# Patient Record
Sex: MALE | Race: BLACK OR AFRICAN AMERICAN | Employment: OTHER | URBAN - METROPOLITAN AREA
[De-identification: names, ages, dates, MRNs, and addresses within clinical notes are randomized per-mention and may not be internally consistent; named-entity substitution may affect disease eponyms.]

---

## 2017-11-01 ENCOUNTER — HOSPITAL ENCOUNTER (EMERGENCY)
Age: 72
Discharge: HOME | End: 2017-11-01
Attending: EMERGENCY MEDICINE
Payer: MEDICARE

## 2017-11-01 VITALS
HEIGHT: 66 IN | HEART RATE: 70 BPM | BODY MASS INDEX: 23.78 KG/M2 | RESPIRATION RATE: 20 BRPM | OXYGEN SATURATION: 100 % | DIASTOLIC BLOOD PRESSURE: 99 MMHG | TEMPERATURE: 98.5 F | SYSTOLIC BLOOD PRESSURE: 180 MMHG | WEIGHT: 148 LBS

## 2017-11-01 DIAGNOSIS — L02.91 ABSCESS: Primary | ICD-10-CM

## 2017-11-01 PROCEDURE — 99282 EMERGENCY DEPT VISIT SF MDM: CPT | Performed by: PHYSICIAN ASSISTANT

## 2017-11-01 RX ORDER — CLINDAMYCIN HYDROCHLORIDE 150 MG/1
150 CAPSULE ORAL 4 TIMES DAILY
Qty: 40 CAP | Refills: 0 | Status: SHIPPED | OUTPATIENT
Start: 2017-11-01 | End: 2018-09-27

## 2017-11-01 NOTE — ED PROVIDER NOTES
Patient is a 73 y.o. male presenting with wound check. The history is provided by the patient. Wound Check    This is a new problem. The current episode started more than 2 days ago. The problem occurs constantly. The problem has been gradually improving. Pain location: left hip/waist area  The quality of the pain is described as aching. The pain is at a severity of 5/10. The pain is mild. The symptoms are aggravated by activity and palpation. He has tried heat for the symptoms. The treatment provided moderate relief. There has been no history of extremity trauma. Past Medical History:   Diagnosis Date    Chronic abdominal pain     EGD, Colonoscopy, CT Abdomen/pelvis-colon polyps, scattered diverticula    EtOH dependence (HCC)     GERD (gastroesophageal reflux disease)     Hypertension     [facility] 9/74/4243       Past Surgical History:   Procedure Laterality Date    HX COLONOSCOPY  4/2016    polyps    HX ENDOSCOPY  4/2016    unremarkable         Family History:   Problem Relation Age of Onset    Hypertension Mother     Kidney Disease Mother     Cancer Father      bone       Social History     Social History    Marital status: SINGLE     Spouse name: N/A    Number of children: N/A    Years of education: N/A     Occupational History    Retired       Social History Main Topics    Smoking status: Former Smoker     Packs/day: 1.00     Years: 15.00     Types: Cigarettes    Smokeless tobacco: Never Used    Alcohol use 0.0 oz/week     0 Standard drinks or equivalent per week      Comment: 5 cans of beer/day    Drug use: Yes     Special: Marijuana    Sexual activity: Not on file     Other Topics Concern    Not on file     Social History Narrative    Lives with his sister. ALLERGIES: Review of patient's allergies indicates no known allergies. Review of Systems   All other systems reviewed and are negative.       Vitals:    11/01/17 0908   BP: (!) 180/99   Pulse: 70   Resp: 20   Temp: 98.5 °F (36.9 °C)   SpO2: 100%   Weight: 67.1 kg (148 lb)   Height: 5' 6\" (1.676 m)            Physical Exam   Constitutional: He is oriented to person, place, and time. He appears well-developed and well-nourished. No distress. HENT:   Head: Normocephalic and atraumatic. Eyes: Conjunctivae and EOM are normal. Pupils are equal, round, and reactive to light. Neck: Normal range of motion. Neck supple. Cardiovascular: Normal rate and regular rhythm. Pulmonary/Chest: Effort normal and breath sounds normal. No respiratory distress. He has no wheezes. Abdominal: Soft. Bowel sounds are normal.   Musculoskeletal: He exhibits tenderness. He exhibits no edema. Left upper hip area with shallow wound,slight yellow drainage    Neurological: He is alert and oriented to person, place, and time. Skin: Skin is warm. Nursing note and vitals reviewed.        MDM  Number of Diagnoses or Management Options  Diagnosis management comments: Draining abscess, no need for any further I&D  Placed on cleocin, and wound covered        Amount and/or Complexity of Data Reviewed  Review and summarize past medical records: yes    Risk of Complications, Morbidity, and/or Mortality  Presenting problems: low  Diagnostic procedures: low  Management options: low    Patient Progress  Patient progress: improved    ED Course       Procedures

## 2017-11-01 NOTE — DISCHARGE INSTRUCTIONS

## 2018-07-14 NOTE — ED TRIAGE NOTES
Pt arrive c/o abscess to left hip x15 years that \"popped\" last week. Pt states soreness.
Principal Discharge DX:	Pancreatitis  Secondary Diagnosis:	Pancreatic pseudocyst

## 2019-05-30 ENCOUNTER — ANESTHESIA EVENT (OUTPATIENT)
Dept: ENDOSCOPY | Age: 74
End: 2019-05-30
Payer: MEDICARE

## 2019-05-30 RX ORDER — SODIUM CHLORIDE, SODIUM LACTATE, POTASSIUM CHLORIDE, CALCIUM CHLORIDE 600; 310; 30; 20 MG/100ML; MG/100ML; MG/100ML; MG/100ML
100 INJECTION, SOLUTION INTRAVENOUS CONTINUOUS
Status: CANCELLED | OUTPATIENT
Start: 2019-05-30

## 2019-05-31 ENCOUNTER — HOSPITAL ENCOUNTER (OUTPATIENT)
Age: 74
Setting detail: OUTPATIENT SURGERY
Discharge: HOME | End: 2019-05-31
Attending: INTERNAL MEDICINE | Admitting: INTERNAL MEDICINE
Payer: MEDICARE

## 2019-05-31 ENCOUNTER — ANESTHESIA (OUTPATIENT)
Dept: ENDOSCOPY | Age: 74
End: 2019-05-31
Payer: MEDICARE

## 2019-05-31 VITALS
OXYGEN SATURATION: 98 % | SYSTOLIC BLOOD PRESSURE: 169 MMHG | RESPIRATION RATE: 18 BRPM | HEIGHT: 66 IN | BODY MASS INDEX: 24.91 KG/M2 | HEART RATE: 68 BPM | DIASTOLIC BLOOD PRESSURE: 74 MMHG | WEIGHT: 155 LBS | TEMPERATURE: 97.6 F

## 2019-05-31 LAB
ALBUMIN SERPL-MCNC: 3.4 G/DL (ref 3.2–4.6)
ALBUMIN/GLOB SERPL: 1 {RATIO} (ref 1.2–3.5)
ALP SERPL-CCNC: 41 U/L (ref 50–136)
ALT SERPL-CCNC: 38 U/L (ref 12–65)
AST SERPL-CCNC: 29 U/L (ref 15–37)
BILIRUB DIRECT SERPL-MCNC: 0.2 MG/DL
BILIRUB SERPL-MCNC: 0.5 MG/DL (ref 0.2–1.1)
GLOBULIN SER CALC-MCNC: 3.5 G/DL (ref 2.3–3.5)
GLUCOSE BLD STRIP.AUTO-MCNC: 104 MG/DL (ref 65–100)
LIPASE SERPL-CCNC: 137 U/L (ref 73–393)
PROT SERPL-MCNC: 6.9 G/DL (ref 6.3–8.2)

## 2019-05-31 PROCEDURE — 80076 HEPATIC FUNCTION PANEL: CPT

## 2019-05-31 PROCEDURE — 74011250636 HC RX REV CODE- 250/636: Performed by: ANESTHESIOLOGY

## 2019-05-31 PROCEDURE — 77030021593 HC FCPS BIOP ENDOSC BSC -A: Performed by: INTERNAL MEDICINE

## 2019-05-31 PROCEDURE — 88305 TISSUE EXAM BY PATHOLOGIST: CPT

## 2019-05-31 PROCEDURE — 76040000026: Performed by: INTERNAL MEDICINE

## 2019-05-31 PROCEDURE — 77030013991 HC SNR POLYP ENDOSC BSC -A: Performed by: INTERNAL MEDICINE

## 2019-05-31 PROCEDURE — 82962 GLUCOSE BLOOD TEST: CPT

## 2019-05-31 PROCEDURE — 74011250636 HC RX REV CODE- 250/636

## 2019-05-31 PROCEDURE — 83690 ASSAY OF LIPASE: CPT

## 2019-05-31 PROCEDURE — 36415 COLL VENOUS BLD VENIPUNCTURE: CPT

## 2019-05-31 PROCEDURE — 88312 SPECIAL STAINS GROUP 1: CPT

## 2019-05-31 PROCEDURE — 76060000032 HC ANESTHESIA 0.5 TO 1 HR: Performed by: INTERNAL MEDICINE

## 2019-05-31 RX ORDER — PROPOFOL 10 MG/ML
INJECTION, EMULSION INTRAVENOUS AS NEEDED
Status: DISCONTINUED | OUTPATIENT
Start: 2019-05-31 | End: 2019-05-31 | Stop reason: HOSPADM

## 2019-05-31 RX ORDER — SODIUM CHLORIDE, SODIUM LACTATE, POTASSIUM CHLORIDE, CALCIUM CHLORIDE 600; 310; 30; 20 MG/100ML; MG/100ML; MG/100ML; MG/100ML
100 INJECTION, SOLUTION INTRAVENOUS CONTINUOUS
Status: DISCONTINUED | OUTPATIENT
Start: 2019-05-31 | End: 2019-05-31 | Stop reason: HOSPADM

## 2019-05-31 RX ORDER — LIDOCAINE HYDROCHLORIDE 20 MG/ML
INJECTION, SOLUTION EPIDURAL; INFILTRATION; INTRACAUDAL; PERINEURAL AS NEEDED
Status: DISCONTINUED | OUTPATIENT
Start: 2019-05-31 | End: 2019-05-31 | Stop reason: HOSPADM

## 2019-05-31 RX ORDER — PROPOFOL 10 MG/ML
INJECTION, EMULSION INTRAVENOUS
Status: DISCONTINUED | OUTPATIENT
Start: 2019-05-31 | End: 2019-05-31 | Stop reason: HOSPADM

## 2019-05-31 RX ORDER — HYDRALAZINE HYDROCHLORIDE 20 MG/ML
10 INJECTION INTRAMUSCULAR; INTRAVENOUS ONCE
Status: COMPLETED | OUTPATIENT
Start: 2019-05-31 | End: 2019-05-31

## 2019-05-31 RX ADMIN — PROPOFOL 45 MG: 10 INJECTION, EMULSION INTRAVENOUS at 12:15

## 2019-05-31 RX ADMIN — HYDRALAZINE HYDROCHLORIDE 10 MG: 20 INJECTION INTRAMUSCULAR; INTRAVENOUS at 13:29

## 2019-05-31 RX ADMIN — PROPOFOL 140 MCG/KG/MIN: 10 INJECTION, EMULSION INTRAVENOUS at 12:13

## 2019-05-31 RX ADMIN — LIDOCAINE HYDROCHLORIDE 100 MG: 20 INJECTION, SOLUTION EPIDURAL; INFILTRATION; INTRACAUDAL; PERINEURAL at 12:13

## 2019-05-31 RX ADMIN — PROPOFOL 40 MG: 10 INJECTION, EMULSION INTRAVENOUS at 12:13

## 2019-05-31 RX ADMIN — SODIUM CHLORIDE, SODIUM LACTATE, POTASSIUM CHLORIDE, AND CALCIUM CHLORIDE 100 ML/HR: 600; 310; 30; 20 INJECTION, SOLUTION INTRAVENOUS at 12:02

## 2019-05-31 NOTE — H&P
[facility] Consult Note           Referring Physician:     Consult Date: 2019    Reason for Consult: epigastric pain and PMH polyps    History of Present Illness:  Patient is a 72 y.o. male who is seen in consultation for epigastric pain and PMH polyps. Past Medical History:   Diagnosis Date    Chronic abdominal pain     EGD, Colonoscopy, CT Abdomen/pelvis-colon polyps, scattered diverticula    EtOH dependence (HCC)     GERD (gastroesophageal reflux disease)     Hypertension     Type [facility] 3466    does not check blood sugars      Past Surgical History:   Procedure Laterality Date    HX COLONOSCOPY  2016    polyps    HX ENDOSCOPY  2016    unremarkable      Family History   Problem Relation Age of Onset    Hypertension Mother     Kidney Disease Mother     Cancer Father         bone     Social History     Occupational History    Occupation: Retired    Tobacco Use    Smoking status: Former Smoker     Packs/day: 1.00     Years: 15.00     Pack years: 15.00     Types: Cigarettes     Last attempt to quit:      Years since quittin.4    Smokeless tobacco: Never Used   Substance and Sexual Activity    Alcohol use: Yes     Alcohol/week: 19.2 oz     Types: 32 Cans of beer per week     Frequency: 4 or more times a week     Drinks per session: 3 or 4     Binge frequency: Never    Drug use: Yes     Types: Marijuana    Sexual activity: Not on file       Hospital Medications:  No current facility-administered medications for this encounter. Allergies:  No Known Allergies    Review of Systems:  A comprehensive review of systems was negative except for that written in the History of Present Illness. Objective:     Physical Exam:  Vitals:  Visit Vitals  BP (!) 189/94   Pulse 69   Resp 17   Ht 5' 6\" (1.676 m)   Wt 70.3 kg (155 lb)   SpO2 100%   BMI 25.02 kg/m²       General: No acute distress.   Skin:  Extremities and face reveal no rashes. No beck erythema. No telangiectasias on the chest wall. HEENT: Sclerae anicteric. No oral ulcers. No abnormal pigmentation of the lips. The neck is supple. Cardiovascular: Regular rate and rhythm. No murmurs, gallops, or rubs. Respiratory:  Comfortable breathing  With no accessory muscle use. Clear breath sounds with no wheezes, rales, or rhonchi. GI:  Abdomen nondistended, soft, and nontender. Normal active bowel sounds. No enlargement of the liver or spleen. No masses palpable. Musculoskeletal:  No pitting edema of the lower legs. Extremities have good range of motion. Neurological:  Gross memory appears intact. Patient is alert and oriented. Psychiatric:  Mood appears appropriate with judgement intact. Lymphatic:  No cervical or supraclavicular adenopathy. Laboratory:    No results for input(s): WBC, RBC, HGB, HCT, PLT, HGBEXT, HCTEXT, PLTEXT in the last 72 hours. No results for input(s): GLU, NA, K, CL, CO2, BUN, CREA, CA in the last 72 hours. No results for input(s): PTP, INR, APTT in the last 72 hours. No lab exists for component: INREXT  No results for input(s): TBIL, CBIL, SGOT, GPT, AP, ALB, TP, AML, LPSE in the last 72 hours.     Assessment:       A 72 y.o. male with epigastric pain and PMH polyps      Plan:       EGD and colonoscopy    Signed By: Morris Vela MD     May 31, 2019

## 2019-05-31 NOTE — ROUTINE PROCESS
VSS. No complaints noted. Education given and reviewed with friend who voiced understanding. Pt wheeled out via wheelchair by ngozi Castañeda.

## 2019-05-31 NOTE — ANESTHESIA PREPROCEDURE EVALUATION
Relevant Problems   No relevant active problems       Anesthetic History   No history of anesthetic complications            Review of Systems / Medical History  Patient summary reviewed and pertinent labs reviewed    Pulmonary          Smoker (Former)         Neuro/Psych   Within defined limits           Cardiovascular    Hypertension: poorly controlled              Exercise tolerance: >4 METS  Comments: Denies CP, SOB or changes in functional status  Pt states he walks several miles per day without issues   GI/Hepatic/Renal     GERD: well controlled           Endo/Other    Diabetes: type 2         Other Findings   Comments: ETOH abuse           Physical Exam    Airway  Mallampati: II  TM Distance: 4 - 6 cm  Neck ROM: normal range of motion   Mouth opening: Normal     Cardiovascular    Rhythm: regular  Rate: normal         Dental    Dentition: Edentulous     Pulmonary  Breath sounds clear to auscultation               Abdominal  GI exam deferred       Other Findings            Anesthetic Plan    ASA: 3  Anesthesia type: total IV anesthesia          Induction: Intravenous  Anesthetic plan and risks discussed with: Patient

## 2019-05-31 NOTE — PROCEDURES
Colonoscopy Procedure Note    Indications: PMH polyps    Anesthesia/Sedation: MAC IV     Pre-Procedure Physical:  Current Facility-Administered Medications   Medication Dose Route Frequency    lactated Ringers infusion  100 mL/hr IntraVENous CONTINUOUS     Facility-Administered Medications Ordered in Other Encounters   Medication Dose Route Frequency    lidocaine (PF) (XYLOCAINE) 20 mg/mL (2 %) injection   IntraVENous PRN    propofol (DIPRIVAN) 10 mg/mL injection    PRN    propofol (DIPRIVAN) 10 mg/mL injection    CONTINUOUS      Patient has no known allergies. Patient Vitals for the past 8 hrs:   BP Pulse Resp SpO2 Height Weight   05/31/19 1148 (!) 189/94 69 17 100 %     05/31/19 1110     5' 6\" (1.676 m) 70.3 kg (155 lb)       Exam:    Airway: clear   Heart: normal S1and S2    Lungs: clear bilateral  Abdomen: soft, nontender, bowel sounds present and normal in all quads   Mental Status: awake, alert and oriented to person, place and time        Procedure Details      Informed consent was obtained for the procedure, including sedation. Risks of perforation, hemorrhage, adverse drug reaction and aspiration were discussed. The patient was placed in the left lateral decubitus position. Based on the pre-procedure assessment, including review of the patient's medical history, medications, allergies, and review of systems, he had been deemed to be an appropriate candidate for conscious sedation; he was therefore sedated with the medications listed below. The patient was monitored continuously with ECG tracing, pulse oximetry, blood pressure monitoring, and direct observations. A rectal examination was performed. The colonoscope was inserted into the rectum and advanced under direct vision to the cecum. The quality of the colonic preparation was fair.  A careful inspection was made as the colonoscope was withdrawn, including a retroflexed view of the rectum; findings and interventions are described below. Appropriate photodocumentation was obtained. Findings:   Single 6 mm sessile polyp removed by cold snare from sigmoid  Single 1 cm sessile polyp removed by hot snare from rectum     Specimens: Polyps    Estimated Blood Loss: 0 cc           Complications: None; patient tolerated the procedure well. Attending Attestation: I performed the procedure.     Impression:    Single 6 mm sessile polyp removed by cold snare from sigmoid  Single 1 cm sessile polyp removed by hot snare from rectum    Recommendations:   Next colonoscopy in 2 years

## 2019-05-31 NOTE — PROCEDURES
Endoscopic Gastroduodenoscopy Procedure Note    Indications: Epigastric pain    Anesthesia/Sedation: MAC IV     Pre-Procedure Physical:    No current facility-administered medications for this encounter. Patient has no known allergies. Patient Vitals for the past 8 hrs:   BP Pulse Resp SpO2 Height Weight   05/31/19 1148 (!) 189/94 69 17 100 %     05/31/19 1110     5' 6\" (1.676 m) 70.3 kg (155 lb)       Exam      Airway: clear   Heart: normal S1and S2    Lungs: clear bilateral  Abdomen: soft, nontender, bowel sounds present and normal in all quads   Mental Status: awake, alert and oriented to person, place and time          Procedure Details     Informed consent was obtained for the procedure, including conscious sedation. Risks of pancreatitis, infection, perforation, hemorrhage, adverse drug reaction and aspiration were discussed. The patient was placed in the left lateral decubitus position. Based on the pre-procedure assessment, including review of the patient's medical history, medications, allergies, and review of systems, he had been deemed to be an appropriate candidate for conscious sedation; he was therefore sedated with the medications listed below. He was monitored continuously with ECG tracing, pulse oximetry, blood pressure monitoring, and direct observation. The EGD gastroscope was inserted into the mouth and advanced under direct vision to the second portion of the duodenum. A careful inspection was made as the gastroscope was withdrawn, including a retroflexed view of the proximal stomach; findings and interventions are described below. Appropriate photodocumentation was obtained. Findings:   Esophagus- Small non-bleeding Judie Thompson tears at [facility]. Stomach- Normal.  Biopsied. Duodenum- Normal.    Therapies: Biopsy    Specimens: Gastric    Estimated Blood Loss: 0 cc           Complications:   None; patient tolerated the procedure well.            Attending Attestation:  I performed the procedure. Impression:    Small non-bleeding Judie Thompson tears at [facility]. Random gastric biopsies taken. Recommendations:  Await path results. Protonix 40 bid. Check LFT's and lipase.

## 2019-05-31 NOTE — PROGRESS NOTES
Blood pressure 169/74. Pt may be discharged at this time per Dr. Dennis Molina. Pt instructed to get blood pressure medication filled today.

## 2019-05-31 NOTE — PROGRESS NOTES
SBP >200 pt no s/s. Dr. Precious Coles at bedside. Orders received to give 10mg hydralazine IV. Will continue to monitor.

## 2019-05-31 NOTE — PROGRESS NOTES
TRANSFER - OUT REPORT:    Verbal report given to [address] [place] [address] on Zoë Kingston  being transferred to [phone] for routine post - op       Report consisted of patients Situation, Background, Assessment and   Recommendations(SBAR). Information from the following report(s) SBAR and MAR was reviewed with the receiving nurse. Lines:   Peripheral IV 05/31/19 Right Hand (Active)   Site Assessment Clean, dry, & intact 5/31/2019 12:54 PM   Phlebitis Assessment 0 5/31/2019 12:54 PM   Infiltration Assessment 0 5/31/2019 12:54 PM   Dressing Status Clean, dry, & intact 5/31/2019 12:54 PM   Dressing Type Transparent;Tape 5/31/2019 12:54 PM   Hub Color/Line Status Blue; Infusing;Patent 5/31/2019 12:00 PM   Alcohol Cap Used No 5/31/2019 12:54 PM        Opportunity for questions and clarification was provided.       Patient transported with:   [employer]

## 2019-05-31 NOTE — DISCHARGE INSTRUCTIONS
Gastrointestinal Esophagogastroduodenoscopy (EGD) - Upper Exam Discharge Instructions    1. Call Dr. Lesa Hu for any problems or questions. 2. Contact the doctor's office for follow up appointment as directed. 3. Medication may cause drowsiness for several hours, therefore:  · Do not drive or operate machinery for remainder of the day. · No alcohol today. · Do not make any important or legal decisions for 24 hours. · Do not sign any legal documents for 24 hours. 5. Ordinarily, you may resume regular diet and activity after exam unless otherwise specified by your physician. 6. For mild soreness in your throat you may use Cepacol throat lozenges or warm salt-water gargles as needed. Any additional instructions: Take Protonix as prescribed for pain. Gastrointestinal Colonoscopy/Flexible Sigmoidoscopy - Lower Exam Discharge Instructions  1. Call Dr. Lesa Hu for any problems or questions. 2. Contact the doctors office for follow up appointment as directed  3. Medication may cause drowsiness for several hours, therefore:  · Do not drive or operate machinery for reminder of the day. · No alcohol today. · Do not make any important or legal decisions for 24 hours. · Do not sign any legal documents for 24 hours. 4. Ordinarily, you may resume regular diet and activity after exam unless otherwise specified by your physician. 5. Because of air put into your colon during exam, you may experience some abdominal distension, relieved by the passage of gas, for several hours. 6. Contact your physician if you have any of the following:  · Excessive amount of bleeding - large amount when having a bowel movement. Occasional specks of blood with bowel movement would not be unusual.  · Severe abdominal pain  · Fever or Chills  7.  Polyp Removal - follow these additional instructions  · Clear liquid diet for the next meal (jell-o, broth, clear drinks)  · Soft diet for 24 hours, then resume regular diet · Take Metamucil - 1 tablespoon in juice every morning for 3 days  · No Aspirin, Advil, Aleve, Nuprin, Ibuprofen, or medications that contain these drugs for 2 weeks. Any additional instructions:      Call Dr. Shae Rowe office in one week for pathology results. Schedule next colonoscopy in 2 years with a 2 day prep. Schedule follow up appointment in 2 months. Instructions given to Bella Mendes and other family members.

## 2019-05-31 NOTE — ANESTHESIA POSTPROCEDURE EVALUATION
Procedure(s):  ESOPHAGOGASTRODUODENOSCOPY (EGD)  COLONOSCOPY/ 26  ESOPHAGOGASTRODUODENAL (EGD) BIOPSY  ENDOSCOPIC POLYPECTOMY. total IV anesthesia    Anesthesia Post Evaluation      Multimodal analgesia: multimodal analgesia not used between 6 hours prior to anesthesia start to PACU discharge  Patient location during evaluation: bedside  Patient participation: complete - patient participated  Level of consciousness: awake  Pain score: 1  Pain management: adequate  Airway patency: patent  Anesthetic complications: no  Cardiovascular status: acceptable  Respiratory status: acceptable  Hydration status: acceptable  Comments: Pt doing well. Pt to fill BP prescription today. Post anesthesia nausea and vomiting:  none      Vitals Value Taken Time   /74 5/31/2019  1:38 PM   Temp     Pulse 63 5/31/2019  1:40 PM   Resp 18 5/31/2019  1:18 PM   SpO2 99 % 5/31/2019  1:40 PM   Vitals shown include unvalidated device data.

## 2019-07-22 ENCOUNTER — PATIENT OUTREACH (OUTPATIENT)
Dept: CASE MANAGEMENT | Age: 74
End: 2019-07-22

## 2019-07-22 NOTE — PROGRESS NOTES
Medication Adherence Outreach    Date/Time of Call:  7/22/2019  11:00 am    Name of medication and dosage:   * Januvia 100 mg 1 every day    Does the patient know the purpose and dosage of medication? * Yes    Are you getting a #30 day or #90 day supply of your medication? * 90 day supply    Are you still taking this medication? If not, why? * Yes    Transportation issues or any problems paying for the medication or other reason? * No    What pharmacy are you using to fill your medication and do you know the last time that you got your medication filled? * [facility]   * Last refill 5/22/2019    Are you having any side effects from taking your medication? * No          Any other questions or concerns expressed by the patient. * Mr. Ulysses Foot states he does not have a glucometer at home to monitor his blood sugar. Mr. Ulysses Foot states he will call his insurance co. to see what glucometer his insurance  will help him with. Comments:  * Medication adherence discussed with Mr. Ulysses Foot and he states he has no current barriers to prevent him from  obtaining or taking his medications.           Call Completed By:  Doretha Mendenhall

## 2019-07-23 ENCOUNTER — PATIENT OUTREACH (OUTPATIENT)
Dept: CASE MANAGEMENT | Age: 74
End: 2019-07-23

## 2019-07-23 NOTE — PROGRESS NOTES
Call from Mr. Brenda Willoughby he would like to have  a glucometer to use at home to monitor his blood sugar. [facility] contacted and One Touch meter and testing supplies are covered with his insurance . Ref# [id number]. Message sent to Dr Cecil Bermudez with a request for One Touch meter and testing supplies. Advised Mr. Brenda Willoughby that he would need instructed on how to use  the glucometer and would need to set up an appointment with Dr Eladio Wood office for instructions after he obtains his glucometer and testing supplies. Isi Robledo He verbalized understanding of advise given .

## 2019-07-30 ENCOUNTER — PATIENT OUTREACH (OUTPATIENT)
Dept: CASE MANAGEMENT | Age: 74
End: 2019-07-30

## 2019-07-30 NOTE — PROGRESS NOTES
Mr. Jose Luis Bautista notified of Glucometer ,test strips and lancets available  for pickup at the pharmacy. Appointment set  up at Dr Nicolasa Hughes office on 7/31/2019 at 3:30 pm  for someone to instruct Mr. Jose Luis Bautista how to use glucometer. Mr. Jose Luis Bautista verbalized understanding of instructions given regarding Rx and appointment at Dr Nicolasa Hughes office.

## 2022-09-16 ENCOUNTER — OFFICE VISIT (OUTPATIENT)
Dept: INTERNAL MEDICINE CLINIC | Facility: CLINIC | Age: 77
End: 2022-09-16
Payer: MEDICARE

## 2022-09-16 VITALS
OXYGEN SATURATION: 100 % | DIASTOLIC BLOOD PRESSURE: 58 MMHG | HEART RATE: 55 BPM | TEMPERATURE: 97.3 F | BODY MASS INDEX: 28.7 KG/M2 | WEIGHT: 162 LBS | SYSTOLIC BLOOD PRESSURE: 130 MMHG | HEIGHT: 63 IN

## 2022-09-16 DIAGNOSIS — N18.30 TYPE 2 DIABETES MELLITUS WITH STAGE 3 CHRONIC KIDNEY DISEASE, WITHOUT LONG-TERM CURRENT USE OF INSULIN, UNSPECIFIED WHETHER STAGE 3A OR 3B CKD (HCC): Primary | ICD-10-CM

## 2022-09-16 DIAGNOSIS — N18.30 TYPE 2 DIABETES MELLITUS WITH STAGE 3 CHRONIC KIDNEY DISEASE, WITHOUT LONG-TERM CURRENT USE OF INSULIN, UNSPECIFIED WHETHER STAGE 3A OR 3B CKD (HCC): ICD-10-CM

## 2022-09-16 DIAGNOSIS — K21.9 GASTROESOPHAGEAL REFLUX DISEASE, UNSPECIFIED WHETHER ESOPHAGITIS PRESENT: ICD-10-CM

## 2022-09-16 DIAGNOSIS — E11.22 TYPE 2 DIABETES MELLITUS WITH STAGE 3 CHRONIC KIDNEY DISEASE, WITHOUT LONG-TERM CURRENT USE OF INSULIN, UNSPECIFIED WHETHER STAGE 3A OR 3B CKD (HCC): ICD-10-CM

## 2022-09-16 DIAGNOSIS — I10 ESSENTIAL HYPERTENSION: ICD-10-CM

## 2022-09-16 DIAGNOSIS — N40.1 BENIGN PROSTATIC HYPERPLASIA WITH LOWER URINARY TRACT SYMPTOMS, SYMPTOM DETAILS UNSPECIFIED: ICD-10-CM

## 2022-09-16 DIAGNOSIS — E11.22 TYPE 2 DIABETES MELLITUS WITH STAGE 3 CHRONIC KIDNEY DISEASE, WITHOUT LONG-TERM CURRENT USE OF INSULIN, UNSPECIFIED WHETHER STAGE 3A OR 3B CKD (HCC): Primary | ICD-10-CM

## 2022-09-16 LAB
ANION GAP SERPL CALC-SCNC: 2 MMOL/L (ref 4–13)
BUN SERPL-MCNC: 36 MG/DL (ref 8–23)
CALCIUM SERPL-MCNC: 9.5 MG/DL (ref 8.3–10.4)
CHLORIDE SERPL-SCNC: 107 MMOL/L (ref 101–110)
CO2 SERPL-SCNC: 27 MMOL/L (ref 21–32)
CREAT SERPL-MCNC: 2.6 MG/DL (ref 0.8–1.5)
EST. AVERAGE GLUCOSE BLD GHB EST-MCNC: 123 MG/DL
GLUCOSE SERPL-MCNC: 114 MG/DL (ref 65–100)
HBA1C MFR BLD: 5.9 % (ref 4.8–5.6)
POTASSIUM SERPL-SCNC: 4.2 MMOL/L (ref 3.5–5.1)
SODIUM SERPL-SCNC: 136 MMOL/L (ref 138–145)

## 2022-09-16 PROCEDURE — 1123F ACP DISCUSS/DSCN MKR DOCD: CPT | Performed by: INTERNAL MEDICINE

## 2022-09-16 PROCEDURE — G8417 CALC BMI ABV UP PARAM F/U: HCPCS | Performed by: INTERNAL MEDICINE

## 2022-09-16 PROCEDURE — 1036F TOBACCO NON-USER: CPT | Performed by: INTERNAL MEDICINE

## 2022-09-16 PROCEDURE — 2022F DILAT RTA XM EVC RTNOPTHY: CPT | Performed by: INTERNAL MEDICINE

## 2022-09-16 PROCEDURE — 99214 OFFICE O/P EST MOD 30 MIN: CPT | Performed by: INTERNAL MEDICINE

## 2022-09-16 PROCEDURE — G8427 DOCREV CUR MEDS BY ELIG CLIN: HCPCS | Performed by: INTERNAL MEDICINE

## 2022-09-16 PROCEDURE — 3051F HG A1C>EQUAL 7.0%<8.0%: CPT | Performed by: INTERNAL MEDICINE

## 2022-09-16 PROCEDURE — 3017F COLORECTAL CA SCREEN DOC REV: CPT | Performed by: INTERNAL MEDICINE

## 2022-09-16 RX ORDER — HYDROCHLOROTHIAZIDE 25 MG/1
25 TABLET ORAL DAILY
Qty: 90 TABLET | Refills: 1 | Status: SHIPPED | OUTPATIENT
Start: 2022-09-16

## 2022-09-16 RX ORDER — TAMSULOSIN HYDROCHLORIDE 0.4 MG/1
0.4 CAPSULE ORAL DAILY
Qty: 90 CAPSULE | Refills: 1 | Status: SHIPPED | OUTPATIENT
Start: 2022-09-16

## 2022-09-16 RX ORDER — ATENOLOL 25 MG/1
25 TABLET ORAL DAILY
Qty: 90 TABLET | Refills: 1 | Status: SHIPPED | OUTPATIENT
Start: 2022-09-16

## 2022-09-16 RX ORDER — LOSARTAN POTASSIUM 100 MG/1
100 TABLET ORAL DAILY
Qty: 90 TABLET | Refills: 1 | Status: SHIPPED | OUTPATIENT
Start: 2022-09-16

## 2022-09-16 RX ORDER — AMLODIPINE BESYLATE 10 MG/1
10 TABLET ORAL DAILY
Qty: 90 TABLET | Refills: 1 | Status: SHIPPED | OUTPATIENT
Start: 2022-09-16

## 2022-09-16 SDOH — ECONOMIC STABILITY: FOOD INSECURITY: WITHIN THE PAST 12 MONTHS, YOU WORRIED THAT YOUR FOOD WOULD RUN OUT BEFORE YOU GOT MONEY TO BUY MORE.: NEVER TRUE

## 2022-09-16 SDOH — ECONOMIC STABILITY: FOOD INSECURITY: WITHIN THE PAST 12 MONTHS, THE FOOD YOU BOUGHT JUST DIDN'T LAST AND YOU DIDN'T HAVE MONEY TO GET MORE.: NEVER TRUE

## 2022-09-16 ASSESSMENT — PATIENT HEALTH QUESTIONNAIRE - PHQ9
SUM OF ALL RESPONSES TO PHQ QUESTIONS 1-9: 0
SUM OF ALL RESPONSES TO PHQ QUESTIONS 1-9: 0
SUM OF ALL RESPONSES TO PHQ9 QUESTIONS 1 & 2: 0
SUM OF ALL RESPONSES TO PHQ QUESTIONS 1-9: 0
2. FEELING DOWN, DEPRESSED OR HOPELESS: 0
1. LITTLE INTEREST OR PLEASURE IN DOING THINGS: 0
SUM OF ALL RESPONSES TO PHQ QUESTIONS 1-9: 0

## 2022-09-16 ASSESSMENT — SOCIAL DETERMINANTS OF HEALTH (SDOH): HOW HARD IS IT FOR YOU TO PAY FOR THE VERY BASICS LIKE FOOD, HOUSING, MEDICAL CARE, AND HEATING?: NOT HARD AT ALL

## 2022-09-16 ASSESSMENT — ENCOUNTER SYMPTOMS: SHORTNESS OF BREATH: 0

## 2022-09-16 NOTE — PROGRESS NOTES
Maryana Louise M.D. Internal Medicine  [address] , [address]   Office : [phone]  Fax : [phone]    Chief Complaint   Patient presents with    Diabetes     4 mo follow up       History of Present Illness:  Kathryn Benson is a 78 y.o. male. Diabetes  Pertinent negatives for hypoglycemia include no speech difficulty. Pertinent negatives for diabetes include no chest pain and no fatigue. Diabetes Mellitus  Patient presents  for follow up on diabetes. Onset of symptoms was several years ago. Patient describes symptoms as none. Course to date has been well controlled. Diet and Lifestyle: follows a diabetic diet regularly  exercises sporadically  nonsmoker  Home glucose monitoring:  Results average n/a. Patient denies polyuria and polydipsia. No wounds in lower limbs. Most recent HbA1c was   Lab Results   Component Value Date    LABA1C 7.5 (H) 01/24/2022     Lab Results   Component Value Date     01/24/2022       Hypertension  Patient is in for Hypertension which is stable. Diet and Lifestyle: generally follows a low sodium diet  Home BP Monitoring: is not measured at home. Patient's recent blood pressures were   BP Readings from Last 3 Encounters:   09/16/22 (!) 130/58   05/17/22 131/67   01/24/22 137/62   . taking medications as instructed, no medication side effects noted, no TIA's, no chest pain on exertion, no dyspnea on exertion, no swelling of ankles. Cardiac risk factors consist of advanced age (older than 54 for men, 72 for women), diabetes mellitus, hypertension, and male gender. GERD  Waxing and waning but still has not yet tried the pepcid. He is scheduled for a colonoscopy, but has not had it yet    Alcohol Dependence  Declines assistance with decreasing alcohol. Discussed [facility] and AA last time but did not go.   Says he drinking 2 or less beers per day    Past Medical History:  Past Medical History: Diagnosis Date    Chronic abdominal pain     EGD, Colonoscopy, CT Abdomen/pelvis-colon polyps, scattered diverticula    Colon polyps     EtOH dependence (HCC)     GERD (gastroesophageal reflux disease)     Helicobacter pylori gastritis     Hypertension     Stage III chronic kidney disease ([facility]     Type 2 diabetes mellitus without complication ([facility] 3/14/9435    does not check blood sugars     Past Surgical History:  Past Surgical History:   Procedure Laterality Date    COLONOSCOPY N/A 2019    COLONOSCOPY/  performed by [facility] Shin MD at [facility] ENDOSCOPY    COLONOSCOPY  2016    polyps    UPPER GASTROINTESTINAL ENDOSCOPY  2016    unremarkable     Allergies:   No Known Allergies  Medications:   Current Outpatient Medications   Medication Sig Dispense Refill    amLODIPine (NORVASC) 10 MG tablet Take 1 tablet by mouth daily 90 tablet 1    atenolol (TENORMIN) 25 MG tablet Take 1 tablet by mouth daily 90 tablet 1    hydroCHLOROthiazide (HYDRODIURIL) 25 MG tablet Take 1 tablet by mouth daily TAKE 1 TABLET BY MOUTH EVERY DAY 90 tablet 1    losartan (COZAAR) 100 MG tablet Take 1 tablet by mouth daily 90 tablet 1    SITagliptin (JANUVIA) 100 MG tablet Take 1 tablet by mouth daily 90 tablet 1    tamsulosin (FLOMAX) 0.4 MG capsule Take 1 capsule by mouth daily 90 capsule 1     No current facility-administered medications for this visit. Social History:  Social History     Tobacco Use    Smoking status: Former     Packs/day: 1.00     Years: 15.00     Pack years: 15.00     Types: Cigarettes     Quit date: 1989     Years since quittin.7    Smokeless tobacco: Never   Substance Use Topics    Alcohol use: Yes     Alcohol/week: 2.0 - 3.0 standard drinks     Family History  Family History   Problem Relation Age of Onset    Cancer Father         bone    Arrhythmia Sister     Hypertension Mother     Kidney Disease Mother        Review of Systems   Constitutional:  Negative for chills, fatigue and fever. Hemoglobin A1C; Future    Essential hypertension  -     amLODIPine (NORVASC) 10 MG tablet; Take 1 tablet by mouth daily  -     atenolol (TENORMIN) 25 MG tablet; Take 1 tablet by mouth daily  -     hydroCHLOROthiazide (HYDRODIURIL) 25 MG tablet; Take 1 tablet by mouth daily TAKE 1 TABLET BY MOUTH EVERY DAY  -     losartan (COZAAR) 100 MG tablet; Take 1 tablet by mouth daily  -     Basic Metabolic Panel; Future    Benign prostatic hyperplasia with lower urinary tract symptoms, symptom details unspecified  -     tamsulosin (FLOMAX) 0.4 MG capsule; Take 1 capsule by mouth daily    Gastroesophageal reflux disease, unspecified whether esophagitis present      Return in about 4 months (around 1/16/2023), or if symptoms worsen or fail to improve.   __  Rodrigo Singh M.D.

## 2022-09-20 ENCOUNTER — TELEPHONE (OUTPATIENT)
Dept: INTERNAL MEDICINE CLINIC | Facility: CLINIC | Age: 77
End: 2022-09-20

## 2022-09-20 DIAGNOSIS — N28.9 KIDNEY FUNCTION ABNORMAL: Primary | ICD-10-CM

## 2023-01-20 ENCOUNTER — OFFICE VISIT (OUTPATIENT)
Dept: INTERNAL MEDICINE CLINIC | Facility: CLINIC | Age: 78
End: 2023-01-20

## 2023-01-20 VITALS
TEMPERATURE: 97.4 F | HEIGHT: 63 IN | DIASTOLIC BLOOD PRESSURE: 62 MMHG | WEIGHT: 165 LBS | HEART RATE: 62 BPM | OXYGEN SATURATION: 99 % | SYSTOLIC BLOOD PRESSURE: 131 MMHG | BODY MASS INDEX: 29.23 KG/M2

## 2023-01-20 DIAGNOSIS — N18.30 TYPE 2 DIABETES MELLITUS WITH STAGE 3 CHRONIC KIDNEY DISEASE, WITHOUT LONG-TERM CURRENT USE OF INSULIN, UNSPECIFIED WHETHER STAGE 3A OR 3B CKD (HCC): Primary | ICD-10-CM

## 2023-01-20 DIAGNOSIS — K21.9 GASTROESOPHAGEAL REFLUX DISEASE, UNSPECIFIED WHETHER ESOPHAGITIS PRESENT: ICD-10-CM

## 2023-01-20 DIAGNOSIS — E11.22 TYPE 2 DIABETES MELLITUS WITH STAGE 3 CHRONIC KIDNEY DISEASE, WITHOUT LONG-TERM CURRENT USE OF INSULIN, UNSPECIFIED WHETHER STAGE 3A OR 3B CKD (HCC): Primary | ICD-10-CM

## 2023-01-20 DIAGNOSIS — N18.30 STAGE 3 CHRONIC KIDNEY DISEASE, UNSPECIFIED WHETHER STAGE 3A OR 3B CKD (HCC): ICD-10-CM

## 2023-01-20 DIAGNOSIS — F10.20 ALCOHOL DEPENDENCE, UNCOMPLICATED (HCC): ICD-10-CM

## 2023-01-20 DIAGNOSIS — N40.1 BENIGN PROSTATIC HYPERPLASIA WITH LOWER URINARY TRACT SYMPTOMS, SYMPTOM DETAILS UNSPECIFIED: ICD-10-CM

## 2023-01-20 DIAGNOSIS — I10 ESSENTIAL HYPERTENSION: ICD-10-CM

## 2023-01-20 RX ORDER — ATENOLOL 25 MG/1
25 TABLET ORAL DAILY
Qty: 90 TABLET | Refills: 1 | Status: SHIPPED | OUTPATIENT
Start: 2023-01-20

## 2023-01-20 RX ORDER — AMLODIPINE BESYLATE 10 MG/1
10 TABLET ORAL DAILY
Qty: 90 TABLET | Refills: 1 | Status: SHIPPED | OUTPATIENT
Start: 2023-01-20

## 2023-01-20 RX ORDER — HYDROCHLOROTHIAZIDE 25 MG/1
TABLET ORAL
Qty: 90 TABLET | Refills: 1 | Status: SHIPPED | OUTPATIENT
Start: 2023-01-20

## 2023-01-20 RX ORDER — TAMSULOSIN HYDROCHLORIDE 0.4 MG/1
0.4 CAPSULE ORAL DAILY
Qty: 90 CAPSULE | Refills: 1 | Status: SHIPPED | OUTPATIENT
Start: 2023-01-20

## 2023-01-20 RX ORDER — LOSARTAN POTASSIUM 100 MG/1
100 TABLET ORAL DAILY
Qty: 90 TABLET | Refills: 1 | Status: SHIPPED | OUTPATIENT
Start: 2023-01-20

## 2023-01-20 ASSESSMENT — PATIENT HEALTH QUESTIONNAIRE - PHQ9
1. LITTLE INTEREST OR PLEASURE IN DOING THINGS: 0
SUM OF ALL RESPONSES TO PHQ QUESTIONS 1-9: 0
2. FEELING DOWN, DEPRESSED OR HOPELESS: 0
SUM OF ALL RESPONSES TO PHQ9 QUESTIONS 1 & 2: 0

## 2023-01-20 ASSESSMENT — ENCOUNTER SYMPTOMS: SHORTNESS OF BREATH: 0

## 2023-01-20 NOTE — PROGRESS NOTES
Luc Carreno M.D. Internal Medicine  [address] , [address]   Office : [phone]  Fax : [phone]    Chief Complaint   Patient presents with    Diabetes     4 mo follow up       History of Present Illness:  Clare Trinidad is a 78 y.o. male. HPI    Diabetes Mellitus  Patient presents  for follow up on diabetes. Onset of symptoms was several years ago. Patient describes symptoms as none. Course to date has been well controlled. Diet and Lifestyle: follows a diabetic diet regularly  exercises sporadically  nonsmoker  Home glucose monitoring:  Results average n/a. Patient denies polyuria and polydipsia. No wounds in lower limbs. Most recent HbA1c was   Hemoglobin A1C   Date Value Ref Range Status   09/16/2022 5.9 (H) 4.8 - 5.6 % Final       Hypertension  Patient is in for Hypertension which is stable. Diet and Lifestyle: generally follows a low sodium diet  Home BP Monitoring: is not measured at home. Patient's recent blood pressures were   BP Readings from Last 3 Encounters:   01/20/23 131/62   09/16/22 (!) 130/58   05/17/22 131/67   . taking medications as instructed, no medication side effects noted, no TIA's, no chest pain on exertion, no dyspnea on exertion, no swelling of ankles. Cardiac risk factors consist of advanced age (older than 54 for men, 72 for women), diabetes mellitus, dyslipidemia, hypertension, and male gender. Lab Results   Component Value Date/Time     09/16/2022 08:54 AM    K 4.2 09/16/2022 08:54 AM     09/16/2022 08:54 AM    CO2 27 09/16/2022 08:54 AM    BUN 36 09/16/2022 08:54 AM    CREATININE 2.60 09/16/2022 08:54 AM    GLUCOSE 114 09/16/2022 08:54 AM    CALCIUM 9.5 09/16/2022 08:54 AM      CKD Stage 3  Being followed by Nephrology    GERD  Waxing and waning but still has not yet tried the pepcid.   He is scheduled for a colonoscopy, but has not had it yet    Alcohol Dependence  Declines assistance with decreasing alcohol. Discussed [facility] and AA last time but did not go. Says he drinking 2 or less beers per day    Past Medical History:  Past Medical History:   Diagnosis Date    Chronic abdominal pain     EGD, Colonoscopy, CT Abdomen/pelvis-colon polyps, scattered diverticula    Colon polyps     EtOH dependence (HCC)     GERD (gastroesophageal reflux disease)     Helicobacter pylori gastritis     Hypertension     Stage III chronic kidney disease ([facility]     Type 2 diabetes mellitus without complication ([facility] [address]     does not check blood sugars     Past Surgical History:  Past Surgical History:   Procedure Laterality Date    COLONOSCOPY N/A 2019    COLONOSCOPY/  performed by Francie Perez MD at [facility] ENDOSCOPY    COLONOSCOPY  2016    polyps    UPPER GASTROINTESTINAL ENDOSCOPY  2016    unremarkable     Allergies:   No Known Allergies  Medications:   Current Outpatient Medications   Medication Sig Dispense Refill    tamsulosin (FLOMAX) 0.4 MG capsule Take 1 capsule by mouth daily 90 capsule 1    SITagliptin (JANUVIA) 100 MG tablet Take 1 tablet by mouth daily 90 tablet 1    losartan (COZAAR) 100 MG tablet Take 1 tablet by mouth daily 90 tablet 1    hydroCHLOROthiazide (HYDRODIURIL) 25 MG tablet TAKE 1 TABLET BY MOUTH EVERY DAY 90 tablet 1    atenolol (TENORMIN) 25 MG tablet Take 1 tablet by mouth daily 90 tablet 1    amLODIPine (NORVASC) 10 MG tablet Take 1 tablet by mouth daily 90 tablet 1     No current facility-administered medications for this visit. Social History:  Social History     Tobacco Use    Smoking status: Former     Packs/day: 1.00     Years: 15.00     Pack years: 15.00     Types: Cigarettes     Quit date: 1989     Years since quittin.0    Smokeless tobacco: Never   Substance Use Topics    Alcohol use:  Yes     Alcohol/week: 2.0 - 3.0 standard drinks     Family History  Family History   Problem Relation Age of Onset    Cancer Father         bone Arrhythmia Sister     Hypertension Mother     Kidney Disease Mother        Review of Systems   Constitutional:  Negative for chills, fatigue and fever. Respiratory:  Negative for shortness of breath. Cardiovascular:  Negative for chest pain and leg swelling. Musculoskeletal:  Negative for gait problem. Skin:  Negative for rash. Neurological:  Negative for speech difficulty. Psychiatric/Behavioral:  Negative for dysphoric mood. Vital Signs  /62 (Site: Left Upper Arm, Position: Sitting, Cuff Size: Large Adult)   Pulse 62   Temp 97.4 °F (36.3 °C) (Temporal)   Ht 5' 3\" (1.6 m)   Wt 165 lb (74.8 kg)   SpO2 99%   BMI 29.23 kg/m²   Body mass index is 29.23 kg/m². Physical Exam  Vitals reviewed. Constitutional:       General: He is not in acute distress. Appearance: Normal appearance. He is not ill-appearing. HENT:      Head: Normocephalic and atraumatic. Eyes:      General: No scleral icterus. Conjunctiva/sclera: Conjunctivae normal.   Neck:      Vascular: No carotid bruit. Cardiovascular:      Rate and Rhythm: Normal rate and regular rhythm. Heart sounds: Normal heart sounds. No murmur heard. Pulmonary:      Effort: Pulmonary effort is normal.      Breath sounds: Normal breath sounds. Musculoskeletal:         General: No swelling. Skin:     Coloration: Skin is not jaundiced. Findings: No rash. Neurological:      General: No focal deficit present. Mental Status: He is alert. Mental status is at baseline. Cranial Nerves: No cranial nerve deficit. Motor: No weakness. Psychiatric:         Mood and Affect: Mood normal.         Behavior: Behavior normal.         Thought Content: Thought content normal.         Judgment: Judgment normal.         Assessment/Plan:  Stepan Grimaldo was seen today for diabetes.     Diagnoses and all orders for this visit:    Type 2 diabetes mellitus with stage 3 chronic kidney disease, without long-term current use of insulin, unspecified whether stage 3a or 3b CKD (HCC)  -     SITagliptin (JANUVIA) 100 MG tablet; Take 1 tablet by mouth daily    Benign prostatic hyperplasia with lower urinary tract symptoms, symptom details unspecified  -     tamsulosin (FLOMAX) 0.4 MG capsule; Take 1 capsule by mouth daily    Essential hypertension  -     losartan (COZAAR) 100 MG tablet; Take 1 tablet by mouth daily  -     hydroCHLOROthiazide (HYDRODIURIL) 25 MG tablet; TAKE 1 TABLET BY MOUTH EVERY DAY  -     atenolol (TENORMIN) 25 MG tablet; Take 1 tablet by mouth daily  -     amLODIPine (NORVASC) 10 MG tablet; Take 1 tablet by mouth daily    Alcohol dependence, uncomplicated (HCC)    Stage 3 chronic kidney disease, unspecified whether stage 3a or 3b CKD (HCC)    Gastroesophageal reflux disease, unspecified whether esophagitis present      Return in about 4 months (around 5/20/2023), or if symptoms worsen or fail to improve.   __  John Birmingham M.D.

## 2023-02-20 ENCOUNTER — APPOINTMENT (OUTPATIENT)
Dept: GENERAL RADIOLOGY | Age: 78
DRG: 629 | End: 2023-02-20
Payer: MEDICARE

## 2023-02-20 ENCOUNTER — APPOINTMENT (OUTPATIENT)
Dept: CT IMAGING | Age: 78
DRG: 629 | End: 2023-02-20
Payer: MEDICARE

## 2023-02-20 ENCOUNTER — HOSPITAL ENCOUNTER (INPATIENT)
Age: 78
LOS: 10 days | Discharge: HOME HEALTH | DRG: 629 | End: 2023-03-02
Attending: EMERGENCY MEDICINE | Admitting: FAMILY MEDICINE
Payer: MEDICARE

## 2023-02-20 DIAGNOSIS — A41.9 SEPTICEMIA (HCC): ICD-10-CM

## 2023-02-20 DIAGNOSIS — E11.621 DIABETIC FOOT ULCER ASSOCIATED WITH TYPE 2 DIABETES MELLITUS, WITH FAT LAYER EXPOSED, UNSPECIFIED LATERALITY, UNSPECIFIED PART OF FOOT (HCC): ICD-10-CM

## 2023-02-20 DIAGNOSIS — M86.072 ACUTE HEMATOGENOUS OSTEOMYELITIS OF LEFT FOOT (HCC): Primary | ICD-10-CM

## 2023-02-20 DIAGNOSIS — L97.502 DIABETIC FOOT ULCER ASSOCIATED WITH TYPE 2 DIABETES MELLITUS, WITH FAT LAYER EXPOSED, UNSPECIFIED LATERALITY, UNSPECIFIED PART OF FOOT (HCC): ICD-10-CM

## 2023-02-20 DIAGNOSIS — E11.628 DIABETIC FOOT INFECTION (HCC): ICD-10-CM

## 2023-02-20 DIAGNOSIS — L08.9 DIABETIC FOOT INFECTION (HCC): ICD-10-CM

## 2023-02-20 PROBLEM — M86.171 ACUTE OSTEOMYELITIS OF RIGHT FOOT (HCC): Status: ACTIVE | Noted: 2023-02-20

## 2023-02-20 PROBLEM — N17.9 AKI (ACUTE KIDNEY INJURY) (HCC): Status: ACTIVE | Noted: 2023-02-20

## 2023-02-20 LAB
ALBUMIN SERPL-MCNC: 3.2 G/DL (ref 3.2–4.6)
ALBUMIN/GLOB SERPL: 0.5 (ref 0.4–1.6)
ALP SERPL-CCNC: 50 U/L (ref 50–136)
ALT SERPL-CCNC: 57 U/L (ref 12–65)
ANION GAP SERPL CALC-SCNC: 7 MMOL/L (ref 2–11)
AST SERPL-CCNC: 36 U/L (ref 15–37)
BASOPHILS # BLD: 0.1 K/UL (ref 0–0.2)
BASOPHILS NFR BLD: 1 % (ref 0–2)
BILIRUB SERPL-MCNC: 0.3 MG/DL (ref 0.2–1.1)
BUN SERPL-MCNC: 38 MG/DL (ref 8–23)
CALCIUM SERPL-MCNC: 10.4 MG/DL (ref 8.3–10.4)
CHLORIDE SERPL-SCNC: 99 MMOL/L (ref 101–110)
CO2 SERPL-SCNC: 25 MMOL/L (ref 21–32)
CREAT SERPL-MCNC: 2.6 MG/DL (ref 0.8–1.5)
DIFFERENTIAL METHOD BLD: ABNORMAL
EKG ATRIAL RATE: 111 BPM
EKG DIAGNOSIS: NORMAL
EKG P AXIS: 72 DEGREES
EKG P-R INTERVAL: 142 MS
EKG Q-T INTERVAL: 320 MS
EKG QRS DURATION: 78 MS
EKG QTC CALCULATION (BAZETT): 435 MS
EKG R AXIS: 43 DEGREES
EKG T AXIS: -2 DEGREES
EKG VENTRICULAR RATE: 111 BPM
EOSINOPHIL # BLD: 0 K/UL (ref 0–0.8)
EOSINOPHIL NFR BLD: 0 % (ref 0.5–7.8)
ERYTHROCYTE [DISTWIDTH] IN BLOOD BY AUTOMATED COUNT: 12.7 % (ref 11.9–14.6)
GLOBULIN SER CALC-MCNC: 6.6 G/DL (ref 2.8–4.5)
GLUCOSE BLD STRIP.AUTO-MCNC: 135 MG/DL (ref 65–100)
GLUCOSE BLD STRIP.AUTO-MCNC: 282 MG/DL (ref 65–100)
GLUCOSE SERPL-MCNC: 230 MG/DL (ref 65–100)
HCT VFR BLD AUTO: 41.6 % (ref 41.1–50.3)
HGB BLD-MCNC: 13.6 G/DL (ref 13.6–17.2)
IMM GRANULOCYTES # BLD AUTO: 0.1 K/UL (ref 0–0.5)
IMM GRANULOCYTES NFR BLD AUTO: 1 % (ref 0–5)
LACTATE SERPL-SCNC: 2 MMOL/L (ref 0.4–2)
LACTATE SERPL-SCNC: 3 MMOL/L (ref 0.4–2)
LYMPHOCYTES # BLD: 1.2 K/UL (ref 0.5–4.6)
LYMPHOCYTES NFR BLD: 11 % (ref 13–44)
MCH RBC QN AUTO: 28.5 PG (ref 26.1–32.9)
MCHC RBC AUTO-ENTMCNC: 32.7 G/DL (ref 31.4–35)
MCV RBC AUTO: 87.2 FL (ref 82–102)
MONOCYTES # BLD: 0.7 K/UL (ref 0.1–1.3)
MONOCYTES NFR BLD: 6 % (ref 4–12)
NEUTS SEG # BLD: 8.6 K/UL (ref 1.7–8.2)
NEUTS SEG NFR BLD: 81 % (ref 43–78)
NRBC # BLD: 0 K/UL (ref 0–0.2)
PLATELET # BLD AUTO: 536 K/UL (ref 150–450)
PMV BLD AUTO: 8.3 FL (ref 9.4–12.3)
POTASSIUM SERPL-SCNC: 4 MMOL/L (ref 3.5–5.1)
PROCALCITONIN SERPL-MCNC: 0.52 NG/ML (ref 0–0.49)
PROT SERPL-MCNC: 9.8 G/DL (ref 6.3–8.2)
RBC # BLD AUTO: 4.77 M/UL (ref 4.23–5.6)
SERVICE CMNT-IMP: ABNORMAL
SERVICE CMNT-IMP: ABNORMAL
SODIUM SERPL-SCNC: 131 MMOL/L (ref 133–143)
WBC # BLD AUTO: 10.6 K/UL (ref 4.3–11.1)

## 2023-02-20 PROCEDURE — 99285 EMERGENCY DEPT VISIT HI MDM: CPT

## 2023-02-20 PROCEDURE — 83605 ASSAY OF LACTIC ACID: CPT

## 2023-02-20 PROCEDURE — 2500000003 HC RX 250 WO HCPCS: Performed by: FAMILY MEDICINE

## 2023-02-20 PROCEDURE — 6360000002 HC RX W HCPCS

## 2023-02-20 PROCEDURE — 97161 PT EVAL LOW COMPLEX 20 MIN: CPT

## 2023-02-20 PROCEDURE — 6360000002 HC RX W HCPCS: Performed by: FAMILY MEDICINE

## 2023-02-20 PROCEDURE — 96374 THER/PROPH/DIAG INJ IV PUSH: CPT

## 2023-02-20 PROCEDURE — 84145 PROCALCITONIN (PCT): CPT

## 2023-02-20 PROCEDURE — 85025 COMPLETE CBC W/AUTO DIFF WBC: CPT

## 2023-02-20 PROCEDURE — 6370000000 HC RX 637 (ALT 250 FOR IP): Performed by: FAMILY MEDICINE

## 2023-02-20 PROCEDURE — 71045 X-RAY EXAM CHEST 1 VIEW: CPT

## 2023-02-20 PROCEDURE — 2580000003 HC RX 258: Performed by: FAMILY MEDICINE

## 2023-02-20 PROCEDURE — 96375 TX/PRO/DX INJ NEW DRUG ADDON: CPT

## 2023-02-20 PROCEDURE — 82962 GLUCOSE BLOOD TEST: CPT

## 2023-02-20 PROCEDURE — 2580000003 HC RX 258

## 2023-02-20 PROCEDURE — 80053 COMPREHEN METABOLIC PANEL: CPT

## 2023-02-20 PROCEDURE — 1100000000 HC RM PRIVATE

## 2023-02-20 PROCEDURE — 6370000000 HC RX 637 (ALT 250 FOR IP)

## 2023-02-20 PROCEDURE — 87040 BLOOD CULTURE FOR BACTERIA: CPT

## 2023-02-20 PROCEDURE — 97530 THERAPEUTIC ACTIVITIES: CPT

## 2023-02-20 PROCEDURE — 36415 COLL VENOUS BLD VENIPUNCTURE: CPT

## 2023-02-20 PROCEDURE — 87070 CULTURE OTHR SPECIMN AEROBIC: CPT

## 2023-02-20 PROCEDURE — 93005 ELECTROCARDIOGRAM TRACING: CPT

## 2023-02-20 PROCEDURE — 73700 CT LOWER EXTREMITY W/O DYE: CPT

## 2023-02-20 PROCEDURE — 81003 URINALYSIS AUTO W/O SCOPE: CPT

## 2023-02-20 RX ORDER — HYDROCODONE BITARTRATE AND ACETAMINOPHEN 5; 325 MG/1; MG/1
1 TABLET ORAL
Status: COMPLETED | OUTPATIENT
Start: 2023-02-20 | End: 2023-02-20

## 2023-02-20 RX ORDER — HYDROCHLOROTHIAZIDE 25 MG/1
25 TABLET ORAL DAILY
Status: DISCONTINUED | OUTPATIENT
Start: 2023-02-21 | End: 2023-02-25

## 2023-02-20 RX ORDER — LOSARTAN POTASSIUM 50 MG/1
100 TABLET ORAL DAILY
Status: DISCONTINUED | OUTPATIENT
Start: 2023-02-21 | End: 2023-03-02 | Stop reason: HOSPADM

## 2023-02-20 RX ORDER — TAMSULOSIN HYDROCHLORIDE 0.4 MG/1
0.4 CAPSULE ORAL DAILY
Status: DISCONTINUED | OUTPATIENT
Start: 2023-02-21 | End: 2023-03-02 | Stop reason: HOSPADM

## 2023-02-20 RX ORDER — HEPARIN SODIUM 5000 [USP'U]/ML
5000 INJECTION, SOLUTION INTRAVENOUS; SUBCUTANEOUS EVERY 8 HOURS SCHEDULED
Status: COMPLETED | OUTPATIENT
Start: 2023-02-20 | End: 2023-02-21

## 2023-02-20 RX ORDER — ATENOLOL 25 MG/1
25 TABLET ORAL DAILY
Status: DISCONTINUED | OUTPATIENT
Start: 2023-02-21 | End: 2023-03-02 | Stop reason: HOSPADM

## 2023-02-20 RX ORDER — POLYETHYLENE GLYCOL 3350 17 G/17G
17 POWDER, FOR SOLUTION ORAL DAILY PRN
Status: DISCONTINUED | OUTPATIENT
Start: 2023-02-20 | End: 2023-03-02 | Stop reason: HOSPADM

## 2023-02-20 RX ORDER — ONDANSETRON 8 MG/1
4 TABLET, ORALLY DISINTEGRATING ORAL EVERY 8 HOURS PRN
Status: DISCONTINUED | OUTPATIENT
Start: 2023-02-20 | End: 2023-03-02 | Stop reason: HOSPADM

## 2023-02-20 RX ORDER — ONDANSETRON 2 MG/ML
4 INJECTION INTRAMUSCULAR; INTRAVENOUS
Status: COMPLETED | OUTPATIENT
Start: 2023-02-20 | End: 2023-02-20

## 2023-02-20 RX ORDER — SODIUM CHLORIDE 0.9 % (FLUSH) 0.9 %
5-40 SYRINGE (ML) INJECTION EVERY 12 HOURS SCHEDULED
Status: DISCONTINUED | OUTPATIENT
Start: 2023-02-20 | End: 2023-03-02 | Stop reason: HOSPADM

## 2023-02-20 RX ORDER — MORPHINE SULFATE 2 MG/ML
2 INJECTION, SOLUTION INTRAMUSCULAR; INTRAVENOUS ONCE
Status: COMPLETED | OUTPATIENT
Start: 2023-02-20 | End: 2023-02-20

## 2023-02-20 RX ORDER — SODIUM CHLORIDE 9 MG/ML
30 INJECTION, SOLUTION INTRAVENOUS ONCE
Status: COMPLETED | OUTPATIENT
Start: 2023-02-20 | End: 2023-02-20

## 2023-02-20 RX ORDER — HYDROCODONE BITARTRATE AND ACETAMINOPHEN 5; 325 MG/1; MG/1
1 TABLET ORAL EVERY 6 HOURS PRN
Status: DISCONTINUED | OUTPATIENT
Start: 2023-02-20 | End: 2023-03-02 | Stop reason: HOSPADM

## 2023-02-20 RX ORDER — SODIUM CHLORIDE 9 MG/ML
INJECTION, SOLUTION INTRAVENOUS PRN
Status: DISCONTINUED | OUTPATIENT
Start: 2023-02-20 | End: 2023-03-02 | Stop reason: HOSPADM

## 2023-02-20 RX ORDER — ONDANSETRON 2 MG/ML
4 INJECTION INTRAMUSCULAR; INTRAVENOUS EVERY 6 HOURS PRN
Status: DISCONTINUED | OUTPATIENT
Start: 2023-02-20 | End: 2023-03-02 | Stop reason: HOSPADM

## 2023-02-20 RX ORDER — INSULIN LISPRO 100 [IU]/ML
0-4 INJECTION, SOLUTION INTRAVENOUS; SUBCUTANEOUS NIGHTLY
Status: DISCONTINUED | OUTPATIENT
Start: 2023-02-20 | End: 2023-03-02 | Stop reason: HOSPADM

## 2023-02-20 RX ORDER — LORAZEPAM 2 MG/ML
1 INJECTION INTRAMUSCULAR EVERY 6 HOURS PRN
Status: DISCONTINUED | OUTPATIENT
Start: 2023-02-20 | End: 2023-02-25 | Stop reason: SDUPTHER

## 2023-02-20 RX ORDER — ACETAMINOPHEN 650 MG/1
650 SUPPOSITORY RECTAL EVERY 6 HOURS PRN
Status: DISCONTINUED | OUTPATIENT
Start: 2023-02-20 | End: 2023-03-02 | Stop reason: HOSPADM

## 2023-02-20 RX ORDER — SODIUM CHLORIDE 9 MG/ML
INJECTION, SOLUTION INTRAVENOUS CONTINUOUS
Status: DISCONTINUED | OUTPATIENT
Start: 2023-02-20 | End: 2023-02-25

## 2023-02-20 RX ORDER — ACETAMINOPHEN 325 MG/1
650 TABLET ORAL EVERY 6 HOURS PRN
Status: DISCONTINUED | OUTPATIENT
Start: 2023-02-20 | End: 2023-03-02 | Stop reason: HOSPADM

## 2023-02-20 RX ORDER — SODIUM CHLORIDE 0.9 % (FLUSH) 0.9 %
5-40 SYRINGE (ML) INJECTION PRN
Status: DISCONTINUED | OUTPATIENT
Start: 2023-02-20 | End: 2023-03-02 | Stop reason: HOSPADM

## 2023-02-20 RX ORDER — INSULIN LISPRO 100 [IU]/ML
0-8 INJECTION, SOLUTION INTRAVENOUS; SUBCUTANEOUS
Status: DISCONTINUED | OUTPATIENT
Start: 2023-02-20 | End: 2023-03-02 | Stop reason: HOSPADM

## 2023-02-20 RX ORDER — AMLODIPINE BESYLATE 10 MG/1
10 TABLET ORAL DAILY
Status: DISCONTINUED | OUTPATIENT
Start: 2023-02-21 | End: 2023-03-02 | Stop reason: HOSPADM

## 2023-02-20 RX ORDER — MORPHINE SULFATE 2 MG/ML
2 INJECTION, SOLUTION INTRAMUSCULAR; INTRAVENOUS EVERY 4 HOURS PRN
Status: DISCONTINUED | OUTPATIENT
Start: 2023-02-20 | End: 2023-03-02 | Stop reason: HOSPADM

## 2023-02-20 RX ADMIN — HYDROCODONE BITARTRATE AND ACETAMINOPHEN 1 TABLET: 5; 325 TABLET ORAL at 12:14

## 2023-02-20 RX ADMIN — SODIUM CHLORIDE 30 ML/KG/HR: 9 INJECTION, SOLUTION INTRAVENOUS at 11:46

## 2023-02-20 RX ADMIN — TUBERCULIN PURIFIED PROTEIN DERIVATIVE 5 UNITS: 5 INJECTION, SOLUTION INTRADERMAL at 15:10

## 2023-02-20 RX ADMIN — INSULIN LISPRO 4 UNITS: 100 INJECTION, SOLUTION INTRAVENOUS; SUBCUTANEOUS at 17:12

## 2023-02-20 RX ADMIN — VANCOMYCIN HYDROCHLORIDE 1750 MG: 10 INJECTION, POWDER, LYOPHILIZED, FOR SOLUTION INTRAVENOUS at 13:27

## 2023-02-20 RX ADMIN — HYDROCODONE BITARTRATE AND ACETAMINOPHEN 1 TABLET: 5; 325 TABLET ORAL at 20:26

## 2023-02-20 RX ADMIN — PIPERACILLIN AND TAZOBACTAM 4500 MG: 4; .5 INJECTION, POWDER, FOR SOLUTION INTRAVENOUS at 12:15

## 2023-02-20 RX ADMIN — MORPHINE SULFATE 2 MG: 2 INJECTION, SOLUTION INTRAMUSCULAR; INTRAVENOUS at 13:26

## 2023-02-20 RX ADMIN — SODIUM CHLORIDE: 9 INJECTION, SOLUTION INTRAVENOUS at 16:11

## 2023-02-20 RX ADMIN — ONDANSETRON 4 MG: 2 INJECTION INTRAMUSCULAR; INTRAVENOUS at 13:26

## 2023-02-20 RX ADMIN — HEPARIN SODIUM 5000 UNITS: 5000 INJECTION INTRAVENOUS; SUBCUTANEOUS at 15:11

## 2023-02-20 RX ADMIN — SODIUM CHLORIDE, PRESERVATIVE FREE 10 ML: 5 INJECTION INTRAVENOUS at 19:50

## 2023-02-20 RX ADMIN — HEPARIN SODIUM 5000 UNITS: 5000 INJECTION INTRAVENOUS; SUBCUTANEOUS at 20:26

## 2023-02-20 RX ADMIN — CEFEPIME 2000 MG: 2 INJECTION, POWDER, FOR SOLUTION INTRAVENOUS at 16:12

## 2023-02-20 ASSESSMENT — PAIN SCALES - GENERAL
PAINLEVEL_OUTOF10: 10
PAINLEVEL_OUTOF10: 1
PAINLEVEL_OUTOF10: 4
PAINLEVEL_OUTOF10: 9
PAINLEVEL_OUTOF10: 10

## 2023-02-20 ASSESSMENT — ENCOUNTER SYMPTOMS
COUGH: 0
BACK PAIN: 0
VOMITING: 0
SHORTNESS OF BREATH: 0
ABDOMINAL PAIN: 0
NAUSEA: 0
CHEST TIGHTNESS: 0
COLOR CHANGE: 0
CONSTIPATION: 0
WHEEZING: 0

## 2023-02-20 ASSESSMENT — PAIN - FUNCTIONAL ASSESSMENT
PAIN_FUNCTIONAL_ASSESSMENT: ACTIVITIES ARE NOT PREVENTED
PAIN_FUNCTIONAL_ASSESSMENT: ACTIVITIES ARE NOT PREVENTED
PAIN_FUNCTIONAL_ASSESSMENT: 0-10

## 2023-02-20 ASSESSMENT — PAIN DESCRIPTION - LOCATION
LOCATION: FOOT
LOCATION: TOE (COMMENT WHICH ONE)
LOCATION: FOOT

## 2023-02-20 ASSESSMENT — PAIN DESCRIPTION - ORIENTATION
ORIENTATION: RIGHT

## 2023-02-20 ASSESSMENT — PAIN DESCRIPTION - PAIN TYPE: TYPE: ACUTE PAIN

## 2023-02-20 ASSESSMENT — PAIN DESCRIPTION - DESCRIPTORS
DESCRIPTORS: ACHING
DESCRIPTORS: SORE

## 2023-02-20 ASSESSMENT — PAIN DESCRIPTION - FREQUENCY: FREQUENCY: CONTINUOUS

## 2023-02-20 ASSESSMENT — LIFESTYLE VARIABLES
HOW OFTEN DO YOU HAVE A DRINK CONTAINING ALCOHOL: NEVER
HOW MANY STANDARD DRINKS CONTAINING ALCOHOL DO YOU HAVE ON A TYPICAL DAY: PATIENT DOES NOT DRINK

## 2023-02-20 ASSESSMENT — PAIN DESCRIPTION - ONSET: ONSET: SUDDEN

## 2023-02-20 NOTE — PROGRESS NOTES
ACUTE PHYSICAL THERAPY GOALS:   (Developed with and agreed upon by patient and/or caregiver.)  Pt will perform bed mobility (I) in 7 therapy sessions. Pt will perform sit-to-stand/ stand-to-sit transfers (I) in 7 therapy sessions. Pt will ambulate 250 ft under (S) without LOB/miss-steps and breaks as needed in 7 therapy sessions. Pt will ascend/ descend 3 stairs without LOB/ miss-step and breaks as needed in 7 therapy sessions. Pt will tolerate multiple sets and reps of BLE exercises in 7 therapy sessions. PHYSICAL THERAPY Initial Assessment, Daily Note, and PM  (Link to Caseload Tracking: PT Visit Days : 1  Acknowledge Orders  Time In/Out  PT Charge Capture  Rehab Caseload Tracker    Reed Slade is a 78 y.o. male   PRIMARY DIAGNOSIS: Acute osteomyelitis of right foot ([address]  Septicemia ([address] [A41.9]  Acute osteomyelitis of right foot ([address] [M86.171]  Acute hematogenous osteomyelitis of left foot ([address] [L85.150]       Reason for Referral: Difficulty in walking, Not elsewhere classified (R26.2)  Other abnormalities of gait and mobility (R26.89)  Inpatient: Payor: Fransisco Asher / Plan: [address] [place] / Product Type: *No Product type* /     ASSESSMENT:     REHAB RECOMMENDATIONS:   Recommendation to date pending progress:  [address] [place] [address] [place] will update as medical course necessitates    Equipment:    None  To Be Determined     ASSESSMENT:  Mr. Edel Monae Is a 78 y.o. male presenting to PT after being admitted on 23 for acute osteomyelitis of R foot. At time of initial evaluation, pt presents below baseline LOF with deficits in transfers, standing dynamic balance, gait and activity tolerance limiting his overall functional mobility. Today, pt performed all mobility with SB/CG (A) while requiring additional use of gait belt for ambulation of 75'x1.  Of note, pt ambulating with antalgic gait associated with decreased damien, wide KRISTINE and increased postural sway although he ultimately did well to avoid any LOB/ miss-steps. Furthermore, pt performed all activity on RA and denied any dizziness, lightheadedness or SOB while moving about on their feet. At this time, pt is an appropriate candidate for skilled PT and will benefit from POC designed to address the aforementioned deficits. Upon completion of treatment, pt was positioned to comfort in bed with needs in reach. RN was made aware of pt performance.      DC Recommendation: HHPT pending medical course and potential surgical intervention     [address] Short Form  [facility] Basic Mobility - Inpatient   How much help is needed turning from your back to your side while in a flat bed without using bedrails?: None  How much help is needed moving from lying on your back to sitting on the side of a flat bed without using bedrails?: None  How much help is needed moving to and from a bed to a chair?: None  How much help is needed standing up from a chair using your arms?: None  How much help is needed walking in hospital room?: A Little  How much help is needed climbing 3-5 steps with a railing?: A Lot  AM-PAC Inpatient Mobility Raw Score : 21  AM-PAC Inpatient T-Scale Score : 50.25  Mobility Inpatient CMS 0-100% Score: 28.97  Mobility Inpatient CMS G-Code Modifier : CJ    SUBJECTIVE:   Mr. Salty Sanchez states, \"Call me [employer]\"     Social/Functional      OBJECTIVE:     PAIN: VITALS / O2: PRECAUTION / Lizette Myers / Vick Distad:   Pre Treatment: 0/10         Post Treatment: 0/10 Vitals        Oxygen  O2 Therapy: Room air   IV    RESTRICTIONS/PRECAUTIONS:                    GROSS EVALUATION: Intact Impaired (Comments):   AROM [x]     PROM []    Strength [x]  WNL; R ankle-foot complex not assessed d/t pain   Balance []  Increased sway d/t antalgic gait but no LOB/miss-steps    Posture [] N/A   Sensation []  NT   Coordination [x]      Tone [x]     Edema []    Activity Tolerance []  Diminished from baseline; limited by pain []      COGNITION/  PERCEPTION: Intact Impaired (Comments):   Orientation [x]     Vision [x]  Not Formally Assessed but WFL   Hearing [x]  Not Formally Assessed but WFL   Cognition  [x]       MOBILITY: I Mod I S SBA CGA Min Mod Max Total  NT x2 Comments:   Bed Mobility    Rolling [] [] [] [x] [] [] [] [] [] [] []    Supine to Sit [] [] [] [x] [] [] [] [] [] [] []    Scooting [] [] [] [x] [] [] [] [] [] [] []    Sit to Supine [] [] [] [x] [] [] [] [] [] [] []    Transfers    Sit to Stand [] [] [] [] [x] [] [] [] [] [] []    Bed to Chair [] [] [] [] [] [] [] [] [] [x] []    Stand to Sit [] [] [] [] [x] [] [] [] [] [] []     [] [] [] [] [] [] [] [] [] [] []    I=Independent, Mod I=Modified Independent, S=Supervision, SBA=Standby Assistance, CGA=Contact Guard Assistance,   Min=Minimal Assistance, Mod=Moderate Assistance, Max=Maximal Assistance, Total=Total Assistance, NT=Not Tested    GAIT: I Mod I S SBA CGA Min Mod Max Total  NT x2 Comments:   Level of Assistance [] [] [] [] [x] [] [] [] [] [] []    Distance   75'x1    DME Gait Belt    Gait Quality Antalgic, Decreased damien , Decreased step clearance, Trunk sway increased, and Wide base of support    Weightbearing Status      Stairs      I=Independent, Mod I=Modified Independent, S=Supervision, SBA=Standby Assistance, CGA=Contact Guard Assistance,   Min=Minimal Assistance, Mod=Moderate Assistance, Max=Maximal Assistance, Total=Total Assistance, NT=Not Tested    PLAN:   FREQUENCY AND DURATION: 2 times/week (will update as medical course necessitates) for duration of hospital stay or until stated goals are met, whichever comes first.    THERAPY PROGNOSIS: Fair    PROBLEM LIST:   (Skilled intervention is medically necessary to address:)  Decreased Activity Tolerance  Decreased AROM/PROM  Decreased Balance  Decreased Gait Ability  Decreased Transfer Abilities  Increased Pain INTERVENTIONS PLANNED:   (Benefits and precautions of physical therapy have been discussed with the patient.)  Self Care Training  Therapeutic Activity  Therapeutic Exercise/HEP  Gait Training  Education       TREATMENT:   EVALUATION: LOW COMPLEXITY: (Untimed Charge)    TREATMENT:   Therapeutic Activity (10 Minutes): Therapeutic activity included Supine to Sit, Sit to Supine, Scooting, Lateral Scooting, Ambulation on level ground, Sitting balance , and Standing balance to improve functional Activity tolerance, Balance, Mobility, and Strength.     TREATMENT GRID:  N/A    AFTER TREATMENT PRECAUTIONS: Bed, Bed/Chair Locked, Call light within reach, Needs within reach, and RN notified    INTERDISCIPLINARY COLLABORATION:  RN/ PCT and PT/ PTA    EDUCATION: Education Given To: Patient  Education Provided: Role of Therapy    TIME IN/OUT:  Time In: 2226  Time Out: 91 [facility]  Minutes: [address], PT

## 2023-02-20 NOTE — PROGRESS NOTES
TRANSFER - IN REPORT:    Verbal report received from [facility] on Madan Adams  being received from ED for routine progression of patient care      Report consisted of patient's Situation, Background, Assessment and   Recommendations(SBAR). Information from the following report(s) Nurse Handoff Report was reviewed with the receiving nurse. Opportunity for questions and clarification was provided. Assessment completed upon patient's arrival to unit and care assumed.

## 2023-02-20 NOTE — ED NOTES
Pt ambulatory to restroom. Pt has slight limp but has steady gait.       Aniceto Moreland, NAA  02/20/23 [id number]

## 2023-02-20 NOTE — PROGRESS NOTES
VANCO DAILY FOLLOW UP NOTE  [address] [facility] Pharmacokinetic Monitoring Service - Vancomycin    Consulting Provider: Layne Barba MD   Indication: [facility]   Target Concentration: Goal AUC/MICK 400-600 mg*hr/L  Day of Therapy: 1  Additional Antimicrobials: cefepime    Patient eligible for piperacillin-tazobactam to cefepime auto-substitution per P&T approved protocol? YES    Pertinent Laboratory Values: Wt Readings from Last 1 Encounters:   02/20/23 163 lb (73.9 kg)     Temp Readings from Last 1 Encounters:   02/20/23 97.7 °F (36.5 °C) (Oral)     Recent Labs     02/20/23  1121   BUN 38*   CREATININE 2.60*   WBC 10.6   PROCAL 0.52*     Estimated Creatinine Clearance: 22 mL/min (A) (based on SCr of 2.6 mg/dL (H)). No results found for: Abiodun Lerma    MRSA Nasal Swab: N/A. Non-respiratory infection    Assessment:  Date/Time Dose Concentration AUC         Note: Serum concentrations collected for AUC dosing may appear elevated if collected in close proximity to the dose administered, this is not necessarily an indication of toxicity    Plan:  Concentration-guided dosing due to renal impairment  Start vancomycin with 1750 mg x 1 dose. Will follow with intermittent dosing currently until renal function improved.     Vancomycin concentrations will be ordered as clinically appropriate   Pharmacy will continue to monitor patient and adjust therapy as indicated    Thank you for the consult,  Charito Hernandez, [address] [facility]

## 2023-02-20 NOTE — ED TRIAGE NOTES
States toe pain right foot times several days. Large wound  noted to medial aspect of right foot.   Denies taking pain medicine

## 2023-02-20 NOTE — ED PROVIDER NOTES
Emergency Department Provider Note                   PCP:                Nathanael Muir MD               Age: 78 y.o. Sex: male       ICD-10-CM    1. Acute hematogenous osteomyelitis of left foot ([facility]  M86.072       2. Septicemia ([facility]  A41.9           DISPOSITION Admitted 02/20/2023 01:27:25 PM       Medical Decision Making  This patient is a 72-year-old male with a past medical history of diabetes, CKD, hypertension, and alcohol dependence who presents today due to right foot pain. Differential diagnoses considered include but is not limited to diabetic foot ulcer, sepsis, osteomyelitis. Physical exam of the patient shows a significant, possibly through and through infected foot wound on his right foot near his first MTP joint. There is edema and erythema present. Patient said he might have stepped on something several weeks ago causing wound to the base of his foot. Just 3 days ago, the wound worsened and extended to the medial aspect of his right foot. (See photos below.)  Patient has an elevated heart rate 116 bpm.  I went ahead and ordered a sepsis bundle as I was concerned for osteomyelitis and sepsis in this patient. White blood cell count is normal.  Lactic acid is elevated at 3 and procalcitonin is also elevated at 0.52. Patient was initiated on IV Zosyn and vancomycin. CT scan of the patient's foot shows gas in the medial cortex of his big toe metatarsal head, proximal phalanx, and bilateral sesamoid bones that is diagnostic of osteomyelitis. Treated the patient's pain with Norco and then later morphine. I discussed admission with the patient. He agrees to be admitted for further treatment and evaluation. I will contacted the hospitalist on-call, Dr. Missy Newman, who agreed to admit this patient for further treatment.     Problems Addressed:  Acute hematogenous osteomyelitis of left foot [facility]): acute illness or injury  Septicemia [facility]): acute illness or injury    Amount and/or Complexity of Data Reviewed  Labs: ordered. Radiology: ordered. ECG/medicine tests: ordered. Risk  Prescription drug management. Decision regarding hospitalization. Complexity of Problem: 1 undiagnosed new problem with uncertain prognosis. (4)    I have conducted an independent ordering and review of Labs. Considerations: Hospitalization was considered. I discussed disposition with another provider. Patient was admitted I have communication with admitting physician. ED Course as of 02/20/23 1327   Mon Feb 20, 2023   1105 Patient initially concerning for sepsis due to heart rate of 116 and obvious infection to his right foot. Sepsis bundle has been ordered as well as antibiotics. Will get CT scan of the patient's right foot for further evaluation. [KS]   7522 IMPRESSION:     Prominent soft tissue defect/ulceration along the medial and plantar aspect of  the big toe metatarsophalangeal joint contacting the underlying sesamoids and  metatarsal head, with dissecting gas extending in the medial cortex of the big  toe metatarsal head, base of the proximal phalanx, and bilateral sesamoids  compatible with osteomyelitis in these areas. Dissecting soft tissue gas along the plantar aspect of the big toe proximal  phalanx, IP joint, and base of the distal phalanx, likely spread of infection. Recommend further evaluation with MRI with/without contrast to evaluate extent  of infection. [KS]   4972 Kidney function stable from previous labs. Patient has sepsis based on lactic acid of 3, procalcitonin of 0.52, heart rate of 116 bpm, and obvious source of infection. CT scan shows osteomyelitis. I contacted hospitalist on-call for further evaluation and treatment of this patient.  [KS]      ED Course User Index  [KS] RADHA Patel        Orders Placed This Encounter   Procedures    Culture, Blood 1    Culture, Blood 2    XR CHEST PORTABLE    CT FOOT RIGHT WO CONTRAST    Lactate, Sepsis    CBC with Auto Differential    CMP    Procalcitonin    Cardiac Monitor - ED Only    Straight Cath (Select if patient is unable to provide a sample)    POCT Urine Dipstick    POCT Urinalysis no Micro    EKG 12 Lead    Saline lock IV    ADMIT TO INPATIENT        Medications   vancomycin (VANCOCIN) 1750 mg in sodium chloride 0.9 % 500 mL IVPB (has no administration in time range)   0.9 % sodium chloride infusion (30 mL/kg/hr × 73.9 kg IntraVENous New Bag 2/20/23 1146)   HYDROcodone-acetaminophen (NORCO) 5-325 MG per tablet 1 tablet (1 tablet Oral Given 2/20/23 1214)   piperacillin-tazobactam (ZOSYN) 4,500 mg in sodium chloride 0.9 % 100 mL IVPB (mini-bag) (4,500 mg IntraVENous New Bag 2/20/23 1215)   morphine injection 2 mg (2 mg IntraVENous Given 2/20/23 1326)   ondansetron (ZOFRAN) injection 4 mg (4 mg IntraVENous Given 2/20/23 1326)       New Prescriptions    No medications on file        Jeremy Dean is a 78 y.o. male who presents to the Emergency Department with chief complaint of    Chief Complaint   Patient presents with    Toe Pain      This patient is a 81-year-old male with a past medical history of diabetes, CKD, hypertension, and alcohol dependence who presents today due to right foot pain. This pain has been ongoing for a while however acutely worsened about 3 days ago. His pain is a 10 out of 10 in severity. Patient states that he possibly stepped on something to injure his foot initially a long time ago and since his wound has gotten worse. He is not taking any medications to help with his symptoms. He has not seen anybody else for this condition. The history is provided by the patient. No  was used. Review of Systems   Constitutional:  Negative for chills, fatigue and fever. HENT:  Negative for congestion. Eyes:  Negative for visual disturbance. Respiratory:  Negative for cough, chest tightness, shortness of breath and wheezing.     Cardiovascular:  Negative for chest pain and leg swelling. Gastrointestinal:  Negative for abdominal pain, constipation, nausea and vomiting. Genitourinary:  Negative for difficulty urinating, dysuria, flank pain and hematuria. Musculoskeletal:  Positive for gait problem. Negative for back pain, neck pain and neck stiffness. Skin:  Positive for wound. Negative for color change. Neurological:  Negative for dizziness, light-headedness, numbness and headaches. All other systems reviewed and are negative. Past Medical History:   Diagnosis Date    Chronic abdominal pain     EGD, Colonoscopy, CT Abdomen/pelvis-colon polyps, scattered diverticula    Colon polyps     EtOH dependence (HCC)     GERD (gastroesophageal reflux disease)     Helicobacter pylori gastritis     Hypertension     Stage III chronic kidney disease (HCC)     Type 2 diabetes mellitus without complication ([facility]     does not check blood sugars        Past Surgical History:   Procedure Laterality Date    COLONOSCOPY N/A 2019    COLONOSCOPY/  performed by Beth Lorenzo MD at [facility] ENDOSCOPY    COLONOSCOPY  2016    polyps    UPPER GASTROINTESTINAL ENDOSCOPY  2016    unremarkable        Family History   Problem Relation Age of Onset    Cancer Father         bone    Arrhythmia Sister     Hypertension Mother     Kidney Disease Mother         Social History     Socioeconomic History    Marital status: Single   Tobacco Use    Smoking status: Former     Packs/day: 1.00     Years: 15.00     Pack years: 15.00     Types: Cigarettes     Quit date: 1989     Years since quittin.1    Smokeless tobacco: Never   Substance and Sexual Activity    Alcohol use: Yes     Alcohol/week: 2.0 - 3.0 standard drinks    Drug use: Yes     Types: Marijuana Meliton [place])   Social History Narrative    Lives with his sister.      Social Determinants of Health     Financial Resource Strain: Low Risk     Difficulty of Paying Living Expenses: Not hard at all   Food Insecurity: No Food Insecurity    Worried About Running Out of Food in the Last Year: Never true    Ran Out of Food in the Last Year: Never true        Allergies: Patient has no known allergies. Previous Medications    AMLODIPINE (NORVASC) 10 MG TABLET    Take 1 tablet by mouth daily    ATENOLOL (TENORMIN) 25 MG TABLET    Take 1 tablet by mouth daily    HYDROCHLOROTHIAZIDE (HYDRODIURIL) 25 MG TABLET    TAKE 1 TABLET BY MOUTH EVERY DAY    LOSARTAN (COZAAR) 100 MG TABLET    Take 1 tablet by mouth daily    SITAGLIPTIN (JANUVIA) 100 MG TABLET    Take 1 tablet by mouth daily    TAMSULOSIN (FLOMAX) 0.4 MG CAPSULE    Take 1 capsule by mouth daily        Vitals signs and nursing note reviewed. Patient Vitals for the past 4 hrs:   Temp Pulse Resp BP SpO2   02/20/23 1051 97.7 °F (36.5 °C) (!) 116 16 (!) 161/81 99 %          Physical Exam  Vitals and nursing note reviewed. Constitutional:       General: He is not in acute distress. Appearance: Normal appearance. He is normal weight. He is not ill-appearing, toxic-appearing or diaphoretic. HENT:      Head: Normocephalic and atraumatic. Eyes:      General: No scleral icterus. Right eye: No discharge. Left eye: No discharge. Extraocular Movements: Extraocular movements intact. Conjunctiva/sclera: Conjunctivae normal.      Pupils: Pupils are equal, round, and reactive to light. Cardiovascular:      Rate and Rhythm: Regular rhythm. Tachycardia present. Pulses: Normal pulses. Heart sounds: Normal heart sounds. No murmur heard. No friction rub. No gallop. Comments: 2+ right dorsalis pedis pulse  Pulmonary:      Effort: Pulmonary effort is normal. No respiratory distress. Breath sounds: Normal breath sounds. No wheezing or rales. Abdominal:      General: Abdomen is flat. There is no distension. Palpations: Abdomen is soft. Tenderness: There is no abdominal tenderness. There is no guarding.    Musculoskeletal:         General: Swelling, tenderness and signs of injury present. Normal range of motion. Cervical back: Normal range of motion and neck supple. Comments: Patient has obvious infected wound to the lateral aspect and plantar surface of his right foot near his first MTP joint. Erythema and edema to right foot. See photo. Skin:     General: Skin is warm and dry. Capillary Refill: Capillary refill takes less than 2 seconds. Neurological:      General: No focal deficit present. Mental Status: He is alert and oriented to person, place, and time. Mental status is at baseline. Gait: Gait abnormal (antalgic). Psychiatric:         Mood and Affect: Mood normal.         Behavior: Behavior normal.         Thought Content: Thought content normal.         Judgment: Judgment normal.        Procedures          ED EKG Interpretation  EKG was interpreted in the absence of a cardiologist.    Rate: 111  EKG Interpretation: EKG Interpretation: sinus rhythm  ST Segments: Normal ST segments - NO STEMI    Is this patient to be included in the SEP-1 core measure due to severe sepsis or septic shock? No Exclusion criteria - the patient is NOT to be included for SEP-1 Core Measure due to: May have criteria for sepsis, but does not meet criteria for severe sepsis or septic shock     Results for orders placed or performed during the hospital encounter of 02/20/23   XR CHEST PORTABLE    Narrative    EXAMINATION: XR CHEST PORTABLE 2/20/2023 11:49 AM    ACCESSION NUMBER: [id number]    COMPARISON: None available    INDICATION: 74-year-old male with sepsis. TECHNIQUE: A single view of the chest was obtained. FINDINGS:    Lungs well inflated. No focal consolidation or edema. No pleural effusion or pneumothorax. Cardiomediastinal silhouette within normal limits. Impression    No acute airspace disease.        CT FOOT RIGHT WO CONTRAST    Narrative    EXAMINATION: CT FOOT RIGHT WO CONTRAST 2/20/2023 11:42     ACCESSION NUMBER: [id number]    COMPARISON: No prior imaging of the right foot available. INDICATION: Infected right medial foot wound, possibly through and through. Hx  CKD and diabetes    TECHNIQUE: Multiple-row detector helical CT examination of the right foot  without intravenous contrast.  Multiplanar reformatting was provided. Radiation dose reduction techniques were used for this study. Our CT scanners  use one or all of the following: Automated exposure control, adjustment of the  mA and/or kV according to patient size, iterative reconstruction. FINDINGS:    Lack of IV contrast limits evaluation. There is a prominent soft tissue defect along the plantar and medial aspect of  the right big toe metatarsophalangeal joint which contacts the underlying  bilateral hallux sesamoids and metatarsal head medially. Soft tissue emphysema  is seen surrounding the first metatarsal head most pronounced medially, with  small volume dissecting gas is seen in the cortex of the medial big toe  metatarsal head communicating with the soft tissue gas (601:26), as well as  trace dissecting gas within the base of the proximal phalanx (603:35). Trace  soft tissue gas is also seen dissecting along the plantar aspect of the big toe  proximal phalanx and interphalangeal joint (603:33). There is heterogeneous  sclerosis of the bilateral hallux sesamoids and along the metatarsal head. Soft  tissue swelling and thickening throughout this region. No evidence of acute  fracture or dislocation. Additional soft tissue thickening and mild swelling  along the plantar aspect of the fifth metatarsophalangeal joint (601:31). Prominent plantar calcaneal enthesophyte. Multifocal vascular calcifications  throughout the lower extremity and foot. No radio opaque foreign body  identified.       Impression    Prominent soft tissue defect/ulceration along the medial and plantar aspect of  the big toe metatarsophalangeal joint contacting the underlying sesamoids and  metatarsal head, with dissecting gas extending in the medial cortex of the big  toe metatarsal head, base of the proximal phalanx, and bilateral sesamoids  compatible with osteomyelitis in these areas. Dissecting soft tissue gas along the plantar aspect of the big toe proximal  phalanx, IP joint, and base of the distal phalanx, likely spread of infection. Recommend further evaluation with MRI with/without contrast to evaluate extent  of infection.    Lactate, Sepsis   Result Value Ref Range    Lactic Acid, Sepsis 3.0 (H) 0.4 - 2.0 MMOL/L   CBC with Auto Differential   Result Value Ref Range    WBC 10.6 4.3 - 11.1 K/uL    RBC 4.77 4.23 - 5.6 M/uL    Hemoglobin 13.6 13.6 - 17.2 g/dL    Hematocrit 41.6 41.1 - 50.3 %    MCV 87.2 82 - 102 FL    MCH 28.5 26.1 - 32.9 PG    MCHC 32.7 31.4 - 35.0 g/dL    RDW 12.7 11.9 - 14.6 %    Platelets 403 (H) 123 - 450 K/uL    MPV 8.3 (L) 9.4 - 12.3 FL    nRBC 0.00 0.0 - 0.2 K/uL    Differential Type AUTOMATED      Seg Neutrophils 81 (H) 43 - 78 %    Lymphocytes 11 (L) 13 - 44 %    Monocytes 6 4.0 - 12.0 %    Eosinophils % 0 (L) 0.5 - 7.8 %    Basophils 1 0.0 - 2.0 %    Immature Granulocytes 1 0.0 - 5.0 %    Segs Absolute 8.6 (H) 1.7 - 8.2 K/UL    Absolute Lymph # 1.2 0.5 - 4.6 K/UL    Absolute Mono # 0.7 0.1 - 1.3 K/UL    Absolute Eos # 0.0 0.0 - 0.8 K/UL    Basophils Absolute 0.1 0.0 - 0.2 K/UL    Absolute Immature Granulocyte 0.1 0.0 - 0.5 K/UL   CMP   Result Value Ref Range    Sodium 131 (L) 133 - 143 mmol/L    Potassium 4.0 3.5 - 5.1 mmol/L    Chloride 99 (L) 101 - 110 mmol/L    CO2 25 21 - 32 mmol/L    Anion Gap 7 2 - 11 mmol/L    Glucose 230 (H) 65 - 100 mg/dL    BUN 38 (H) 8 - 23 MG/DL    Creatinine 2.60 (H) 0.8 - 1.5 MG/DL    Est, Glom Filt Rate 25 (L) >60 ml/min/1.73m2    Calcium 10.4 8.3 - 10.4 MG/DL    Total Bilirubin 0.3 0.2 - 1.1 MG/DL    ALT 57 12 - 65 U/L    AST 36 15 - 37 U/L    Alk Phosphatase 50 50 - 136 U/L    Total Protein 9.8 (H) 6.3 - 8.2 g/dL    Albumin 3.2 3.2 - 4.6 g/dL    Globulin 6.6 (H) 2.8 - 4.5 g/dL    Albumin/Globulin Ratio 0.5 0.4 - 1.6     Procalcitonin   Result Value Ref Range    Procalcitonin 0.52 (H) 0.00 - 0.49 ng/mL   EKG 12 Lead   Result Value Ref Range    Ventricular Rate 111 BPM    Atrial Rate 111 BPM    P-R Interval 142 ms    QRS Duration 78 ms    Q-T Interval 320 ms    QTc Calculation (Bazett) 435 ms    P Axis 72 degrees    R Axis 43 degrees    T Axis -2 degrees    Diagnosis Sinus tachycardia         XR CHEST PORTABLE   Final Result      No acute airspace disease. CT FOOT RIGHT WO CONTRAST   Final Result      Prominent soft tissue defect/ulceration along the medial and plantar aspect of   the big toe metatarsophalangeal joint contacting the underlying sesamoids and   metatarsal head, with dissecting gas extending in the medial cortex of the big   toe metatarsal head, base of the proximal phalanx, and bilateral sesamoids   compatible with osteomyelitis in these areas. Dissecting soft tissue gas along the plantar aspect of the big toe proximal   phalanx, IP joint, and base of the distal phalanx, likely spread of infection. Recommend further evaluation with MRI with/without contrast to evaluate extent   of infection. Voice dictation software was used during the making of this note. This software is not perfect and grammatical and other typographical errors may be present. This note has not been completely proofread for errors.        RADHA Marino  02/20/23 1517

## 2023-02-20 NOTE — ED NOTES
TRANSFER - OUT REPORT:    Verbal report given to [address] [place] [address] on David Quintanilla  being transferred to [phone] for routine progression of patient care       Report consisted of patient's Situation, Background, Assessment and   Recommendations(SBAR). Information from the following report(s) ED SBAR was reviewed with the receiving nurse. [place] Assessment: Presents to emergency department  because of falls (Syncope, seizure, or loss of consciousness): No, Age > 79: Yes, Altered Mental Status, Intoxication with alcohol or substance confusion (Disorientation, impaired judgment, poor safety awaremess, or inability to follow instructions): No, Impaired Mobility: Ambulates or transfers with assistive devices or assistance; Unable to ambulate or transer.: Yes, Nursing Judgement: No  Lines:   Peripheral IV 02/20/23 Right Antecubital (Active)   Site Assessment Clean, dry & intact 02/20/23 1122   Line Status Brisk blood return;Blood return noted 02/20/23 [address] Connections checked and tightened 02/20/23 1122   Phlebitis Assessment No symptoms 02/20/23 1122   Infiltration Assessment 0 02/20/23 1122   Alcohol Cap Used No 02/20/23 1122   Dressing Status New dressing applied 02/20/23 1122        Opportunity for questions and clarification was provided.       Patient transported with:  Transport         Gallito Collazo RN  02/20/23 5740

## 2023-02-20 NOTE — H&P
Hospitalist History and Physical   Admit Date:  2023 10:55 AM   Name:  Cleola Siemens   Age:  78 y.o. Sex:  male  :  1945   MRN:  [id number]   Room:  601/01    Presenting Complaint: Toe Pain     Reason(s) for Admission: Septicemia [facility]) [A41.9]  Acute osteomyelitis of right foot ([facility] [M86.171]  Acute hematogenous osteomyelitis of left foot ([facility] [M86.072]     History of Present Illness:   Cleola Siemens is a 78 y.o. male with medical history of hypertension, hyperlipidemia, diabetes mellitus, CKD stage III and alcohol dependence presented to ED with worsening right foot pain. Patient reports he has right foot wound for couple of weeks. He thinks he might have stepped on something which caused this wound. Reports over the past few days, pain has been significantly worsened. He has not seen anyone for the wound. Has not taken any antibiotic outpatient. Denies chest pain, palpitation, nausea, vomiting, abdominal pain. Reports decreased oral intake. He is not on insulin at home and reports blood glucose well controlled on oral regimen. He drinks couple of beers a day. Reports he used to drink more than this. He is a former smoker, quit years ago. In the ED patient was tachycardic. Labs notable for WBC 10.6, hemoglobin 13.6, platelet 770, sodium 131, potassium 4, creatinine 2.60 (baseline ~1.8). Procalcitonin 0.52 and lactic acid 3. CT foot concerning for osteomyelitis. Hospitalist consulted for admission.       Assessment & Plan:     Acute osteomyelitis of right foot:  Not meeting sepsis criteria on admission  CT foot concerning for osteomyelitis  Blood and wound cultures ordered  Duplex arterial right lower extremity  Wound care consult  Ortho consulted  Start patient on vancomycin/cefepime, pharmacy to dose  Pain control  IV fluids    MEG on CKD stage III:  Baseline ~1.8, on admission creatinine 2.6  Likely in the setting of acute infection  Continue IV fluids  Avoid nephrotoxic agent  Continue to monitor    Lactic acidosis: In the setting of acute infection  Continue IV fluids  Trend lactic acid    Hyponatremia:  Due to decreased oral intake  Continue NS    Hypertension/hyperlipidemia:  Continue home meds: Amlodipine, atenolol, hydrochlorothiazide  Holding losartan in the setting of MEG    Diabetes mellitus:  A1c  Hold oral hypoglycemic  Continue sliding scale  Monitor blood glucose    Alcohol use:  Drinks couple of beers a day  Ativan as needed for withdrawal  Continue to monitor    PT/OT evals and PPD needed/ordered? Yes    Diet: ADULT DIET; Regular  VTE prophylaxis: Heparin  Code status: Full Code    Hospital Problems:  Principal Problem:    Acute osteomyelitis of right foot ([facility]  Active Problems:    Type 2 diabetes mellitus with chronic kidney disease    MEG (acute kidney injury) ([facility]    EtOH dependence ([facility]    GERD (gastroesophageal reflux disease)    Hypertension    Stage III chronic kidney disease ([facility]  Resolved Problems:    * No resolved hospital problems.  *       Past History:     Past Medical History:   Diagnosis Date    Chronic abdominal pain     EGD, Colonoscopy, CT Abdomen/pelvis-colon polyps, scattered diverticula    Colon polyps     EtOH dependence (HCC)     GERD (gastroesophageal reflux disease)     Helicobacter pylori gastritis     Hypertension     Stage III chronic kidney disease (HCC)     Type 2 diabetes mellitus without complication ([facility] 7652    does not check blood sugars       Past Surgical History:   Procedure Laterality Date    COLONOSCOPY N/A 2019    COLONOSCOPY/  performed by Dirk Diaz MD at [facility] ENDOSCOPY    COLONOSCOPY  2016    polyps    UPPER GASTROINTESTINAL ENDOSCOPY  2016    unremarkable        Social History     Tobacco Use    Smoking status: Former     Packs/day: 1.00     Years: 15.00     Pack years: 15.00     Types: Cigarettes     Quit date: 1989     Years since quittin.1    Smokeless tobacco: Never   Substance Use Topics    Alcohol use: Yes     Alcohol/week: 2.0 - 3.0 standard drinks      Social History     Substance and Sexual Activity   Drug Use Yes    Types: Marijuana [place])       Family History   Problem Relation Age of Onset    Cancer Father         bone    Arrhythmia Sister     Hypertension Mother     Kidney Disease Mother         Immunization History   Administered Date(s) Administered    COVID-19, MODERNA BLUE border, Primary or Immunocompromised, (age 12y+), IM, 100 mcg/0.5mL 04/30/2021, 05/28/2021, 12/01/2021    Influenza, FLUAD, (age 72 y+), Adjuvanted, 0.5mL 01/19/2021    Influenza, High Dose (Fluzone 65 yrs and older) 09/23/2021    Influenza, Triv, inactivated, subunit, adjuvanted, IM (Fluad 65 yrs and older) 10/10/2019    Pneumococcal Polysaccharide ([id number]) 03/11/2020     No Known Allergies  Prior to Admit Medications:  Current Outpatient Medications   Medication Instructions    amLODIPine (NORVASC) 10 mg, Oral, DAILY    atenolol (TENORMIN) 25 mg, Oral, DAILY    hydroCHLOROthiazide (HYDRODIURIL) 25 MG tablet TAKE 1 TABLET BY MOUTH EVERY DAY    losartan (COZAAR) 100 mg, Oral, DAILY    SITagliptin (JANUVIA) 100 mg, Oral, DAILY    tamsulosin (FLOMAX) 0.4 mg, Oral, DAILY         Objective:   Patient Vitals for the past 24 hrs:   Temp Pulse Resp BP SpO2   02/20/23 1345 -- 67 15 116/64 --   02/20/23 1051 97.7 °F (36.5 °C) (!) 116 16 (!) 161/81 99 %       Oxygen Therapy  SpO2: 99 %  Pulse Oximeter Device Mode: Continuous  O2 Device: None (Room air)    Estimated body mass index is 28.87 kg/m² as calculated from the following:    Height as of this encounter: 5' 3\" (1.6 m). Weight as of this encounter: 163 lb (73.9 kg). No intake or output data in the 24 hours ending 02/20/23 1439      Physical Exam:    Blood pressure 116/64, pulse 67, temperature 97.7 °F (36.5 °C), temperature source Oral, resp. rate 15, height 5' 3\" (1.6 m), weight 163 lb (73.9 kg), SpO2 99 %. General:    Well nourished. Head:  Normocephalic, atraumatic  Eyes:  Sclerae appear normal.  Pupils equally round. ENT:  Nares appear normal.  Moist oral mucosa  Neck:  No restricted ROM. Trachea midline   CV:   RRR. No m/r/g. No jugular venous distension. Lungs:   CTAB. No wheezing, rhonchi, or rales. Symmetric expansion. Abdomen:   Soft, nontender, nondistended. Extremities: Foul-smelling wound to lateral aspect and plantar surface of right foot near his first-line MTP joint, surrounding erythema and redness  Neuro:  CN II-XII grossly intact. Sensation intact. Psych:  Normal mood and affect.       I have personally reviewed labs and tests:  Recent Labs:  Recent Results (from the past 24 hour(s))   EKG 12 Lead    Collection Time: 02/20/23 11:06 AM   Result Value Ref Range    Ventricular Rate 111 BPM    Atrial Rate 111 BPM    P-R Interval 142 ms    QRS Duration 78 ms    Q-T Interval 320 ms    QTc Calculation (Bazett) 435 ms    P Axis 72 degrees    R Axis 43 degrees    T Axis -2 degrees    Diagnosis Sinus tachycardia    Lactate, Sepsis    Collection Time: 02/20/23 11:21 AM   Result Value Ref Range    Lactic Acid, Sepsis 3.0 (H) 0.4 - 2.0 MMOL/L   CBC with Auto Differential    Collection Time: 02/20/23 11:21 AM   Result Value Ref Range    WBC 10.6 4.3 - 11.1 K/uL    RBC 4.77 4.23 - 5.6 M/uL    Hemoglobin 13.6 13.6 - 17.2 g/dL    Hematocrit 41.6 41.1 - 50.3 %    MCV 87.2 82 - 102 FL    MCH 28.5 26.1 - 32.9 PG    MCHC 32.7 31.4 - 35.0 g/dL    RDW 12.7 11.9 - 14.6 %    Platelets 126 (H) 280 - 450 K/uL    MPV 8.3 (L) 9.4 - 12.3 FL    nRBC 0.00 0.0 - 0.2 K/uL    Differential Type AUTOMATED      Seg Neutrophils 81 (H) 43 - 78 %    Lymphocytes 11 (L) 13 - 44 %    Monocytes 6 4.0 - 12.0 %    Eosinophils % 0 (L) 0.5 - 7.8 %    Basophils 1 0.0 - 2.0 %    Immature Granulocytes 1 0.0 - 5.0 %    Segs Absolute 8.6 (H) 1.7 - 8.2 K/UL    Absolute Lymph # 1.2 0.5 - 4.6 K/UL    Absolute Mono # 0.7 0.1 - 1.3 K/UL    Absolute Eos # 0.0 0.0 - 0.8 K/UL    Basophils Absolute 0.1 0.0 - 0.2 K/UL    Absolute Immature Granulocyte 0.1 0.0 - 0.5 K/UL   CMP    Collection Time: 02/20/23 11:21 AM   Result Value Ref Range    Sodium 131 (L) 133 - 143 mmol/L    Potassium 4.0 3.5 - 5.1 mmol/L    Chloride 99 (L) 101 - 110 mmol/L    CO2 25 21 - 32 mmol/L    Anion Gap 7 2 - 11 mmol/L    Glucose 230 (H) 65 - 100 mg/dL    BUN 38 (H) 8 - 23 MG/DL    Creatinine 2.60 (H) 0.8 - 1.5 MG/DL    Est, Glom Filt Rate 25 (L) >60 ml/min/1.73m2    Calcium 10.4 8.3 - 10.4 MG/DL    Total Bilirubin 0.3 0.2 - 1.1 MG/DL    ALT 57 12 - 65 U/L    AST 36 15 - 37 U/L    Alk Phosphatase 50 50 - 136 U/L    Total Protein 9.8 (H) 6.3 - 8.2 g/dL    Albumin 3.2 3.2 - 4.6 g/dL    Globulin 6.6 (H) 2.8 - 4.5 g/dL    Albumin/Globulin Ratio 0.5 0.4 - 1.6     Procalcitonin    Collection Time: 02/20/23 11:21 AM   Result Value Ref Range    Procalcitonin 0.52 (H) 0.00 - 0.49 ng/mL       I have personally reviewed imaging studies:  XR CHEST PORTABLE    Result Date: 2/20/2023  EXAMINATION: XR CHEST PORTABLE 2/20/2023 11:49 AM ACCESSION NUMBER: [id number] COMPARISON: None available INDICATION: 77-year-old male with sepsis. TECHNIQUE: A single view of the chest was obtained. FINDINGS: Lungs well inflated. No focal consolidation or edema. No pleural effusion or pneumothorax. Cardiomediastinal silhouette within normal limits.     No acute airspace disease.     CT FOOT RIGHT WO CONTRAST    Result Date: 2/20/2023  EXAMINATION: CT FOOT RIGHT WO CONTRAST 2/20/2023 11:42 AM ACCESSION NUMBER: [id number] COMPARISON: No prior imaging of the right foot available. INDICATION: Infected right medial foot wound, possibly through and through.  Hx CKD and diabetes TECHNIQUE: Multiple-row detector helical CT examination of the right foot without intravenous contrast.  Multiplanar reformatting was provided. Radiation dose reduction techniques were used for this study. Our CT scanners use one or all of the following: Automated exposure  control, adjustment of the mA and/or kV according to patient size, iterative reconstruction. FINDINGS: Lack of IV contrast limits evaluation. There is a prominent soft tissue defect along the plantar and medial aspect of the right big toe metatarsophalangeal joint which contacts the underlying bilateral hallux sesamoids and metatarsal head medially. Soft tissue emphysema is seen surrounding the first metatarsal head most pronounced medially, with small volume dissecting gas is seen in the cortex of the medial big toe metatarsal head communicating with the soft tissue gas (601:26), as well as trace dissecting gas within the base of the proximal phalanx (603:35). Trace soft tissue gas is also seen dissecting along the plantar aspect of the big toe proximal phalanx and interphalangeal joint (603:33). There is heterogeneous sclerosis of the bilateral hallux sesamoids and along the metatarsal head. Soft tissue swelling and thickening throughout this region. No evidence of acute fracture or dislocation. Additional soft tissue thickening and mild swelling along the plantar aspect of the fifth metatarsophalangeal joint (601:31). Prominent plantar calcaneal enthesophyte. Multifocal vascular calcifications throughout the lower extremity and foot. No radio opaque foreign body identified. Prominent soft tissue defect/ulceration along the medial and plantar aspect of the big toe metatarsophalangeal joint contacting the underlying sesamoids and metatarsal head, with dissecting gas extending in the medial cortex of the big toe metatarsal head, base of the proximal phalanx, and bilateral sesamoids compatible with osteomyelitis in these areas. Dissecting soft tissue gas along the plantar aspect of the big toe proximal phalanx, IP joint, and base of the distal phalanx, likely spread of infection. Recommend further evaluation with MRI with/without contrast to evaluate extent of infection.       Echocardiogram:  No results found for this or any previous visit. Orders Placed This Encounter   Medications    0.9 % sodium chloride infusion    HYDROcodone-acetaminophen (NORCO) 5-325 MG per tablet 1 tablet    piperacillin-tazobactam (ZOSYN) 4,500 mg in sodium chloride 0.9 % 100 mL IVPB (mini-bag)     Order Specific Question:   Antimicrobial Indications     Answer: Other     Order Specific Question:   Other Abx Indication     Answer: Suspected Sepsis of Skin or Soft Tissue Origin    vancomycin (VANCOCIN) 1750 mg in sodium chloride 0.9 % 500 mL IVPB     Order Specific Question:   Antimicrobial Indications     Answer: Other     Order Specific Question:   Other Abx Indication     Answer:    Suspected Sepsis of Skin or Soft Tissue Origin    morphine injection 2 mg    ondansetron (ZOFRAN) injection 4 mg    amLODIPine (NORVASC) tablet 10 mg    atenolol (TENORMIN) tablet 25 mg    hydroCHLOROthiazide (HYDRODIURIL) tablet 25 mg    losartan (COZAAR) tablet 100 mg    tamsulosin (FLOMAX) capsule 0.4 mg    insulin lispro (HUMALOG) injection vial 0-8 Units    insulin lispro (HUMALOG) injection vial 0-4 Units    sodium chloride flush 0.9 % injection 5-40 mL    sodium chloride flush 0.9 % injection 5-40 mL    0.9 % sodium chloride infusion    OR Linked Order Group     ondansetron (ZOFRAN-ODT) disintegrating tablet 4 mg     ondansetron (ZOFRAN) injection 4 mg    polyethylene glycol (GLYCOLAX) packet 17 g    OR Linked Order Group     acetaminophen (TYLENOL) tablet 650 mg     acetaminophen (TYLENOL) suppository 650 mg    heparin (porcine) injection 5,000 Units    tuberculin injection 5 Units    FOLLOWED BY Linked Order Group     cefepime (MAXIPIME) 2,000 mg in sodium chloride 0.9 % 50 mL IVPB (mini-bag)      Order Specific Question:   Antimicrobial Indications      Answer:   Bone and Joint Infection     cefepime (MAXIPIME) 1,000 mg in sodium chloride 0.9 % 50 mL IVPB (mini-bag)      Order Specific Question:   Antimicrobial Indications      Answer:   Bone and Joint Infection    vancomycin (VANCOCIN) 750 mg in sodium chloride 0.9 % 250 mL IVPB ([id number])     Please adjust dose     Order Specific Question:   Antimicrobial Indications     Answer:   Bone and Joint Infection    0.9 % sodium chloride infusion    morphine injection 2 mg    HYDROcodone-acetaminophen (NORCO) 5-325 MG per tablet 1 tablet         Signed:  Mark Lilly MD    Part of this note may have been written by using a voice dictation software. The note has been proof read but may still contain some grammatical/other typographical errors.

## 2023-02-21 ENCOUNTER — ANESTHESIA EVENT (OUTPATIENT)
Dept: SURGERY | Age: 78
End: 2023-02-21
Payer: MEDICARE

## 2023-02-21 ENCOUNTER — APPOINTMENT (OUTPATIENT)
Dept: ULTRASOUND IMAGING | Age: 78
DRG: 629 | End: 2023-02-21
Payer: MEDICARE

## 2023-02-21 PROBLEM — L08.9 DIABETIC FOOT INFECTION (HCC): Status: ACTIVE | Noted: 2023-02-20

## 2023-02-21 PROBLEM — R00.0 SINUS TACHYCARDIA: Status: RESOLVED | Noted: 2023-02-21 | Resolved: 2023-02-21

## 2023-02-21 PROBLEM — R00.0 SINUS TACHYCARDIA: Status: ACTIVE | Noted: 2023-02-21

## 2023-02-21 PROBLEM — E11.628 DIABETIC FOOT INFECTION (HCC): Status: ACTIVE | Noted: 2023-02-20

## 2023-02-21 PROBLEM — E87.1 HYPONATREMIA: Status: ACTIVE | Noted: 2023-02-21

## 2023-02-21 PROBLEM — E87.1 HYPONATREMIA: Status: RESOLVED | Noted: 2023-02-21 | Resolved: 2023-02-21

## 2023-02-21 PROBLEM — E87.20 LACTIC ACIDOSIS: Status: RESOLVED | Noted: 2023-02-21 | Resolved: 2023-02-21

## 2023-02-21 PROBLEM — E87.20 LACTIC ACIDOSIS: Status: ACTIVE | Noted: 2023-02-21

## 2023-02-21 LAB
ANION GAP SERPL CALC-SCNC: 5 MMOL/L (ref 2–11)
BASOPHILS # BLD: 0.1 K/UL (ref 0–0.2)
BASOPHILS NFR BLD: 1 % (ref 0–2)
BILIRUB UR QL: NEGATIVE
BUN SERPL-MCNC: 31 MG/DL (ref 8–23)
CALCIUM SERPL-MCNC: 8.6 MG/DL (ref 8.3–10.4)
CHLORIDE SERPL-SCNC: 113 MMOL/L (ref 101–110)
CO2 SERPL-SCNC: 23 MMOL/L (ref 21–32)
CREAT SERPL-MCNC: 1.7 MG/DL (ref 0.8–1.5)
DIFFERENTIAL METHOD BLD: ABNORMAL
EOSINOPHIL # BLD: 0.1 K/UL (ref 0–0.8)
EOSINOPHIL NFR BLD: 1 % (ref 0.5–7.8)
ERYTHROCYTE [DISTWIDTH] IN BLOOD BY AUTOMATED COUNT: 12.8 % (ref 11.9–14.6)
EST. AVERAGE GLUCOSE BLD GHB EST-MCNC: 163 MG/DL
GLUCOSE BLD STRIP.AUTO-MCNC: 109 MG/DL (ref 65–100)
GLUCOSE BLD STRIP.AUTO-MCNC: 141 MG/DL (ref 65–100)
GLUCOSE BLD STRIP.AUTO-MCNC: 199 MG/DL (ref 65–100)
GLUCOSE BLD STRIP.AUTO-MCNC: 205 MG/DL (ref 65–100)
GLUCOSE SERPL-MCNC: 115 MG/DL (ref 65–100)
GLUCOSE UR QL STRIP.AUTO: 250 MG/DL
HBA1C MFR BLD: 7.3 % (ref 4.8–5.6)
HCT VFR BLD AUTO: 32.5 % (ref 41.1–50.3)
HGB BLD-MCNC: 10.7 G/DL (ref 13.6–17.2)
IMM GRANULOCYTES # BLD AUTO: 0.1 K/UL (ref 0–0.5)
IMM GRANULOCYTES NFR BLD AUTO: 1 % (ref 0–5)
KETONES UR-MCNC: NEGATIVE MG/DL
LEUKOCYTE ESTERASE UR QL STRIP: NEGATIVE
LYMPHOCYTES # BLD: 1.8 K/UL (ref 0.5–4.6)
LYMPHOCYTES NFR BLD: 21 % (ref 13–44)
MCH RBC QN AUTO: 28.4 PG (ref 26.1–32.9)
MCHC RBC AUTO-ENTMCNC: 32.9 G/DL (ref 31.4–35)
MCV RBC AUTO: 86.2 FL (ref 82–102)
MM INDURATION, POC: 0 MM (ref 0–5)
MONOCYTES # BLD: 1 K/UL (ref 0.1–1.3)
MONOCYTES NFR BLD: 12 % (ref 4–12)
NEUTS SEG # BLD: 5.3 K/UL (ref 1.7–8.2)
NEUTS SEG NFR BLD: 64 % (ref 43–78)
NITRITE UR QL: NEGATIVE
NRBC # BLD: 0 K/UL (ref 0–0.2)
PH UR: 6 (ref 5–9)
PHOSPHATE SERPL-MCNC: 2.3 MG/DL (ref 2.3–3.7)
PLATELET # BLD AUTO: 403 K/UL (ref 150–450)
PLATELET COMMENT: ADEQUATE
PMV BLD AUTO: 8.4 FL (ref 9.4–12.3)
POTASSIUM SERPL-SCNC: 4 MMOL/L (ref 3.5–5.1)
PPD, POC: NEGATIVE
PROT UR QL: 30 MG/DL
RBC # BLD AUTO: 3.77 M/UL (ref 4.23–5.6)
RBC # UR STRIP: ABNORMAL
RBC MORPH BLD: ABNORMAL
SERVICE CMNT-IMP: ABNORMAL
SODIUM SERPL-SCNC: 141 MMOL/L (ref 133–143)
SP GR UR: >1.03 (ref 1–1.02)
UROBILINOGEN UR QL: 0.2 EU/DL (ref 0.2–1)
VANCOMYCIN SERPL-MCNC: 16.1 UG/ML
WBC # BLD AUTO: 8.4 K/UL (ref 4.3–11.1)
WBC MORPH BLD: ABNORMAL

## 2023-02-21 PROCEDURE — 6360000002 HC RX W HCPCS: Performed by: FAMILY MEDICINE

## 2023-02-21 PROCEDURE — 80202 ASSAY OF VANCOMYCIN: CPT

## 2023-02-21 PROCEDURE — 1100000000 HC RM PRIVATE

## 2023-02-21 PROCEDURE — 99222 1ST HOSP IP/OBS MODERATE 55: CPT | Performed by: PHYSICIAN ASSISTANT

## 2023-02-21 PROCEDURE — 97535 SELF CARE MNGMENT TRAINING: CPT

## 2023-02-21 PROCEDURE — 6360000002 HC RX W HCPCS: Performed by: PHYSICIAN ASSISTANT

## 2023-02-21 PROCEDURE — 93922 UPR/L XTREMITY ART 2 LEVELS: CPT

## 2023-02-21 PROCEDURE — 6360000002 HC RX W HCPCS: Performed by: INTERNAL MEDICINE

## 2023-02-21 PROCEDURE — 2580000003 HC RX 258: Performed by: INTERNAL MEDICINE

## 2023-02-21 PROCEDURE — 85025 COMPLETE CBC W/AUTO DIFF WBC: CPT

## 2023-02-21 PROCEDURE — 82962 GLUCOSE BLOOD TEST: CPT

## 2023-02-21 PROCEDURE — 84100 ASSAY OF PHOSPHORUS: CPT

## 2023-02-21 PROCEDURE — 2580000003 HC RX 258: Performed by: FAMILY MEDICINE

## 2023-02-21 PROCEDURE — 36415 COLL VENOUS BLD VENIPUNCTURE: CPT

## 2023-02-21 PROCEDURE — 83036 HEMOGLOBIN GLYCOSYLATED A1C: CPT

## 2023-02-21 PROCEDURE — 80048 BASIC METABOLIC PNL TOTAL CA: CPT

## 2023-02-21 PROCEDURE — 6370000000 HC RX 637 (ALT 250 FOR IP): Performed by: FAMILY MEDICINE

## 2023-02-21 PROCEDURE — 97165 OT EVAL LOW COMPLEX 30 MIN: CPT

## 2023-02-21 RX ORDER — DEXTROSE MONOHYDRATE 100 MG/ML
INJECTION, SOLUTION INTRAVENOUS CONTINUOUS PRN
Status: DISCONTINUED | OUTPATIENT
Start: 2023-02-21 | End: 2023-03-02 | Stop reason: HOSPADM

## 2023-02-21 RX ADMIN — HEPARIN SODIUM 5000 UNITS: 5000 INJECTION INTRAVENOUS; SUBCUTANEOUS at 05:26

## 2023-02-21 RX ADMIN — CEFEPIME 1000 MG: 1 INJECTION, POWDER, FOR SOLUTION INTRAMUSCULAR; INTRAVENOUS at 02:22

## 2023-02-21 RX ADMIN — SODIUM CHLORIDE, PRESERVATIVE FREE 10 ML: 5 INJECTION INTRAVENOUS at 08:25

## 2023-02-21 RX ADMIN — AMLODIPINE BESYLATE 10 MG: 10 TABLET ORAL at 08:22

## 2023-02-21 RX ADMIN — TAMSULOSIN HYDROCHLORIDE 0.4 MG: 0.4 CAPSULE ORAL at 08:21

## 2023-02-21 RX ADMIN — SODIUM CHLORIDE: 9 INJECTION, SOLUTION INTRAVENOUS at 02:34

## 2023-02-21 RX ADMIN — CEFEPIME 2000 MG: 2 INJECTION, POWDER, FOR SOLUTION INTRAVENOUS at 17:49

## 2023-02-21 RX ADMIN — HYDROCHLOROTHIAZIDE 25 MG: 25 TABLET ORAL at 08:21

## 2023-02-21 RX ADMIN — INSULIN LISPRO 2 UNITS: 100 INJECTION, SOLUTION INTRAVENOUS; SUBCUTANEOUS at 17:51

## 2023-02-21 RX ADMIN — SODIUM CHLORIDE: 9 INJECTION, SOLUTION INTRAVENOUS at 22:31

## 2023-02-21 RX ADMIN — HEPARIN SODIUM 5000 UNITS: 5000 INJECTION INTRAVENOUS; SUBCUTANEOUS at 20:35

## 2023-02-21 RX ADMIN — HYDROCODONE BITARTRATE AND ACETAMINOPHEN 1 TABLET: 5; 325 TABLET ORAL at 21:29

## 2023-02-21 RX ADMIN — VANCOMYCIN HYDROCHLORIDE 1000 MG: 1 INJECTION, POWDER, LYOPHILIZED, FOR SOLUTION INTRAVENOUS at 14:22

## 2023-02-21 RX ADMIN — HEPARIN SODIUM 5000 UNITS: 5000 INJECTION INTRAVENOUS; SUBCUTANEOUS at 14:35

## 2023-02-21 RX ADMIN — ATENOLOL 25 MG: 25 TABLET ORAL at 08:21

## 2023-02-21 RX ADMIN — SODIUM CHLORIDE, PRESERVATIVE FREE 10 ML: 5 INJECTION INTRAVENOUS at 19:40

## 2023-02-21 ASSESSMENT — PAIN SCALES - GENERAL
PAINLEVEL_OUTOF10: 2
PAINLEVEL_OUTOF10: 5
PAINLEVEL_OUTOF10: 0

## 2023-02-21 ASSESSMENT — PAIN DESCRIPTION - DESCRIPTORS: DESCRIPTORS: SORE

## 2023-02-21 ASSESSMENT — PAIN DESCRIPTION - LOCATION: LOCATION: FOOT

## 2023-02-21 ASSESSMENT — LIFESTYLE VARIABLES: SMOKING_STATUS: 1

## 2023-02-21 ASSESSMENT — PAIN DESCRIPTION - ORIENTATION: ORIENTATION: RIGHT

## 2023-02-21 ASSESSMENT — PAIN - FUNCTIONAL ASSESSMENT: PAIN_FUNCTIONAL_ASSESSMENT: ACTIVITIES ARE NOT PREVENTED

## 2023-02-21 NOTE — PROGRESS NOTES
Pt is alert and oriented x4. Pt in bed, resting. Bed in low position, wheels locked, call light within reach, instructed to call with any needs. Hourly rounds completed this shift. NAD noted. VSS. Pt has c/o pain, treated per MAR. IV is infusing, and dressing is clean, dry, and intact. Report to be given to day shift nurse.

## 2023-02-21 NOTE — PLAN OF CARE
Problem: Discharge Planning  Goal: Discharge to home or other facility with appropriate resources  Outcome: Progressing  Flowsheets  Taken 2/20/2023 1902 by Keegan Hernandes RN  Discharge to home or other facility with appropriate resources:   Identify barriers to discharge with patient and caregiver   Arrange for needed discharge resources and transportation as appropriate   Identify discharge learning needs (meds, wound care, etc)   Refer to discharge planning if patient needs post-hospital services based on physician order or complex needs related to functional status, cognitive ability or social support system  Taken 2/20/2023 1454 by Beatrice Gabriel RN  Discharge to home or other facility with appropriate resources: Identify barriers to discharge with patient and caregiver     Problem: Pain  Goal: Verbalizes/displays adequate comfort level or baseline comfort level  Outcome: Progressing  Flowsheets (Taken 2/20/2023 1930)  Verbalizes/displays adequate comfort level or baseline comfort level: Encourage patient to monitor pain and request assistance

## 2023-02-21 NOTE — CARE COORDINATION
CM met with patient to complete initial assessment. Patient alert and oriented x3. He states that he lives with his brother and sister in a two story home however, patient lives on the main level/floor. Patient is completely independent with ADL's, still drives. No DME's. Demographics, insurance and PCP confirmed. No hx of HH or STR. PT/OT evaluated patient and recommended HH. CM spoke with patient about recommendation. Patient states, \"I don't think I need it\". He was encouraged to contact his PCP after he discharges if he changes his mind. He voiced understanding. DCP is for patient to return home with no needs. Please consult CM for any needs that may arise. 02/21/23 3833   Service Assessment   Patient Orientation Alert and Oriented   Cognition Alert   History Provided By Patient   Primary Caregiver Self   Support Systems Family Members   PCP Verified by CM Yes   Prior Functional Level Independent in ADLs/IADLs   Current Functional Level Independent in ADLs/IADLs   Can patient return to prior living arrangement Yes   Ability to make needs known: Good   Family able to assist with home care needs: No   Would you like for me to discuss the discharge plan with any other family members/significant others, and if so, who? No   Social/Functional History   Lives With Family   Type of [address] Two [address]   Entrance Stairs - Number of Steps 3-4   Discharge Planning   Type of Residence House   Living Arrangements Family Members   Current Services Prior To Admission None   Potential Assistance Needed N/A   DME Ordered?  No   Potential Assistance Purchasing Medications No   Type of Home Care Services None   Patient expects to be discharged to: [address] Discharge   Transition of Care Consult (CM Consult) N/A   Services At/After Discharge None

## 2023-02-21 NOTE — WOUND CARE
Patient consult noted. Orthopedic surgery team also consulted for same wound. Vascular studies ordered. Wound team will hold consult and see patient if indicated by ortho team. Discussed with primary nurse.

## 2023-02-21 NOTE — PROGRESS NOTES
Consult Received:     Right foot diabetic foot wound: awaiting arterial studies that have ordered but not performed yet.

## 2023-02-21 NOTE — PROGRESS NOTES
Hospitalist Progress Note   Admit Date:  2023 10:55 AM   Name:  Eleanor Knowles   Age:  78 y.o. Sex:  male  :  1945   MRN:  [id number]   Room:  601/01    Presenting Complaint: Right foot pain  Reason(s) for Admission: Septicemia [facility]) [A41.9]  Acute osteomyelitis of right foot ([facility] [M86.171]  Acute hematogenous osteomyelitis of left foot [facility]) [facility] Course & Interval History:   78 y.o. male with medical history of hypertension, hyperlipidemia, diabetes mellitus, CKD stage III and alcohol dependence presented to ED with worsening right foot pain. Patient reports he has right foot wound for couple of weeks. He thinks he might have stepped on something which caused this wound. Reports over the past few days, pain has been significantly worsened. He has not seen anyone for the wound. Has not taken any antibiotic outpatient. Denies chest pain, palpitation, nausea, vomiting, abdominal pain. Reports decreased oral intake. He is not on insulin at home and reports blood glucose well controlled on oral regimen. He drinks couple of beers a day. Reports he used to drink more than this. He is a former smoker, quit years ago. In the ED patient was tachycardic. Labs notable for WBC 10.6, hemoglobin 13.6, platelet 095, sodium 131, potassium 4, creatinine 2.60 (baseline ~1.8). Procalcitonin 0.52 and lactic acid 3. CT foot concerning for osteomyelitis. Hospitalist consulted for admission. Subjective/24hr Events (23): Still with right foot pain. Says he has a hard time walking. Denies CP/palpitations. Denies SOB/cough. Denies N/V/D. Denies abdominal pain. Denies F/C.       Assessment & Plan:     Principal Problem:    Acute osteomyelitis of right foot ([facility] [address]  - CT right foot concerning for OM  - Continue Vanc  - Continue Cefepime  - LE Arterical duplex with BULMARO pending  - Pain control  - PT/OT  - Appreciate Ortho's input and assistance    Active Problems:    MEG (acute kidney injury) ([facility]  - Baseline Cr 1.5-1.6  - 2/20 Cr 2.60  - 2/21 Cr 1.70  - Likely due to dehydration and infection  - Continue IVFs  - Check BMP daily      Hypertension  - Stable  - Continue HCTZ  - Continue Amlodipine  - Continue Atenolol  - Holidng Losartan due to MEG, likely restart in AM      Stage 3a chronic kidney disease (HCC)      Type 2 diabetes mellitus with chronic kidney disease  - Stable  - Continue Humalog SSI      EtOH dependence (HCC)  - No s/s of withdrawal  - CIWA protocol      Diet:  ADULT DIET; Regular  DVT PPx: Heparin  Code status: Full Code    Hospital Problems:  Principal Problem:    Acute osteomyelitis of right foot ([facility]  Active Problems:    MEG (acute kidney injury) ([facility]    Hypertension    Stage 3a chronic kidney disease ([facility]    Type 2 diabetes mellitus with chronic kidney disease    EtOH dependence ([facility]    GERD (gastroesophageal reflux disease)  Resolved Problems:    Hyponatremia    Lactic acidosis    Sinus tachycardia      Discharge Plan:     Location: [facility]  Days: 2-3  PPD Ordered: 2/20    Objective:   Patient Vitals for the past 24 hrs:   Temp Pulse Resp BP SpO2   02/21/23 0719 98.2 °F (36.8 °C) 55 14 132/66 98 %   02/21/23 0451 98.4 °F (36.9 °C) 57 20 (!) 124/59 97 %   02/20/23 2339 98.2 °F (36.8 °C) 56 19 130/67 99 %   02/20/23 2056 -- -- 18 -- --   02/20/23 2026 -- -- 20 -- --   02/20/23 1930 98.6 °F (37 °C) 65 17 123/66 98 %   02/20/23 1345 98 °F (36.7 °C) 67 15 116/64 --   02/20/23 1051 97.7 °F (36.5 °C) (!) 116 16 (!) 161/81 99 %       Oxygen Therapy  SpO2: 98 %  Pulse Oximeter Device Mode: Continuous  O2 Device: None (Room air)    Estimated body mass index is 28.87 kg/m² as calculated from the following:    Height as of this encounter: 5' 3\" (1.6 m). Weight as of this encounter: 163 lb (73.9 kg).     Intake/Output Summary (Last 24 hours) at 2/21/2023 0900  Last data filed at 2/21/2023 0454  Gross per 24 hour   Intake --   Output 575 ml   Net -575 ml         Physical Exam: Blood pressure 132/66, pulse 55, temperature 98.2 °F (36.8 °C), temperature source Oral, resp. rate 14, height 5' 3\" (1.6 m), weight 163 lb (73.9 kg), SpO2 98 %. General:    Well nourished. No overt distress  Head:  Normocephalic, atraumatic  Eyes:  Sclerae appear normal.  Pupils equally round. ENT:  Nares appear normal, no drainage. Moist oral mucosa  Neck:  No restricted ROM. Trachea midline   CV:   RRR. No m/r/g. No jugular venous distension. Lungs:   CTAB. No wheezing, rhonchi, or rales. Respirations even, unlabored  Abdomen: Bowel sounds present. Soft, nontender, nondistended. Extremities: No cyanosis or clubbing. Right foot with dressing, wound clearn  Skin:     No rashes and normal coloration. Warm and dry. Neuro:  CN II-XII grossly intact. Sensation intact. A&Ox3  Psych:  Normal mood and affect.       I have reviewed ordered lab tests and independently visualized imaging below:    Recent Labs:  Recent Results (from the past 48 hour(s))   EKG 12 Lead    Collection Time: 02/20/23 11:06 AM   Result Value Ref Range    Ventricular Rate 111 BPM    Atrial Rate 111 BPM    P-R Interval 142 ms    QRS Duration 78 ms    Q-T Interval 320 ms    QTc Calculation (Bazett) 435 ms    P Axis 72 degrees    R Axis 43 degrees    T Axis -2 degrees    Diagnosis Sinus tachycardia    Lactate, Sepsis    Collection Time: 02/20/23 11:21 AM   Result Value Ref Range    Lactic Acid, Sepsis 3.0 (H) 0.4 - 2.0 MMOL/L   CBC with Auto Differential    Collection Time: 02/20/23 11:21 AM   Result Value Ref Range    WBC 10.6 4.3 - 11.1 K/uL    RBC 4.77 4.23 - 5.6 M/uL    Hemoglobin 13.6 13.6 - 17.2 g/dL    Hematocrit 41.6 41.1 - 50.3 %    MCV 87.2 82 - 102 FL    MCH 28.5 26.1 - 32.9 PG    MCHC 32.7 31.4 - 35.0 g/dL    RDW 12.7 11.9 - 14.6 %    Platelets 692 (H) 488 - 450 K/uL    MPV 8.3 (L) 9.4 - 12.3 FL    nRBC 0.00 0.0 - 0.2 K/uL    Differential Type AUTOMATED      Seg Neutrophils 81 (H) 43 - 78 %    Lymphocytes 11 (L) 13 - 44 %    Monocytes 6 4.0 - 12.0 %    Eosinophils % 0 (L) 0.5 - 7.8 %    Basophils 1 0.0 - 2.0 %    Immature Granulocytes 1 0.0 - 5.0 %    Segs Absolute 8.6 (H) 1.7 - 8.2 K/UL    Absolute Lymph # 1.2 0.5 - 4.6 K/UL    Absolute Mono # 0.7 0.1 - 1.3 K/UL    Absolute Eos # 0.0 0.0 - 0.8 K/UL    Basophils Absolute 0.1 0.0 - 0.2 K/UL    Absolute Immature Granulocyte 0.1 0.0 - 0.5 K/UL   CMP    Collection Time: 02/20/23 11:21 AM   Result Value Ref Range    Sodium 131 (L) 133 - 143 mmol/L    Potassium 4.0 3.5 - 5.1 mmol/L    Chloride 99 (L) 101 - 110 mmol/L    CO2 25 21 - 32 mmol/L    Anion Gap 7 2 - 11 mmol/L    Glucose 230 (H) 65 - 100 mg/dL    BUN 38 (H) 8 - 23 MG/DL    Creatinine 2.60 (H) 0.8 - 1.5 MG/DL    Est, Glom Filt Rate 25 (L) >60 ml/min/1.73m2    Calcium 10.4 8.3 - 10.4 MG/DL    Total Bilirubin 0.3 0.2 - 1.1 MG/DL    ALT 57 12 - 65 U/L    AST 36 15 - 37 U/L    Alk Phosphatase 50 50 - 136 U/L    Total Protein 9.8 (H) 6.3 - 8.2 g/dL    Albumin 3.2 3.2 - 4.6 g/dL    Globulin 6.6 (H) 2.8 - 4.5 g/dL    Albumin/Globulin Ratio 0.5 0.4 - 1.6     Procalcitonin    Collection Time: 02/20/23 11:21 AM   Result Value Ref Range    Procalcitonin 0.52 (H) 0.00 - 0.49 ng/mL   POCT Urinalysis no Micro    Collection Time: 02/20/23 11:33 AM   Result Value Ref Range    Specific Gravity, Urine, POC >1.030 (H) 1.001 - 1.023    pH, Urine, POC 6.0 5.0 - 9.0      Protein, Urine, POC 30 (A) NEG mg/dL    Glucose, UA  (A) NEG mg/dL    Ketones, Urine, POC Negative NEG mg/dL    Bilirubin, Urine, POC Negative NEG      Blood, UA POC Trace Intact (A) NEG      URINE UROBILINOGEN POC 0.2 0.2 - 1.0 EU/dL    Nitrite, Urine, POC Negative NEG      Leukocyte Est, UA POC Negative NEG      Performed by: Xochitl Kidney    Lactate, Sepsis    Collection Time: 02/20/23  2:48 PM   Result Value Ref Range    Lactic Acid, Sepsis 2.0 0.4 - 2.0 MMOL/L   POCT Glucose    Collection Time: 02/20/23  4:16 PM   Result Value Ref Range    POC Glucose 282 (H) 65 - 100 mg/dL    Performed by: Shereen    Culture, Wound Aerobic Only    Collection Time: 02/20/23  7:33 PM    Specimen: Foot    RIGHT   Result Value Ref Range    Special Requests NO SPECIAL REQUESTS      Gram stain PENDING     Culture        No growth after short period of incubation. Further results to follow after overnight incubation. POCT Glucose    Collection Time: 02/20/23  8:39 PM   Result Value Ref Range    POC Glucose 135 (H) 65 - 100 mg/dL    Performed by:  [facility]    Basic Metabolic Panel w/ Reflex to MG    Collection Time: 02/21/23  4:27 AM   Result Value Ref Range    Sodium 141 133 - 143 mmol/L    Potassium 4.0 3.5 - 5.1 mmol/L    Chloride 113 (H) 101 - 110 mmol/L    CO2 23 21 - 32 mmol/L    Anion Gap 5 2 - 11 mmol/L    Glucose 115 (H) 65 - 100 mg/dL    BUN 31 (H) 8 - 23 MG/DL    Creatinine 1.70 (H) 0.8 - 1.5 MG/DL    Est, Glom Filt Rate 42 (L) >60 ml/min/1.73m2    Calcium 8.6 8.3 - 10.4 MG/DL   CBC with Auto Differential    Collection Time: 02/21/23  4:27 AM   Result Value Ref Range    WBC 8.4 4.3 - 11.1 K/uL    RBC 3.77 (L) 4.23 - 5.6 M/uL    Hemoglobin 10.7 (L) 13.6 - 17.2 g/dL    Hematocrit 32.5 (L) 41.1 - 50.3 %    MCV 86.2 82 - 102 FL    MCH 28.4 26.1 - 32.9 PG    MCHC 32.9 31.4 - 35.0 g/dL    RDW 12.8 11.9 - 14.6 %    Platelets 828 083 - 019 K/uL    MPV 8.4 (L) 9.4 - 12.3 FL    nRBC 0.00 0.0 - 0.2 K/uL    Seg Neutrophils 64 43 - 78 %    Lymphocytes 21 13 - 44 %    Monocytes 12 4.0 - 12.0 %    Eosinophils % 1 0.5 - 7.8 %    Basophils 1 0.0 - 2.0 %    Immature Granulocytes 1 0.0 - 5.0 %    Segs Absolute 5.3 1.7 - 8.2 K/UL    Absolute Lymph # 1.8 0.5 - 4.6 K/UL    Absolute Mono # 1.0 0.1 - 1.3 K/UL    Absolute Eos # 0.1 0.0 - 0.8 K/UL    Basophils Absolute 0.1 0.0 - 0.2 K/UL    Absolute Immature Granulocyte 0.1 0.0 - 0.5 K/UL    RBC Comment NORMOCYTIC/NORMOCHROMIC      WBC Comment Result Confirmed By Smear      Platelet Comment ADEQUATE      Differential Type AUTOMATED     Hemoglobin A1C Collection Time: 02/21/23  4:27 AM   Result Value Ref Range    Hemoglobin A1C 7.3 (H) 4.8 - 5.6 %    eAG 163 mg/dL   Vancomycin Level, Random    Collection Time: 02/21/23  4:27 AM   Result Value Ref Range    Vancomycin Rm 16.1 UG/ML   POCT Glucose    Collection Time: 02/21/23  7:20 AM   Result Value Ref Range    POC Glucose 109 (H) 65 - 100 mg/dL    Performed by: Nishi Byrd        Other Studies:  XR CHEST PORTABLE    Result Date: 2/20/2023  EXAMINATION: XR CHEST PORTABLE 2/20/2023 11:49 AM ACCESSION NUMBER: [id number] COMPARISON: None available INDICATION: 44-year-old male with sepsis. TECHNIQUE: A single view of the chest was obtained. FINDINGS: Lungs well inflated. No focal consolidation or edema. No pleural effusion or pneumothorax. Cardiomediastinal silhouette within normal limits. No acute airspace disease. CT FOOT RIGHT WO CONTRAST    Result Date: 2/20/2023  EXAMINATION: CT FOOT RIGHT WO CONTRAST 2/20/2023 11:42 AM ACCESSION NUMBER: [id number] COMPARISON: No prior imaging of the right foot available. INDICATION: Infected right medial foot wound, possibly through and through. Hx CKD and diabetes TECHNIQUE: Multiple-row detector helical CT examination of the right foot without intravenous contrast.  Multiplanar reformatting was provided. Radiation dose reduction techniques were used for this study. Our CT scanners use one or all of the following: Automated exposure control, adjustment of the mA and/or kV according to patient size, iterative reconstruction. FINDINGS: Lack of IV contrast limits evaluation. There is a prominent soft tissue defect along the plantar and medial aspect of the right big toe metatarsophalangeal joint which contacts the underlying bilateral hallux sesamoids and metatarsal head medially.  Soft tissue emphysema is seen surrounding the first metatarsal head most pronounced medially, with small volume dissecting gas is seen in the cortex of the medial big toe metatarsal head communicating with the soft tissue gas (601:26), as well as trace dissecting gas within the base of the proximal phalanx (603:35). Trace soft tissue gas is also seen dissecting along the plantar aspect of the big toe proximal phalanx and interphalangeal joint (603:33). There is heterogeneous sclerosis of the bilateral hallux sesamoids and along the metatarsal head. Soft tissue swelling and thickening throughout this region. No evidence of acute fracture or dislocation. Additional soft tissue thickening and mild swelling along the plantar aspect of the fifth metatarsophalangeal joint (601:31). Prominent plantar calcaneal enthesophyte. Multifocal vascular calcifications throughout the lower extremity and foot. No radio opaque foreign body identified. Prominent soft tissue defect/ulceration along the medial and plantar aspect of the big toe metatarsophalangeal joint contacting the underlying sesamoids and metatarsal head, with dissecting gas extending in the medial cortex of the big toe metatarsal head, base of the proximal phalanx, and bilateral sesamoids compatible with osteomyelitis in these areas. Dissecting soft tissue gas along the plantar aspect of the big toe proximal phalanx, IP joint, and base of the distal phalanx, likely spread of infection. Recommend further evaluation with MRI with/without contrast to evaluate extent of infection.       Current Meds:  Current Facility-Administered Medications   Medication Dose Route Frequency    vancomycin (VANCOCIN) 1,000 mg in sodium chloride 0.9 % 250 mL IVPB ([id number])  1,000 mg IntraVENous Q24H    cefepime (MAXIPIME) 2,000 mg in sodium chloride 0.9 % 50 mL IVPB (mini-bag)  2,000 mg IntraVENous Q12H    glucose chewable tablet 16 g  4 tablet Oral PRN    dextrose bolus 10% 125 mL  125 mL IntraVENous PRN    Or    dextrose bolus 10% 250 mL  250 mL IntraVENous PRN    glucagon (rDNA) injection 1 mg  1 mg SubCUTAneous PRN    dextrose 10 % infusion   IntraVENous Continuous PRN    amLODIPine (NORVASC) tablet 10 mg  10 mg Oral Daily    atenolol (TENORMIN) tablet 25 mg  25 mg Oral Daily    hydroCHLOROthiazide (HYDRODIURIL) tablet 25 mg  25 mg Oral Daily    [Held by provider] losartan (COZAAR) tablet 100 mg  100 mg Oral Daily    tamsulosin (FLOMAX) capsule 0.4 mg  0.4 mg Oral Daily    insulin lispro (HUMALOG) injection vial 0-8 Units  0-8 Units SubCUTAneous TID WC    insulin lispro (HUMALOG) injection vial 0-4 Units  0-4 Units SubCUTAneous Nightly    sodium chloride flush 0.9 % injection 5-40 mL  5-40 mL IntraVENous 2 times per day    sodium chloride flush 0.9 % injection 5-40 mL  5-40 mL IntraVENous PRN    0.9 % sodium chloride infusion   IntraVENous PRN    ondansetron (ZOFRAN-ODT) disintegrating tablet 4 mg  4 mg Oral Q8H PRN    Or    ondansetron (ZOFRAN) injection 4 mg  4 mg IntraVENous Q6H PRN    polyethylene glycol (GLYCOLAX) packet 17 g  17 g Oral Daily PRN    acetaminophen (TYLENOL) tablet 650 mg  650 mg Oral Q6H PRN    Or    acetaminophen (TYLENOL) suppository 650 mg  650 mg Rectal Q6H PRN    heparin (porcine) injection 5,000 Units  5,000 Units SubCUTAneous 3 times per day    tuberculin injection 5 Units  5 Units IntraDERmal Once    0.9 % sodium chloride infusion   IntraVENous Continuous    morphine injection 2 mg  2 mg IntraVENous Q4H PRN    HYDROcodone-acetaminophen (NORCO) 5-325 MG per tablet 1 tablet  1 tablet Oral Q6H PRN    LORazepam (ATIVAN) injection 1 mg  1 mg IntraVENous Q6H PRN       Signed:  Gloria Gallagher DO  2/21/2023

## 2023-02-21 NOTE — PROGRESS NOTES
ACUTE OCCUPATIONAL THERAPY GOALS:   (Developed with and agreed upon by patient and/or caregiver.)  (1.) Ro Cordoba  will complete lower body bathing and dressing with INDEPENDENCE and adaptive equipment as needed. (2.) Ro Cordoba will complete toileting with INDEPENDENCE. (3.) Ro Cordoba will tolerate 25 minutes of OT treatment with 1-2 rest breaks to increase activity tolerance for ADLs. (4.) Ro Cordoba will complete functional transfers with INDEPENDENCE and adaptive equipment as needed. (5.) Ro Cordoba will complete functional ADL task standing at sink INDEPENDENCE and adaptive equipment as needed. Timeframe: 7 visits       OCCUPATIONAL THERAPY Initial Assessment and Daily Note       OT Visit Days: 1  Acknowledge Orders  Time  OT Charge Capture  Rehab Caseload Tracker      Ro Cordoba is a 78 y.o. male   PRIMARY DIAGNOSIS: Acute osteomyelitis of right foot ([address]  Septicemia ([address] [A41.9]  Acute osteomyelitis of right foot ([address] [M86.171]  Acute hematogenous osteomyelitis of left foot ([address] [L62.958]       Reason for Referral: Generalized Muscle Weakness (M62.81)  Difficulty in walking, Not elsewhere classified (R26.2)  Other abnormalities of gait and mobility (R26.89)  Inpatient: Payor: Maris Gallegos / Plan: [address] [place] [address] [place] / Product Type: *No Product type* /     ASSESSMENT:     REHAB RECOMMENDATIONS:   Recommendation to date pending progress:  Setting:  Home Health Therapy    Equipment:    To Be Determined   May need RW? ASSESSMENT:  Mr. Nilesh Fuchs is a 77 y/o male who presents with osteomyelitis of R foot. Various specialists on board to determine POC for foot infection- pt utilized RW this date and cued to bear weight through heel only. At baseline pt lives with his sister, is independent without use of AD, and reports that he does not have a car so he walks everywhere (about 15-20 miles/day). This date, pt up in chair upon arrival and had just finished bathing with nursing. Session limited this date due to wound/pain in R foot. Completed functional mobility of short household distances with CGA-SBA and RW. Verbal cuing throughout for bearing weight through heel in order to not exacerbate the pain. Completed grooming task standing at sink with good dynamic balance. Today pt presents with decreased activity tolerance, functional mobility, and increased pain impacting ADLs. Pt is currently functioning below baseline and would benefit from skilled OT services to address OT goals and plan of care. Rec HHOT at d/c. Maryuri Thomason      [facility]IR[facility] 6 Clicks Daily Activity Inpatient Short Form:    AM-PAC Daily Activity - Inpatient   How much help is needed for putting on and taking off regular lower body clothing?: A Little  How much help is needed for bathing (which includes washing, rinsing, drying)?: A Little  How much help is needed for toileting (which includes using toilet, bedpan, or urinal)?: None  How much help is needed for putting on and taking off regular upper body clothing?: None  How much help is needed for taking care of personal grooming?: None  How much help for eating meals?: None  [facility] Inpatient Daily Activity Raw Score: 22  AM-PAC Inpatient ADL T-Scale Score : 47.1  ADL Inpatient CMS 0-100% Score: 25.8  ADL Inpatient CMS G-Code Modifier : CJ           SUBJECTIVE:     Mr. Nilesh Fuchs states, \"well I might need a car now\"     Social/Functional      OBJECTIVE:     Rosanna Abrahamd / [place] Amis / Lemond Oz: None    RESTRICTIONS/PRECAUTIONS:       PAIN: VITALS / O2:   Pre Treatment:    C/o pain in R foot, did not provide number      Post Treatment: same, however resting comfortably in chair       Vitals          Oxygen            GROSS EVALUATION: INTACT IMPAIRED   (See Comments)   UE AROM [x] []   UE PROM [x] []   Strength []  Generalized weakness but functional     Posture / Balance [] Posture: Good  Sitting - Static: Good  Sitting - Dynamic: Good  Standing - Static: Good  Standing - Dynamic: Good   Sensation []  Decreased in B feet   Coordination []       Tone []       Edema []    Activity Tolerance []  Decreased, limited by wound and pain in R foot     Hand Dominance R [] L []      COGNITION/  PERCEPTION: INTACT IMPAIRED   (See Comments)   Orientation [x]     Vision [x]     Hearing [x]     Cognition  [x]     Perception []       MOBILITY: I Mod I S SBA CGA Min Mod Max Total  NT x2 Comments:   Bed Mobility    Rolling [] [] [] [] [] [] [] [] [] [x] []    Supine to Sit [] [] [] [] [] [] [] [] [] [x] []    Scooting [] [] [] [] [] [] [] [] [] [x] []    Sit to Supine [] [] [] [] [] [] [] [] [] [x] []    Transfers    Sit to Stand [] [] [] [] [x] [] [] [] [] [] []    Bed to Chair [] [] [] [] [] [] [] [] [] [x] []    Stand to Sit [] [] [] [x] [] [] [] [] [] [] []    Tub/Shower [] [] [] [] [] [] [] [] [] [x] []     Toilet [] [] [] [] [] [] [] [] [] [x] []      [] [] [] [] [] [] [] [] [] [] []    I=Independent, Mod I=Modified Independent, S=Supervision/Setup, SBA=Standby Assistance, CGA=Contact Guard Assistance, Min=Minimal Assistance, Mod=Moderate Assistance, Max=Maximal Assistance, Total=Total Assistance, NT=Not Tested    ACTIVITIES OF DAILY LIVING: I Mod I S SBA CGA Min Mod Max Total NT Comments   BASIC ADLs:              Upper Body Bathing  [] [] [] [] [] [] [] [] [] [x]    Lower Body Bathing [] [] [] [] [] [] [] [] [] [x]    Toileting [] [] [] [] [] [] [] [] [] [x]    Upper Body Dressing [] [] [] [] [] [] [] [] [] [x]    Lower Body Dressing [] [] [] [] [] [] [] [] [] [x]    Feeding [] [] [] [] [] [] [] [] [] [x]    Grooming [] [] [] [] [x] [] [] [] [] [] Standing at sink for grooming task   Personal Device Care [] [] [] [] [] [] [] [] [] [x]    Functional Mobility [] [] [] [x] [x] [] [] [] [] [] Short household distances, utilized RW   I=Independent, Mod I=Modified Independent, S=Supervision/Setup, SBA=Standby Assistance, CGA=Contact Guard Assistance, Min=Minimal Assistance, Mod=Moderate Assistance, Max=Maximal Assistance, Total=Total Assistance, NT=Not Tested    PLAN:   FREQUENCY/DURATION   OT Plan of Care: 3 times/week for duration of hospital stay or until stated goals are met, whichever comes first.    PROBLEM LIST:   (Skilled intervention is medically necessary to address:)  Decreased ADL/Functional Activities  Decreased Activity Tolerance  Decreased Gait Ability  Decreased Strength  Decreased Transfer Abilities  Increased Pain   INTERVENTIONS PLANNED:  (Benefits and precautions of occupational therapy have been discussed with the patient.)  Self Care Training  Therapeutic Activity  Therapeutic Exercise/HEP  Neuromuscular Re-education  Manual Therapy  Education         TREATMENT:     EVALUATION: LOW COMPLEXITY: (Untimed Charge)    TREATMENT:   Self Care (8 minutes): Patient participated in grooming ADLs in standing with minimal verbal cueing to increase independence, decrease assistance required, increase activity tolerance, and increase safety awareness. Patient also participated in functional mobility, functional transfer, and adaptive equipment training to increase independence, decrease assistance required, increase activity tolerance, and increase safety awareness.      TREATMENT GRID:  N/A    AFTER TREATMENT PRECAUTIONS: Bed/Chair Locked, Call light within reach, Chair, Heels floated, Needs within reach, and RN notified    INTERDISCIPLINARY COLLABORATION:  RN/ PCT and PT/ PTA    EDUCATION:  Education Given To: Patient  Education Provided: Role of Therapy;Plan of Care  Education Method: Verbal  Barriers to Learning: None  Education Outcome: Verbalized understanding    TOTAL TREATMENT DURATION AND TIME:  Time In: 1021  Time Out: [address]  Minutes: [address] [place], [place]

## 2023-02-21 NOTE — CONSULTS
[facility]  Consultation Note    Patient ID:  Name: Eleanor Knowles  MRN: [id number]  AGE: 78 y.o.  : 1945    Date of Consultation:  2023  Referring Physician:  Hospitalist     Subjective: Pt complains of right foot wound. They presented to the [facility] on . They were found to have a right foot diabetic foot wound. The patient says he noticed this about a week ago but it may have been there longer. He doesn't now. They were admitted by the hospitalist. He has no pain. He has not had problems with this foot before. They have no other orthopedic concerns at this time. History of EtOH dependence      Past Medical History Includes:   Past Medical History:   Diagnosis Date    Chronic abdominal pain     EGD, Colonoscopy, CT Abdomen/pelvis-colon polyps, scattered diverticula    Colon polyps     EtOH dependence (HCC)     GERD (gastroesophageal reflux disease)     Helicobacter pylori gastritis     Hypertension     Stage III chronic kidney disease (HCC)     Type 2 diabetes mellitus without complication ([facility] [address] 1386    does not check blood sugars   ,   Past Surgical History:   Procedure Laterality Date    COLONOSCOPY N/A 2019    COLONOSCOPY/ 26 performed by Anthony Og MD at [facility] ENDOSCOPY    COLONOSCOPY  2016    polyps    UPPER GASTROINTESTINAL ENDOSCOPY  2016    unremarkable     Family History:   Family History   Problem Relation Age of Onset    Cancer Father         bone    Arrhythmia Sister     Hypertension Mother     Kidney Disease Mother       Social History:   Social History     Tobacco Use    Smoking status: Former     Packs/day: 1.00     Years: 15.00     Pack years: 15.00     Types: Cigarettes     Quit date: 1989     Years since quittin.1    Smokeless tobacco: Never   Substance Use Topics    Alcohol use:  Yes     Alcohol/week: 2.0 - 3.0 standard drinks       ALLERGIES: No Known Allergies     Patient Medications    Current Facility-Administered Medications   Medication Dose Route Frequency    vancomycin (VANCOCIN) 1,000 mg in sodium chloride 0.9 % 250 mL IVPB ([id number])  1,000 mg IntraVENous Q24H    cefepime (MAXIPIME) 2,000 mg in sodium chloride 0.9 % 50 mL IVPB (mini-bag)  2,000 mg IntraVENous Q12H    glucose chewable tablet 16 g  4 tablet Oral PRN    dextrose bolus 10% 125 mL  125 mL IntraVENous PRN    Or    dextrose bolus 10% 250 mL  250 mL IntraVENous PRN    glucagon (rDNA) injection 1 mg  1 mg SubCUTAneous PRN    dextrose 10 % infusion   IntraVENous Continuous PRN    amLODIPine (NORVASC) tablet 10 mg  10 mg Oral Daily    atenolol (TENORMIN) tablet 25 mg  25 mg Oral Daily    hydroCHLOROthiazide (HYDRODIURIL) tablet 25 mg  25 mg Oral Daily    [Held by provider] losartan (COZAAR) tablet 100 mg  100 mg Oral Daily    tamsulosin (FLOMAX) capsule 0.4 mg  0.4 mg Oral Daily    insulin lispro (HUMALOG) injection vial 0-8 Units  0-8 Units SubCUTAneous TID WC    insulin lispro (HUMALOG) injection vial 0-4 Units  0-4 Units SubCUTAneous Nightly    sodium chloride flush 0.9 % injection 5-40 mL  5-40 mL IntraVENous 2 times per day    sodium chloride flush 0.9 % injection 5-40 mL  5-40 mL IntraVENous PRN    0.9 % sodium chloride infusion   IntraVENous PRN    ondansetron (ZOFRAN-ODT) disintegrating tablet 4 mg  4 mg Oral Q8H PRN    Or    ondansetron (ZOFRAN) injection 4 mg  4 mg IntraVENous Q6H PRN    polyethylene glycol (GLYCOLAX) packet 17 g  17 g Oral Daily PRN    acetaminophen (TYLENOL) tablet 650 mg  650 mg Oral Q6H PRN    Or    acetaminophen (TYLENOL) suppository 650 mg  650 mg Rectal Q6H PRN    heparin (porcine) injection 5,000 Units  5,000 Units SubCUTAneous 3 times per day    tuberculin injection 5 Units  5 Units IntraDERmal Once    0.9 % sodium chloride infusion   IntraVENous Continuous    morphine injection 2 mg  2 mg IntraVENous Q4H PRN    HYDROcodone-acetaminophen (NORCO) 5-325 MG per tablet 1 tablet  1 tablet Oral Q6H PRN LORazepam (ATIVAN) injection 1 mg  1 mg IntraVENous Q6H PRN         Review of Systems:  Pertinent items are noted in HPI. Physical Exam:      General: NAD, Alert, Oriented x 3   Mental Status: Appropriate   Psych: Normal Affect, Normal Mood    HEENT: Normal Cephalic/Atraumatic, PERRL   Lungs: Respirations even and unlabored, Breath Sounds were clear, no respiratory distress   Heart: Regular Rate and Rhythm   Vascular: poor capillary refill  Skin: see image of wound on chart. There is a large ulcerated wound of the medial aspect of the forefoot that appears to extend through to the plant aspect with another ulcerated wound present there  Musculoskeletal: Normal ROM of the right ankle. Diffuse swelling of the right midfoot. There is no ascending redness  Lymphatic: No lympahdenopathy, No distal edema   Neuro: No gross deficits   Abdomen: Soft, Non tender, No distension      VITALS: Patient Vitals for the past 8 hrs:   BP Temp Temp src Pulse Resp SpO2   23 0719 132/66 98.2 °F (36.8 °C) Oral 55 14 98 %   23 0451 (!) 124/59 98.4 °F (36.9 °C) Oral 57 20 97 %    , Temp (24hrs), Av.2 °F (36.8 °C), Min:97.7 °F (36.5 °C), Max:98.6 °F (37 °C)           Diagnosis   Patient Active Problem List   Diagnosis    Helicobacter pylori gastritis    Colon polyps    EtOH dependence (HCC)    Chronic abdominal pain    GERD (gastroesophageal reflux disease)    Hypertension    Stage 3a chronic kidney disease (HCC)    Type 2 diabetes mellitus with chronic kidney disease    Acute osteomyelitis of right foot (HCC)    MEG (acute kidney injury) ([facility] [address]          Assessment      Right forefoot diabetic wound infection     Medical Decision Making:     CT and chart have been reviewed. We are still awaiting arterial studies to determine the best course of action with this. Ultimately he will require a debridement of this. Whether or not this is more appropriate for vascular surgery or orthopedic surgery is yet to be determined.  I changed the dressing this morning. Will discuss with Dr. Faith Rinne and will await the arterial studies.          RADHA Iraheta  2/21/2023,  9:49 AM

## 2023-02-21 NOTE — PROGRESS NOTES
VANCO DAILY FOLLOW UP NOTE  [address] [facility] Pharmacokinetic Monitoring Service - Vancomycin    Consulting Provider: Dr. Brian Smallwood   Indication: bone and joint infection  Target Concentration: Goal AUC/MICK 400-600 mg*hr/L  Day of Therapy: 2  Additional Antimicrobials: cefepime    Patient eligible for piperacillin-tazobactam to cefepime auto-substitution per P&T approved protocol? N/A    Pertinent Laboratory Values: Wt Readings from Last 1 Encounters:   02/20/23 163 lb (73.9 kg)     Temp Readings from Last 1 Encounters:   02/21/23 98.2 °F (36.8 °C) (Oral)     Recent Labs     02/20/23  1121 02/21/23  0427   BUN 38* 31*   CREATININE 2.60* 1.70*   WBC 10.6 8.4   PROCAL 0.52*  --      Estimated Creatinine Clearance: 34 mL/min (A) (based on SCr of 1.7 mg/dL (H)). Lab Results   Component Value Date/Time    VANCORANDOM 16.1 02/21/2023 04:27 AM     MRSA Nasal Swab: N/A.  Non-respiratory infection    Assessment:  Date/Time Dose Concentration AUC         Note: Serum concentrations collected for AUC dosing may appear elevated if collected in close proximity to the dose administered, this is not necessarily an indication of toxicity    Plan:  Chaning to dosing recommendations based on [address] as renal function has improved  Start vancomycin 1000 mg q24h  Anticipated AUC of 539 and trough concentration of 17.2 at steady state  Renal labs as indicated   Vancomycin concentrations will be ordered as clinically appropriate   Pharmacy will continue to monitor patient and adjust therapy as indicated    Thank you for the consult,  Haydee Rayo, [address] [facility]

## 2023-02-22 ENCOUNTER — ANESTHESIA (OUTPATIENT)
Dept: SURGERY | Age: 78
End: 2023-02-22
Payer: MEDICARE

## 2023-02-22 LAB
ANION GAP SERPL CALC-SCNC: 4 MMOL/L (ref 2–11)
BUN SERPL-MCNC: 20 MG/DL (ref 8–23)
CALCIUM SERPL-MCNC: 9.2 MG/DL (ref 8.3–10.4)
CHLORIDE SERPL-SCNC: 110 MMOL/L (ref 101–110)
CO2 SERPL-SCNC: 26 MMOL/L (ref 21–32)
CREAT SERPL-MCNC: 1.6 MG/DL (ref 0.8–1.5)
ERYTHROCYTE [DISTWIDTH] IN BLOOD BY AUTOMATED COUNT: 12.6 % (ref 11.9–14.6)
GLUCOSE BLD STRIP.AUTO-MCNC: 129 MG/DL (ref 65–100)
GLUCOSE BLD STRIP.AUTO-MCNC: 135 MG/DL (ref 65–100)
GLUCOSE BLD STRIP.AUTO-MCNC: 174 MG/DL (ref 65–100)
GLUCOSE BLD STRIP.AUTO-MCNC: 308 MG/DL (ref 65–100)
GLUCOSE SERPL-MCNC: 145 MG/DL (ref 65–100)
HCT VFR BLD AUTO: 38.1 % (ref 41.1–50.3)
HGB BLD-MCNC: 12.4 G/DL (ref 13.6–17.2)
MCH RBC QN AUTO: 28.6 PG (ref 26.1–32.9)
MCHC RBC AUTO-ENTMCNC: 32.5 G/DL (ref 31.4–35)
MCV RBC AUTO: 88 FL (ref 82–102)
MM INDURATION, POC: 0 MM (ref 0–5)
NRBC # BLD: 0 K/UL (ref 0–0.2)
PLATELET # BLD AUTO: 461 K/UL (ref 150–450)
PMV BLD AUTO: 8.8 FL (ref 9.4–12.3)
POTASSIUM SERPL-SCNC: 3.6 MMOL/L (ref 3.5–5.1)
PPD, POC: NEGATIVE
RBC # BLD AUTO: 4.33 M/UL (ref 4.23–5.6)
SERVICE CMNT-IMP: ABNORMAL
SODIUM SERPL-SCNC: 140 MMOL/L (ref 133–143)
WBC # BLD AUTO: 7.5 K/UL (ref 4.3–11.1)

## 2023-02-22 PROCEDURE — 87186 SC STD MICRODIL/AGAR DIL: CPT

## 2023-02-22 PROCEDURE — 6360000002 HC RX W HCPCS: Performed by: NURSE ANESTHETIST, CERTIFIED REGISTERED

## 2023-02-22 PROCEDURE — 2580000003 HC RX 258: Performed by: FAMILY MEDICINE

## 2023-02-22 PROCEDURE — A4217 STERILE WATER/SALINE, 500 ML: HCPCS | Performed by: ORTHOPAEDIC SURGERY

## 2023-02-22 PROCEDURE — 7100000000 HC PACU RECOVERY - FIRST 15 MIN: Performed by: ORTHOPAEDIC SURGERY

## 2023-02-22 PROCEDURE — 6360000002 HC RX W HCPCS: Performed by: INTERNAL MEDICINE

## 2023-02-22 PROCEDURE — 87176 TISSUE HOMOGENIZATION CULTR: CPT

## 2023-02-22 PROCEDURE — 7100000001 HC PACU RECOVERY - ADDTL 15 MIN: Performed by: ORTHOPAEDIC SURGERY

## 2023-02-22 PROCEDURE — 87075 CULTR BACTERIA EXCEPT BLOOD: CPT

## 2023-02-22 PROCEDURE — 2580000003 HC RX 258: Performed by: INTERNAL MEDICINE

## 2023-02-22 PROCEDURE — 85027 COMPLETE CBC AUTOMATED: CPT

## 2023-02-22 PROCEDURE — 1100000000 HC RM PRIVATE

## 2023-02-22 PROCEDURE — 2500000003 HC RX 250 WO HCPCS: Performed by: NURSE ANESTHETIST, CERTIFIED REGISTERED

## 2023-02-22 PROCEDURE — 0QBN0ZZ EXCISION OF RIGHT METATARSAL, OPEN APPROACH: ICD-10-PCS | Performed by: ORTHOPAEDIC SURGERY

## 2023-02-22 PROCEDURE — 3600000014 HC SURGERY LEVEL 4 ADDTL 15MIN: Performed by: ORTHOPAEDIC SURGERY

## 2023-02-22 PROCEDURE — 2709999900 HC NON-CHARGEABLE SUPPLY: Performed by: ORTHOPAEDIC SURGERY

## 2023-02-22 PROCEDURE — 3700000001 HC ADD 15 MINUTES (ANESTHESIA): Performed by: ORTHOPAEDIC SURGERY

## 2023-02-22 PROCEDURE — 82962 GLUCOSE BLOOD TEST: CPT

## 2023-02-22 PROCEDURE — 2580000003 HC RX 258: Performed by: ANESTHESIOLOGY

## 2023-02-22 PROCEDURE — 3600000004 HC SURGERY LEVEL 4 BASE: Performed by: ORTHOPAEDIC SURGERY

## 2023-02-22 PROCEDURE — 87070 CULTURE OTHR SPECIMN AEROBIC: CPT

## 2023-02-22 PROCEDURE — 6370000000 HC RX 637 (ALT 250 FOR IP): Performed by: FAMILY MEDICINE

## 2023-02-22 PROCEDURE — 2580000003 HC RX 258: Performed by: ORTHOPAEDIC SURGERY

## 2023-02-22 PROCEDURE — 3700000000 HC ANESTHESIA ATTENDED CARE: Performed by: ORTHOPAEDIC SURGERY

## 2023-02-22 PROCEDURE — 80048 BASIC METABOLIC PNL TOTAL CA: CPT

## 2023-02-22 PROCEDURE — 87077 CULTURE AEROBIC IDENTIFY: CPT

## 2023-02-22 RX ORDER — LIDOCAINE HYDROCHLORIDE 20 MG/ML
INJECTION, SOLUTION EPIDURAL; INFILTRATION; INTRACAUDAL; PERINEURAL PRN
Status: DISCONTINUED | OUTPATIENT
Start: 2023-02-22 | End: 2023-02-22 | Stop reason: SDUPTHER

## 2023-02-22 RX ORDER — ONDANSETRON 2 MG/ML
INJECTION INTRAMUSCULAR; INTRAVENOUS PRN
Status: DISCONTINUED | OUTPATIENT
Start: 2023-02-22 | End: 2023-02-22 | Stop reason: SDUPTHER

## 2023-02-22 RX ORDER — DEXAMETHASONE SODIUM PHOSPHATE 4 MG/ML
INJECTION, SOLUTION INTRA-ARTICULAR; INTRALESIONAL; INTRAMUSCULAR; INTRAVENOUS; SOFT TISSUE PRN
Status: DISCONTINUED | OUTPATIENT
Start: 2023-02-22 | End: 2023-02-22 | Stop reason: SDUPTHER

## 2023-02-22 RX ORDER — MAGNESIUM HYDROXIDE 1200 MG/15ML
LIQUID ORAL CONTINUOUS PRN
Status: DISCONTINUED | OUTPATIENT
Start: 2023-02-22 | End: 2023-02-22 | Stop reason: HOSPADM

## 2023-02-22 RX ORDER — FENTANYL CITRATE 50 UG/ML
INJECTION, SOLUTION INTRAMUSCULAR; INTRAVENOUS PRN
Status: DISCONTINUED | OUTPATIENT
Start: 2023-02-22 | End: 2023-02-22 | Stop reason: SDUPTHER

## 2023-02-22 RX ORDER — MORPHINE SULFATE 2 MG/ML
4 INJECTION, SOLUTION INTRAMUSCULAR; INTRAVENOUS
Status: CANCELLED | OUTPATIENT
Start: 2023-02-22

## 2023-02-22 RX ORDER — SODIUM CHLORIDE, SODIUM LACTATE, POTASSIUM CHLORIDE, CALCIUM CHLORIDE 600; 310; 30; 20 MG/100ML; MG/100ML; MG/100ML; MG/100ML
INJECTION, SOLUTION INTRAVENOUS CONTINUOUS
Status: DISCONTINUED | OUTPATIENT
Start: 2023-02-22 | End: 2023-02-22 | Stop reason: HOSPADM

## 2023-02-22 RX ADMIN — SODIUM CHLORIDE, POTASSIUM CHLORIDE, SODIUM LACTATE AND CALCIUM CHLORIDE: 600; 310; 30; 20 INJECTION, SOLUTION INTRAVENOUS at 08:34

## 2023-02-22 RX ADMIN — SODIUM CHLORIDE: 9 INJECTION, SOLUTION INTRAVENOUS at 18:52

## 2023-02-22 RX ADMIN — CEFEPIME 2000 MG: 2 INJECTION, POWDER, FOR SOLUTION INTRAVENOUS at 15:50

## 2023-02-22 RX ADMIN — VANCOMYCIN HYDROCHLORIDE 1000 MG: 1 INJECTION, POWDER, LYOPHILIZED, FOR SOLUTION INTRAVENOUS at 13:21

## 2023-02-22 RX ADMIN — FENTANYL CITRATE 50 MCG: 50 INJECTION, SOLUTION INTRAMUSCULAR; INTRAVENOUS at 10:09

## 2023-02-22 RX ADMIN — FENTANYL CITRATE 50 MCG: 50 INJECTION, SOLUTION INTRAMUSCULAR; INTRAVENOUS at 09:53

## 2023-02-22 RX ADMIN — HYDROCODONE BITARTRATE AND ACETAMINOPHEN 1 TABLET: 5; 325 TABLET ORAL at 04:37

## 2023-02-22 RX ADMIN — DEXAMETHASONE SODIUM PHOSPHATE 4 MG: 4 INJECTION, SOLUTION INTRAMUSCULAR; INTRAVENOUS at 09:49

## 2023-02-22 RX ADMIN — INSULIN LISPRO 6 UNITS: 100 INJECTION, SOLUTION INTRAVENOUS; SUBCUTANEOUS at 17:19

## 2023-02-22 RX ADMIN — SODIUM CHLORIDE, PRESERVATIVE FREE 10 ML: 5 INJECTION INTRAVENOUS at 21:00

## 2023-02-22 RX ADMIN — CEFEPIME 2000 MG: 2 INJECTION, POWDER, FOR SOLUTION INTRAVENOUS at 03:07

## 2023-02-22 RX ADMIN — HYDROCODONE BITARTRATE AND ACETAMINOPHEN 1 TABLET: 5; 325 TABLET ORAL at 15:55

## 2023-02-22 RX ADMIN — LIDOCAINE HYDROCHLORIDE 100 MG: 20 INJECTION, SOLUTION EPIDURAL; INFILTRATION; INTRACAUDAL; PERINEURAL at 09:38

## 2023-02-22 RX ADMIN — ONDANSETRON 4 MG: 2 INJECTION INTRAMUSCULAR; INTRAVENOUS at 09:49

## 2023-02-22 ASSESSMENT — PAIN DESCRIPTION - LOCATION
LOCATION: FOOT
LOCATION: FOOT

## 2023-02-22 ASSESSMENT — PAIN SCALES - GENERAL
PAINLEVEL_OUTOF10: 0
PAINLEVEL_OUTOF10: 5
PAINLEVEL_OUTOF10: 0
PAINLEVEL_OUTOF10: 0
PAINLEVEL_OUTOF10: 2
PAINLEVEL_OUTOF10: 5
PAINLEVEL_OUTOF10: 0

## 2023-02-22 ASSESSMENT — PAIN - FUNCTIONAL ASSESSMENT
PAIN_FUNCTIONAL_ASSESSMENT: 0-10
PAIN_FUNCTIONAL_ASSESSMENT: ACTIVITIES ARE NOT PREVENTED

## 2023-02-22 ASSESSMENT — PAIN DESCRIPTION - DESCRIPTORS
DESCRIPTORS: CRAMPING
DESCRIPTORS: SORE

## 2023-02-22 ASSESSMENT — PAIN DESCRIPTION - ORIENTATION: ORIENTATION: RIGHT

## 2023-02-22 NOTE — PROGRESS NOTES
Pt is alert and oriented x4. Pt in bed, resting. Bed in low position, wheels locked, call light within reach, instructed to call with any needs. Hourly rounds completed this shift. NAD noted. VSS. Pt has c/o pain, treated per MAR. IV is infusing, and dressing is clean, dry, and intact. Pt has been NPO sushila CORNEJO, received pre-op bath this AM.  Report to be given to day shift nurse.

## 2023-02-22 NOTE — INTERVAL H&P NOTE
Update History & Physical    The Patient's History and Physical of 2/20/2023 was reviewed with the patient and I examined the patient. There was no change. The surgical site was confirmed by the patient and me. Plan:  The risk, benefits, expected outcome, and alternative to the recommended procedure have been discussed with the patient. Patient understands and wants to proceed with excisional debridement of right foot wound.     Electronically signed by Chloe Martinez MD on 2/22/2023 at 8:11 AM

## 2023-02-22 NOTE — CARE COORDINATION
CM following patient's chart. CM will submit order for wound vac to [employer], as patient is [facility] [employer] [facility]. CM will submit wound vac order pending physician signature and wound care nurse information. Form will be filled out tomorrow, 2/23 and submitted. Submission will be charted.

## 2023-02-22 NOTE — WOUND CARE
Noted VAC placement in OR. Spoke with Dr Joseph Fair. Routine 3 time per week dressings to start on Friday. Home VAC to be requested. Patient care manager notified.

## 2023-02-22 NOTE — PERIOP NOTE
TRANSFER - OUT REPORT:    Verbal report given to NAA Mcintosh on Kali Gordillo  being transferred to [facility] for routine post-op       Report consisted of patients Situation, Background, Assessment and   Recommendations(SBAR). Information from the following report(s) Nurse Handoff Report, Surgery Report, Intake/Output, MAR, Cardiac Rhythm SB, and Neuro Assessment was reviewed with the receiving nurse. Lines:   Peripheral IV 02/21/23 Right;Ventral Hand (Active)   Site Assessment Clean, dry & intact 02/22/23 1024   Line Status Infusing 02/22/23 1024   Line Care Connections checked and tightened 02/22/23 1024   Phlebitis Assessment No symptoms 02/22/23 1024   Infiltration Assessment 0 02/22/23 1024   Alcohol Cap Used No 02/22/23 1024   Dressing Status Clean, dry & intact 02/22/23 1024   Dressing Type Transparent 02/22/23 1024       Peripheral IV 02/22/23 Left Forearm (Active)   Site Assessment Clean, dry & intact 02/22/23 1024   Line Status Normal saline locked 02/22/23 1024   Line Care Connections checked and tightened 02/22/23 1024   Phlebitis Assessment No symptoms 02/22/23 1024   Infiltration Assessment 0 02/22/23 1024   Alcohol Cap Used No 02/22/23 1024   Dressing Status Clean, dry & intact 02/22/23 1024   Dressing Type Transparent 02/22/23 1024   Dressing Intervention New 02/22/23 0715        Opportunity for questions and clarification was provided. Patient transported with:   O2 @ 4 liters  Tech    VTE prophylaxis orders have been written for Kali Gordillo. Patient and family given floor number and nurses name. Family updated re: pt status after security code verified.

## 2023-02-22 NOTE — OP NOTE
Operative Report    Patient: Ronnie Martinez MRN: [id number]  SSN: [id number]    YOB: 1945  Age: 78 y.o. Sex: male       Date of Surgery: February 22, 2023     History:  Ronnie Martinez is a 78 y.o. male who has a right foot wound is sort of been worsening over the last few weeks. He does have a history of diabetes and this does appear to be consistent with a diabetic foot infection. He had vascular studies which as usual say they are normal.  I have talked him about the fact that obviously I am very concerned about his ability to heal this as it does appear that this is sort of a chronic diabetic foot wound that likely will not heal and at some point will lead to some sort of amputation. However I think it is reasonable to at least give him a chance to see if we can heal this. S the plan today is just to debride the right foot wound and give him a chance to heal this with some antibiotics and see if we are able to make some progress toward healing his wound. If we are not then he would likely have some sort of midfoot type amputation. Hopefully we can get this to heal with just some local wound care and antibiotics after debridement. I talked to the patient and/or their representative and explained the exact nature the procedure. I also went through a detailed list of the material risks associated with  the procedure which included risk of bleeding, infection, injury to nearby structures, worsening the situation, as well as the risks associate with anesthesia and finally death. Also talked with him regarding the benefits and alternatives to the procedure.     Preoperative Diagnosis: Diabetic foot [facility] [L00.865, L08.9]     Postoperative Diagnosis:   Right diabetic foot infection      Surgeon(s) and Role:     * Oralia Yadav MD - Primary    Anesthesia: General     Procedure: Excisional debridement of right diabetic foot wound    Procedure in Detail: After the successful induction of general anesthetic the right lower extremity was prepped and draped in usual sterile fashion. We then began the process debriding his right foot wound. I used a #10 blade to debride skin and subcutaneous tissue and multiple tendons around the medial and plantar aspect of his right foot near the area of the MTP joint. I actually utilized a acetabular reamer as well to help debride some of the bone and soft tissue and after this was done we appear to have very healthy bleeding bone which was a portion of the metatarsal head and of the proximal phalanx of the great toe. There is also completely viable tissue around including subcutaneous tissue and tendons. After this excisional debridement of skin subcutaneous tissue and bone to a depth of about Demeter I then irrigated with a copious amount normal saline and then placed a wound VAC over the wound. Once the wound VAC was in place he was awakened taken recovery in stable condition      Estimated Blood Loss: 90 cc    Tourniquet Time: * No tourniquets in log *      Implants: * No implants in log *            Specimens:   ID Type Source Tests Collected by Time Destination   1 : Culture Right Foot Wound Tissue Tissue CULTURE, TISSUE Betito Fischer MD 2/22/2023 1005            Drains: None                Complications: None    Counts: Sponge and needle counts were correct times two.     Signed By:  Betito Fischer MD     February 22, 2023

## 2023-02-22 NOTE — ANESTHESIA PRE PROCEDURE
Department of Anesthesiology  Preprocedure Note       Name:  Steve Aguilar   Age:  78 y.o.  :  1945                                          MRN:  [id number]         Date:  2023      Surgeon: Ruddy Gongora):  Eduar Sahu MD    Procedure: Procedure(s):  Excisional debridment right foot wound    Medications prior to admission:   Prior to Admission medications    Medication Sig Start Date End Date Taking?  Authorizing Provider   tamsulosin (FLOMAX) 0.4 MG capsule Take 1 capsule by mouth daily 23   Fei Cherry MD   SITagliptin (JANUVIA) 100 MG tablet Take 1 tablet by mouth daily 23   Fei Cherry MD   losartan (COZAAR) 100 MG tablet Take 1 tablet by mouth daily 23   Fei Cherry MD   hydroCHLOROthiazide (HYDRODIURIL) 25 MG tablet TAKE 1 TABLET BY MOUTH EVERY DAY 23   Fei Cherry MD   atenolol (TENORMIN) 25 MG tablet Take 1 tablet by mouth daily 23   Fei Cherry MD   amLODIPine (NORVASC) 10 MG tablet Take 1 tablet by mouth daily 23   Fei Cherry MD       Current medications:    Current Facility-Administered Medications   Medication Dose Route Frequency Provider Last Rate Last Admin    vancomycin (VANCOCIN) 1,000 mg in sodium chloride 0.9 % 250 mL IVPB ([id number])  1,000 mg IntraVENous Q24H Boo Fontana, DO   Stopped at 23 1733    cefepime (MAXIPIME) 2,000 mg in sodium chloride 0.9 % 50 mL IVPB (mini-bag)  2,000 mg IntraVENous Q12H [place], DO 12.5 mL/hr at 23 1749 2,000 mg at 23 1749    glucose chewable tablet 16 g  4 tablet Oral PRN [place], DO        dextrose bolus 10% 125 mL  125 mL IntraVENous PRN Boo Fontana, DO        Or    dextrose bolus 10% 250 mL  250 mL IntraVENous PRN Boo Addi, DO        glucagon (rDNA) injection 1 mg  1 mg SubCUTAneous PRN Ellie E [place], DO        dextrose 10 % infusion   IntraVENous Continuous PRN Boo Fontana, DO        amLODIPine (NORVASC) tablet 10 mg  10 mg Oral Daily Teresa Massey MD   10 mg at 02/21/23 3207    atenolol (TENORMIN) tablet 25 mg  25 mg Oral Daily Teresa Massey MD   25 mg at 02/21/23 3321    hydroCHLOROthiazide (HYDRODIURIL) tablet 25 mg  25 mg Oral Daily Teresa Massey MD   25 mg at 02/21/23 1427    [Held by provider] losartan (COZAAR) tablet 100 mg  100 mg Oral Daily Teresa Massey MD        tamsulosin (FLOMAX) capsule 0.4 mg  0.4 mg Oral Daily Teresa Massey MD   0.4 mg at 02/21/23 9324    insulin lispro (HUMALOG) injection vial 0-8 Units  0-8 Units SubCUTAneous TID WC Teresa Massey MD   2 Units at 02/21/23 1751    insulin lispro (HUMALOG) injection vial 0-4 Units  0-4 Units SubCUTAneous Nightly Teresa Massey MD        sodium chloride flush 0.9 % injection 5-40 mL  5-40 mL IntraVENous 2 times per day Teresa Massey MD   10 mL at 02/21/23 1940    sodium chloride flush 0.9 % injection 5-40 mL  5-40 mL IntraVENous PRN Teresa Massey MD        0.9 % sodium chloride infusion   IntraVENous PRN Teresa Massey MD        ondansetron (ZOFRAN-ODT) disintegrating tablet 4 mg  4 mg Oral Q8H PRN Teresa Massey MD        Or    ondansetron (ZOFRAN) injection 4 mg  4 mg IntraVENous Q6H PRN Teresa Massey MD        polyethylene glycol (GLYCOLAX) packet 17 g  17 g Oral Daily PRN Teresa Massey MD        acetaminophen (TYLENOL) tablet 650 mg  650 mg Oral Q6H PRN Teresa Massey MD        Or    acetaminophen (TYLENOL) suppository 650 mg  650 mg Rectal Q6H PRN Teresa Massey MD        heparin (porcine) injection 5,000 Units  5,000 Units SubCUTAneous 3 times per day RADHA Martinez   5,000 Units at 02/21/23 1435    0.9 % sodium chloride infusion   IntraVENous Continuous Teresa Massey  mL/hr at 02/21/23 0234 New Bag at 02/21/23 0234    morphine injection 2 mg  2 mg IntraVENous Q4H PRN Teresa Massey MD        HYDROcodone-acetaminophen (NORCO) 5-325 MG per tablet 1 tablet  1 tablet Oral Q6H PRN Teresa Massey MD   1 tablet at 02/20/23 2026    LORazepam (ATIVAN) injection 1 mg  1 mg IntraVENous Q6H PRN Bebeto Hernandez MD           Allergies:  No Known Allergies    Problem List:    Patient Active Problem List   Diagnosis Code    Helicobacter pylori gastritis K29.70, B96.81    Colon polyps K63.5    EtOH dependence ([facility] [address] F10.20    Chronic abdominal pain R10.9, G89.29    GERD (gastroesophageal reflux disease) K21.9    Hypertension I10    Stage 3a chronic kidney disease ([facility] [address] N18.31    Type 2 diabetes mellitus with chronic kidney disease E11.22    Acute osteomyelitis of right foot ([facility] [address] M86.171    MEG (acute kidney injury) ([facility] [address] N17.9    Diabetic foot infection ([facility] [address] E11.628, L08.9       Past Medical History:        Diagnosis Date    Chronic abdominal pain     EGD, Colonoscopy, CT Abdomen/pelvis-colon polyps, scattered diverticula    Colon polyps     EtOH dependence (HCC)     GERD (gastroesophageal reflux disease)     Helicobacter pylori gastritis     Hypertension     Stage III chronic kidney disease (HCC)     Type 2 diabetes mellitus without complication ([facility] [address]     does not check blood sugars       Past Surgical History:        Procedure Laterality Date    COLONOSCOPY N/A 2019    COLONOSCOPY/ 26 performed [facility] Torsten [facility] ENDOSCOPY    COLONOSCOPY  2016    polyps    UPPER GASTROINTESTINAL ENDOSCOPY  2016    unremarkable       Social History:    Social History     Tobacco Use    Smoking status: Former     Packs/day: 1.00     Years: 15.00     Pack years: 15.00     Types: Cigarettes     Quit date: 1989     Years since quittin.1    Smokeless tobacco: Never   Substance Use Topics    Alcohol use:  Yes     Alcohol/week: 2.0 - 3.0 standard drinks                                Counseling given: Not Answered      Vital Signs (Current):   Vitals:    23 0451 23 0719 23 1223 23 1543   BP: (!) 124/59 132/66 131/60 131/63   Pulse: 57 55 55 62   Resp: 20 14 16 16   Temp: 98.4 °F (36.9 °C) 98.2 °F (36.8 °C) 99.1 °F (37.3 °C) 98.2 °F (36.8 °C)   TempSrc: Oral Oral Oral Oral   SpO2: 97% 98% 100% 98%   Weight:       Height:                                                  BP Readings from Last 3 Encounters:   02/21/23 131/63   01/20/23 131/62   09/16/22 (!) 130/58       NPO Status:                                                                                 BMI:   Wt Readings from Last 3 Encounters:   02/20/23 163 lb (73.9 kg)   01/20/23 165 lb (74.8 kg)   09/16/22 162 lb (73.5 kg)     Body mass index is 28.87 kg/m².     CBC:   Lab Results   Component Value Date/Time    WBC 8.4 02/21/2023 04:27 AM    RBC 3.77 02/21/2023 04:27 AM    HGB 10.7 02/21/2023 04:27 AM    HCT 32.5 02/21/2023 04:27 AM    MCV 86.2 02/21/2023 04:27 AM    RDW 12.8 02/21/2023 04:27 AM     02/21/2023 04:27 AM       CMP:   Lab Results   Component Value Date/Time     02/21/2023 04:27 AM    K 4.0 02/21/2023 04:27 AM     02/21/2023 04:27 AM    CO2 23 02/21/2023 04:27 AM    BUN 31 02/21/2023 04:27 AM    CREATININE 1.70 02/21/2023 04:27 AM    GFRAA 31 09/16/2022 08:54 AM    AGRATIO 1.5 09/10/2020 11:02 AM    LABGLOM 42 02/21/2023 04:27 AM    GLUCOSE 115 02/21/2023 04:27 AM    PROT 9.8 02/20/2023 11:21 AM    CALCIUM 8.6 02/21/2023 04:27 AM    BILITOT 0.3 02/20/2023 11:21 AM    ALKPHOS 50 02/20/2023 11:21 AM    ALKPHOS 47 01/24/2022 08:51 AM    AST 36 02/20/2023 11:21 AM    ALT 57 02/20/2023 11:21 AM       POC Tests:   Recent Labs     02/21/23  1644   POCGLU 205*       Coags: No results found for: PROTIME, INR, APTT    HCG (If Applicable): No results found for: PREGTESTUR, PREGSERUM, HCG, HCGQUANT     ABGs: No results found for: PHART, PO2ART, ULY8RZA, RLO6DFJ, BEART, D4SRZRNN     Type & Screen (If Applicable):  No results found for: LABABO, LABRH    Drug/Infectious Status (If Applicable):  Lab Results   Component Value Date/Time    HEPCAB <0.1 08/30/2016 11:18 AM       COVID-19 Screening (If Applicable): No results found for: COVID19        Anesthesia Evaluation  Patient summary reviewed  Airway: Mallampati: II  TM distance: >3 FB   Neck ROM: limited  Mouth opening: > = 3 FB   Dental:    (+) edentulous      Pulmonary:normal exam    (+) current smoker (Former)                           Cardiovascular:    (+) hypertension:,                   Neuro/Psych:               GI/Hepatic/Renal:   (+) renal disease: CRI,           Endo/Other:    (+) DiabetesType II DM, , .                  ROS comment: ETOH abuse Abdominal:             Vascular: Other Findings:           Anesthesia Plan      general     ASA 3     (LMA map goal >80 given CKD)  Induction: intravenous. Anesthetic plan and risks discussed with patient.                         Jagdish Arciniega MD   2/21/2023

## 2023-02-22 NOTE — PROGRESS NOTES
VANCO DAILY FOLLOW UP NOTE  [address] [facility] Pharmacokinetic Monitoring Service - Vancomycin    Consulting Provider: Dr. Elam Serum   Indication: bone and joint infection  Target Concentration: Goal AUC/MICK 400-600 mg*hr/L  Day of Therapy: 3  Additional Antimicrobials: cefepime    Patient eligible for piperacillin-tazobactam to cefepime auto-substitution per P&T approved protocol? N/A    Pertinent Laboratory Values: Wt Readings from Last 1 Encounters:   02/22/23 163 lb (73.9 kg)     Temp Readings from Last 1 Encounters:   02/22/23 98.4 °F (36.9 °C) (Oral)     Recent Labs     02/20/23  1121 02/21/23  0427 02/22/23  0432   BUN 38* 31* 20   CREATININE 2.60* 1.70* 1.60*   WBC 10.6 8.4 7.5   PROCAL 0.52*  --   --      Estimated Creatinine Clearance: 36 mL/min (A) (based on SCr of 1.6 mg/dL (H)). Lab Results   Component Value Date/Time    VANCORANDOM 16.1 02/21/2023 04:27 AM     MRSA Nasal Swab: N/A. Non-respiratory infection    Assessment:  Date/Time Dose Concentration AUC         Note: Serum concentrations collected for AUC dosing may appear elevated if collected in close proximity to the dose administered, this is not necessarily an indication of toxicity    Plan:  Chaning to dosing recommendations based on [address] as renal function has improved  Start vancomycin 1000 mg q24h  Anticipated AUC of 539 and trough concentration of 17.2 at steady state  Renal labs as indicated   Vancomycin concentration ordered for 2/23 @ 0400   Pharmacy will continue to monitor patient and adjust therapy as indicated    Thank you for the consult,  Red Zavala, [facility]

## 2023-02-22 NOTE — ANESTHESIA POSTPROCEDURE EVALUATION
Department of Anesthesiology  Postprocedure Note    Patient: Makayla Fernández  MRN: [id number]  YOB: 1945  Date of evaluation: 2/22/2023      Procedure Summary     Date: 02/22/23 Room / Location: [facility] MAIN OR  / [facility] MAIN OR    Anesthesia Start: 0925 Anesthesia Stop: 1024    Procedure: Excisional debridment right foot wound (Right: Foot) Diagnosis:       Diabetic foot infection ([address]      (Diabetic foot infection ([address] [I52.679, L08.9])    Providers: Ana Chavarria MD Responsible Provider: Loren Ignacio MD    Anesthesia Type: general ASA Status: 3          Anesthesia Type: No value filed.     Karen Phase I: Karen Score: 10    Karen Phase II:        Anesthesia Post Evaluation    Patient location during evaluation: PACU  Patient participation: complete - patient participated  Level of consciousness: awake and alert  Airway patency: patent  Nausea & Vomiting: no nausea and no vomiting  Complications: no  Cardiovascular status: hemodynamically stable  Respiratory status: acceptable, nonlabored ventilation and spontaneous ventilation  Hydration status: euvolemic  Comments: BP (!) 168/76   Pulse 53   Temp 98 °F (36.7 °C) (Temporal)   Resp 16   Ht 5' 3\" (1.6 m)   Wt 163 lb (73.9 kg)   SpO2 100%   BMI 28.87 kg/m²     Multimodal analgesia pain management approach

## 2023-02-22 NOTE — PROGRESS NOTES
Hospitalist Progress Note   Admit Date:  2023 10:55 AM   Name:  York Alpers   Age:  78 y.o. Sex:  male  :  1945   MRN:  [id number]   Room:  601/01    Presenting Complaint: Right foot pain  Reason(s) for Admission: Septicemia [facility]) [A41.9]  Acute osteomyelitis of right foot ([facility] [M86.171]  Acute hematogenous osteomyelitis of left foot [facility]) [facility] Course & Interval History:   78 y.o. male with medical history of hypertension, hyperlipidemia, diabetes mellitus, CKD stage III and alcohol dependence presented to ED with worsening right foot pain. Patient reports he has right foot wound for couple of weeks. He thinks he might have stepped on something which caused this wound. Reports over the past few days, pain has been significantly worsened. He has not seen anyone for the wound. Has not taken any antibiotic outpatient. Denies chest pain, palpitation, nausea, vomiting, abdominal pain. Reports decreased oral intake. He is not on insulin at home and reports blood glucose well controlled on oral regimen. He drinks couple of beers a day. Reports he used to drink more than this. He is a former smoker, quit years ago. In the ED patient was tachycardic. Labs notable for WBC 10.6, hemoglobin 13.6, platelet 416, sodium 131, potassium 4, creatinine 2.60 (baseline ~1.8). Procalcitonin 0.52 and lactic acid 3. CT foot concerning for osteomyelitis. Hospitalist consulted for admission. He went to the OR for I&D on . Subjective/24hr Events (23): No current right foot pain post-op due to block. Wound vac in place. Denies CP/palpitations. Denies SOB/cough. Denies N/V/D. Denies abdominal pain. Denies F/C.       Assessment & Plan:     Principal Problem:    Acute osteomyelitis of right foot ([facility]  - CT right foot concerning for OM  -  S/P right foot wound I&D  - Continue Vanc  - Continue Cefepime  - LE Arterical duplex with BULMARO with mild stenosis  - Pain control  - PT/OT  - Appreciate Ortho's input and assistance    Active Problems:    MEG (acute kidney injury) ([facility]  - Baseline Cr 1.5-1.6  - 2/20 Cr 2.60  - 2/21 Cr 1.70  - 2/22 Cr 1.60 - Resolved  - Likely due to dehydration and infection  - Continue IVFs  - Check BMP daily      Hypertension  - Stable  - Continue HCTZ  - Continue Amlodipine  - Continue Atenolol  - Holding Losartan due to MEG, likely restart in AM      Stage 3a chronic kidney disease (HCC)      Type 2 diabetes mellitus with chronic kidney disease  - Stable  - Continue Humalog SSI      EtOH dependence (HCC)  - No s/s of withdrawal  - CIWA protocol      Diet:  Diet NPO  DVT PPx: Heparin  Code status: Full Code    Hospital Problems:  Principal Problem:    Ac[facility] osteomyelitis of right foot ([facility]  Active Problems:    MEG (acute kidney injury) ([address]    Hypertension    Stage 3a chronic kidney disease ([facility]    Type 2 diabetes mellitus with chronic kidney disease    EtOH dependence ([facility]    GERD (gastroesophageal reflux disease)    Diabetic foot infection ([facility]  Resolved Problems:    Hyponatremia    Lactic acidosis    Sinus tachycardia      Discharge Plan:     Location: [facility]  Days: 2-3  PPD Ordered: 2/20    Objective:   Patient Vitals for the past 24 hrs:   Temp Pulse Resp BP SpO2   02/22/23 1100 -- 61 20 (!) 171/79 97 %   02/22/23 1053 98 °F (36.7 °C) 53 20 (!) 159/75 98 %   02/22/23 1050 -- 53 20 (!) 177/81 99 %   02/22/23 1045 -- 55 16 (!) 150/75 99 %   02/22/23 1040 -- 54 16 (!) 149/72 97 %   02/22/23 1035 -- 56 16 (!) 152/78 100 %   02/22/23 1030 -- 54 16 (!) 153/71 99 %   02/22/23 1025 -- -- (P) 16 -- --   02/22/23 1024 98 °F (36.7 °C) 53 16 (!) 168/76 100 %   02/22/23 0828 98.4 °F (36.9 °C) 62 12 (!) 153/72 97 %   02/22/23 0720 98.2 °F (36.8 °C) 82 18 (!) 147/86 --   02/22/23 0507 -- -- 18 -- --   02/22/23 0437 -- -- 20 -- --   02/22/23 0428 98.1 °F (36.7 °C) 58 20 -- 100 %   02/22/23 0420 -- 58 -- (!) 153/63 --   02/22/23 0029 97.9 °F (36.6 °C) 60 20 126/67 100 %   02/21/23 2159 -- -- 16 -- --   02/21/23 2129 -- -- 20 -- --   02/21/23 2039 98.1 °F (36.7 °C) 59 20 (!) 141/60 98 %   02/21/23 1543 98.2 °F (36.8 °C) 62 16 131/63 98 %   02/21/23 1223 99.1 °F (37.3 °C) 55 16 131/60 100 %       Oxygen Therapy  SpO2: 97 %  Pulse via Oximetry: 59 beats per minute  Pulse Oximeter Device Mode: Continuous  Pulse Oximeter Device Location: Finger  O2 Device: Nasal cannula  O2 Flow Rate (L/min): 3 L/min  O2 Delivery Method: Oral airway    Estimated body mass index is 28.87 kg/m² as calculated from the following:    Height as of this encounter: 5' 3\" (1.6 m). Weight as of this encounter: 163 lb (73.9 kg). Intake/Output Summary (Last 24 hours) at 2/22/2023 1126  Last data filed at 2/22/2023 0428  Gross per 24 hour   Intake --   Output 1245 ml   Net -1245 ml         Physical Exam:   Blood pressure (!) 171/79, pulse 61, temperature 98 °F (36.7 °C), temperature source Infrared, resp. rate 20, height 5' 3\" (1.6 m), weight 163 lb (73.9 kg), SpO2 97 %. General:    Well nourished. No overt distress  Head:  Normocephalic, atraumatic  Eyes:  Sclerae appear normal.  Pupils equally round. ENT:  Nares appear normal, no drainage. Moist oral mucosa  Neck:  No restricted ROM. Trachea midline   CV:   RRR. No m/r/g. No jugular venous distension. Lungs:   CTAB. No wheezing, rhonchi, or rales. Respirations even, unlabored  Abdomen: Bowel sounds present. Soft, nontender, nondistended. Extremities: No cyanosis or clubbing. Right foot with wound vac  Skin:     No rashes and normal coloration. Warm and dry. Neuro:  CN II-XII grossly intact. Sensation intact. A&Ox3  Psych:  Normal mood and affect.       I have reviewed ordered lab tests and independently visualized imaging below:    Recent Labs:  Recent Results (from the past 48 hour(s))   POCT Urinalysis no Micro    Collection Time: 02/20/23 11:33 AM   Result Value Ref Range    Specific Gravity, Urine, POC >1.030 (H) 1.001 - 1.023 pH, Urine, POC 6.0 5.0 - 9.0      Protein, Urine, POC 30 (A) NEG mg/dL    Glucose, UA  (A) NEG mg/dL    Ketones, Urine, POC Negative NEG mg/dL    Bilirubin, Urine, POC Negative NEG      Blood, UA POC Trace Intact (A) NEG      URINE UROBILINOGEN POC 0.2 0.2 - 1.0 EU/dL    Nitrite, Urine, POC Negative NEG      Leukocyte Est, UA POC Negative NEG      Performed by: Juli Liu    Culture, Blood 2    Collection Time: 02/20/23 12:04 PM    Specimen: Blood   Result Value Ref Range    Special Requests NO SPECIAL REQUESTS  LEFT  Antecubital        Culture NO GROWTH 2 DAYS     Lactate, Sepsis    Collection Time: 02/20/23  2:48 PM   Result Value Ref Range    Lactic Acid, Sepsis 2.0 0.4 - 2.0 MMOL/L   POCT Glucose    Collection Time: 02/20/23  4:16 PM   Result Value Ref Range    POC Glucose 282 (H) 65 - 100 mg/dL    Performed by: Shereen    Culture, Wound Aerobic Only    Collection Time: 02/20/23  7:33 PM    Specimen: Foot    RIGHT   Result Value Ref Range    Special Requests NO SPECIAL REQUESTS      Gram stain 0 TO 3 WBCS SEEN PER OIF     Gram stain NO DEFINITE ORGANISM SEEN      Culture MODERATE MIXED SKIN TRELL ISOLATED     POCT Glucose    Collection Time: 02/20/23  8:39 PM   Result Value Ref Range    POC Glucose 135 (H) 65 - 100 mg/dL    Performed by:  Gaurav    PLEASE READ & DOCUMENT PPD TEST IN 24 HRS    Collection Time: 02/21/23 12:00 AM   Result Value Ref Range    PPD, (POC) Negative Negative    mm Induration 0 0 - 5 mm   Basic Metabolic Panel w/ Reflex to MG    Collection Time: 02/21/23  4:27 AM   Result Value Ref Range    Sodium 141 133 - 143 mmol/L    Potassium 4.0 3.5 - 5.1 mmol/L    Chloride 113 (H) 101 - 110 mmol/L    CO2 23 21 - 32 mmol/L    Anion Gap 5 2 - 11 mmol/L    Glucose 115 (H) 65 - 100 mg/dL    BUN 31 (H) 8 - 23 MG/DL    Creatinine 1.70 (H) 0.8 - 1.5 MG/DL    Est, Glom Filt Rate 42 (L) >60 ml/min/1.73m2    Calcium 8.6 8.3 - 10.4 MG/DL   CBC with Auto Differential    Collection Time: 02/21/23 4: 27 AM   Result Value Ref Range    WBC 8.4 4.3 - 11.1 K/uL    RBC 3.77 (L) 4.23 - 5.6 M/uL    Hemoglobin 10.7 (L) 13.6 - 17.2 g/dL    Hematocrit 32.5 (L) 41.1 - 50.3 %    MCV 86.2 82 - 102 FL    MCH 28.4 26.1 - 32.9 PG    MCHC 32.9 31.4 - 35.0 g/dL    RDW 12.8 11.9 - 14.6 %    Platelets 283 117 - 266 K/uL    MPV 8.4 (L) 9.4 - 12.3 FL    nRBC 0.00 0.0 - 0.2 K/uL    Seg Neutrophils 64 43 - 78 %    Lymphocytes 21 13 - 44 %    Monocytes 12 4.0 - 12.0 %    Eosinophils % 1 0.5 - 7.8 %    Basophils 1 0.0 - 2.0 %    Immature Granulocytes 1 0.0 - 5.0 %    Segs Absolute 5.3 1.7 - 8.2 K/UL    Absolute Lymph # 1.8 0.5 - 4.6 K/UL    Absolute Mono # 1.0 0.1 - 1.3 K/UL    Absolute Eos # 0.1 0.0 - 0.8 K/UL    Basophils Absolute 0.1 0.0 - 0.2 K/UL    Absolute Immature Granulocyte 0.1 0.0 - 0.5 K/UL    RBC Comment NORMOCYTIC/NORMOCHROMIC      WBC Comment Result Confirmed By Smear      Platelet Comment ADEQUATE      Differential Type AUTOMATED     Hemoglobin A1C    Collection Time: 02/21/23  4:27 AM   Result Value Ref Range    Hemoglobin A1C 7.3 (H) 4.8 - 5.6 %    eAG 163 mg/dL   Vancomycin Level, Random    Collection Time: 02/21/23  4:27 AM   Result Value Ref Range    Vancomycin Rm 16.1 UG/ML   Phosphorus    Collection Time: 02/21/23  4:27 AM   Result Value Ref Range    Phosphorus 2.3 2.3 - 3.7 MG/DL   POCT Glucose    Collection Time: 02/21/23  7:20 AM   Result Value Ref Range    POC Glucose 109 (H) 65 - 100 mg/dL    Performed by: Arnetha Mohs    POCT Glucose    Collection Time: 02/21/23 12:27 PM   Result Value Ref Range    POC Glucose 141 (H) 65 - 100 mg/dL    Performed by: Alma Marin    POCT Glucose    Collection Time: 02/21/23  4:44 PM   Result Value Ref Range    POC Glucose 205 (H) 65 - 100 mg/dL    Performed by: Zee (Holly)    POCT Glucose    Collection Time: 02/21/23  8:40 PM   Result Value Ref Range    POC Glucose 199 (H) 65 - 100 mg/dL    Performed by:  Fei    CBC    Collection Time: 02/22/23  4:32 AM   Result Value Ref Range    WBC 7.5 4.3 - 11.1 K/uL    RBC 4.33 4.23 - 5.6 M/uL    Hemoglobin 12.4 (L) 13.6 - 17.2 g/dL    Hematocrit 38.1 (L) 41.1 - 50.3 %    MCV 88.0 82 - 102 FL    MCH 28.6 26.1 - 32.9 PG    MCHC 32.5 31.4 - 35.0 g/dL    RDW 12.6 11.9 - 14.6 %    Platelets 718 (H) 824 - 450 K/uL    MPV 8.8 (L) 9.4 - 12.3 FL    nRBC 0.00 0.0 - 0.2 K/uL   Basic Metabolic Panel w/ Reflex to MG    Collection Time: 02/22/23  4:32 AM   Result Value Ref Range    Sodium 140 133 - 143 mmol/L    Potassium 3.6 3.5 - 5.1 mmol/L    Chloride 110 101 - 110 mmol/L    CO2 26 21 - 32 mmol/L    Anion Gap 4 2 - 11 mmol/L    Glucose 145 (H) 65 - 100 mg/dL    BUN 20 8 - 23 MG/DL    Creatinine 1.60 (H) 0.8 - 1.5 MG/DL    Est, Glom Filt Rate 45 (L) >60 ml/min/1.73m2    Calcium 9.2 8.3 - 10.4 MG/DL   POCT Glucose    Collection Time: 02/22/23  7:36 AM   Result Value Ref Range    POC Glucose 129 (H) 65 - 100 mg/dL    Performed by: Vangie Kidd        Other Studies:  XR CHEST PORTABLE    Result Date: 2/20/2023  EXAMINATION: XR CHEST PORTABLE 2/20/2023 11:49 AM ACCESSION NUMBER: [id number] COMPARISON: None available INDICATION: 82-year-old male with sepsis. TECHNIQUE: A single view of the chest was obtained. FINDINGS: Lungs well inflated. No focal consolidation or edema. No pleural effusion or pneumothorax. Cardiomediastinal silhouette within normal limits. No acute airspace disease. CT FOOT RIGHT WO CONTRAST    Result Date: 2/20/2023  EXAMINATION: CT FOOT RIGHT WO CONTRAST 2/20/2023 11:42 AM ACCESSION NUMBER: [id number] COMPARISON: No prior imaging of the right foot available. INDICATION: Infected right medial foot wound, possibly through and through. Hx CKD and diabetes TECHNIQUE: Multiple-row detector helical CT examination of the right foot without intravenous contrast.  Multiplanar reformatting was provided. Radiation dose reduction techniques were used for this study.  Our CT scanners use one or all of the following: Automated exposure control, adjustment of the mA and/or kV according to patient size, iterative reconstruction. FINDINGS: Lack of IV contrast limits evaluation. There is a prominent soft tissue defect along the plantar and medial aspect of the right big toe metatarsophalangeal joint which contacts the underlying bilateral hallux sesamoids and metatarsal head medially. Soft tissue emphysema is seen surrounding the first metatarsal head most pronounced medially, with small volume dissecting gas is seen in the cortex of the medial big toe metatarsal head communicating with the soft tissue gas (601:26), as well as trace dissecting gas within the base of the proximal phalanx (603:35). Trace soft tissue gas is also seen dissecting along the plantar aspect of the big toe proximal phalanx and interphalangeal joint (603:33). There is heterogeneous sclerosis of the bilateral hallux sesamoids and along the metatarsal head. Soft tissue swelling and thickening throughout this region. No evidence of acute fracture or dislocation. Additional soft tissue thickening and mild swelling along the plantar aspect of the fifth metatarsophalangeal joint (601:31). Prominent plantar calcaneal enthesophyte. Multifocal vascular calcifications throughout the lower extremity and foot. No radio opaque foreign body identified. Prominent soft tissue defect/ulceration along the medial and plantar aspect of the big toe metatarsophalangeal joint contacting the underlying sesamoids and metatarsal head, with dissecting gas extending in the medial cortex of the big toe metatarsal head, base of the proximal phalanx, and bilateral sesamoids compatible with osteomyelitis in these areas. Dissecting soft tissue gas along the plantar aspect of the big toe proximal phalanx, IP joint, and base of the distal phalanx, likely spread of infection.  Recommend further evaluation with MRI with/without contrast to evaluate extent of infection.       Current Meds:  Current Facility-Administered Medications   Medication Dose Route Frequency    vancomycin (VANCOCIN) 1,000 mg in sodium chloride 0.9 % 250 mL IVPB ([id number])  1,000 mg IntraVENous Q24H    cefepime (MAXIPIME) 2,000 mg in sodium chloride 0.9 % 50 mL IVPB (mini-bag)  2,000 mg IntraVENous Q12H    glucose chewable tablet 16 g  4 tablet Oral PRN    dextrose bolus 10% 125 mL  125 mL IntraVENous PRN    Or    dextrose bolus 10% 250 mL  250 mL IntraVENous PRN    glucagon (rDNA) injection 1 mg  1 mg SubCUTAneous PRN    dextrose 10 % infusion   IntraVENous Continuous PRN    amLODIPine (NORVASC) tablet 10 mg  10 mg Oral Daily    atenolol (TENORMIN) tablet 25 mg  25 mg Oral Daily    hydroCHLOROthiazide (HYDRODIURIL) tablet 25 mg  25 mg Oral Daily    [Held by provider] losartan (COZAAR) tablet 100 mg  100 mg Oral Daily    tamsulosin (FLOMAX) capsule 0.4 mg  0.4 mg Oral Daily    insulin lispro (HUMALOG) injection vial 0-8 Units  0-8 Units SubCUTAneous TID WC    insulin lispro (HUMALOG) injection vial 0-4 Units  0-4 Units SubCUTAneous Nightly    sodium chloride flush 0.9 % injection 5-40 mL  5-40 mL IntraVENous 2 times per day    sodium chloride flush 0.9 % injection 5-40 mL  5-40 mL IntraVENous PRN    0.9 % sodium chloride infusion   IntraVENous PRN    ondansetron (ZOFRAN-ODT) disintegrating tablet 4 mg  4 mg Oral Q8H PRN    Or    ondansetron (ZOFRAN) injection 4 mg  4 mg IntraVENous Q6H PRN    polyethylene glycol (GLYCOLAX) packet 17 g  17 g Oral Daily PRN    acetaminophen (TYLENOL) tablet 650 mg  650 mg Oral Q6H PRN    Or    acetaminophen (TYLENOL) suppository 650 mg  650 mg Rectal Q6H PRN    0.9 % sodium chloride infusion   IntraVENous Continuous    morphine injection 2 mg  2 mg IntraVENous Q4H PRN    HYDROcodone-acetaminophen (NORCO) 5-325 MG per tablet 1 tablet  1 tablet Oral Q6H PRN    LORazepam (ATIVAN) injection 1 mg  1 mg IntraVENous Q6H PRN       Signed:  Esther Corporal, DO  2/22/2023

## 2023-02-23 LAB
ANION GAP SERPL CALC-SCNC: 7 MMOL/L (ref 2–11)
BUN SERPL-MCNC: 17 MG/DL (ref 8–23)
CALCIUM SERPL-MCNC: 8.7 MG/DL (ref 8.3–10.4)
CHLORIDE SERPL-SCNC: 108 MMOL/L (ref 101–110)
CO2 SERPL-SCNC: 25 MMOL/L (ref 21–32)
CREAT SERPL-MCNC: 1.2 MG/DL (ref 0.8–1.5)
ERYTHROCYTE [DISTWIDTH] IN BLOOD BY AUTOMATED COUNT: 12.3 % (ref 11.9–14.6)
GLUCOSE BLD STRIP.AUTO-MCNC: 122 MG/DL (ref 65–100)
GLUCOSE BLD STRIP.AUTO-MCNC: 197 MG/DL (ref 65–100)
GLUCOSE BLD STRIP.AUTO-MCNC: 234 MG/DL (ref 65–100)
GLUCOSE BLD STRIP.AUTO-MCNC: 239 MG/DL (ref 65–100)
GLUCOSE SERPL-MCNC: 132 MG/DL (ref 65–100)
HCT VFR BLD AUTO: 33.7 % (ref 41.1–50.3)
HGB BLD-MCNC: 11.2 G/DL (ref 13.6–17.2)
MCH RBC QN AUTO: 28.7 PG (ref 26.1–32.9)
MCHC RBC AUTO-ENTMCNC: 33.2 G/DL (ref 31.4–35)
MCV RBC AUTO: 86.4 FL (ref 82–102)
NRBC # BLD: 0 K/UL (ref 0–0.2)
PLATELET # BLD AUTO: 444 K/UL (ref 150–450)
PMV BLD AUTO: 8.8 FL (ref 9.4–12.3)
POTASSIUM SERPL-SCNC: 4.1 MMOL/L (ref 3.5–5.1)
RBC # BLD AUTO: 3.9 M/UL (ref 4.23–5.6)
SERVICE CMNT-IMP: ABNORMAL
SODIUM SERPL-SCNC: 140 MMOL/L (ref 133–143)
VANCOMYCIN SERPL-MCNC: 12.7 UG/ML
WBC # BLD AUTO: 10.2 K/UL (ref 4.3–11.1)

## 2023-02-23 PROCEDURE — 1100000000 HC RM PRIVATE

## 2023-02-23 PROCEDURE — 82962 GLUCOSE BLOOD TEST: CPT

## 2023-02-23 PROCEDURE — 6370000000 HC RX 637 (ALT 250 FOR IP): Performed by: FAMILY MEDICINE

## 2023-02-23 PROCEDURE — 2580000003 HC RX 258: Performed by: INTERNAL MEDICINE

## 2023-02-23 PROCEDURE — 36415 COLL VENOUS BLD VENIPUNCTURE: CPT

## 2023-02-23 PROCEDURE — 6360000002 HC RX W HCPCS: Performed by: INTERNAL MEDICINE

## 2023-02-23 PROCEDURE — 80202 ASSAY OF VANCOMYCIN: CPT

## 2023-02-23 PROCEDURE — 85027 COMPLETE CBC AUTOMATED: CPT

## 2023-02-23 PROCEDURE — 80048 BASIC METABOLIC PNL TOTAL CA: CPT

## 2023-02-23 PROCEDURE — 2580000003 HC RX 258: Performed by: FAMILY MEDICINE

## 2023-02-23 RX ADMIN — CEFTRIAXONE 1000 MG: 1 INJECTION, POWDER, FOR SOLUTION INTRAMUSCULAR; INTRAVENOUS at 11:26

## 2023-02-23 RX ADMIN — HYDROCHLOROTHIAZIDE 25 MG: 25 TABLET ORAL at 08:17

## 2023-02-23 RX ADMIN — LOSARTAN POTASSIUM 100 MG: 50 TABLET, FILM COATED ORAL at 08:19

## 2023-02-23 RX ADMIN — SODIUM CHLORIDE: 9 INJECTION, SOLUTION INTRAVENOUS at 08:23

## 2023-02-23 RX ADMIN — CEFEPIME 2000 MG: 2 INJECTION, POWDER, FOR SOLUTION INTRAVENOUS at 03:19

## 2023-02-23 RX ADMIN — AMLODIPINE BESYLATE 10 MG: 10 TABLET ORAL at 08:18

## 2023-02-23 RX ADMIN — SODIUM CHLORIDE: 9 INJECTION, SOLUTION INTRAVENOUS at 20:59

## 2023-02-23 RX ADMIN — SODIUM CHLORIDE, PRESERVATIVE FREE 10 ML: 5 INJECTION INTRAVENOUS at 08:19

## 2023-02-23 RX ADMIN — TAMSULOSIN HYDROCHLORIDE 0.4 MG: 0.4 CAPSULE ORAL at 08:17

## 2023-02-23 RX ADMIN — ATENOLOL 25 MG: 25 TABLET ORAL at 08:18

## 2023-02-23 RX ADMIN — SODIUM CHLORIDE 1500 MG: 9 INJECTION, SOLUTION INTRAVENOUS at 12:39

## 2023-02-23 RX ADMIN — HYDROCODONE BITARTRATE AND ACETAMINOPHEN 1 TABLET: 5; 325 TABLET ORAL at 03:25

## 2023-02-23 RX ADMIN — SODIUM CHLORIDE, PRESERVATIVE FREE 10 ML: 5 INJECTION INTRAVENOUS at 21:00

## 2023-02-23 RX ADMIN — INSULIN LISPRO 2 UNITS: 100 INJECTION, SOLUTION INTRAVENOUS; SUBCUTANEOUS at 17:12

## 2023-02-23 ASSESSMENT — PAIN DESCRIPTION - LOCATION: LOCATION: FOOT

## 2023-02-23 ASSESSMENT — PAIN SCALES - GENERAL
PAINLEVEL_OUTOF10: 6
PAINLEVEL_OUTOF10: 0

## 2023-02-23 ASSESSMENT — PAIN DESCRIPTION - DESCRIPTORS: DESCRIPTORS: ACHING

## 2023-02-23 ASSESSMENT — PAIN DESCRIPTION - ORIENTATION: ORIENTATION: RIGHT

## 2023-02-23 NOTE — PROGRESS NOTES
Occupational Therapy Note:    Currently awaiting weight bearing clarification for patient's R LE prior to re-evaluating. Discussed with attending at IDT rounds. Message sent to orthopedic surgery via [employer].  Will continue to follow and attempt to see once given WB orders from ortho team.    Thank you,  Luis Enrique Briones, OTR/L

## 2023-02-23 NOTE — PROGRESS NOTES
Physical Therapy Note:    Currently awaiting weight bearing clarification for patient's R LE prior to re-evaluating. Discussed with attending at IDT rounds. Message sent to orthopedic surgery via [employer].  Thank you,    Verna Moseley, PT, DPT  2/23/23

## 2023-02-23 NOTE — PROGRESS NOTES
VANCO DAILY FOLLOW UP NOTE  [address] [facility] Pharmacokinetic Monitoring Service - Vancomycin    Consulting Provider: Dr. Nisreen Deluca   Indication: bone and joint infection  Target Concentration: Goal AUC/MICK 400-600 mg*hr/L  Day of Therapy: 4  Additional Antimicrobials: cefepime    Patient eligible for piperacillin-tazobactam to cefepime auto-substitution per P&T approved protocol? N/A    Pertinent Laboratory Values: Wt Readings from Last 1 Encounters:   02/22/23 163 lb (73.9 kg)     Temp Readings from Last 1 Encounters:   02/23/23 97.7 °F (36.5 °C) (Oral)     Recent Labs     02/20/23  1121 02/21/23  0427 02/22/23  0432 02/23/23  0623   BUN 38* 31* 20 17   CREATININE 2.60* 1.70* 1.60* 1.20   WBC 10.6 8.4 7.5 10.2   PROCAL 0.52*  --   --   --      Estimated Creatinine Clearance: 48 mL/min (based on SCr of 1.2 mg/dL). Lab Results   Component Value Date/Time    VANCORANDOM 12.7 02/23/2023 06:23 AM     MRSA Nasal Swab: N/A. Non-respiratory infection    Assessment:  Date/Time Dose Concentration AUC   2/23/2023 0623 1000mg q24 12.7 mcg/mL 345   Note: Serum concentrations collected for AUC dosing may appear elevated if collected in close proximity to the dose administered, this is not necessarily an indication of toxicity    Plan:  Dosing recommendations based on Bayesian software  Current dosing regimen is subtherapeutic.  Increase to vancomycin 1500 mg q24h  Anticipated AUC of 506 and trough concentration of 13.5 at steady state  Renal labs as indicated   Vancomycin concentrations will be ordered as clinically appropriate   Pharmacy will continue to monitor patient and adjust therapy as indicated    Thank you for the consult,  Dina Villegas, [facility]

## 2023-02-23 NOTE — PROGRESS NOTES
Hospitalist Progress Note   Admit Date:  2023 10:55 AM   Name:  Maricruz Mccray   Age:  78 y.o. Sex:  male  :  1945   MRN:  [id number]   Room:  601/01    Presenting Complaint: Right foot pain  Reason(s) for Admission: Septicemia [facility]) [A41.9]  Acute osteomyelitis of right foot ([facility] [address] [M86.171]  Acute hematogenous osteomyelitis of left foot [facility]) [facility] Course & Interval History:   78 y.o. male with medical history of hypertension, hyperlipidemia, diabetes mellitus, CKD stage III and alcohol dependence presented to ED with worsening right foot pain. Patient reports he has right foot wound for couple of weeks. He thinks he might have stepped on something which caused this wound. Reports over the past few days, pain has been significantly worsened. He has not seen anyone for the wound. Has not taken any antibiotic outpatient. Denies chest pain, palpitation, nausea, vomiting, abdominal pain. Reports decreased oral intake. He is not on insulin at home and reports blood glucose well controlled on oral regimen. He drinks couple of beers a day. Reports he used to drink more than this. He is a former smoker, quit years ago. In the ED patient was tachycardic. Labs notable for WBC 10.6, hemoglobin 13.6, platelet 506, sodium 131, potassium 4, creatinine 2.60 (baseline ~1.8). Procalcitonin 0.52 and lactic acid 3. CT foot concerning for osteomyelitis. Hospitalist consulted for admission. He went to the OR for I&D on . Subjective/24hr Events (23): Right foot pain 3/10. Wound vac in place. Denies CP/palpitations. Denies SOB/cough. Denies N/V/D. Denies abdominal pain. Denies F/C.       Assessment & Plan:     Principal Problem:    Acute osteomyelitis of right foot ([facility]  - CT right foot concerning for OM  -  S/P right foot wound I&D  - Continue Vanc  - Continue Cefepime  - LE Arterical duplex with BULMARO with mild stenosis  - Pain control  - PT/OT  - Appreciate Ortho's input and assistance    Active Problems:    MEG (acute kidney injury) ([facility]  - Baseline Cr 1.5-1.6  - 2/20 Cr 2.60  - 2/21 Cr 1.70  - 2/22 Cr 1.60  - 2/23 Cr 1.20  - Likely due to dehydration and infection  - 2/23 Stop IVFs  - Check BMP daily      Hypertension  - Stable  - Continue HCTZ  - Continue Amlodipine  - Continue Atenolol  - Restart Losartan      Stage 3a chronic kidney disease (HCC)      Type 2 diabetes mellitus with chronic kidney disease  - Stable  - Continue Humalog SSI      EtOH dependence (HCC)  - No s/s of withdrawal  - CIWA protocol      Diet:  Diet NPO  DVT PPx: Heparin  Code status: Full Code    Hospital Problems:  Principal Problem:    Acute osteomyelitis of right foot ([facility]  Active Problems:    MEG (acute kidney injury) ([facility]    Hypertension    Stage 3a chronic kidney disease ([facility]    Type 2 diabetes mellitus with chronic kidney disease    EtOH dependence ([facility]    GERD (gastroesophageal reflux disease)    Diabetic foot infection ([facility]  Resolved Problems:    Hyponatremia    Lactic acidosis    Sinus tachycardia      Discharge Plan:     Location: [facility]  Days: 1-2  PPD Ordered: 2/20    Objective:   Patient Vitals for the past 24 hrs:   Temp Pulse Resp BP SpO2   02/23/23 0712 97.7 °F (36.5 °C) (!) 48 (!) 1 (!) 151/59 100 %   02/23/23 0325 -- -- 18 -- --   02/23/23 0246 97.6 °F (36.4 °C) 50 18 (!) 156/60 99 %   02/22/23 2335 97.7 °F (36.5 °C) 52 18 (!) 154/70 99 %   02/22/23 1924 97.5 °F (36.4 °C) 52 18 (!) 146/65 100 %   02/22/23 1645 97.9 °F (36.6 °C) 60 18 (!) 157/74 --   02/22/23 1555 -- -- 18 -- --   02/22/23 1210 97.7 °F (36.5 °C) 53 18 (!) 170/69 --   02/22/23 1100 -- 61 20 (!) 171/79 97 %   02/22/23 1053 98 °F (36.7 °C) 53 20 (!) 159/75 98 %   02/22/23 1050 -- 53 20 (!) 177/81 99 %   02/22/23 1045 -- 55 16 (!) 150/75 99 %   02/22/23 1040 -- 54 16 (!) 149/72 97 %   02/22/23 1035 -- 56 16 (!) 152/78 100 %   02/22/23 1030 -- 54 16 (!) 153/71 99 %   02/22/23 1025 -- -- (P) 16 -- --   02/22/23 1024 98 °F (36.7 °C) 53 16 (!) 168/76 100 %   02/22/23 0828 98.4 °F (36.9 °C) 62 12 (!) 153/72 97 %       Oxygen Therapy  SpO2: 100 %  Pulse via Oximetry: 59 beats per minute  Pulse Oximeter Device Mode: Continuous  Pulse Oximeter Device Location: Finger  O2 Device: None (Room air)  O2 Flow Rate (L/min): 3 L/min  O2 Delivery Method: Oral airway    Estimated body mass index is 28.87 kg/m² as calculated from the following:    Height as of this encounter: 5' 3\" (1.6 m). Weight as of this encounter: 163 lb (73.9 kg). Intake/Output Summary (Last 24 hours) at 2/23/2023 0816  Last data filed at 2/23/2023 0772  Gross per 24 hour   Intake 420 ml   Output 2125 ml   Net -1705 ml         Physical Exam:   Blood pressure (!) 151/59, pulse (!) 48, temperature 97.7 °F (36.5 °C), temperature source Oral, resp. rate (!) 1, height 5' 3\" (1.6 m), weight 163 lb (73.9 kg), SpO2 100 %. General:    Well nourished. No overt distress  Head:  Normocephalic, atraumatic  Eyes:  Sclerae appear normal.  Pupils equally round. ENT:  Nares appear normal, no drainage. Moist oral mucosa  Neck:  No restricted ROM. Trachea midline   CV:   RRR. No m/r/g. No jugular venous distension. Lungs:   CTAB. No wheezing, rhonchi, or rales. Respirations even, unlabored  Abdomen: Bowel sounds present. Soft, nontender, nondistended. Extremities: No cyanosis or clubbing. Right foot with wound vac  Skin:     No rashes and normal coloration. Warm and dry. Neuro:  CN II-XII grossly intact. Sensation intact. A&Ox3  Psych:  Normal mood and affect.       I have reviewed ordered lab tests and independently visualized imaging below:    Recent Labs:  Recent Results (from the past 48 hour(s))   POCT Glucose    Collection Time: 02/21/23 12:27 PM   Result Value Ref Range    POC Glucose 141 (H) 65 - 100 mg/dL    Performed by: Sue Howell    POCT Glucose    Collection Time: 02/21/23  4:44 PM   Result Value Ref Range    POC Glucose 205 (H) 65 - 100 mg/dL Performed by: Zee (Holly)    POCT Glucose    Collection Time: 02/21/23  8:40 PM   Result Value Ref Range    POC Glucose 199 (H) 65 - 100 mg/dL    Performed by:  Fei    CBC    Collection Time: 02/22/23  4:32 AM   Result Value Ref Range    WBC 7.5 4.3 - 11.1 K/uL    RBC 4.33 4.23 - 5.6 M/uL    Hemoglobin 12.4 (L) 13.6 - 17.2 g/dL    Hematocrit 38.1 (L) 41.1 - 50.3 %    MCV 88.0 82 - 102 FL    MCH 28.6 26.1 - 32.9 PG    MCHC 32.5 31.4 - 35.0 g/dL    RDW 12.6 11.9 - 14.6 %    Platelets 880 (H) 915 - 450 K/uL    MPV 8.8 (L) 9.4 - 12.3 FL    nRBC 0.00 0.0 - 0.2 K/uL   Basic Metabolic Panel w/ Reflex to MG    Collection Time: 02/22/23  4:32 AM   Result Value Ref Range    Sodium 140 133 - 143 mmol/L    Potassium 3.6 3.5 - 5.1 mmol/L    Chloride 110 101 - 110 mmol/L    CO2 26 21 - 32 mmol/L    Anion Gap 4 2 - 11 mmol/L    Glucose 145 (H) 65 - 100 mg/dL    BUN 20 8 - 23 MG/DL    Creatinine 1.60 (H) 0.8 - 1.5 MG/DL    Est, Glom Filt Rate 45 (L) >60 ml/min/1.73m2    Calcium 9.2 8.3 - 10.4 MG/DL   POCT Glucose    Collection Time: 02/22/23  7:36 AM   Result Value Ref Range    POC Glucose 129 (H) 65 - 100 mg/dL    Performed by: Crystal    POCT Glucose    Collection Time: 02/22/23 11:43 AM   Result Value Ref Range    POC Glucose 135 (H) 65 - 100 mg/dL    Performed by: Denise Jett    POCT Glucose    Collection Time: 02/22/23  4:19 PM   Result Value Ref Range    POC Glucose 308 (H) 65 - 100 mg/dL    Performed by: Mikayla    PLEASE READ & DOCUMENT PPD TEST IN 48 HRS    Collection Time: 02/22/23  8:00 PM   Result Value Ref Range    PPD, (POC) Negative Negative    mm Induration 0 0 - 5 mm   POCT Glucose    Collection Time: 02/22/23  8:23 PM   Result Value Ref Range    POC Glucose 174 (H) 65 - 100 mg/dL    Performed by: Gus    CBC    Collection Time: 02/23/23  6:23 AM   Result Value Ref Range    WBC 10.2 4.3 - 11.1 K/uL    RBC 3.90 (L) 4.23 - 5.6 M/uL    Hemoglobin 11.2 (L) 13.6 - 17.2 g/dL    Hematocrit 33.7 (L) 41.1 - 50.3 %    MCV 86.4 82 - 102 FL    MCH 28.7 26.1 - 32.9 PG    MCHC 33.2 31.4 - 35.0 g/dL    RDW 12.3 11.9 - 14.6 %    Platelets 364 011 - 917 K/uL    MPV 8.8 (L) 9.4 - 12.3 FL    nRBC 0.00 0.0 - 0.2 K/uL   Basic Metabolic Panel w/ Reflex to MG    Collection Time: 02/23/23  6:23 AM   Result Value Ref Range    Sodium 140 133 - 143 mmol/L    Potassium 4.1 3.5 - 5.1 mmol/L    Chloride 108 101 - 110 mmol/L    CO2 25 21 - 32 mmol/L    Anion Gap 7 2 - 11 mmol/L    Glucose 132 (H) 65 - 100 mg/dL    BUN 17 8 - 23 MG/DL    Creatinine 1.20 0.8 - 1.5 MG/DL    Est, Glom Filt Rate >60 >60 ml/min/1.73m2    Calcium 8.7 8.3 - 10.4 MG/DL   Vancomycin Level, Random    Collection Time: 02/23/23  6:23 AM   Result Value Ref Range    Vancomycin Rm 12.7 UG/ML   POCT Glucose    Collection Time: 02/23/23  7:17 AM   Result Value Ref Range    POC Glucose 122 (H) 65 - 100 mg/dL    Performed by: Zee (Holly)        Other Studies:  XR CHEST PORTABLE    Result Date: 2/20/2023  EXAMINATION: XR CHEST PORTABLE 2/20/2023 11:49 AM ACCESSION NUMBER: [id number] COMPARISON: None available INDICATION: 44-year-old male with sepsis. TECHNIQUE: A single view of the chest was obtained. FINDINGS: Lungs well inflated. No focal consolidation or edema. No pleural effusion or pneumothorax. Cardiomediastinal silhouette within normal limits. No acute airspace disease. CT FOOT RIGHT WO CONTRAST    Result Date: 2/20/2023  EXAMINATION: CT FOOT RIGHT WO CONTRAST 2/20/2023 11:42 AM ACCESSION NUMBER: [id number] COMPARISON: No prior imaging of the right foot available. INDICATION: Infected right medial foot wound, possibly through and through. Hx CKD and diabetes TECHNIQUE: Multiple-row detector helical CT examination of the right foot without intravenous contrast.  Multiplanar reformatting was provided. Radiation dose reduction techniques were used for this study.  Our CT scanners use one or all of the following: Automated exposure control, adjustment of the mA and/or kV according to patient size, iterative reconstruction.  FINDINGS: Lack of IV contrast limits evaluation. There is a prominent soft tissue defect along the plantar and medial aspect of the right big toe metatarsophalangeal joint which contacts the underlying bilateral hallux sesamoids and metatarsal head medially. Soft tissue emphysema is seen surrounding the first metatarsal head most pronounced medially, with small volume dissecting gas is seen in the cortex of the medial big toe metatarsal head communicating with the soft tissue gas (601:26), as well as trace dissecting gas within the base of the proximal phalanx (603:35). Trace soft tissue gas is also seen dissecting along the plantar aspect of the big toe proximal phalanx and interphalangeal joint (603:33). There is heterogeneous sclerosis of the bilateral hallux sesamoids and along the metatarsal head. Soft tissue swelling and thickening throughout this region. No evidence of acute fracture or dislocation. Additional soft tissue thickening and mild swelling along the plantar aspect of the fifth metatarsophalangeal joint (601:31). Prominent plantar calcaneal enthesophyte. Multifocal vascular calcifications throughout the lower extremity and foot. No radio opaque foreign body identified.     Prominent soft tissue defect/ulceration along the medial and plantar aspect of the big toe metatarsophalangeal joint contacting the underlying sesamoids and metatarsal head, with dissecting gas extending in the medial cortex of the big toe metatarsal head, base of the proximal phalanx, and bilateral sesamoids compatible with osteomyelitis in these areas. Dissecting soft tissue gas along the plantar aspect of the big toe proximal phalanx, IP joint, and base of the distal phalanx, likely spread of infection. Recommend further evaluation with MRI with/without contrast to evaluate extent of infection.      Current  Meds:  Current Facility-Administered Medications   Medication Dose Route Frequency    vancomycin (VANCOCIN) 1500 mg in sodium chloride 0.9% 500 mL IVPB  1,500 mg IntraVENous Q24H    cefepime (MAXIPIME) 2,000 mg in sodium chloride 0.9 % 50 mL IVPB (mini-bag)  2,000 mg IntraVENous Q12H    glucose chewable tablet 16 g  4 tablet Oral PRN    dextrose bolus 10% 125 mL  125 mL IntraVENous PRN    Or    dextrose bolus 10% 250 mL  250 mL IntraVENous PRN    glucagon (rDNA) injection 1 mg  1 mg SubCUTAneous PRN    dextrose 10 % infusion   IntraVENous Continuous PRN    amLODIPine (NORVASC) tablet 10 mg  10 mg Oral Daily    atenolol (TENORMIN) tablet 25 mg  25 mg Oral Daily    hydroCHLOROthiazide (HYDRODIURIL) tablet 25 mg  25 mg Oral Daily    losartan (COZAAR) tablet 100 mg  100 mg Oral Daily    tamsulosin (FLOMAX) capsule 0.4 mg  0.4 mg Oral Daily    insulin lispro (HUMALOG) injection vial 0-8 Units  0-8 Units SubCUTAneous TID WC    insulin lispro (HUMALOG) injection vial 0-4 Units  0-4 Units SubCUTAneous Nightly    sodium chloride flush 0.9 % injection 5-40 mL  5-40 mL IntraVENous 2 times per day    sodium chloride flush 0.9 % injection 5-40 mL  5-40 mL IntraVENous PRN    0.9 % sodium chloride infusion   IntraVENous PRN    ondansetron (ZOFRAN-ODT) disintegrating tablet 4 mg  4 mg Oral Q8H PRN    Or    ondansetron (ZOFRAN) injection 4 mg  4 mg IntraVENous Q6H PRN    polyethylene glycol (GLYCOLAX) packet 17 g  17 g Oral Daily PRN    acetaminophen (TYLENOL) tablet 650 mg  650 mg Oral Q6H PRN    Or    acetaminophen (TYLENOL) suppository 650 mg  650 mg Rectal Q6H PRN    0.9 % sodium chloride infusion   IntraVENous Continuous    morphine injection 2 mg  2 mg IntraVENous Q4H PRN    HYDROcodone-acetaminophen (NORCO) 5-325 MG per tablet 1 tablet  1 tablet Oral Q6H PRN    LORazepam (ATIVAN) injection 1 mg  1 mg IntraVENous Q6H PRN       Signed:  Asim Rodrigues DO  2/23/2023

## 2023-02-23 NOTE — DISCHARGE INSTRUCTIONS
[facility]        Patient Discharge Instructions    Yumiko Mireles / [id number] : 1945    Admitted 2023 Discharged: 2023     IF YOU HAVE ANY PROBLEMS ONCE YOU ARE AT HOME CALL THE FOLLOWING NUMBERS:   Main office number: [phone] ask for Jodie Kamara (medical assistant with Dr. Shane Myles)  Office Address: [address] [place] [address] Jaime Hamlin [address] [facility], [address]          Activity  As tolerated and as directed by your doctor. Keep wound vac dressing dry and clean  The wound vac should be changed, //   Use walker when walking      Diet  Resume regular diet       Medications    The medications you are to continue on are listed on the medication reconciliation sheet. Narcotic pain medications as well as supplemental iron can cause constipation. If this occurs try stopping the narcotic pain medication and/or the iron. It is important that you take the medication exactly as they are prescribed. Keep your medication in the bottles provided by the pharmacist and keep a list of the medication names, dosages, and times to be taken in your wallet. Do not take other medications without consulting your doctor. Other Important Information    Do NOT smoke as this will greatly increase your risk of infection! Do not drive and operate hazardous machinery until being cleared to do so by your doctor     You are at a risk for falls. Use the rolling walker when walking. Patients should not drive if they are still taking narcotic pain mediation during the daytime hours. Most patients wean themselves off of pain medication within 2-5 weeks after surgery. Please give a list of your current medications to your Primary Care Provider and update this list whenever your medications are discontinued, doses are changed, or new medications (including over-the-counter products) are added. Please carry medication information at all times in case of emergency situations.     If you have sleep apnea and have a CPAP machine, please use it for all naps and sleeping. When to Call the office    - If you have a temperature greater then 101  - Excessive bleeding that does not stop after holding pressure over the area  - Excessive redness, swelling or bruising, and/ or green or yellow, smelly discharge from incision  - Uncontrolled vomiting   - Loose control of your bladder or bowel function  - Need a pain medication refill   - Need to change your follow up appointment     Information obtained by :  I understand that if any problems occur once I am at home I am to contact my physician. I understand and acknowledge receipt of the instructions indicated above.                                                                                                                                            Physician's or R.N.'s Signature                                                                  Date/Time                                                                                                                                              Patient or Representative Signature                                                          Date/Time

## 2023-02-23 NOTE — PROGRESS NOTES
completed initial visit with patient. Patient expressed a positive affect and positive outlook on his recovery. Patient states he has support from his brothers and sisters.  provided pastoral presence, prayer and empathetic listening.    Signed by  Raymundo Simno M.Div.

## 2023-02-23 NOTE — PROGRESS NOTES
Pt has rested quietly through the night, requested pain medication at 0325 for foot pain, relief obtained. Wound vac and dressing wnl. Hourly rounding being performed, bed low and locked with call light in reach. Will monitor.

## 2023-02-24 ENCOUNTER — HOME HEALTH ADMISSION (OUTPATIENT)
Dept: HOME HEALTH SERVICES | Facility: HOME HEALTH | Age: 78
End: 2023-02-24

## 2023-02-24 ENCOUNTER — TELEPHONE (OUTPATIENT)
Dept: ORTHOPEDIC SURGERY | Age: 78
End: 2023-02-24

## 2023-02-24 LAB
ANION GAP SERPL CALC-SCNC: 2 MMOL/L (ref 2–11)
BUN SERPL-MCNC: 19 MG/DL (ref 8–23)
CALCIUM SERPL-MCNC: 8.9 MG/DL (ref 8.3–10.4)
CHLORIDE SERPL-SCNC: 110 MMOL/L (ref 101–110)
CO2 SERPL-SCNC: 28 MMOL/L (ref 21–32)
CREAT SERPL-MCNC: 1.3 MG/DL (ref 0.8–1.5)
ERYTHROCYTE [DISTWIDTH] IN BLOOD BY AUTOMATED COUNT: 12.6 % (ref 11.9–14.6)
GLUCOSE BLD STRIP.AUTO-MCNC: 132 MG/DL (ref 65–100)
GLUCOSE BLD STRIP.AUTO-MCNC: 136 MG/DL (ref 65–100)
GLUCOSE BLD STRIP.AUTO-MCNC: 167 MG/DL (ref 65–100)
GLUCOSE BLD STRIP.AUTO-MCNC: 233 MG/DL (ref 65–100)
GLUCOSE SERPL-MCNC: 116 MG/DL (ref 65–100)
HCT VFR BLD AUTO: 32.1 % (ref 41.1–50.3)
HGB BLD-MCNC: 10.7 G/DL (ref 13.6–17.2)
MAGNESIUM SERPL-MCNC: 1.9 MG/DL (ref 1.8–2.4)
MCH RBC QN AUTO: 28.8 PG (ref 26.1–32.9)
MCHC RBC AUTO-ENTMCNC: 33.3 G/DL (ref 31.4–35)
MCV RBC AUTO: 86.3 FL (ref 82–102)
NRBC # BLD: 0 K/UL (ref 0–0.2)
PLATELET # BLD AUTO: 455 K/UL (ref 150–450)
PMV BLD AUTO: 8.8 FL (ref 9.4–12.3)
POTASSIUM SERPL-SCNC: 3.5 MMOL/L (ref 3.5–5.1)
RBC # BLD AUTO: 3.72 M/UL (ref 4.23–5.6)
SERVICE CMNT-IMP: ABNORMAL
SODIUM SERPL-SCNC: 140 MMOL/L (ref 133–143)
WBC # BLD AUTO: 9.3 K/UL (ref 4.3–11.1)

## 2023-02-24 PROCEDURE — 83735 ASSAY OF MAGNESIUM: CPT

## 2023-02-24 PROCEDURE — 97168 OT RE-EVAL EST PLAN CARE: CPT

## 2023-02-24 PROCEDURE — 97535 SELF CARE MNGMENT TRAINING: CPT

## 2023-02-24 PROCEDURE — 85027 COMPLETE CBC AUTOMATED: CPT

## 2023-02-24 PROCEDURE — 82962 GLUCOSE BLOOD TEST: CPT

## 2023-02-24 PROCEDURE — 36415 COLL VENOUS BLD VENIPUNCTURE: CPT

## 2023-02-24 PROCEDURE — 2580000003 HC RX 258: Performed by: FAMILY MEDICINE

## 2023-02-24 PROCEDURE — 80048 BASIC METABOLIC PNL TOTAL CA: CPT

## 2023-02-24 PROCEDURE — 1100000000 HC RM PRIVATE

## 2023-02-24 PROCEDURE — 97607 NEG PRS WND THR NDME<=50SQCM: CPT

## 2023-02-24 PROCEDURE — 6360000002 HC RX W HCPCS: Performed by: INTERNAL MEDICINE

## 2023-02-24 PROCEDURE — 97164 PT RE-EVAL EST PLAN CARE: CPT

## 2023-02-24 PROCEDURE — 6370000000 HC RX 637 (ALT 250 FOR IP): Performed by: FAMILY MEDICINE

## 2023-02-24 PROCEDURE — 97605 NEG PRS WND THER DME<=50SQCM: CPT

## 2023-02-24 PROCEDURE — 2580000003 HC RX 258: Performed by: INTERNAL MEDICINE

## 2023-02-24 PROCEDURE — 97116 GAIT TRAINING THERAPY: CPT

## 2023-02-24 RX ADMIN — HYDROCHLOROTHIAZIDE 25 MG: 25 TABLET ORAL at 08:19

## 2023-02-24 RX ADMIN — TAMSULOSIN HYDROCHLORIDE 0.4 MG: 0.4 CAPSULE ORAL at 08:19

## 2023-02-24 RX ADMIN — INSULIN LISPRO 2 UNITS: 100 INJECTION, SOLUTION INTRAVENOUS; SUBCUTANEOUS at 16:51

## 2023-02-24 RX ADMIN — LOSARTAN POTASSIUM 100 MG: 50 TABLET, FILM COATED ORAL at 08:18

## 2023-02-24 RX ADMIN — SODIUM CHLORIDE: 9 INJECTION, SOLUTION INTRAVENOUS at 05:34

## 2023-02-24 RX ADMIN — SODIUM CHLORIDE: 9 INJECTION, SOLUTION INTRAVENOUS at 18:28

## 2023-02-24 RX ADMIN — SODIUM CHLORIDE, PRESERVATIVE FREE 10 ML: 5 INJECTION INTRAVENOUS at 21:27

## 2023-02-24 RX ADMIN — SODIUM CHLORIDE 1500 MG: 9 INJECTION, SOLUTION INTRAVENOUS at 12:28

## 2023-02-24 RX ADMIN — CEFTRIAXONE 1000 MG: 1 INJECTION, POWDER, FOR SOLUTION INTRAMUSCULAR; INTRAVENOUS at 11:14

## 2023-02-24 NOTE — PROGRESS NOTES
Physical Therapy Note:    Perfect serve message sent to orthopedic surgery Dr. Gayle Gil again today to clarify weight bearing status on patient's R LE. Discussed with attending MD, RN Case Manager, and OT. Awaiting a response back.  Thank you,    Genesis Hunt, PT, DPT  2/24/23

## 2023-02-24 NOTE — CARE COORDINATION
Dispo update:  Ordered orthotic for right foot from [facility]. Also, ordered [employer] wound vac and gave heads up to [employer] rep Papa Commander at [employer] ([phone]). Ordered home health OT, PT, and RN from [facility] and [facility] home care, but [facility] cannot staff, and [facility] cannot take a [employer] patient with a wound vac. Sent new referral via [employer] to [facility]. Await their review. Dr. Karyn Orourke says culture is presumptive enterococcus, so will need to wait on sensitivities. Updated Mr. Foote File in room 601.

## 2023-02-24 NOTE — PROGRESS NOTES
ACUTE OCCUPATIONAL THERAPY GOALS:   (Developed with and agreed upon by patient and/or caregiver.)  Re-evaluation completed on 2/24/23 due to s/p debridement and placement of wound vac  (1.) Violeta Davis  will complete lower body bathing and dressing with INDEPENDENCE and adaptive equipment as needed. (2.) Violeta Davis will complete toileting with INDEPENDENCE. (3.) Violeta Davis will tolerate 25 minutes of OT treatment with 1-2 rest breaks to increase activity tolerance for ADLs. (4.) Violeta Davis will complete functional transfers with INDEPENDENCE and adaptive equipment as needed. (5.) Violeta Davis will complete functional ADL task standing at sink INDEPENDENCE and adaptive equipment as needed. Timeframe: 7 visits       OCCUPATIONAL THERAPY Daily Note and Re-evaluation       OT Visit Days: 1  Acknowledge Orders  Time  OT Charge Capture  Rehab Caseload Tracker      Violeta Davis is a 78 y.o. male   PRIMARY DIAGNOSIS: Acute osteomyelitis of right foot ([address]  Septicemia ([address] [A41.9]  Acute osteomyelitis of right foot ([address] [M86.171]  Acute hematogenous osteomyelitis of left foot ([address] [M86.072]  Procedure(s) (LRB):  Excisional debridment right foot wound (Right)  2 Days Post-Op  Reason for Referral: Generalized Muscle Weakness (M62.81)  Difficulty in walking, Not elsewhere classified (R26.2)  Other abnormalities of gait and mobility (R26.89)  Inpatient: Payor: Yehuda Lorenzo / Plan: [employer] / Product Type: *No Product type* /     ASSESSMENT:     REHAB RECOMMENDATIONS:   Recommendation to date pending progress:  Setting:  Home Health Therapy    Equipment:    Rolling Walker     ASSESSMENT:  Mr. Aniceto Hernandez presented to the hospital with osteomyelitis of the R foot. Pt is now s/p I&D and placement of wound vac. Per Dr. Chance Arciniega, pt is WBAT to RLE. At baseline, pt is mod I for all ADLs and IADLs. Does not ambulate with AD. Pt seen for re-evaluation today due to procedure.    Today, pt was received supine in bed. Completed bed mobility, LB dressing, functional transfers, ambulation with RW, toileting, and grooming task standing at the sink with overall SBA-CGA. Pt reported he does not feel weak--recommend pt return home with [facility] services. Jez Turner currently demonstrates overall deficits in strength, balance, activity tolerance, and ADL performance. Patient would benefit from skilled OT services at this time in order to address functional deficits and OT goals stated above. [address] [facility] [address] [address] [facility] 6 Clicks Daily Activity Inpatient Short Form:    AM-PAC Daily Activity - Inpatient   How much help is needed for putting on and taking off regular lower body clothing?: A Little  How much help is needed for bathing (which includes washing, rinsing, drying)?: A Little  How much help is needed for toileting (which includes using toilet, bedpan, or urinal)?: A Little  How much help is needed for putting on and taking off regular upper body clothing?: A Little  How much help is needed for taking care of personal grooming?: A Little  How much help for eating meals?: None  [facility] Inpatient Daily Activity Raw Score: 19  AM-PAC Inpatient ADL T-Scale Score : 40.22  ADL Inpatient CMS 0-100% Score: 42.8  ADL Inpatient CMS G-Code Modifier : CK    SUBJECTIVE:     Mr. Arvind Shah states, \"I dont need that. I am not weak. \"     Social/Functional Lives With: Family  Type of Home: House  Home Layout: Two level  Entrance Stairs - Number of Steps: 3-4  ADL Assistance: Independent  Ambulation Assistance: Independent    OBJECTIVE:     LINES / [place] / AIRWAY: None    RESTRICTIONS/PRECAUTIONS:  Restrictions/Precautions: Weight Bearing  Right Lower Extremity Weight Bearing: Weight Bearing As Tolerated    PAIN: VITALS / O2:   Pre Treatment:   Pain Assessment: None - Denies Pain      Post Treatment: no cnange       Vitals          Oxygen            GROSS EVALUATION: INTACT IMPAIRED   (See Comments)   UE AROM [x] []   UE PROM [x] []   Strength []  Generalized weakness but functional for bADLs     Posture / Balance []  Intact sitting, fair+ standing    Sensation []  Decreased in B feet   Coordination [x]       Tone [x]       Edema []    Activity Tolerance []  Decreased, generalized weakness      Hand Dominance R [] L []      COGNITION/  PERCEPTION: INTACT IMPAIRED   (See Comments)   Orientation [x]     Vision [x]     Hearing [x]     Cognition  []  Decreased safety awareness    Perception [x]       MOBILITY: I Mod I S SBA CGA Min Mod Max Total  NT x2 Comments:   Bed Mobility    Rolling [] [] [] [] [] [] [] [] [] [x] []    Supine to Sit [] [] [] [x] [] [] [] [] [] [] []    Scooting [] [] [] [x] [] [] [] [] [] [] []    Sit to Supine [] [] [] [] [] [] [] [] [] [x] [] Left sitting in the recliner    Transfers    Sit to Stand [] [] [] [] [x] [] [] [] [] [] []    Bed to Chair [] [] [] [] [x] [] [] [] [] [] [] RW   Stand to Sit [] [] [] [] [x] [] [] [] [] [] []    Tub/Shower [] [] [] [] [] [] [] [] [] [x] []     Toilet [] [] [] [] [x] [] [] [] [] [] []      [] [] [] [] [] [] [] [] [] [] []    I=Independent, Mod I=Modified Independent, S=Supervision/Setup, SBA=Standby Assistance, CGA=Contact Guard Assistance, Min=Minimal Assistance, Mod=Moderate Assistance, Max=Maximal Assistance, Total=Total Assistance, NT=Not Tested    ACTIVITIES OF DAILY LIVING: I Mod I S SBA CGA Min Mod Max Total NT Comments   BASIC ADLs:              Upper Body Bathing  [] [] [] [] [] [] [] [] [] [x]    Lower Body Bathing [] [] [] [] [] [] [] [] [] [x]    Toileting [] [] [] [] [x] [] [] [] [] []    Upper Body Dressing [] [] [] [] [] [] [] [] [] []    Lower Body Dressing [] [] [] [x] [] [] [] [] [] [] Socks    Feeding [] [] [] [] [] [] [] [] [] [x]    Grooming [] [] [] [] [x] [] [] [] [] [] Washed hands standing at the sink   Personal Device Care [] [] [] [] [] [] [] [] [] [x]    Functional Mobility [] [] [] [] [x] [] [] [] [] [] RW   I=Independent, Mod I=Modified Independent, S=Supervision/Setup, SBA=Standby Assistance, CGA=Contact [address] 230, Min=Minimal Assistance, Mod=Moderate Assistance, Max=Maximal Assistance, Total=Total Assistance, NT=Not Tested    PLAN:   [facility] of Care: 3 times/week for duration of hospital stay or until stated goals are met, whichever comes first.    PROBLEM LIST:   (Skilled intervention is medically necessary to address:)  Decreased ADL/Functional Activities  Decreased Activity Tolerance  Decreased Gait Ability  Decreased Strength  Decreased Transfer Abilities  Increased Pain   INTERVENTIONS PLANNED:  (Benefits and precautions of occupational therapy have been discussed with the patient.)  Self Care Training  Therapeutic Activity  Therapeutic Exercise/HEP  Neuromuscular Re-education  Manual Therapy  Education         TREATMENT:     EVALUATION: LOW COMPLEXITY: (Untimed Charge)    TREATMENT:   Self Care (10 minutes): Patient participated in toileting, lower body dressing, and grooming ADLs in unsupported sitting and standing with minimal verbal cueing to increase independence, decrease assistance required, increase activity tolerance, and increase safety awareness. Patient also participated in functional mobility, functional transfer, and adaptive equipment training to increase independence, decrease assistance required, increase activity tolerance, and increase safety awareness. TREATMENT GRID:  N/A    AFTER TREATMENT PRECAUTIONS: Call light within reach, Chair, Needs within reach, and RN notified    INTERDISCIPLINARY COLLABORATION:  RN/ PCT, PT/ PTA, and OT/ MCCLURE    EDUCATION:  Education Given To: Patient  Education Provided: Role of Therapy;Plan of Care; Fall Prevention Strategies  Education Outcome: Verbalized understanding;Demonstrated understanding    TOTAL TREATMENT DURATION AND TIME:  Time In: 1421  Time Out: 1200 Mark Rodríguez  Minutes: 1501 Osbaldo MARQUEZ, OT

## 2023-02-24 NOTE — ADT AUTH CERT
CONCURRENT REVIEW - 23    Patient Demographics    Name Patient ID SSN Gender Identity Birth Date   Naila Harry [id number]  Male [birth year] (78 yrs)     Address Phone Email Employer    [address] [address] [phone] (R)   [phone] (M) --  [employer] Race Occupation Emp Status    Lorelie Hodgkin / Mojgan Cabral -- Retired      Reg Status PCP Date Last Verified Next Review Date    Verified Sade Montes [address]      Admission Date Discharge Date Admitting Provider     23 -- Dangelo Amador MD       Marital Status Oriental orthodox      [address] [place] [address],[facility]        Emergency Contact [address] Sriram Ø)   Armanuel   [phone] [address] [de-identified]  [address] Name/Sex/Relation Subscriber  Subscriber Address/Phone Subscriber Emp/Emp Phone   [facility]   [id number] Sami Mcelroy Male   (Self) 1945 [address] [place], [place]  [address] [phone](T) RETIRED      Utilization Reviews       Foot: Surgical Wound Care - Care Day 1 (2023) by Kunal Pruitt       Review Entered Review Status   2023 1535 Completed      Criteria Review      Care Day: 1 Care Date: 2023 Level of Care: Inpatient Floor    Guideline Day 1    Level Of Care    (X) OR to ICU or floor    2023 3:35 PM EST by Rosita Day 42Lindsay Eckert    Clinical Status    (X) * Clinical Indications met    2023 3:35 PM EST by Wojciech Barber      CT of foot concerning osteomyelitis of the R foot    Routes    (X) IV fluids    2023 3:35 PM EST by Wojciech Barber       mL/hr IV (D/C)    (X) Diet as tolerated postoperatively    2023 3:35 PM EST by Judie Day 112 DIET; Regular; 4 carb choices (60 gm/meal)    Interventions    (X) WBC, cultures, urinalysis, imaging as indicated    2023 3:35 PM EST by Wojciech Barber      WBC: 7.5    Tissue Cx: Pending   Anaerobic Cx:  Pending    (X) Serum glucose, serum ketones, electrolytes    2/22/2023 3:35 PM EST by Deyanira Reeves      Glucose, Random: 145 (H)  Sodium: 140  Potassium: 3.6  Chloride: 110  CALCIUM, SERUM, [id number]: 9.2    (X) Bedside glucose monitoring    2/22/2023 3:35 PM EST by Deyanira Reeves      POC Glucose: 129 (H), 135 (H)    Medications    (X) Perioperative antibiotics    2/22/2023 3:35 PM EST by Deyanira Reeves      Maxipime 2000mg IV q12h, Vancomycin 1000mg IV q24h    ( ) Multimodal analgesia, possible PCA    2/22/2023 3:35 PM EST by Durenda Wayne 1 tablet PO q6h PRN x1    * Milestone   Additional Notes   DATE: 02/22/23      PERTINENT UPDATES:   S/p Excisional debridement of right diabetic foot wound today. Noted VAC placement in OR      VITALS:   98 (36.7) 18 53 170/69 97% RA       ABNL/PERTINENT LABS/RADIOLOGY/DIAGNOSTIC STUDIES:   Est, Glom Filt Rate: 45 (L)   RBC: 4.33   Hemoglobin Quant: 12.4 (L)   Hematocrit: 38.1 (L)   Platelet Count: 579 (H)      PHYSICAL EXAM:   Extremities:  Foul-smelling wound to lateral aspect and plantar surface of right foot near his first-line MTP joint, surrounding erythema and redness      No other acute changes in PE noted       MD CONSULTS/ASSESSMENT AND PLAN:   ORTHOPEDIC SURGERY:   Acute osteomyelitis of right foot:   CT foot concerning for osteomyelitis   Blood and wound cultures ordered   Duplex arterial right lower extremity   Wound care consult   Start patient on vancomycin/cefepime, pharmacy to dose   Pain control   IV fluids       MEG on CKD stage III:   Baseline ~1.8, on admission creatinine 2.6   Likely in the setting of acute infection   Continue IV fluids   Avoid nephrotoxic agent   Continue to monitor       Lactic acidosis:    In the setting of acute infection   Continue IV fluids   Trend lactic acid       Hyponatremia:   Due to decreased oral intake   Continue NS       Hypertension/hyperlipidemia:   Continue home meds: Amlodipine, atenolol, hydrochlorothiazide   Holding losartan in the setting of MEG       Diabetes mellitus:   A1c   Hold oral hypoglycemic   Continue sliding scale   Monitor blood glucose       Alcohol use:   Drinks couple of beers a day   Ativan as needed for withdrawal   Continue to monitor      VTE prophylaxis: Heparin      OP NOTE:   Procedure: Excisional debridement of right diabetic foot wound   Preoperative Diagnosis: Diabetic foot infection    Postoperative Diagnosis:  Right diabetic foot infection   Anesthesia: General    Estimated Blood Loss: 90 cc   Complications: None      CM Notes:   CM will submit order for wound vac to [facility] [employer],               Foot: Surgical Wound Care - Clinical Indications for Procedure by Antonio Wilson       Review Entered Review Status   2/22/2023 1520 Completed      Criteria Review      Clinical Indications for Procedure    Most Recent : Nathaniel Postal Most Recent Date: 2/22/2023 3:20 PM EST    (X) Procedure is indicated for  1 or more  of the following  [A] (1) (2) (3) (4):       (X) Operative treatment [A] needed for foot ulcer or wound, [B] [C] [D] as indicated by  1 or       more  of the following :          (X) Osteomyelitis diagnosed or suspected (eg, able to probe to bone) [E]          2/22/2023 3:20 PM EST by Nathaniel Postal            Diabetic foot infection - Acute osteomyelitis of right foot, Acute hematogenous osteomyelitis of left foot. CT foot concerning for osteomyelitis.

## 2023-02-24 NOTE — WOUND CARE
Right foot wound cleansed with wound  wound vac dressing applied, wound with subcutaneous exposed and pink viable tissue, 5x6x0.4cm, seal achieved machine working well. May discharge home with home vac and home health soon, once home vac arrives. Next dressing change due Monday.

## 2023-02-24 NOTE — PROGRESS NOTES
Hospitalist Progress Note   Admit Date:  2023 10:55 AM   Name:  Vikas Barnhart   Age:  78 y.o. Sex:  male  :  1945   MRN:  [id number]   Room:  601/01    Presenting Complaint: Right foot pain  Reason(s) for Admission: Septicemia [facility]) [A41.9]  Acute osteomyelitis of right foot ([facility] [M86.171]  Acute hematogenous osteomyelitis of left foot [facility]) [facility] Course & Interval History:   78 y.o. male with medical history of hypertension, hyperlipidemia, diabetes mellitus, CKD stage III and alcohol dependence presented to ED with worsening right foot pain. Patient reports he has right foot wound for couple of weeks. He thinks he might have stepped on something which caused this wound. Reports over the past few days, pain has been significantly worsened. He has not seen anyone for the wound. Has not taken any antibiotic outpatient. Denies chest pain, palpitation, nausea, vomiting, abdominal pain. Reports decreased oral intake. He is not on insulin at home and reports blood glucose well controlled on oral regimen. He drinks couple of beers a day. Reports he used to drink more than this. He is a former smoker, quit years ago. In the ED patient was tachycardic. Labs notable for WBC 10.6, hemoglobin 13.6, platelet 958, sodium 131, potassium 4, creatinine 2.60 (baseline ~1.8). Procalcitonin 0.52 and lactic acid 3. CT foot concerning for osteomyelitis. Hospitalist consulted for admission. He went to the OR for I&D on . Subjective/24hr Events (23): Right foot pain 3/10. Wound vac in place. Denies CP/palpitations. Denies SOB/cough. Denies N/V/D. Denies abdominal pain. Denies F/C.       Assessment & Plan:     Principal Problem:    Acute osteomyelitis of right foot ([facility]  - CT right foot concerning for OM  -  S/P right foot wound I&D  -  Wound culture with presumptive Enterococcus - need sensitivities  - Continue Vanc  - Continue Cefepime  - LE Arterical duplex with BULMARO with mild stenosis  - Pain control  - PT/OT  - Appreciate Ortho's input and assistance    Active Problems:    MEG (acute kidney injury) ([facility]  - Baseline Cr 1.5-1.6  - 2/20 Cr 2.60  - 2/21 Cr 1.70  - 2/22 Cr 1.60  - 2/23 Cr 1.20  - 2/24 Cr 1.30  - Likely due to dehydration and infection  - 2/23 Stopped IVFs  - Check BMP daily      Hypertension  - Stable  - Continue HCTZ  - Continue Amlodipine  - Continue Atenolol  - Restart Losartan      Stage 3a chronic kidney disease (HCC)      Type 2 diabetes mellitus with chronic kidney disease  - Stable  - Continue Humalog SSI      EtOH dependence (HCC)  - No s/s of withdrawal  - CIWA protocol      Diet:  ADULT DIET;  Regular  DVT PPx: Heparin  Code status: Full Code    Hospital Problems:  Principal Problem:    Acute osteomyelitis of right foot ([facility] [facility]  Active Problems:    MEG (acute kidney injury) ([facility] [facility]    Hypertension    Stage 3a chronic kidney disease ([facility]    Type 2 diabetes mellitus with chronic kidney disease    EtOH dependence ([facility]    GERD (gastroesophageal reflux disease)    Diabetic foot infection ([facility]  Resolved Problems:    Hyponatremia    Lactic acidosis    Sinus tachycardia      Discharge Plan:     Location: SNF vs [facility]  Days: 1-2  PPD Ordered: 2/20    Objective:   Patient Vitals for the past 24 hrs:   Temp Pulse Resp BP SpO2   02/24/23 1104 98.6 °F (37 °C) 59 17 (!) 149/66 99 %   02/24/23 0714 98.2 °F (36.8 °C) 51 18 (!) 128/58 98 %   02/24/23 0404 97.7 °F (36.5 °C) 58 22 (!) 144/74 97 %   02/23/23 2338 97.9 °F (36.6 °C) 51 20 128/72 98 %   02/23/23 1959 98.1 °F (36.7 °C) 57 19 125/62 99 %   02/23/23 1534 98.2 °F (36.8 °C) 58 18 (!) 119/59 98 %   02/23/23 1112 97.3 °F (36.3 °C) 53 16 (!) 121/51 99 %       Oxygen Therapy  SpO2: 99 %  Pulse via Oximetry: 59 beats per minute  Pulse Oximeter Device Mode: Continuous  Pulse Oximeter Device Location: Finger  O2 Device: None (Room air)  O2 Flow Rate (L/min): 3 L/min  O2 Delivery Method: Oral airway    Estimated body mass index is 28.87 kg/m² as calculated from the following:    Height as of this encounter: 5' 3\" (1.6 m). Weight as of this encounter: 163 lb (73.9 kg). Intake/Output Summary (Last 24 hours) at 2/24/2023 1105  Last data filed at 2/24/2023 7059  Gross per 24 hour   Intake --   Output 2575 ml   Net -2575 ml         Physical Exam:   Blood pressure (!) 149/66, pulse 59, temperature 98.6 °F (37 °C), temperature source Oral, resp. rate 17, height 5' 3\" (1.6 m), weight 163 lb (73.9 kg), SpO2 99 %. General:    Well nourished. No overt distress  Head:  Normocephalic, atraumatic  Eyes:  Sclerae appear normal.  Pupils equally round. ENT:  Nares appear normal, no drainage. Moist oral mucosa  Neck:  No restricted ROM. Trachea midline   CV:   RRR. No m/r/g. No jugular venous distension. Lungs:   CTAB. No wheezing, rhonchi, or rales. Respirations even, unlabored  Abdomen: Bowel sounds present. Soft, nontender, nondistended. Extremities: No cyanosis or clubbing. Right foot with wound vac  Skin:     No rashes and normal coloration. Warm and dry. Neuro:  CN II-XII grossly intact. Sensation intact. A&Ox3  Psych:  Normal mood and affect.       I have reviewed ordered lab tests and independently visualized imaging below:    Recent Labs:  Recent Results (from the past 48 hour(s))   POCT Glucose    Collection Time: 02/22/23 11:43 AM   Result Value Ref Range    POC Glucose 135 (H) 65 - 100 mg/dL    Performed by: Alyssa Madrigal    POCT Glucose    Collection Time: 02/22/23  4:19 PM   Result Value Ref Range    POC Glucose 308 (H) 65 - 100 mg/dL    Performed by: Grecia Radford PPD TEST IN 48 HRS    Collection Time: 02/22/23  8:00 PM   Result Value Ref Range    PPD, (POC) Negative Negative    mm Induration 0 0 - 5 mm   POCT Glucose    Collection Time: 02/22/23  8:23 PM   Result Value Ref Range    POC Glucose 174 (H) 65 - 100 mg/dL    Performed by: Gus    CBC    Collection Time: 02/23/23  6:23 AM   Result Value Ref Range    WBC 10.2 4.3 - 11.1 K/uL    RBC 3.90 (L) 4.23 - 5.6 M/uL    Hemoglobin 11.2 (L) 13.6 - 17.2 g/dL    Hematocrit 33.7 (L) 41.1 - 50.3 %    MCV 86.4 82 - 102 FL    MCH 28.7 26.1 - 32.9 PG    MCHC 33.2 31.4 - 35.0 g/dL    RDW 12.3 11.9 - 14.6 %    Platelets 782 453 - 718 K/uL    MPV 8.8 (L) 9.4 - 12.3 FL    nRBC 0.00 0.0 - 0.2 K/uL   Basic Metabolic Panel w/ Reflex to MG    Collection Time: 02/23/23  6:23 AM   Result Value Ref Range    Sodium 140 133 - 143 mmol/L    Potassium 4.1 3.5 - 5.1 mmol/L    Chloride 108 101 - 110 mmol/L    CO2 25 21 - 32 mmol/L    Anion Gap 7 2 - 11 mmol/L    Glucose 132 (H) 65 - 100 mg/dL    BUN 17 8 - 23 MG/DL    Creatinine 1.20 0.8 - 1.5 MG/DL    Est, Glom Filt Rate >60 >60 ml/min/1.73m2    Calcium 8.7 8.3 - 10.4 MG/DL   Vancomycin Level, Random    Collection Time: 02/23/23  6:23 AM   Result Value Ref Range    Vancomycin Rm 12.7 UG/ML   POCT Glucose    Collection Time: 02/23/23  7:17 AM   Result Value Ref Range    POC Glucose 122 (H) 65 - 100 mg/dL    Performed by: Zee (Holly)    POCT Glucose    Collection Time: 02/23/23 11:17 AM   Result Value Ref Range    POC Glucose 197 (H) 65 - 100 mg/dL    Performed by: Zee (Holly)    POCT Glucose    Collection Time: 02/23/23  3:42 PM   Result Value Ref Range    POC Glucose 234 (H) 65 - 100 mg/dL    Performed by: Victorina    POCT Glucose    Collection Time: 02/23/23  8:00 PM   Result Value Ref Range    POC Glucose 239 (H) 65 - 100 mg/dL    Performed by:  Fei    CBC    Collection Time: 02/24/23  4:53 AM   Result Value Ref Range    WBC 9.3 4.3 - 11.1 K/uL    RBC 3.72 (L) 4.23 - 5.6 M/uL    Hemoglobin 10.7 (L) 13.6 - 17.2 g/dL    Hematocrit 32.1 (L) 41.1 - 50.3 %    MCV 86.3 82 - 102 FL    MCH 28.8 26.1 - 32.9 PG    MCHC 33.3 31.4 - 35.0 g/dL    RDW 12.6 11.9 - 14.6 %    Platelets 650 (H) 530 - 450 K/uL    MPV 8.8 (L) 9.4 - 12.3 FL    nRBC 0.00 0.0 - 0.2 K/uL Basic Metabolic Panel w/ Reflex to MG    Collection Time: 02/24/23  4:53 AM   Result Value Ref Range    Sodium 140 133 - 143 mmol/L    Potassium 3.5 3.5 - 5.1 mmol/L    Chloride 110 101 - 110 mmol/L    CO2 28 21 - 32 mmol/L    Anion Gap 2 2 - 11 mmol/L    Glucose 116 (H) 65 - 100 mg/dL    BUN 19 8 - 23 MG/DL    Creatinine 1.30 0.8 - 1.5 MG/DL    Est, Glom Filt Rate 57 (L) >60 ml/min/1.73m2    Calcium 8.9 8.3 - 10.4 MG/DL   Magnesium    Collection Time: 02/24/23  4:53 AM   Result Value Ref Range    Magnesium 1.9 1.8 - 2.4 mg/dL   POCT Glucose    Collection Time: 02/24/23  7:15 AM   Result Value Ref Range    POC Glucose 132 (H) 65 - 100 mg/dL    Performed by: [facility]        Other Studies:  XR CHEST PORTABLE    Result Date: 2/20/2023  EXAMINATION: XR CHEST PORTABLE 2/20/2023 11:49 AM ACCESSION NUMBER: [id number] COMPARISON: None available INDICATION: 72-year-old male with sepsis. TECHNIQUE: A single view of the chest was obtained. FINDINGS: Lungs well inflated. No focal consolidation or edema. No pleural effusion or pneumothorax. Cardiomediastinal silhouette within normal limits. No acute airspace disease. CT FOOT RIGHT WO CONTRAST    Result Date: 2/20/2023  EXAMINATION: CT FOOT RIGHT WO CONTRAST 2/20/2023 11:42 AM ACCESSION NUMBER: [id number] COMPARISON: No prior imaging of the right foot available. INDICATION: Infected right medial foot wound, possibly through and through. Hx CKD and diabetes TECHNIQUE: Multiple-row detector helical CT examination of the right foot without intravenous contrast.  Multiplanar reformatting was provided. Radiation dose reduction techniques were used for this study. Our CT scanners use one or all of the following: Automated exposure control, adjustment of the mA and/or kV according to patient size, iterative reconstruction. FINDINGS: Lack of IV contrast limits evaluation.  There is a prominent soft tissue defect along the plantar and medial aspect of the right big toe metatarsophalangeal joint which contacts the underlying bilateral hallux sesamoids and metatarsal head medially. Soft tissue emphysema is seen surrounding the first metatarsal head most pronounced medially, with small volume dissecting gas is seen in the cortex of the medial big toe metatarsal head communicating with the soft tissue gas (601:26), as well as trace dissecting gas within the base of the proximal phalanx (603:35). Trace soft tissue gas is also seen dissecting along the plantar aspect of the big toe proximal phalanx and interphalangeal joint (603:33). There is heterogeneous sclerosis of the bilateral hallux sesamoids and along the metatarsal head. Soft tissue swelling and thickening throughout this region. No evidence of acute fracture or dislocation. Additional soft tissue thickening and mild swelling along the plantar aspect of the fifth metatarsophalangeal joint (601:31). Prominent plantar calcaneal enthesophyte. Multifocal vascular calcifications throughout the lower extremity and foot. No radio opaque foreign body identified. Prominent soft tissue defect/ulceration along the medial and plantar aspect of the big toe metatarsophalangeal joint contacting the underlying sesamoids and metatarsal head, with dissecting gas extending in the medial cortex of the big toe metatarsal head, base of the proximal phalanx, and bilateral sesamoids compatible with osteomyelitis in these areas. Dissecting soft tissue gas along the plantar aspect of the big toe proximal phalanx, IP joint, and base of the distal phalanx, likely spread of infection. Recommend further evaluation with MRI with/without contrast to evaluate extent of infection.       Current Meds:  Current Facility-Administered Medications   Medication Dose Route Frequency    vancomycin (VANCOCIN) 1500 mg in sodium chloride 0.9% 500 mL IVPB  1,500 mg IntraVENous Q24H    cefTRIAXone (ROCEPHIN) 1,000 mg in sodium chloride 0.9 % 50 mL IVPB (mini-bag)  1,000 mg IntraVENous Q24H    glucose chewable tablet 16 g  4 tablet Oral PRN    dextrose bolus 10% 125 mL  125 mL IntraVENous PRN    Or    dextrose bolus 10% 250 mL  250 mL IntraVENous PRN    glucagon (rDNA) injection 1 mg  1 mg SubCUTAneous PRN    dextrose 10 % infusion   IntraVENous Continuous PRN    amLODIPine (NORVASC) tablet 10 mg  10 mg Oral Daily    atenolol (TENORMIN) tablet 25 mg  25 mg Oral Daily    hydroCHLOROthiazide (HYDRODIURIL) tablet 25 mg  25 mg Oral Daily    losartan (COZAAR) tablet 100 mg  100 mg Oral Daily    tamsulosin (FLOMAX) capsule 0.4 mg  0.4 mg Oral Daily    insulin lispro (HUMALOG) injection vial 0-8 Units  0-8 Units SubCUTAneous TID WC    insulin lispro (HUMALOG) injection vial 0-4 Units  0-4 Units SubCUTAneous Nightly    sodium chloride flush 0.9 % injection 5-40 mL  5-40 mL IntraVENous 2 times per day    sodium chloride flush 0.9 % injection 5-40 mL  5-40 mL IntraVENous PRN    0.9 % sodium chloride infusion   IntraVENous PRN    ondansetron (ZOFRAN-ODT) disintegrating tablet 4 mg  4 mg Oral Q8H PRN    Or    ondansetron (ZOFRAN) injection 4 mg  4 mg IntraVENous Q6H PRN    polyethylene glycol (GLYCOLAX) packet 17 g  17 g Oral Daily PRN    acetaminophen (TYLENOL) tablet 650 mg  650 mg Oral Q6H PRN    Or    acetaminophen (TYLENOL) suppository 650 mg  650 mg Rectal Q6H PRN    0.9 % sodium chloride infusion   IntraVENous Continuous    morphine injection 2 mg  2 mg IntraVENous Q4H PRN    HYDROcodone-acetaminophen (NORCO) 5-325 MG per tablet 1 tablet  1 tablet Oral Q6H PRN    LORazepam (ATIVAN) injection 1 mg  1 mg IntraVENous Q6H PRN       Signed:  Ellie Neff DO  2/24/2023

## 2023-02-24 NOTE — PROGRESS NOTES
VANCO DAILY FOLLOW UP NOTE  [facility] Pharmacokinetic Monitoring Service - Vancomycin    Consulting Provider: Dr. Krystal Redmond   Indication: bone and joint infection  Target Concentration: Goal AUC/MICK 400-600 mg*hr/L  Day of Therapy: 5  Additional Antimicrobials: cefepime    Patient eligible for piperacillin-tazobactam to cefepime auto-substitution per P&T approved protocol? N/A    Pertinent Laboratory Values: Wt Readings from Last 1 Encounters:   02/22/23 163 lb (73.9 kg)     Temp Readings from Last 1 Encounters:   02/24/23 98.2 °F (36.8 °C) (Oral)     Recent Labs     02/22/23  0432 02/23/23  0623 02/24/23  0453   BUN 20 17 19   CREATININE 1.60* 1.20 1.30   WBC 7.5 10.2 9.3     Estimated Creatinine Clearance: 44 mL/min (based on SCr of 1.3 mg/dL). Lab Results   Component Value Date/Time    VANCORANDOM 12.7 02/23/2023 06:23 AM     MRSA Nasal Swab: N/A.  Non-respiratory infection    Assessment:  Date/Time Dose Concentration AUC   2/23 0623 1000mg q24 12.7 mcg/mL 345   Note: Serum concentrations collected for AUC dosing may appear elevated if collected in close proximity to the dose administered, this is not necessarily an indication of toxicity    Plan:  Dosing recommendations based on [address]  Renal labs as indicated   Vancomycin concentrations will be ordered as clinically appropriate   Pharmacy will continue to monitor patient and adjust therapy as indicated    Thank you for the consult,  Marli Stevens, [facility]

## 2023-02-24 NOTE — TELEPHONE ENCOUNTER
Pj Valdez  with [facility] called for ok to refer out for [employer] for an offloading shoe for patient since foot wound debridement.  Ok for external referral per ML.

## 2023-02-24 NOTE — PROGRESS NOTES
ACUTE PHYSICAL THERAPY GOALS:   (Developed with and agreed upon by patient and/or caregiver.)  GOALS UPDATED ON 2/24/23  1. Patient will perform bed mobility with INDEPENDENCE within 7 days. 2. Patient will transfer bed to chair with MODIFIED INDEPENDENCE within 7 days. 3. Patient will demonstrate GOOD DYNAMIC STANDING balance within 7 day(s). 4. Patient will ambulate 300ft+ with MODIFIED INDEPENDENCE within 7 days. 5. Patient will tolerate 25+ minutes of therapeutic activity/exercise and/or neuromuscular re-education while maintaining stable vitals to improve functional strength and activity tolerance within 7 days. Previous Goals:      PHYSICAL THERAPY Daily Note, Re-evaluation, and PM  (Link to Caseload Tracking: PT Visit Days : 1  Acknowledge Orders  Time In/Out  PT Charge Capture  Rehab Caseload Tracker    WBAT R VIANEY Quintanilla is a 78 y.o. male   PRIMARY DIAGNOSIS: Acute osteomyelitis of right foot ([address]  Septicemia ([address] [A41.9]  Acute osteomyelitis of right foot ([address] [M86.171]  Acute hematogenous osteomyelitis of left foot ([address] [M86.072]  Procedure(s) (LRB):  Excisional debridment right foot wound (Right)  2 Days Post-Op  Reason for Referral: Difficulty in walking, Not elsewhere classified (R26.2)  Other abnormalities of gait and mobility (R26.89)  Inpatient: Payor: Yoel Panchal / Plan: Edvin Barclay HMO / Product Type: *No Product type* /     ASSESSMENT:     REHAB RECOMMENDATIONS:   Recommendation to date pending progress:  Setting:  Home Health Therapy   Equipment:     Rolling walker vs. Rollator (to assist with wound vac management)     ASSESSMENT:  Mr. Toribio Jhaveri is a pleasant 78year old male 2 days s/p debridement and wound vac placement of right foot wound (plantar aspect). Patient is seen today for PT re-evaluation s/p procedure. [facility]  At baseline, patient is an independent, community-level ambulatory without utilizing an assistive device. Today, mobilized with RW and SBA for functional transfers, dynamic standing balance activity, and x40ft within room. Recommend continued use of RW vs. Rollator to help off-load R LE. Educated patient on activity pacing and safety. Will follow with updated plan of care. [address] [facility] [address] [address] AM-PAC 6 Clicks Basic Mobility Inpatient Short Form  AM-PAC Basic Mobility - Inpatient   How much help is needed turning from your back to your side while in a flat bed without using bedrails?: None  How much help is needed moving from lying on your back to sitting on the side of a flat bed without using bedrails?: None  How much help is needed moving to and from a bed to a chair?: A Little  How much help is needed standing up from a chair using your arms?: A Little  How much help is needed walking in hospital room?: A Little  How much help is needed climbing 3-5 steps with a railing?: A Little  AM-[facility] Inpatient Mobility Raw Score : 20  AM-PAC Inpatient T-Scale Score : 47.67  Mobility Inpatient CMS 0-100% Score: 35.83  Mobility Inpatient CMS G-Code Modifier : CJ  INITIAL Score: 21  SUBJECTIVE:   Mr. Hammond Ends states, [place] house is tight. \"     Social/Functional Lives With: Family  Type of Home: House  Home Layout: Two level  Entrance Stairs - Number of Steps: 3-4  ADL Assistance: Independent  Ambulation Assistance: Independent    OBJECTIVE:     PAIN: VITALS / O2: PRECAUTION / Debe Shear / DRAINS:   Pre Treatment: 0/10  Pain Assessment: None - Denies Pain      Post Treatment: 0/10 Vitals        Oxygen      IV    RESTRICTIONS/PRECAUTIONS:  Restrictions/Precautions: Weight Bearing  Right Lower Extremity Weight Bearing: Weight Bearing As Tolerated              GROSS EVALUATION: Intact Impaired (Comments):   AROM [x]  WFL B LE   PROM []    Strength [x]  WFL BLE   Balance []  Fair statically and dynamically in standing; improved to fair+ with RW use   Posture [] Forward Head  (Mild)   Sensation [x]  Intact to light touch distal B LE   Coordination []      Tone []     Edema []    Activity Tolerance [x]  WFL    []      COGNITION/  PERCEPTION: Intact Impaired (Comments):   Orientation [x]  Oriented x3   Vision []     Hearing []     Cognition  []  Mildly poor historian regarding history     MOBILITY: I Mod I S SBA CGA Min Mod Max Total  NT x2 Comments:   Bed Mobility    Rolling [] [] [] [] [] [] [] [] [] [x] []    Supine to Sit [] [] [x] [] [] [] [] [] [] [] []    Scooting [] [] [x] [] [] [] [] [] [] [] []    Sit to Supine [] [] [] [] [] [] [] [] [] [x] []    Transfers    Sit to Stand [] [] [] [] [x] [] [] [] [] [] []    Bed to Chair [] [] [] [x] [] [] [] [] [] [] []    Stand to Sit [] [] [] [] [x] [] [] [] [] [] []     [] [] [] [] [] [] [] [] [] [] []    I=Independent, Mod I=Modified Independent, S=Supervision, SBA=Standby Assistance, CGA=Contact Guard Assistance,   Min=Minimal Assistance, Mod=Moderate Assistance, Max=Maximal Assistance, Total=Total Assistance, NT=Not Tested    GAIT: I Mod I S SBA CGA Min Mod Max Total  NT x2 Comments:   Level of Assistance [] [] [] [x] [x] [] [] [] [] [] [] Patient wanted to trial no RW, recommend continued RW use secondary to balance improvement and benefits of off-loading R LE.    Distance 40 ft    DME Rolling Walker     Gait Quality Antalgic, Decreased damien , Decreased step length, Decreased stance, Trunk sway increased, and Wide base of support    Weightbearing Status Restrictions/Precautions  Restrictions/Precautions: Weight Bearing  Lower Extremity Weight Bearing Restrictions  Right Lower Extremity Weight Bearing: Weight Bearing As Tolerated    Stairs      I=Independent, Mod I=Modified Independent, S=Supervision, SBA=Standby Assistance, CGA=Contact Guard Assistance,   Min=Minimal Assistance, Mod=Moderate Assistance, Max=Maximal Assistance, Total=Total Assistance, NT=Not Tested    PLAN:   FREQUENCY AND DURATION: 3 times/week for duration of hospital stay or until stated goals are met, whichever comes first.    THERAPY PROGNOSIS: Good    PROBLEM LIST:   (Skilled intervention is medically necessary to address:)  Decreased ADL/Functional Activities  Decreased Activity Tolerance  Decreased AROM/PROM  Decreased Balance  Decreased Gait Ability  Decreased Transfer Abilities INTERVENTIONS PLANNED:   (Benefits and precautions of physical therapy have been discussed with the patient.)  Therapeutic Activity  Therapeutic Exercise/HEP  Neuromuscular Re-education  Gait Training  Education       TREATMENT:   RE-EVALUATION:(Untimed Charge)  Today, patient is appropriate for Co-treatment with occupational therapy due to patient's decreased need for skilled assistance to complete functional transfers/mobility and functional tasks. TREATMENT:   Gait Training (12 Minutes): Gait training for 40 feet utilizing [facility]. Patient required Tactile, Verbal, and Visual cueing to improve Activity Pacing, Assistive Device Utilization, Dynamic Standing Balance, Gait Mechanics, and as well as pre-gait stance, weight shifts, and transfers. TREATMENT GRID:  N/A    AFTER TREATMENT PRECAUTIONS: Bed/Chair Locked, Call light within reach, Needs within reach, RN notified, and Educated patient on calling for assistance out of bed/chair (verbalized understanding), RN and PCT notified of patient status (okay no alarm, patient calling for assistance). INTERDISCIPLINARY COLLABORATION:  MD/ PA/ NP , RN/ PCT, PT/ PTA, and RN Case Manager/      EDUCATION: Education Given To: Patient  Education Provided: Role of Therapy;Plan of Care;Transfer Training; Fall Prevention Strategies (Assistive device utilization)  Education Method: Demonstration;Verbal  Barriers to Learning: None  Education Outcome: Verbalized understanding;Demonstrated understanding    TIME IN/OUT:  Time In: 1421  Time Out: 1200 Mark Rodríguez  Minutes: 21    Alvis Aase, PT

## 2023-02-25 LAB
ANION GAP SERPL CALC-SCNC: 3 MMOL/L (ref 2–11)
BACTERIA SPEC CULT: ABNORMAL
BACTERIA SPEC CULT: ABNORMAL
BACTERIA SPEC CULT: NORMAL
BUN SERPL-MCNC: 17 MG/DL (ref 8–23)
CALCIUM SERPL-MCNC: 9 MG/DL (ref 8.3–10.4)
CHLORIDE SERPL-SCNC: 109 MMOL/L (ref 101–110)
CO2 SERPL-SCNC: 27 MMOL/L (ref 21–32)
CREAT SERPL-MCNC: 1.2 MG/DL (ref 0.8–1.5)
ERYTHROCYTE [DISTWIDTH] IN BLOOD BY AUTOMATED COUNT: 12.6 % (ref 11.9–14.6)
GLUCOSE BLD STRIP.AUTO-MCNC: 113 MG/DL (ref 65–100)
GLUCOSE BLD STRIP.AUTO-MCNC: 113 MG/DL (ref 65–100)
GLUCOSE BLD STRIP.AUTO-MCNC: 159 MG/DL (ref 65–100)
GLUCOSE BLD STRIP.AUTO-MCNC: 284 MG/DL (ref 65–100)
GLUCOSE SERPL-MCNC: 124 MG/DL (ref 65–100)
GRAM STN SPEC: ABNORMAL
GRAM STN SPEC: ABNORMAL
GRAM STN SPEC: NORMAL
GRAM STN SPEC: NORMAL
HCT VFR BLD AUTO: 33.8 % (ref 41.1–50.3)
HGB BLD-MCNC: 11.1 G/DL (ref 13.6–17.2)
MCH RBC QN AUTO: 28.5 PG (ref 26.1–32.9)
MCHC RBC AUTO-ENTMCNC: 32.8 G/DL (ref 31.4–35)
MCV RBC AUTO: 86.9 FL (ref 82–102)
NRBC # BLD: 0 K/UL (ref 0–0.2)
PLATELET # BLD AUTO: 437 K/UL (ref 150–450)
PMV BLD AUTO: 8.5 FL (ref 9.4–12.3)
POTASSIUM SERPL-SCNC: 3.9 MMOL/L (ref 3.5–5.1)
RBC # BLD AUTO: 3.89 M/UL (ref 4.23–5.6)
SERVICE CMNT-IMP: ABNORMAL
SERVICE CMNT-IMP: NORMAL
SODIUM SERPL-SCNC: 139 MMOL/L (ref 133–143)
VANCOMYCIN SERPL-MCNC: 19 UG/ML
WBC # BLD AUTO: 8.9 K/UL (ref 4.3–11.1)

## 2023-02-25 PROCEDURE — 82962 GLUCOSE BLOOD TEST: CPT

## 2023-02-25 PROCEDURE — 85027 COMPLETE CBC AUTOMATED: CPT

## 2023-02-25 PROCEDURE — 80048 BASIC METABOLIC PNL TOTAL CA: CPT

## 2023-02-25 PROCEDURE — 6370000000 HC RX 637 (ALT 250 FOR IP): Performed by: FAMILY MEDICINE

## 2023-02-25 PROCEDURE — 36415 COLL VENOUS BLD VENIPUNCTURE: CPT

## 2023-02-25 PROCEDURE — 2580000003 HC RX 258: Performed by: INTERNAL MEDICINE

## 2023-02-25 PROCEDURE — 6360000002 HC RX W HCPCS: Performed by: INTERNAL MEDICINE

## 2023-02-25 PROCEDURE — 6370000000 HC RX 637 (ALT 250 FOR IP): Performed by: HOSPITALIST

## 2023-02-25 PROCEDURE — 1100000000 HC RM PRIVATE

## 2023-02-25 PROCEDURE — 80202 ASSAY OF VANCOMYCIN: CPT

## 2023-02-25 PROCEDURE — 2580000003 HC RX 258: Performed by: FAMILY MEDICINE

## 2023-02-25 RX ORDER — LORAZEPAM 2 MG/ML
2 INJECTION INTRAMUSCULAR
Status: DISCONTINUED | OUTPATIENT
Start: 2023-02-25 | End: 2023-03-02 | Stop reason: HOSPADM

## 2023-02-25 RX ORDER — LANOLIN ALCOHOL/MO/W.PET/CERES
100 CREAM (GRAM) TOPICAL DAILY
Status: DISCONTINUED | OUTPATIENT
Start: 2023-02-25 | End: 2023-03-02 | Stop reason: HOSPADM

## 2023-02-25 RX ADMIN — SODIUM CHLORIDE: 9 INJECTION, SOLUTION INTRAVENOUS at 04:06

## 2023-02-25 RX ADMIN — CEFTRIAXONE 1000 MG: 1 INJECTION, POWDER, FOR SOLUTION INTRAMUSCULAR; INTRAVENOUS at 11:03

## 2023-02-25 RX ADMIN — SODIUM CHLORIDE, PRESERVATIVE FREE 10 ML: 5 INJECTION INTRAVENOUS at 08:42

## 2023-02-25 RX ADMIN — LOSARTAN POTASSIUM 100 MG: 50 TABLET, FILM COATED ORAL at 08:40

## 2023-02-25 RX ADMIN — TAMSULOSIN HYDROCHLORIDE 0.4 MG: 0.4 CAPSULE ORAL at 08:40

## 2023-02-25 RX ADMIN — SODIUM CHLORIDE, PRESERVATIVE FREE 10 ML: 5 INJECTION INTRAVENOUS at 20:38

## 2023-02-25 RX ADMIN — SODIUM CHLORIDE 1500 MG: 9 INJECTION, SOLUTION INTRAVENOUS at 12:59

## 2023-02-25 RX ADMIN — Medication 100 MG: at 11:04

## 2023-02-25 RX ADMIN — HYDROCHLOROTHIAZIDE 25 MG: 25 TABLET ORAL at 08:40

## 2023-02-25 RX ADMIN — AMLODIPINE BESYLATE 10 MG: 10 TABLET ORAL at 08:40

## 2023-02-25 RX ADMIN — ATENOLOL 25 MG: 25 TABLET ORAL at 08:40

## 2023-02-25 ASSESSMENT — PAIN SCALES - GENERAL: PAINLEVEL_OUTOF10: 0

## 2023-02-25 NOTE — PROGRESS NOTES
Hospitalist Progress Note   Admit Date:  2023 10:55 AM   Name:  Kehinde Castañeda   Age:  78 y.o. Sex:  male  :  1945   MRN:  [id number]   Room:  601/01    Presenting Complaint: Right foot pain  Reason(s) for Admission: Septicemia [facility]) [A41.9]  Acute osteomyelitis of right foot ([facility] [address] [M86.171]  Acute hematogenous osteomyelitis of left foot [facility]) [facility] Course & Interval History:   78 y.o. male with medical history of hypertension, hyperlipidemia, diabetes mellitus, CKD stage III and alcohol dependence presented to ED with worsening right foot pain. Patient reports he has right foot wound for couple of weeks. He thinks he might have stepped on something which caused this wound. Reports over the past few days, pain has been significantly worsened. He has not seen anyone for the wound. Has not taken any antibiotic outpatient. Denies chest pain, palpitation, nausea, vomiting, abdominal pain. Reports decreased oral intake. He is not on insulin at home and reports blood glucose well controlled on oral regimen. He drinks couple of beers a day. Reports he used to drink more than this. He is a former smoker, quit years ago. In the ED patient was tachycardic. Labs notable for WBC 10.6, hemoglobin 13.6, platelet 466, sodium 131, potassium 4, creatinine 2.60 (baseline ~1.8). Procalcitonin 0.52 and lactic acid 3. CT foot concerning for osteomyelitis. Hospitalist consulted for admission. He went to the OR for I&D on .     Today, drain in place, no ac evenys, no shakes    Assessment & Plan:       Acute osteomyelitis of right foot, consulted ID  - CT right foot concerning for OM  -  S/P right foot wound I&D  -  Wound culture with presumptive Enterococcus - need sensitivities  - Continue Vanc  - Continue Cefepime  - LE Arterical duplex with BULMARO with mild stenosis  - Pain control  - PT/OT  - Appreciate Ortho's input and assistance        MEG (acute kidney injury), resolved      Hypertension  - Stable        Type 2 diabetes mellitus with chronic kidney disease  - Stable  - Continue Humalog SSI      EtOH dependence ([facility] [address]  - No s/s of withdrawal  - CIWA protocol    DispO; TBD    Discharge Plan:     Location: [facility] vs [facility]  Days: 1-2  PPD Ordered: 2/20    Objective:   Patient Vitals for the past 24 hrs:   Temp Pulse Resp BP SpO2   02/25/23 1043 98.9 °F (37.2 °C) 60 18 131/69 97 %   02/25/23 0840 -- 65 -- (!) 143/64 --   02/25/23 0727 98.5 °F (36.9 °C) 67 18 (!) 143/64 100 %   02/25/23 0407 98.1 °F (36.7 °C) 52 18 (!) 129/58 97 %   02/25/23 0024 -- 53 -- 128/62 --   02/25/23 0003 98.4 °F (36.9 °C) 87 18 (!) 190/97 96 %   02/24/23 1938 98.6 °F (37 °C) 54 16 137/82 99 %   02/24/23 1525 99 °F (37.2 °C) 56 18 (!) 155/77 98 %         Oxygen Therapy  SpO2: 97 %  Pulse via Oximetry: 59 beats per minute  Pulse Oximeter Device Mode: Continuous  Pulse Oximeter Device Location: Finger  O2 Device: None (Room air)  O2 Flow Rate (L/min): 3 L/min  O2 Delivery Method: Oral airway    Estimated body mass index is 28.87 kg/m² as calculated from the following:    Height as of this encounter: 5' 3\" (1.6 m). Weight as of this encounter: 163 lb (73.9 kg). Intake/Output Summary (Last 24 hours) at 2/25/2023 1520  Last data filed at 2/25/2023 1345  Gross per 24 hour   Intake --   Output 2025 ml   Net -2025 ml           Physical Exam:   Blood pressure 131/69, pulse 60, temperature 98.9 °F (37.2 °C), temperature source Oral, resp. rate 18, height 5' 3\" (1.6 m), weight 163 lb (73.9 kg), SpO2 97 %. General:    Well nourished. Moderate distress  CV:   RRR. No m/r/g. Lungs:   CTAB. No wheezing, rhonchi, or rales. Respirations even, unlabored  Abdomen: Bowel sounds present. Soft, nontender, nondistended. Extremities: No cyanosis or clubbing. Right foot with wound vac  Skin:     No rashes and normal coloration. Warm and dry. Neuro:  grossly intact. A&Ox3  Psych:  Normal mood and affect.       I have reviewed ordered lab tests and independently visualized imaging below:    Recent Labs:  Recent Results (from the past 48 hour(s))   POCT Glucose    Collection Time: 02/23/23  3:42 PM   Result Value Ref Range    POC Glucose 234 (H) 65 - 100 mg/dL    Performed by: Victorina    POCT Glucose    Collection Time: 02/23/23  8:00 PM   Result Value Ref Range    POC Glucose 239 (H) 65 - 100 mg/dL    Performed by: Fei    CBC    Collection Time: 02/24/23  4:53 AM   Result Value Ref Range    WBC 9.3 4.3 - 11.1 K/uL    RBC 3.72 (L) 4.23 - 5.6 M/uL    Hemoglobin 10.7 (L) 13.6 - 17.2 g/dL    Hematocrit 32.1 (L) 41.1 - 50.3 %    MCV 86.3 82 - 102 FL    MCH 28.8 26.1 - 32.9 PG    MCHC 33.3 31.4 - 35.0 g/dL    RDW 12.6 11.9 - 14.6 %    Platelets 455 (H) 150 - 450 K/uL    MPV 8.8 (L) 9.4 - 12.3 FL    nRBC 0.00 0.0 - 0.2 K/uL   Basic Metabolic Panel w/ Reflex to MG    Collection Time: 02/24/23  4:53 AM   Result Value Ref Range    Sodium 140 133 - 143 mmol/L    Potassium 3.5 3.5 - 5.1 mmol/L    Chloride 110 101 - 110 mmol/L    CO2 28 21 - 32 mmol/L    Anion Gap 2 2 - 11 mmol/L    Glucose 116 (H) 65 - 100 mg/dL    BUN 19 8 - 23 MG/DL    Creatinine 1.30 0.8 - 1.5 MG/DL    Est, Glom Filt Rate 57 (L) >60 ml/min/1.73m2    Calcium 8.9 8.3 - 10.4 MG/DL   Magnesium    Collection Time: 02/24/23  4:53 AM   Result Value Ref Range    Magnesium 1.9 1.8 - 2.4 mg/dL   POCT Glucose    Collection Time: 02/24/23  7:15 AM   Result Value Ref Range    POC Glucose 132 (H) 65 - 100 mg/dL    Performed by: JeffreyComjose    POCT Glucose    Collection Time: 02/24/23 11:05 AM   Result Value Ref Range    POC Glucose 136 (H) 65 - 100 mg/dL    Performed by: [facility]    POCT Glucose    Collection Time: 02/24/23  3:26 PM   Result Value Ref Range    POC Glucose 233 (H) 65 - 100 mg/dL    Performed by: [employer]    POCT Glucose    Collection Time: 02/24/23  8:45 PM   Result Value Ref Range    POC  Glucose 167 (H) 65 - 100 mg/dL    Performed by: Zoltan    CBC    Collection Time: 02/25/23  5:24 AM   Result Value Ref Range    WBC 8.9 4.3 - 11.1 K/uL    RBC 3.89 (L) 4.23 - 5.6 M/uL    Hemoglobin 11.1 (L) 13.6 - 17.2 g/dL    Hematocrit 33.8 (L) 41.1 - 50.3 %    MCV 86.9 82 - 102 FL    MCH 28.5 26.1 - 32.9 PG    MCHC 32.8 31.4 - 35.0 g/dL    RDW 12.6 11.9 - 14.6 %    Platelets 705 212 - 538 K/uL    MPV 8.5 (L) 9.4 - 12.3 FL    nRBC 0.00 0.0 - 0.2 K/uL   Basic Metabolic Panel w/ Reflex to MG    Collection Time: 02/25/23  5:24 AM   Result Value Ref Range    Sodium 139 133 - 143 mmol/L    Potassium 3.9 3.5 - 5.1 mmol/L    Chloride 109 101 - 110 mmol/L    CO2 27 21 - 32 mmol/L    Anion Gap 3 2 - 11 mmol/L    Glucose 124 (H) 65 - 100 mg/dL    BUN 17 8 - 23 MG/DL    Creatinine 1.20 0.8 - 1.5 MG/DL    Est, Glom Filt Rate >60 >60 ml/min/1.73m2    Calcium 9.0 8.3 - 10.4 MG/DL   Vancomycin Level, Random    Collection Time: 02/25/23  5:24 AM   Result Value Ref Range    Vancomycin Rm 19.0 UG/ML   POCT Glucose    Collection Time: 02/25/23  7:15 AM   Result Value Ref Range    POC Glucose 113 (H) 65 - 100 mg/dL    Performed by: Gus    POCT Glucose    Collection Time: 02/25/23 11:54 AM   Result Value Ref Range    POC Glucose 159 (H) 65 - 100 mg/dL    Performed by: Gus        Other Studies:  XR CHEST PORTABLE    Result Date: 2/20/2023  EXAMINATION: XR CHEST PORTABLE 2/20/2023 11:49 AM ACCESSION NUMBER: [id number] COMPARISON: None available INDICATION: 29-year-old male with sepsis. TECHNIQUE: A single view of the chest was obtained. FINDINGS: Lungs well inflated. No focal consolidation or edema. No pleural effusion or pneumothorax. Cardiomediastinal silhouette within normal limits. No acute airspace disease.      CT FOOT RIGHT WO CONTRAST    Result Date: 2/20/2023  EXAMINATION: CT FOOT RIGHT WO CONTRAST 2/20/2023 11:42 AM ACCESSION NUMBER: [id number] COMPARISON: No prior imaging of the right foot available. INDICATION: Infected right medial foot wound, possibly through and through. Hx CKD and diabetes TECHNIQUE: Multiple-row detector helical CT examination of the right foot without intravenous contrast.  Multiplanar reformatting was provided. Radiation dose reduction techniques were used for this study. Our CT scanners use one or all of the following: Automated exposure control, adjustment of the mA and/or kV according to patient size, iterative reconstruction. FINDINGS: Lack of IV contrast limits evaluation. There is a prominent soft tissue defect along the plantar and medial aspect of the right big toe metatarsophalangeal joint which contacts the underlying bilateral hallux sesamoids and metatarsal head medially. Soft tissue emphysema is seen surrounding the first metatarsal head most pronounced medially, with small volume dissecting gas is seen in the cortex of the medial big toe metatarsal head communicating with the soft tissue gas (601:26), as well as trace dissecting gas within the base of the proximal phalanx (603:35). Trace soft tissue gas is also seen dissecting along the plantar aspect of the big toe proximal phalanx and interphalangeal joint (603:33). There is heterogeneous sclerosis of the bilateral hallux sesamoids and along the metatarsal head. Soft tissue swelling and thickening throughout this region. No evidence of acute fracture or dislocation. Additional soft tissue thickening and mild swelling along the plantar aspect of the fifth metatarsophalangeal joint (601:31). Prominent plantar calcaneal enthesophyte. Multifocal vascular calcifications throughout the lower extremity and foot. No radio opaque foreign body identified.      Prominent soft tissue defect/ulceration along the medial and plantar aspect of the big toe metatarsophalangeal joint contacting the underlying sesamoids and metatarsal head, with dissecting gas extending in the medial cortex of the big toe metatarsal head, base of the proximal phalanx, and bilateral sesamoids compatible with osteomyelitis in these areas. Dissecting soft tissue gas along the plantar aspect of the big toe proximal phalanx, IP joint, and base of the distal phalanx, likely spread of infection. Recommend further evaluation with MRI with/without contrast to evaluate extent of infection.       Current Meds:  Current Facility-Administered Medications   Medication Dose Route Frequency    thiamine tablet 100 mg  100 mg Oral Daily    LORazepam (ATIVAN) injection 2 mg  2 mg IntraVENous Q2H PRN    vancomycin (VANCOCIN) 1500 mg in sodium chloride 0.9% 500 mL IVPB  1,500 mg IntraVENous Q24H    cefTRIAXone (ROCEPHIN) 1,000 mg in sodium chloride 0.9 % 50 mL IVPB (mini-bag)  1,000 mg IntraVENous Q24H    glucose chewable tablet 16 g  4 tablet Oral PRN    dextrose bolus 10% 125 mL  125 mL IntraVENous PRN    Or    dextrose bolus 10% 250 mL  250 mL IntraVENous PRN    glucagon (rDNA) injection 1 mg  1 mg SubCUTAneous PRN    dextrose 10 % infusion   IntraVENous Continuous PRN    amLODIPine (NORVASC) tablet 10 mg  10 mg Oral Daily    atenolol (TENORMIN) tablet 25 mg  25 mg Oral Daily    losartan (COZAAR) tablet 100 mg  100 mg Oral Daily    tamsulosin (FLOMAX) capsule 0.4 mg  0.4 mg Oral Daily    insulin lispro (HUMALOG) injection vial 0-8 Units  0-8 Units SubCUTAneous TID WC    insulin lispro (HUMALOG) injection vial 0-4 Units  0-4 Units SubCUTAneous Nightly    sodium chloride flush 0.9 % injection 5-40 mL  5-40 mL IntraVENous 2 times per day    sodium chloride flush 0.9 % injection 5-40 mL  5-40 mL IntraVENous PRN    0.9 % sodium chloride infusion   IntraVENous PRN    ondansetron (ZOFRAN-ODT) disintegrating tablet 4 mg  4 mg Oral Q8H PRN    Or    ondansetron (ZOFRAN) injection 4 mg  4 mg IntraVENous Q6H PRN    polyethylene glycol (GLYCOLAX) packet 17 g  17 g Oral Daily PRN    acetaminophen (TYLENOL) tablet 650 mg  650 mg Oral Q6H PRN    Or    acetaminophen (TYLENOL) suppository 650 mg  650 mg Rectal Q6H PRN    morphine injection 2 mg  2 mg IntraVENous Q4H PRN    HYDROcodone-acetaminophen (NORCO) 5-325 MG per tablet 1 tablet  1 tablet Oral Q6H PRN       Signed:  Romain Khan MD  2/25/2023

## 2023-02-25 NOTE — PROGRESS NOTES
VANCO DAILY FOLLOW UP NOTE  [address] [facility] Pharmacokinetic Monitoring Service - Vancomycin    Consulting Provider: Dr. Luanne Burks   Indication: bone and joint infection  Target Concentration: Goal AUC/MICK 400-600 mg*hr/L  Day of Therapy: 6  Additional Antimicrobials: cefepime    Patient eligible for piperacillin-tazobactam to cefepime auto-substitution per P&T approved protocol? N/A    Pertinent Laboratory Values: Wt Readings from Last 1 Encounters:   02/22/23 163 lb (73.9 kg)     Temp Readings from Last 1 Encounters:   02/25/23 98.9 °F (37.2 °C) (Oral)     Recent Labs     02/23/23  0623 02/24/23  0453 02/25/23  0524   BUN 17 19 17   CREATININE 1.20 1.30 1.20   WBC 10.2 9.3 8.9     Estimated Creatinine Clearance: 48 mL/min (based on SCr of 1.2 mg/dL). Lab Results   Component Value Date/Time    VANCORANDOM 19.0 02/25/2023 05:24 AM     MRSA Nasal Swab: N/A.  Non-respiratory infection    Assessment:  Date/Time Dose Concentration AUC   2/23 0623 1000mg q24h 12.7 mcg/mL 345   2/25 0524 1500 mg q24h 19 mcg/mL 524   Note: Serum concentrations collected for AUC dosing may appear elevated if collected in close proximity to the dose administered, this is not necessarily an indication of toxicity    Plan:  Current dosing regimen is therapeutic  Continue current dose  Repeat vancomycin concentrations will be ordered as clinically appropriate   Pharmacy will continue to monitor patient and adjust therapy as indicated    Thank you for the consult,  Bita Jose, [address] [facility]

## 2023-02-25 NOTE — PROGRESS NOTES
Pt resting, denies needs at this time, NAD. Wound Vac to R. Foot functioning properly w/serous drainage in container. Hourly rounds completed. Bed in L/L position, call light and personal items within reach. Will give bedside report to oncoming nurse.

## 2023-02-26 LAB
CRP SERPL-MCNC: 1.7 MG/DL (ref 0–0.9)
ERYTHROCYTE [SEDIMENTATION RATE] IN BLOOD: 63 MM/HR
GLUCOSE BLD STRIP.AUTO-MCNC: 121 MG/DL (ref 65–100)
GLUCOSE BLD STRIP.AUTO-MCNC: 147 MG/DL (ref 65–100)
GLUCOSE BLD STRIP.AUTO-MCNC: 170 MG/DL (ref 65–100)
GLUCOSE BLD STRIP.AUTO-MCNC: 181 MG/DL (ref 65–100)
SERVICE CMNT-IMP: ABNORMAL

## 2023-02-26 PROCEDURE — 86140 C-REACTIVE PROTEIN: CPT

## 2023-02-26 PROCEDURE — 82962 GLUCOSE BLOOD TEST: CPT

## 2023-02-26 PROCEDURE — 6360000002 HC RX W HCPCS: Performed by: HOSPITALIST

## 2023-02-26 PROCEDURE — 6370000000 HC RX 637 (ALT 250 FOR IP): Performed by: HOSPITALIST

## 2023-02-26 PROCEDURE — 36415 COLL VENOUS BLD VENIPUNCTURE: CPT

## 2023-02-26 PROCEDURE — 6370000000 HC RX 637 (ALT 250 FOR IP): Performed by: FAMILY MEDICINE

## 2023-02-26 PROCEDURE — 2580000003 HC RX 258: Performed by: HOSPITALIST

## 2023-02-26 PROCEDURE — 85652 RBC SED RATE AUTOMATED: CPT

## 2023-02-26 PROCEDURE — 1100000000 HC RM PRIVATE

## 2023-02-26 PROCEDURE — 6370000000 HC RX 637 (ALT 250 FOR IP): Performed by: INTERNAL MEDICINE

## 2023-02-26 PROCEDURE — 2580000003 HC RX 258: Performed by: FAMILY MEDICINE

## 2023-02-26 RX ORDER — AMOXICILLIN AND CLAVULANATE POTASSIUM 875; 125 MG/1; MG/1
1 TABLET, FILM COATED ORAL EVERY 12 HOURS SCHEDULED
Status: DISCONTINUED | OUTPATIENT
Start: 2023-02-26 | End: 2023-03-02 | Stop reason: HOSPADM

## 2023-02-26 RX ORDER — DOXYCYCLINE HYCLATE 100 MG/1
100 CAPSULE ORAL EVERY 12 HOURS SCHEDULED
Status: DISCONTINUED | OUTPATIENT
Start: 2023-02-26 | End: 2023-03-02 | Stop reason: HOSPADM

## 2023-02-26 RX ADMIN — AMOXICILLIN AND CLAVULANATE POTASSIUM 1 TABLET: 875; 125 TABLET, FILM COATED ORAL at 19:54

## 2023-02-26 RX ADMIN — Medication 100 MG: at 08:36

## 2023-02-26 RX ADMIN — SODIUM CHLORIDE, PRESERVATIVE FREE 10 ML: 5 INJECTION INTRAVENOUS at 22:00

## 2023-02-26 RX ADMIN — DOXYCYCLINE HYCLATE 100 MG: 100 CAPSULE ORAL at 19:54

## 2023-02-26 RX ADMIN — LOSARTAN POTASSIUM 100 MG: 50 TABLET, FILM COATED ORAL at 08:37

## 2023-02-26 RX ADMIN — DOXYCYCLINE HYCLATE 100 MG: 100 CAPSULE ORAL at 11:48

## 2023-02-26 RX ADMIN — AMOXICILLIN AND CLAVULANATE POTASSIUM 1 TABLET: 875; 125 TABLET, FILM COATED ORAL at 11:48

## 2023-02-26 RX ADMIN — ATENOLOL 25 MG: 25 TABLET ORAL at 08:37

## 2023-02-26 RX ADMIN — CEFAZOLIN 2000 MG: 10 INJECTION, POWDER, FOR SOLUTION INTRAVENOUS at 08:38

## 2023-02-26 RX ADMIN — TAMSULOSIN HYDROCHLORIDE 0.4 MG: 0.4 CAPSULE ORAL at 08:37

## 2023-02-26 RX ADMIN — SODIUM CHLORIDE, PRESERVATIVE FREE 10 ML: 5 INJECTION INTRAVENOUS at 08:39

## 2023-02-26 RX ADMIN — AMLODIPINE BESYLATE 10 MG: 10 TABLET ORAL at 08:37

## 2023-02-26 NOTE — PROGRESS NOTES
Hospitalist Progress Note   Admit Date:  2023 10:55 AM   Name:  Richard Mcnamara   Age:  78 y.o. Sex:  male  :  1945   MRN:  [id number]   Room:  601/    Presenting Complaint: Right foot pain  Reason(s) for Admission: Septicemia [facility]) [A41.9]  Acute osteomyelitis of right foot ([facility] [address] [M86.171]  Acute hematogenous osteomyelitis of left foot [facility]) [facility] Course & Interval History:   78 y.o. male with medical history of hypertension, hyperlipidemia, diabetes mellitus, CKD stage III and alcohol dependence presented to ED with worsening right foot pain. Patient reports he has right foot wound for couple of weeks. He thinks he might have stepped on something which caused this wound. Reports over the past few days, pain has been significantly worsened. He has not seen anyone for the wound. Has not taken any antibiotic outpatient. Denies chest pain, palpitation, nausea, vomiting, abdominal pain. Reports decreased oral intake. He is not on insulin at home and reports blood glucose well controlled on oral regimen. He drinks couple of beers a day. Reports he used to drink more than this. He is a former smoker, quit years ago. In the ED patient was tachycardic. Labs notable for WBC 10.6, hemoglobin 13.6, platelet 945, sodium 131, potassium 4, creatinine 2.60 (baseline ~1.8). Procalcitonin 0.52 and lactic acid 3. CT foot concerning for osteomyelitis. Hospitalist consulted for admission. He went to the OR for I&D on .     Today, drain in place, no ac events, no shakes, feels well, calm    Assessment & Plan:       Acute osteomyelitis of right foot, consulted ID  - CT right foot concerning for OM  -  S/P right foot wound I&D  -  Wound culture with staph  - LE Arterical duplex with BULMARO with mild stenosis  - Appreciate Ortho's input and assistance      MEG (acute kidney injury), resolved      Hypertension Stable      Type 2 diabetes mellitus Stable      EtOH dependence- No s/s of withdrawal    Rx:  Cefazolin iv  ID consulted for more input    Dispo; home health      Objective:   Patient Vitals for the past 24 hrs:   Temp Pulse Resp BP SpO2   02/26/23 0732 98.8 °F (37.1 °C) 57 16 130/60 98 %   02/26/23 0405 98.6 °F (37 °C) 58 18 125/69 97 %   02/25/23 2337 98.8 °F (37.1 °C) 56 18 124/66 98 %   02/25/23 1936 98.4 °F (36.9 °C) 64 16 (!) 119/54 98 %   02/25/23 1523 98.6 °F (37 °C) 56 18 135/62 99 %   02/25/23 1043 98.9 °F (37.2 °C) 60 18 131/69 97 %         Oxygen Therapy  SpO2: 98 %  Pulse via Oximetry: 59 beats per minute  Pulse Oximeter Device Mode: Continuous  Pulse Oximeter Device Location: Finger  O2 Device: None (Room air)  O2 Flow Rate (L/min): 3 L/min  O2 Delivery Method: Oral airway    Estimated body mass index is 28.87 kg/m² as calculated from the following:    Height as of this encounter: 5' 3\" (1.6 m). Weight as of this encounter: 163 lb (73.9 kg). Intake/Output Summary (Last 24 hours) at 2/26/2023 0928  Last data filed at 2/25/2023 1928  Gross per 24 hour   Intake --   Output 1300 ml   Net -1300 ml           Physical Exam:   Blood pressure 130/60, pulse 57, temperature 98.8 °F (37.1 °C), temperature source Oral, resp. rate 16, height 5' 3\" (1.6 m), weight 163 lb (73.9 kg), SpO2 98 %. General:    Well nourished. Mild  distress  CV:   RRR. No m/r/g. Lungs:   CTAB. No wheezing, rhonchi, or rales. Respirations even, unlabored  Abdomen: Bowel sounds present. Soft, nontender, nondistended. Extremities: No cyanosis or clubbing. Right foot with wound vac  Skin:     No rashes and normal coloration. Warm and dry. Neuro:  grossly intact. A&Ox3  Psych:  Normal mood and affect.       I have reviewed ordered lab tests and independently visualized imaging below:    Recent Labs:  Recent Results (from the past 48 hour(s))   POCT Glucose    Collection Time: 02/24/23 11:05 AM   Result Value Ref Range    POC Glucose 136 (H) 65 - 100 mg/dL    Performed by: JeffreyCompanion    POCT Glucose    Collection Time: 02/24/23  3:26 PM   Result Value Ref Range    POC Glucose 233 (H) 65 - 100 mg/dL    Performed by: JeffreyCompanion    POCT Glucose    Collection Time: 02/24/23  8:45 PM   Result Value Ref Range    POC Glucose 167 (H) 65 - 100 mg/dL    Performed by: Zoltan    CBC    Collection Time: 02/25/23  5:24 AM   Result Value Ref Range    WBC 8.9 4.3 - 11.1 K/uL    RBC 3.89 (L) 4.23 - 5.6 M/uL    Hemoglobin 11.1 (L) 13.6 - 17.2 g/dL    Hematocrit 33.8 (L) 41.1 - 50.3 %    MCV 86.9 82 - 102 FL    MCH 28.5 26.1 - 32.9 PG    MCHC 32.8 31.4 - 35.0 g/dL    RDW 12.6 11.9 - 14.6 %    Platelets 188 389 - 921 K/uL    MPV 8.5 (L) 9.4 - 12.3 FL    nRBC 0.00 0.0 - 0.2 K/uL   Basic Metabolic Panel w/ Reflex to MG    Collection Time: 02/25/23  5:24 AM   Result Value Ref Range    Sodium 139 133 - 143 mmol/L    Potassium 3.9 3.5 - 5.1 mmol/L    Chloride 109 101 - 110 mmol/L    CO2 27 21 - 32 mmol/L    Anion Gap 3 2 - 11 mmol/L    Glucose 124 (H) 65 - 100 mg/dL    BUN 17 8 - 23 MG/DL    Creatinine 1.20 0.8 - 1.5 MG/DL    Est, Glom Filt Rate >60 >60 ml/min/1.73m2    Calcium 9.0 8.3 - 10.4 MG/DL   Vancomycin Level, Random    Collection Time: 02/25/23  5:24 AM   Result Value Ref Range    Vancomycin Rm 19.0 UG/ML   POCT Glucose    Collection Time: 02/25/23  7:15 AM   Result Value Ref Range    POC Glucose 113 (H) 65 - 100 mg/dL    Performed by: Gus    POCT Glucose    Collection Time: 02/25/23 11:54 AM   Result Value Ref Range    POC Glucose 159 (H) 65 - 100 mg/dL    Performed by: JoshuaPCT    POCT Glucose    Collection Time: 02/25/23  4:04 PM   Result Value Ref Range    POC Glucose 113 (H) 65 - 100 mg/dL    Performed by: JoshuaPCJANET    POCT Glucose    Collection Time: 02/25/23  8:45 PM   Result Value Ref Range    POC Glucose 284 (H) 65 - 100 mg/dL    Performed by: Maykel    POCT Glucose    Collection Time: 02/26/23  7:21 AM   Result Value Ref Range POC Glucose 121 (H) 65 - 100 mg/dL    Performed by: Ross Ovalle        Other Studies:  XR CHEST PORTABLE    Result Date: 2/20/2023  EXAMINATION: XR CHEST PORTABLE 2/20/2023 11:49 AM ACCESSION NUMBER: [id number] COMPARISON: None available INDICATION: 72-year-old male with sepsis. TECHNIQUE: A single view of the chest was obtained. FINDINGS: Lungs well inflated. No focal consolidation or edema. No pleural effusion or pneumothorax. Cardiomediastinal silhouette within normal limits. No acute airspace disease. CT FOOT RIGHT WO CONTRAST    Result Date: 2/20/2023  EXAMINATION: CT FOOT RIGHT WO CONTRAST 2/20/2023 11:42 AM ACCESSION NUMBER: [id number] COMPARISON: No prior imaging of the right foot available. INDICATION: Infected right medial foot wound, possibly through and through. Hx CKD and diabetes TECHNIQUE: Multiple-row detector helical CT examination of the right foot without intravenous contrast.  Multiplanar reformatting was provided. Radiation dose reduction techniques were used for this study. Our CT scanners use one or all of the following: Automated exposure control, adjustment of the mA and/or kV according to patient size, iterative reconstruction. FINDINGS: Lack of IV contrast limits evaluation. There is a prominent soft tissue defect along the plantar and medial aspect of the right big toe metatarsophalangeal joint which contacts the underlying bilateral hallux sesamoids and metatarsal head medially. Soft tissue emphysema is seen surrounding the first metatarsal head most pronounced medially, with small volume dissecting gas is seen in the cortex of the medial big toe metatarsal head communicating with the soft tissue gas (601:26), as well as trace dissecting gas within the base of the proximal phalanx (603:35). Trace soft tissue gas is also seen dissecting along the plantar aspect of the big toe proximal phalanx and interphalangeal joint (603:33).  There is heterogeneous sclerosis of the bilateral hallux sesamoids and along the metatarsal head. Soft tissue swelling and thickening throughout this region. No evidence of acute fracture or dislocation. Additional soft tissue thickening and mild swelling along the plantar aspect of the fifth metatarsophalangeal joint (601:31). Prominent plantar calcaneal enthesophyte. Multifocal vascular calcifications throughout the lower extremity and foot. No radio opaque foreign body identified. Prominent soft tissue defect/ulceration along the medial and plantar aspect of the big toe metatarsophalangeal joint contacting the underlying sesamoids and metatarsal head, with dissecting gas extending in the medial cortex of the big toe metatarsal head, base of the proximal phalanx, and bilateral sesamoids compatible with osteomyelitis in these areas. Dissecting soft tissue gas along the plantar aspect of the big toe proximal phalanx, IP joint, and base of the distal phalanx, likely spread of infection. Recommend further evaluation with MRI with/without contrast to evaluate extent of infection.       Current Meds:  Current Facility-Administered Medications   Medication Dose Route Frequency    ceFAZolin (ANCEF) 2000 mg in sterile water 20 mL IV syringe  2,000 mg IntraVENous Q8H    thiamine tablet 100 mg  100 mg Oral Daily    LORazepam (ATIVAN) injection 2 mg  2 mg IntraVENous Q2H PRN    glucose chewable tablet 16 g  4 tablet Oral PRN    dextrose bolus 10% 125 mL  125 mL IntraVENous PRN    Or    dextrose bolus 10% 250 mL  250 mL IntraVENous PRN    glucagon (rDNA) injection 1 mg  1 mg SubCUTAneous PRN    dextrose 10 % infusion   IntraVENous Continuous PRN    amLODIPine (NORVASC) tablet 10 mg  10 mg Oral Daily    atenolol (TENORMIN) tablet 25 mg  25 mg Oral Daily    losartan (COZAAR) tablet 100 mg  100 mg Oral Daily    tamsulosin (FLOMAX) capsule 0.4 mg  0.4 mg Oral Daily    insulin lispro (HUMALOG) injection vial 0-8 Units  0-8 Units SubCUTAneous TID WC    insulin lispro (HUMALOG) injection vial 0-4 Units  0-4 Units SubCUTAneous Nightly    sodium chloride flush 0.9 % injection 5-40 mL  5-40 mL IntraVENous 2 times per day    sodium chloride flush 0.9 % injection 5-40 mL  5-40 mL IntraVENous PRN    0.9 % sodium chloride infusion   IntraVENous PRN    ondansetron (ZOFRAN-ODT) disintegrating tablet 4 mg  4 mg Oral Q8H PRN    Or    ondansetron (ZOFRAN) injection 4 mg  4 mg IntraVENous Q6H PRN    polyethylene glycol (GLYCOLAX) packet 17 g  17 g Oral Daily PRN    acetaminophen (TYLENOL) tablet 650 mg  650 mg Oral Q6H PRN    Or    acetaminophen (TYLENOL) suppository 650 mg  650 mg Rectal Q6H PRN    morphine injection 2 mg  2 mg IntraVENous Q4H PRN    HYDROcodone-acetaminophen (NORCO) 5-325 MG per tablet 1 tablet  1 tablet Oral Q6H PRN       Signed:  Andrez Peralta MD  2/26/2023

## 2023-02-26 NOTE — CONSULTS
Infectious Disease Consult    Today's Date: 2/26/2023   Admit Date: 2/20/2023    Impression:   Acute osteomyelitis of the right great toe metatarsal, sesamoids, and proximal phalanx at least.  Operative cultures are only growing a coagulase negative staph spp which I think is not representative of the causitive organism(s). DM 2 (hgb a1c 7.3)      Plan:    Stop cefazolin  Start empiric amox/clav plus doxycycline and plan for at least 6 weeks of therapy. He is going to need outpatient wound care, preferably in the wound clinic  Check ESR, CRP now and periodically while on treatment. Anti-infectives:   Vancomycin (2/20/2023 - 2/25/2023)  Cefazolin (2/26/2023 -   Cefepime (2/20/2023 - 2/23/2023)  Ceftriaxone (2/23/2023 - 2/25/2023)    Subjective:   Date of Consultation:  February 26, 2023  Referring Physician: Rodrigo Gonzalez    Patient is a 78 y.o. male admitted through the ED on 2/20/2023 after presenting with right foot pain. He has diabetes, alcohol dependence. He had a right foot wound for a few weeks prior to presentation. CT scan in the ED suggested osteomyelitis of the right metatarsal head, sesamoids, and proximal phalanx along with soft tissue gas. Dr. Gayle Gil did a debridement including the metatarsal head and proximal phalanx on 2/22/2023. Wound managed subsequently with VAC. He was on vancomycin and cefepime for 2 days prior to this. Blood cultures were negative and operative wound cultures are only growing a coagulase negative staph spp. Antibiotics have been shuffled as outlined above and he is currently on cefazolin alone.     Patient Active Problem List   Diagnosis    Helicobacter pylori gastritis    Colon polyps    EtOH dependence (HCC)    Chronic abdominal pain    GERD (gastroesophageal reflux disease)    Hypertension    Stage 3a chronic kidney disease (HCC)    Type 2 diabetes mellitus with chronic kidney disease    Acute osteomyelitis of right foot ([address]    MEG (acute kidney injury) ([address] Diabetic foot infection ([facility]     Past Medical History:   Diagnosis Date    Chronic abdominal pain     EGD, Colonoscopy, CT Abdomen/pelvis-colon polyps, scattered diverticula    Colon polyps     EtOH dependence (HCC)     GERD (gastroesophageal reflux disease)     Helicobacter pylori gastritis     Hypertension     Stage III chronic kidney disease (HCC)     Type 2 diabetes mellitus without complication ([facility] 3/73/9805    does not check blood sugars      Family History   Problem Relation Age of Onset    Cancer Father         bone    Arrhythmia Sister     Hypertension Mother     Kidney Disease Mother       Social History     Tobacco Use    Smoking status: Former     Packs/day: 1.00     Years: 15.00     Pack years: 15.00     Types: Cigarettes     Quit date: 1989     Years since quittin.1    Smokeless tobacco: Never   Substance Use Topics    Alcohol use: Yes     Alcohol/week: 2.0 - 3.0 standard drinks     Past Surgical History:   Procedure Laterality Date    COLONOSCOPY N/A 2019    COLONOSCOPY/ 26 performed by Felicia Louie MD at [facility] ENDOSCOPY    COLONOSCOPY  2016    polyps    FOOT DEBRIDEMENT Right 2023    Excisional debridment right foot wound performed by Modesto Johns MD at [address] [place] [address] ENDOSCOPY  2016    unremarkable      Prior to Admission medications    Medication Sig Start Date End Date Taking?  Authorizing Provider   tamsulosin (FLOMAX) 0.4 MG capsule Take 1 capsule by mouth daily 23   Cailin Hendricks MD   SITagliptin (JANUVIA) 100 MG tablet Take 1 tablet by mouth daily 23   Cailin Hendricks MD   losartan (COZAAR) 100 MG tablet Take 1 tablet by mouth daily 23   Cailin Hendricks MD   hydroCHLOROthiazide (HYDRODIURIL) 25 MG tablet TAKE 1 TABLET BY MOUTH EVERY DAY 23   Cailin Hendricks MD   atenolol (TENORMIN) 25 MG tablet Take 1 tablet by mouth daily 23   Cailin Hendricks MD   amLODIPine (NORVASC) 10 MG tablet Take 1 tablet by mouth daily 23   Tim Galeazzi, MD       No Known Allergies     Review of Systems:  A comprehensive review of systems was negative except for that written in the History of Present Illness. Objective:     Visit Vitals  /60   Pulse 57   Temp 98.8 °F (37.1 °C) (Oral)   Resp 16   Ht 5' 3\" (1.6 m)   Wt 163 lb (73.9 kg)   SpO2 98%   BMI 28.87 kg/m²     Temp (24hrs), Av.7 °F (37.1 °C), Min:98.4 °F (36.9 °C), Max:98.9 °F (37.2 °C)       Lines:  Peripheral IV:       Physical Exam:    General:  Alert, cooperative, well noursished, well developed, appears stated age   Eyes:  Sclera anicteric. Pupils equally round and reactive to light. Mouth/Throat: Mucous membranes normal, oral pharynx clear   Neck: Supple   Lungs:   Clear to auscultation bilaterally, good effort   CV:  Regular rate and rhythm,no murmur, click, rub or gallop   Abdomen:   Soft, non-tender. bowel sounds normal. non-distended   Extremities: Right foot wound at 1st metatarsal head covered with wound VAC   Skin: Skin color, texture, turgor normal. no acute rash or lesions   Lymph nodes: Cervical and supraclavicular normal   Musculoskeletal: No swelling or deformity   Lines/Devices:  Intact, no erythema, drainage or tenderness   Psych: Alert and oriented, normal mood affect given the setting       Data Review:     CBC:  Recent Labs     23  0453 23  0524   WBC 9.3 8.9   HGB 10.7* 11.1*   HCT 32.1* 33.8*   * 437       BMP:  Recent Labs     23  0453 23  0524   BUN 19 17    139   K 3.5 3.9    109   CO2 28 27       LFTS:  No results for input(s): ALT, TP, ALB in the last 72 hours.     Invalid input(s): TBILI, SGOT, AP    Microbiology:   OR wound culture: coagulase negative staph    Imaging:   CT right foot (2023)  Prominent soft tissue defect/ulceration along the medial and plantar aspect of   the big toe metatarsophalangeal joint contacting the underlying sesamoids and   metatarsal head, with dissecting gas extending in the medial cortex of the big   toe metatarsal head, base of the proximal phalanx, and bilateral sesamoids   compatible with osteomyelitis in these areas. Dissecting soft tissue gas along the plantar aspect of the big toe proximal   phalanx, IP joint, and base of the distal phalanx, likely spread of infection. Recommend further evaluation with MRI with/without contrast to evaluate extent   of infection.        Signed By: Luanne Presley MD     February 26, 2023

## 2023-02-26 NOTE — PROGRESS NOTES
Pt is resting in bed without any complaints. Hourly rounds completed and all needs met. Bed is low, locked,call light is in reach and pt is encouraged to call for assistance. Wound vac on and on R foot.

## 2023-02-27 LAB
GLUCOSE BLD STRIP.AUTO-MCNC: 118 MG/DL (ref 65–100)
GLUCOSE BLD STRIP.AUTO-MCNC: 149 MG/DL (ref 65–100)
GLUCOSE BLD STRIP.AUTO-MCNC: 165 MG/DL (ref 65–100)
GLUCOSE BLD STRIP.AUTO-MCNC: 185 MG/DL (ref 65–100)
SERVICE CMNT-IMP: ABNORMAL

## 2023-02-27 PROCEDURE — 6370000000 HC RX 637 (ALT 250 FOR IP): Performed by: HOSPITALIST

## 2023-02-27 PROCEDURE — 6370000000 HC RX 637 (ALT 250 FOR IP): Performed by: INTERNAL MEDICINE

## 2023-02-27 PROCEDURE — 6370000000 HC RX 637 (ALT 250 FOR IP): Performed by: FAMILY MEDICINE

## 2023-02-27 PROCEDURE — 97530 THERAPEUTIC ACTIVITIES: CPT

## 2023-02-27 PROCEDURE — 1100000000 HC RM PRIVATE

## 2023-02-27 PROCEDURE — 2580000003 HC RX 258: Performed by: FAMILY MEDICINE

## 2023-02-27 PROCEDURE — 97605 NEG PRS WND THER DME<=50SQCM: CPT

## 2023-02-27 PROCEDURE — 82962 GLUCOSE BLOOD TEST: CPT

## 2023-02-27 PROCEDURE — 97607 NEG PRS WND THR NDME<=50SQCM: CPT

## 2023-02-27 RX ADMIN — SODIUM CHLORIDE, PRESERVATIVE FREE 10 ML: 5 INJECTION INTRAVENOUS at 09:47

## 2023-02-27 RX ADMIN — LOSARTAN POTASSIUM 100 MG: 50 TABLET, FILM COATED ORAL at 09:44

## 2023-02-27 RX ADMIN — DOXYCYCLINE HYCLATE 100 MG: 100 CAPSULE ORAL at 20:58

## 2023-02-27 RX ADMIN — DOXYCYCLINE HYCLATE 100 MG: 100 CAPSULE ORAL at 09:42

## 2023-02-27 RX ADMIN — SODIUM CHLORIDE, PRESERVATIVE FREE 10 ML: 5 INJECTION INTRAVENOUS at 20:58

## 2023-02-27 RX ADMIN — Medication 100 MG: at 09:42

## 2023-02-27 RX ADMIN — AMLODIPINE BESYLATE 10 MG: 10 TABLET ORAL at 09:43

## 2023-02-27 RX ADMIN — AMOXICILLIN AND CLAVULANATE POTASSIUM 1 TABLET: 875; 125 TABLET, FILM COATED ORAL at 09:42

## 2023-02-27 RX ADMIN — TAMSULOSIN HYDROCHLORIDE 0.4 MG: 0.4 CAPSULE ORAL at 09:42

## 2023-02-27 RX ADMIN — ATENOLOL 25 MG: 25 TABLET ORAL at 09:44

## 2023-02-27 RX ADMIN — AMOXICILLIN AND CLAVULANATE POTASSIUM 1 TABLET: 875; 125 TABLET, FILM COATED ORAL at 20:58

## 2023-02-27 ASSESSMENT — PAIN SCALES - GENERAL
PAINLEVEL_OUTOF10: 0
PAINLEVEL_OUTOF10: 0

## 2023-02-27 NOTE — PROGRESS NOTES
Infectious Disease Progress Note    Today's Date: 2/27/2023   Admit Date: 2/20/2023    Impression:   Acute osteomyelitis of the right great toe metatarsal, sesamoids, and proximal phalanx at least.  OR 2/22 for debridement. Operative cultures are only growing a coagulase negative staph spp (S.simulans) which may not be representative of the causitive organism(s). Anaerobic culture with no anaerobes isolated after 5 days. Of note, patient did receive Vancomycin and Cefepime for a couple days prior to going o the OR for debridement. CRP 1.7 and ESR 63 on 2/26. DM 2 (hgb a1c 7.3)      Plan:   Continue amox/clav plus doxycycline and plan for at least 6 weeks of therapy from surgery with anticipated EOT 4/5/23. He is going to need outpatient wound care, preferably in the wound clinic  Check ESR, CRP periodically while on treatment  Patient will need an ID follow-up appointment towards the EOT. Patient will also need labs CBCd, CMP, CRP, and ESR in approximately 4 weeks/just prior to ID follow-up appointment. Will contact the office to arrange follow-up appointment. ID will sign off for now but please reach out with any questions or concerns. Anti-infectives:   Vancomycin (2/20/2023 - 2/25/2023)  Cefazolin 2/26/2023   Cefepime (2/20/2023 - 2/23/2023)  Ceftriaxone (2/23/2023 - 2/25/2023)  Augmentin 2/26-  Doxycycline 2/26-    Subjective: Interval Events:  Patient remains afebrile. Patient states he doing well today, no specific complaints. States his stomach was \"weak/bubbly\" last night but is much better now. States he at breakfast with no complaints and is tolerating abx therapy well. Patient is a 78 y.o. male admitted through the ED on 2/20/2023 after presenting with right foot pain. He has diabetes, alcohol dependence. He had a right foot wound for a few weeks prior to presentation.   CT scan in the ED suggested osteomyelitis of the right metatarsal head, sesamoids, and proximal phalanx along with soft tissue gas. Dr. Ava Kirby did a debridement including the metatarsal head and proximal phalanx on 2023. Wound managed subsequently with VAC. He was on vancomycin and cefepime for 2 days prior to this. Blood cultures were negative and operative wound cultures are only growing a coagulase negative staph spp. Antibiotics have been shuffled as outlined above and he is currently on cefazolin alone. Patient Active Problem List   Diagnosis    Helicobacter pylori gastritis    Colon polyps    EtOH dependence (HCC)    Chronic abdominal pain    GERD (gastroesophageal reflux disease)    Hypertension    Stage 3a chronic kidney disease (HCC)    Type 2 diabetes mellitus with chronic kidney disease    Acute osteomyelitis of right foot (HCC)    MEG (acute kidney injury) ([facility]    Diabetic foot infection ([facility]     Past Medical History:   Diagnosis Date    Chronic abdominal pain     EGD, Colonoscopy, CT Abdomen/pelvis-colon polyps, scattered diverticula    Colon polyps     EtOH dependence (HCC)     GERD (gastroesophageal reflux disease)     Helicobacter pylori gastritis     Hypertension     Stage III chronic kidney disease (HCC)     Type 2 diabetes mellitus without complication ([facility]     does not check blood sugars      Family History   Problem Relation Age of Onset    Cancer Father         bone    Arrhythmia Sister     Hypertension Mother     Kidney Disease Mother       Social History     Tobacco Use    Smoking status: Former     Packs/day: 1.00     Years: 15.00     Pack years: 15.00     Types: Cigarettes     Quit date: 1989     Years since quittin.1    Smokeless tobacco: Never   Substance Use Topics    Alcohol use:  Yes     Alcohol/week: 2.0 - 3.0 standard drinks     Past Surgical History:   Procedure Laterality Date    COLONOSCOPY N/A 2019    COLONOSCOPY/ 26 performed by Evita Cheek MD at [facility] ENDOSCOPY    COLONOSCOPY  2016    polyps    FOOT DEBRIDEMENT Right 2023 Excisional debridment right foot wound performed by Darío Morgan MD at [facility] ENDOSCOPY  2016    unremarkable      Prior to Admission medications    Medication Sig Start Date End Date Taking? Authorizing Provider   tamsulosin (FLOMAX) 0.4 MG capsule Take 1 capsule by mouth daily 23   Ileana Colón MD   SITagliptin (JANUVIA) 100 MG tablet Take 1 tablet by mouth daily 23   Ileana Colón MD   losartan (COZAAR) 100 MG tablet Take 1 tablet by mouth daily 23   Ileana Colón MD   hydroCHLOROthiazide (HYDRODIURIL) 25 MG tablet TAKE 1 TABLET BY MOUTH EVERY DAY 23   Ileana Colón MD   atenolol (TENORMIN) 25 MG tablet Take 1 tablet by mouth daily 23   Ileana Colón MD   amLODIPine (NORVASC) 10 MG tablet Take 1 tablet by mouth daily 23   Ileana Colón MD       No Known Allergies     Review of Systems:  A comprehensive review of systems was negative except for that written in the History of Present Illness. Objective:     Visit Vitals  /74   Pulse 60   Temp 98.4 °F (36.9 °C) (Oral)   Resp 16   Ht 5' 3\" (1.6 m)   Wt 163 lb (73.9 kg)   SpO2 97%   BMI 28.87 kg/m²     Temp (24hrs), Av.6 °F (37 °C), Min:98.4 °F (36.9 °C), Max:99 °F (37.2 °C)       Lines:  Peripheral IV:     Exam unchanged today 23 unless noted below. Physical Exam:    General:  Alert, cooperative, well noursished, well developed, appears stated age, sitting in recliner chair. Eyes:  Sclera anicteric. Pupils equally round and reactive to light. Mouth/Throat: Mucous membranes normal, oral pharynx clear   Neck: Supple   Lungs:   Clear to auscultation bilaterally, good effort   CV:  Regular rate and rhythm,no murmur, click, rub or gallop   Abdomen:   Soft, non-tender. bowel sounds normal. non-distended   Extremities: Right foot wound at 1st metatarsal head covered with wound VAC and gauze bandage C/D/I. Bloody drainage noted in wound vac tubing/canister. Skin: Skin color, texture, turgor normal. no acute rash or lesions   Lymph nodes: Cervical and supraclavicular normal   Musculoskeletal: No swelling or deformity   Lines/Devices:  Intact, no erythema, drainage or tenderness   Psych: Alert and oriented, normal mood affect given the setting       Data Review:     CBC:  Recent Labs     02/25/23 0524   WBC 8.9   HGB 11.1*   HCT 33.8*            BMP:  Recent Labs     02/25/23 0524   BUN 17      K 3.9      CO2 27         LFTS:  No results for input(s): ALT, TP, ALB in the last 72 hours. Invalid input(s): TBILI, SGOT, AP    Microbiology:   OR wound culture: coagulase negative staph    Imaging:   CT right foot (2/20/2023)  Prominent soft tissue defect/ulceration along the medial and plantar aspect of   the big toe metatarsophalangeal joint contacting the underlying sesamoids and   metatarsal head, with dissecting gas extending in the medial cortex of the big   toe metatarsal head, base of the proximal phalanx, and bilateral sesamoids   compatible with osteomyelitis in these areas. Dissecting soft tissue gas along the plantar aspect of the big toe proximal   phalanx, IP joint, and base of the distal phalanx, likely spread of infection. Recommend further evaluation with MRI with/without contrast to evaluate extent   of infection.        Signed By: SANDRINE Rand - SHAWN     February 27, 2023

## 2023-02-27 NOTE — CARE COORDINATION
CM made aware that patient is interested in STR  now vs going home with [facility]. Patient's sister is assisting with facility choices. She relayed to patient that she would like for patient to go go [facility]. Patient's sister is on the way to the hospital - CM will be meeting with her to get additional SNF choices and explain that [employer] will likely not cover that level of therapy. 3:00 - CM met with patient's sister at bedside. CM explained that [employer] would likely not pay for rehab at [facility] due to the level of therapy. She voiced understanding. After SNF list was provided, she asked that referrals be sent to 1) [address]  [facility]) [facility]. All referrals sent via [address] . Patient has [employer], which will require auth after bed  has been accepted. CM will continue to follow.

## 2023-02-27 NOTE — PROGRESS NOTES
Up in chair all shift. Has been assisted back to bed and resting quietly without complaints. Had 1 BM this shift. UOP yellow/straw. Wound Vac intact, drainage serosanguinous. Hourly rounds completed, all needs met this shift. Bed locked, low position, call light in reach. Instructed to call if needed anything

## 2023-02-27 NOTE — PROGRESS NOTES
Hourly rounds done. Pt denies pain. 1 Emesis episode of undigested food, ~50mL. UOP yellow/straw, clear. No BM this shift. Wound vac drainage serosanguinous. All needs met at this time.

## 2023-02-27 NOTE — WOUND CARE
Patient seen for foot wound VAC dressing change. Wound base has pale pink granulation tissue and slough. Light foul odor noted. Cleaned and prepped. New dressing applied. Shifted tubing up tibial area to help prevent tube getting under foot and tripping patient. Discussed with patient and he was in agreement. Unclear dc plan this am. Possible rehab now. Home VAC in clean closet in case he does not go home and needs to be returned.

## 2023-02-27 NOTE — PROGRESS NOTES
Hospitalist Progress Note   Admit Date:  2023 10:55 AM   Name:  Ed Mukherjee   Age:  78 y.o. Sex:  male  :  1945   MRN:  [id number]   Room:  601/01    Presenting Complaint: Toe Pain     Reason(s) for Admission: Septicemia [facility]) [A41.9]  Ac[facility]omyel[facility] foot [facility] [M86.171]  Acute hematogenous osteomyelitis of left foot [facility]) [facility] Course:   Ed Mukherjee is a 78 y.o. male with medical history of hypertension, hyperlipidemia, diabetes mellitus, CKD stage III and alcohol dependence admitted with acute osteomyelitis of right foot. CT right foot concerning for osteomyelitis. Started on empiric antibiotics vancomycin/cefazolin. Ortho consulted and patient status post debridement on  and wound VAC placement. Lower extremity arterial duplex with BULMARO with mild stenosis. Wound culture grew Staph. Infectious disease consulted and recommend empiric Augmentin and doxycycline for at least 6 weeks of therapy. Subjective & 24hr Events (23): Patient is seen at the bedside. Reports he is feeling better. No active complaint. Denies chest pain, palpitation, nausea, vomiting or abdominal pain. Discussed dispo with patient and he would like to go to rehab on discharge. Case management notified. Case discussed with wound care as well.     Assessment & Plan:     Acute osteomyelitis of right foot:  CT right foot concerning for osteomyelitis  S/p right foot debridement and wound VAC placement on  by Ortho  Wound culture with staph  LE arterial duplex with BULMARO with mild stenosis  ID consulted  and transitioned to Augmentin and doxycycline for at least 6 weeks of therapy  Supportive care  Wound care    MEG:  Resolved  Continue to monitor     Hypertension:  Blood pressure stable  Continue home meds amlodipine and losartan     Diabetes mellitus:  Hold oral hypoglycemic  Continue sliding scale    Alcohol dependence:  No signs of withdrawal  Continue thiamine  Encouraged cessation    BPH:  Continue tamsulosin    PT/OT evals and PPD needed/ordered? Yes  Diet:  ADULT DIET; Regular; 4 carb choices (60 gm/meal)  VTE prophylaxis: SCD's   Code status: Full Code    Hospital Problems:  Principal Problem:    Acute osteomyelitis of right foot ([facility]  Active Problems:    Type 2 diabetes mellitus with chronic kidney disease    [facility]    EtOH dependence ([facility]    GERD (gastroesophageal reflux disease)    Hypertension    Stage 3a chronic kidney disease (HCC)    Diabetic foot infection ([facility]  Resolved Problems:    Hyponatremia    Lactic acidosis    Sinus tachycardia      Objective:   Patient Vitals for the past 24 hrs:   Temp Pulse Resp BP SpO2   02/27/23 0943 -- 60 -- 113/74 --   02/27/23 0728 98.4 °F (36.9 °C) 60 16 113/74 97 %   02/27/23 0424 99 °F (37.2 °C) 63 18 (!) 121/59 100 %   02/26/23 2327 98.8 °F (37.1 °C) 57 18 124/60 96 %   02/26/23 1907 98.4 °F (36.9 °C) 66 18 138/64 100 %   02/26/23 1537 98.6 °F (37 °C) 58 16 126/64 98 %       Oxygen Therapy  SpO2: 97 %  Pulse via Oximetry: 59 beats per minute  Pulse Oximeter Device Mode: Continuous  Pulse Oximeter Device Location: Finger  O2 Device: None (Room air)  O2 Flow Rate (L/min): 3 L/min  O2 Delivery Method: Oral airway    Estimated body mass index is 28.87 kg/m² as calculated from the following:    Height as of this encounter: 5' 3\" (1.6 m). Weight as of this encounter: 163 lb (73.9 kg). Intake/Output Summary (Last 24 hours) at 2/27/2023 1108  Last data filed at 2/26/2023 2306  Gross per 24 hour   Intake --   Output 525 ml   Net -525 ml         Physical Exam:     Blood pressure 113/74, pulse 60, temperature 98.4 °F (36.9 °C), temperature source Oral, resp. rate 16, height 5' 3\" (1.6 m), weight 163 lb (73.9 kg), SpO2 97 %. General:    Well nourished. Head:  Normocephalic, atraumatic  Eyes:  Sclerae appear normal.  Pupils equally round.   ENT:  Nares appear normal.  Moist oral mucosa  Neck:  No restricted ROM. Trachea midline   CV:   RRR. No m/r/g. No jugular venous distension. Lungs:   CTAB. No wheezing, rhonchi, or rales. Symmetric expansion. Abdomen:   Soft, nontender, nondistended. Extremities: Right foot dressing dry intact, wound VAC in place  Neuro:  CN II-XII grossly intact. Psych:  Normal mood and affect.       I have personally reviewed labs and tests:  Recent Labs:  Recent Results (from the past 48 hour(s))   POCT Glucose    Collection Time: 02/25/23 11:54 AM   Result Value Ref Range    POC Glucose 159 (H) 65 - 100 mg/dL    Performed by: JoshuaPCJANET    POCT Glucose    Collection Time: 02/25/23  4:04 PM   Result Value Ref Range    POC Glucose 113 (H) 65 - 100 mg/dL    Performed by: JoshuaPCJANET    POCT Glucose    Collection Time: 02/25/23  8:45 PM   Result Value Ref Range    POC Glucose 284 (H) 65 - 100 mg/dL    Performed by: Maykel    POCT Glucose    Collection Time: 02/26/23  7:21 AM   Result Value Ref Range    POC Glucose 121 (H) 65 - 100 mg/dL    Performed by: Ross Ovalle    POCT Glucose    Collection Time: 02/26/23 11:05 AM   Result Value Ref Range    POC Glucose 181 (H) 65 - 100 mg/dL    Performed by: Ross Ovalle    Sedimentation Rate    Collection Time: 02/26/23 11:26 AM   Result Value Ref Range    Sed Rate, Automated 63 (H) 15 mm/hr   C-Reactive Protein    Collection Time: 02/26/23 11:26 AM   Result Value Ref Range    CRP 1.7 (H) 0.0 - 0.9 mg/dL   POCT Glucose    Collection Time: 02/26/23  4:32 PM   Result Value Ref Range    POC Glucose 147 (H) 65 - 100 mg/dL    Performed by: Ross Ovalle    POCT Glucose    Collection Time: 02/26/23  8:47 PM   Result Value Ref Range    POC Glucose 170 (H) 65 - 100 mg/dL    Performed by: Victorina    POCT Glucose    Collection Time: 02/27/23  6:56 AM   Result Value Ref Range    POC Glucose 118 (H) 65 - 100 mg/dL    Performed by: Ross Ovalle        I have personally reviewed imaging studies:  Other Studies:  Vascular duplex lower extremity arteries bilateral with BULMARO   Final Result   1. Noncompressible ankle pressures on the right possibly due to calcinosis. 2. Velocities indicate very mild stenosis within the right proximal superficial   femoral artery. XR CHEST PORTABLE   Final Result      No acute airspace disease. CT FOOT RIGHT WO CONTRAST   Final Result      Prominent soft tissue defect/ulceration along the medial and plantar aspect of   the big toe metatarsophalangeal joint contacting the underlying sesamoids and   metatarsal head, with dissecting gas extending in the medial cortex of the big   toe metatarsal head, base of the proximal phalanx, and bilateral sesamoids   compatible with osteomyelitis in these areas. Dissecting soft tissue gas along the plantar aspect of the big toe proximal   phalanx, IP joint, and base of the distal phalanx, likely spread of infection. Recommend further evaluation with MRI with/without contrast to evaluate extent   of infection.           Current Meds:  Current Facility-Administered Medications   Medication Dose Route Frequency    amoxicillin-clavulanate (AUGMENTIN) 875-125 MG per tablet 1 tablet  1 tablet Oral 2 times per day    doxycycline hyclate (VIBRAMYCIN) capsule 100 mg  100 mg Oral 2 times per day    thiamine tablet 100 mg  100 mg Oral Daily    LORazepam (ATIVAN) injection 2 mg  2 mg IntraVENous Q2H PRN    glucose chewable tablet 16 g  4 tablet Oral PRN    dextrose bolus 10% 125 mL  125 mL IntraVENous PRN    Or    dextrose bolus 10% 250 mL  250 mL IntraVENous PRN    glucagon (rDNA) injection 1 mg  1 mg SubCUTAneous PRN    dextrose 10 % infusion   IntraVENous Continuous PRN    amLODIPine (NORVASC) tablet 10 mg  10 mg Oral Daily    atenolol (TENORMIN) tablet 25 mg  25 mg Oral Daily    losartan (COZAAR) tablet 100 mg  100 mg Oral Daily    tamsulosin (FLOMAX) capsule 0.4 mg  0.4 mg Oral Daily    insulin lispro (HUMALOG) injection vial 0-8 Units  0-8 Units SubCUTAneous TID WC    insulin lispro (HUMALOG) injection vial 0-4 Units  0-4 Units SubCUTAneous Nightly    sodium chloride flush 0.9 % injection 5-40 mL  5-40 mL IntraVENous 2 times per day    sodium chloride flush 0.9 % injection 5-40 mL  5-40 mL IntraVENous PRN    0.9 % sodium chloride infusion   IntraVENous PRN    ondansetron (ZOFRAN-ODT) disintegrating tablet 4 mg  4 mg Oral Q8H PRN    Or    ondansetron (ZOFRAN) injection 4 mg  4 mg IntraVENous Q6H PRN    polyethylene glycol (GLYCOLAX) packet 17 g  17 g Oral Daily PRN    acetaminophen (TYLENOL) tablet 650 mg  650 mg Oral Q6H PRN    Or    acetaminophen (TYLENOL) suppository 650 mg  650 mg Rectal Q6H PRN    morphine injection 2 mg  2 mg IntraVENous Q4H PRN    HYDROcodone-acetaminophen (NORCO) 5-325 MG per tablet 1 tablet  1 tablet Oral Q6H PRN       Signed:  Mark Lilly MD    Part of this note may have been written by using a voice dictation software. The note has been proof read but may still contain some grammatical/other typographical errors.

## 2023-02-27 NOTE — PROGRESS NOTES
ACUTE PHYSICAL THERAPY GOALS:   (Developed with and agreed upon by patient and/or caregiver.)  1. Patient will perform bed mobility with INDEPENDENCE within 7 days. 2. Patient will transfer bed to chair with MODIFIED INDEPENDENCE within 7 days. 3. Patient will demonstrate GOOD DYNAMIC STANDING balance within 7 day(s). 4. Patient will ambulate 300ft+ with MODIFIED INDEPENDENCE within 7 days. 5. Patient will tolerate 25+ minutes of therapeutic activity/exercise and/or neuromuscular re-education while maintaining stable vitals to improve functional strength and activity tolerance within 7 days. PHYSICAL THERAPY: Daily Note AM   (Link to Caseload Tracking: PT Visit Days : 2  Time In/Out PT Charge Capture  Rehab Caseload Tracker  Orders    Jez Turner is a 78 y.o. male   PRIMARY DIAGNOSIS: Acute osteomyelitis of right foot ([address]  Septicemia ([address] [A41.9]  Acute osteomyelitis of right foot ([address] [M86.171]  Acute hematogenous osteomyelitis of left foot ([address] [M86.072]  Procedure(s) (LRB):  Excisional debridment right foot wound (Right)  5 Days Post-Op  Inpatient: Payor: Raoul Nino / Plan: [employer] / Product Type: *No Product type* /     ASSESSMENT:     REHAB RECOMMENDATIONS:   Recommendation to date pending progress:  Setting:  Home Health Therapy    Equipment:    To Be Determined     ASSESSMENT:  Mr. Arvind Shah was supine upon contact and agreeable to PT. Patient performed supine to sit and transfer to standing with supervision and cues for technique. Patient ambulated 250' with use of rolling walker, CGA-SBA and occasional cues for sequencing/walker utilization. Patient returned to recliner chair where he participated in LE exercises with good ability noted. Steady progress. Will continue PT efforts.      SUBJECTIVE:   Mr. Arvind Shah states, \"I'm moving pretty good\"     Social/Functional Lives With: Family  Type of Home: House  Home Layout: Two level  Entrance Stairs - Number of Steps: 3-4  ADL Assistance: Independent  Ambulation Assistance: Independent  OBJECTIVE:     PAIN: VITALS / O2: PRECAUTION / Glorya Archie / DRAINS:   Pre Treatment:   Pain Assessment: None - Denies Pain      Post Treatment: 0 Vitals        Oxygen    Wound Vac    RESTRICTIONS/PRECAUTIONS:  Restrictions/Precautions  Restrictions/Precautions: Weight Bearing  Lower Extremity Weight Bearing Restrictions  Right Lower Extremity Weight Bearing: Weight Bearing As Tolerated  Restrictions/Precautions: Weight Bearing     MOBILITY: I Mod I S SBA CGA Min Mod Max Total  NT x2 Comments:   Bed Mobility    Rolling [] [] [] [] [] [] [] [] [] [] []    Supine to Sit [] [] [x] [] [] [] [] [] [] [] []    Scooting [] [] [] [] [] [] [] [] [] [] []    Sit to Supine [] [] [] [] [] [] [] [] [] [] []    Transfers    Sit to Stand [] [] [x] [] [] [] [] [] [] [] []    Bed to Chair [] [] [] [] [] [] [] [] [] [] []    Stand to Sit [] [] [] [] [] [] [] [] [] [] []     [] [] [] [] [] [] [] [] [] [] []    I=Independent, Mod I=Modified Independent, S=Supervision, SBA=Standby Assistance, CGA=Contact Guard Assistance,   Min=Minimal Assistance, Mod=Moderate Assistance, Max=Maximal Assistance, Total=Total Assistance, NT=Not Tested    BALANCE: Good Fair+ Fair Fair- Poor NT Comments   Sitting Static [x] [] [] [] [] []    Sitting Dynamic [x] [] [] [] [] []              Standing Static [x] [] [] [] [] []    Standing Dynamic [] [x] [] [] [] []      GAIT: I Mod I S SBA CGA Min Mod Max Total  NT x2 Comments:   Level of Assistance [] [] [] [x] [x] [] [] [] [] [] []    Distance   250 feet    DME Rolling Walker    Gait Quality Decreased damien , Decreased step clearance, and Decreased step length    Weightbearing Status      Stairs      I=Independent, Mod I=Modified Independent, S=Supervision, SBA=Standby Assistance, CGA=Contact Guard Assistance,   Min=Minimal Assistance, Mod=Moderate Assistance, Max=Maximal Assistance, Total=Total Assistance, NT=Not Tested    PLAN:   FREQUENCY AND DURATION: 3 times/week for duration of hospital stay or until stated goals are met, whichever comes first.    TREATMENT:   TREATMENT:   Therapeutic Activity (23 Minutes): Therapeutic activity included Supine to Sit, Scooting, Transfer Training, Ambulation on level ground, Sitting balance , Standing balance, and LE exercises to improve functional Activity tolerance, Balance, Mobility, and Strength. TREATMENT GRID:  N/A    AFTER TREATMENT PRECAUTIONS: Bed/Chair Locked, Call light within reach, Chair, Needs within reach, and RN notified    INTERDISCIPLINARY COLLABORATION:  RN/ PCT and PT/ PTA    EDUCATION:      TIME IN/OUT:  Time In: 0915  Time Out: [facility]  Minutes: [facility].  ISABEL Shetty

## 2023-02-28 LAB
GLUCOSE BLD STRIP.AUTO-MCNC: 130 MG/DL (ref 65–100)
GLUCOSE BLD STRIP.AUTO-MCNC: 134 MG/DL (ref 65–100)
GLUCOSE BLD STRIP.AUTO-MCNC: 188 MG/DL (ref 65–100)
GLUCOSE BLD STRIP.AUTO-MCNC: 197 MG/DL (ref 65–100)
SERVICE CMNT-IMP: ABNORMAL

## 2023-02-28 PROCEDURE — 97530 THERAPEUTIC ACTIVITIES: CPT

## 2023-02-28 PROCEDURE — 1100000000 HC RM PRIVATE

## 2023-02-28 PROCEDURE — 6370000000 HC RX 637 (ALT 250 FOR IP): Performed by: INTERNAL MEDICINE

## 2023-02-28 PROCEDURE — 6370000000 HC RX 637 (ALT 250 FOR IP): Performed by: FAMILY MEDICINE

## 2023-02-28 PROCEDURE — 2580000003 HC RX 258: Performed by: FAMILY MEDICINE

## 2023-02-28 PROCEDURE — 82962 GLUCOSE BLOOD TEST: CPT

## 2023-02-28 PROCEDURE — 6370000000 HC RX 637 (ALT 250 FOR IP): Performed by: HOSPITALIST

## 2023-02-28 PROCEDURE — 97535 SELF CARE MNGMENT TRAINING: CPT

## 2023-02-28 RX ADMIN — SODIUM CHLORIDE, PRESERVATIVE FREE 10 ML: 5 INJECTION INTRAVENOUS at 20:28

## 2023-02-28 RX ADMIN — TAMSULOSIN HYDROCHLORIDE 0.4 MG: 0.4 CAPSULE ORAL at 09:35

## 2023-02-28 RX ADMIN — AMOXICILLIN AND CLAVULANATE POTASSIUM 1 TABLET: 875; 125 TABLET, FILM COATED ORAL at 08:45

## 2023-02-28 RX ADMIN — AMOXICILLIN AND CLAVULANATE POTASSIUM 1 TABLET: 875; 125 TABLET, FILM COATED ORAL at 20:28

## 2023-02-28 RX ADMIN — Medication 100 MG: at 09:35

## 2023-02-28 RX ADMIN — DOXYCYCLINE HYCLATE 100 MG: 100 CAPSULE ORAL at 08:45

## 2023-02-28 RX ADMIN — DOXYCYCLINE HYCLATE 100 MG: 100 CAPSULE ORAL at 20:28

## 2023-02-28 RX ADMIN — SODIUM CHLORIDE, PRESERVATIVE FREE 10 ML: 5 INJECTION INTRAVENOUS at 09:38

## 2023-02-28 ASSESSMENT — PAIN SCALES - GENERAL: PAINLEVEL_OUTOF10: 0

## 2023-02-28 NOTE — PROGRESS NOTES
Hospitalist Progress Note   Admit Date:  2023 10:55 AM   Name:  Arcenio Rojas   Age:  78 y.o. Sex:  male  :  1945   MRN:  [id number]   Room:  601/01    Presenting Complaint: Toe Pain     Reason(s) for Admission: Septicemia [facility]) [A41.9]  Acute osteomyelitis of right foot ([facility] [M86.171]  Acute hematogenous osteomyelitis of left foot [facility]) [facility] Hospital Course:   Arcenio Rojas is a 78 y.o. male with medical history of hypertension, hyperlipidemia, diabetes mellitus, CKD stage III and alcohol dependence admitted with acute osteomyelitis of right foot. CT right foot concerning for osteomyelitis. Started on empiric antibiotics vancomycin/cefazolin. Ortho consulted and patient status post debridement on  and wound VAC placement. Lower extremity arterial duplex with BULMARO with mild stenosis. Wound culture grew Staph. Infectious disease consulted and recommend empiric Augmentin and doxycycline for at least 6 weeks of therapy. Subjective & 24hr Events (23): Patient is seen at the bedside. Reports he is feeling better. No active complaint. No acute event reported overnight. Pending placement. Assessment & Plan:     Acute osteomyelitis of right foot:  CT right foot concerning for osteomyelitis  S/p right foot debridement and wound VAC placement on  by Ortho  Wound culture with staph  LE arterial duplex with BULMARO with mild stenosis  ID consulted  and transitioned to Augmentin and doxycycline for at least 6 weeks of therapy, EOT 2023  Supportive care  Wound care    MEG:  Resolved  Continue to monitor     Hypertension:  Blood pressure stable  Continue home meds amlodipine and losartan     Diabetes mellitus:  Hold oral hypoglycemic  Continue sliding scale    Alcohol dependence:  No signs of withdrawal  Continue thiamine  Encouraged cessation    BPH:  Continue tamsulosin    PT/OT evals and PPD needed/ordered? Yes  Diet:  ADULT DIET;  Regular; 4 carb choices (60 gm/meal)  VTE prophylaxis: SCD's   Code status: Full Code    Hospital Problems:  Principal Problem:    Ac[facility] osteomyelitis of right foot ([facility]  Active Problems:    Type 2 diabetes mellitus with chronic kidney disease    MEG (acute kidney injury) ([facility]    EtOH dependence ([facility]    GERD (gastroesophageal reflux disease)    Hypertension    Stage 3a chronic kidney disease ([facility]    Diabetic foot infection ([facility]  Resolved Problems:    Hyponatremia    Lactic acidosis    Sinus tachycardia      Objective:   Patient Vitals for the past 24 hrs:   Temp Pulse Resp BP SpO2   02/28/23 1107 98.6 °F (37 °C) 58 16 123/65 100 %   02/28/23 0934 -- 58 -- (!) 119/52 --   02/28/23 0659 98.4 °F (36.9 °C) 58 18 (!) 119/52 98 %   02/28/23 0323 98.4 °F (36.9 °C) 56 17 123/66 99 %   02/27/23 2332 99 °F (37.2 °C) 58 17 (!) 103/49 99 %   02/27/23 1939 98.6 °F (37 °C) 62 17 110/64 98 %   02/27/23 1502 98.8 °F (37.1 °C) 66 16 (!) 101/58 98 %   02/27/23 1228 98.6 °F (37 °C) 59 16 108/62 99 %         Oxygen Therapy  SpO2: 100 %  Pulse via Oximetry: 59 beats per minute  Pulse Oximeter Device Mode: Continuous  Pulse Oximeter Device Location: Finger  O2 Device: None (Room air)  O2 Flow Rate (L/min): 3 L/min  O2 Delivery Method: Oral airway    Estimated body mass index is 28.87 kg/m² as calculated from the following:    Height as of this encounter: 5' 3\" (1.6 m). Weight as of this encounter: 163 lb (73.9 kg). Intake/Output Summary (Last 24 hours) at 2/28/2023 1218  Last data filed at 2/28/2023 0743  Gross per 24 hour   Intake --   Output 375 ml   Net -375 ml           Physical Exam:     Blood pressure 123/65, pulse 58, temperature 98.6 °F (37 °C), temperature source Oral, resp. rate 16, height 5' 3\" (1.6 m), weight 163 lb (73.9 kg), SpO2 100 %. General:    Well nourished. Head:  Normocephalic, atraumatic  Eyes:  Sclerae appear normal.  Pupils equally round. ENT:  Nares appear normal.  Moist oral mucosa  Neck:  No restricted ROM.   Trachea midline   CV:   RRR. No m/r/g. No jugular venous distension. Lungs:   CTAB. No wheezing, rhonchi, or rales. Symmetric expansion. Abdomen:   Soft, nontender, nondistended. Extremities: Right foot dressing dry intact, wound VAC in place  Neuro:  CN II-XII grossly intact. Psych:  Normal mood and affect. I have personally reviewed labs and tests:  Recent Labs:  Recent Results (from the past 48 hour(s))   POCT Glucose    Collection Time: 02/26/23  4:32 PM   Result Value Ref Range    POC Glucose 147 (H) 65 - 100 mg/dL    Performed by: Deanna Merl    POCT Glucose    Collection Time: 02/26/23  8:47 PM   Result Value Ref Range    POC Glucose 170 (H) 65 - 100 mg/dL    Performed by: Victorina    POCT Glucose    Collection Time: 02/27/23  6:56 AM   Result Value Ref Range    POC Glucose 118 (H) 65 - 100 mg/dL    Performed by: Deanna Merl    POCT Glucose    Collection Time: 02/27/23 11:11 AM   Result Value Ref Range    POC Glucose 185 (H) 65 - 100 mg/dL    Performed by: Deanna Merl    POCT Glucose    Collection Time: 02/27/23  4:33 PM   Result Value Ref Range    POC Glucose 149 (H) 65 - 100 mg/dL    Performed by: Deanna Merl    POCT Glucose    Collection Time: 02/27/23  8:22 PM   Result Value Ref Range    POC Glucose 165 (H) 65 - 100 mg/dL    Performed by: Aki Quezada    POCT Glucose    Collection Time: 02/28/23  6:59 AM   Result Value Ref Range    POC Glucose 134 (H) 65 - 100 mg/dL    Performed by: Colby Branch    POCT Glucose    Collection Time: 02/28/23 11:08 AM   Result Value Ref Range    POC Glucose 188 (H) 65 - 100 mg/dL    Performed by: Colby Branch        I have personally reviewed imaging studies:  Other Studies:  Vascular duplex lower extremity arteries bilateral with BULMARO   Final Result   1. Noncompressible ankle pressures on the right possibly due to calcinosis. 2. Velocities indicate very mild stenosis within the right proximal superficial   femoral artery.             XR CHEST PORTABLE   Final Result      No acute airspace disease. CT FOOT RIGHT WO CONTRAST   Final Result      Prominent soft tissue defect/ulceration along the medial and plantar aspect of   the big toe metatarsophalangeal joint contacting the underlying sesamoids and   metatarsal head, with dissecting gas extending in the medial cortex of the big   toe metatarsal head, base of the proximal phalanx, and bilateral sesamoids   compatible with osteomyelitis in these areas. Dissecting soft tissue gas along the plantar aspect of the big toe proximal   phalanx, IP joint, and base of the distal phalanx, likely spread of infection. Recommend further evaluation with MRI with/without contrast to evaluate extent   of infection.           Current Meds:  Current Facility-Administered Medications   Medication Dose Route Frequency    amoxicillin-clavulanate (AUGMENTIN) 875-125 MG per tablet 1 tablet  1 tablet Oral 2 times per day    doxycycline hyclate (VIBRAMYCIN) capsule 100 mg  100 mg Oral 2 times per day    thiamine tablet 100 mg  100 mg Oral Daily    LORazepam (ATIVAN) injection 2 mg  2 mg IntraVENous Q2H PRN    glucose chewable tablet 16 g  4 tablet Oral PRN    dextrose bolus 10% 125 mL  125 mL IntraVENous PRN    Or    dextrose bolus 10% 250 mL  250 mL IntraVENous PRN    glucagon (rDNA) injection 1 mg  1 mg SubCUTAneous PRN    dextrose 10 % infusion   IntraVENous Continuous PRN    amLODIPine (NORVASC) tablet 10 mg  10 mg Oral Daily    atenolol (TENORMIN) tablet 25 mg  25 mg Oral Daily    losartan (COZAAR) tablet 100 mg  100 mg Oral Daily    tamsulosin (FLOMAX) capsule 0.4 mg  0.4 mg Oral Daily    insulin lispro (HUMALOG) injection vial 0-8 Units  0-8 Units SubCUTAneous TID WC    insulin lispro (HUMALOG) injection vial 0-4 Units  0-4 Units SubCUTAneous Nightly    sodium chloride flush 0.9 % injection 5-40 mL  5-40 mL IntraVENous 2 times per day    sodium chloride flush 0.9 % injection 5-40 mL  5-40 mL IntraVENous PRN    0.9 % sodium chloride infusion   IntraVENous PRN    ondansetron (ZOFRAN-ODT) disintegrating tablet 4 mg  4 mg Oral Q8H PRN    Or    ondansetron (ZOFRAN) injection 4 mg  4 mg IntraVENous Q6H PRN    polyethylene glycol (GLYCOLAX) packet 17 g  17 g Oral Daily PRN    acetaminophen (TYLENOL) tablet 650 mg  650 mg Oral Q6H PRN    Or    acetaminophen (TYLENOL) suppository 650 mg  650 mg Rectal Q6H PRN    morphine injection 2 mg  2 mg IntraVENous Q4H PRN    HYDROcodone-acetaminophen (NORCO) 5-325 MG per tablet 1 tablet  1 tablet Oral Q6H PRN       Signed:  Kina Davis MD    Part of this note may have been written by using a voice dictation software. The note has been proof read but may still contain some grammatical/other typographical errors.

## 2023-02-28 NOTE — PROGRESS NOTES
Pt is stable with bed in lowest position, wheels locked, and call light within reach. Hourly rounds completed. VSS. IV is patent and dressing is clean, dry, and intact. Wound vac in place. No complaints of pain throughout the shift. Report to be given to day shift nurse.

## 2023-02-28 NOTE — CARE COORDINATION
Patient accepted into [facility] and [facility]. [address]  referral still pending. CM reached out to [facility] with [address] to check on the status of referral - he states he will review it and will reach back out to CM. He was made aware that patient is medically stable for discharge. 1:55 - Skylar with [address] responded stating that there is nothing available. CM attempted to call patient's sister however[facility] CM LVM to call CM back. Due to patient being medically stable for discharge, CM went ahead and accepted bed offer from [facility] (which was sister's number 2 choice) so that auth could be started. IF facility needs to be changed, CM can notify [facility] and change facility. Leslie Booth started Marly[facility] . CM will await phone call back from patient's sister. (CM did see if she was in the room, and she was not).

## 2023-02-28 NOTE — PROGRESS NOTES
ACUTE PHYSICAL THERAPY GOALS:   (Developed with and agreed upon by patient and/or caregiver.)  1. Patient will perform bed mobility with INDEPENDENCE within 7 days. 2. Patient will transfer bed to chair with MODIFIED INDEPENDENCE within 7 days. 3. Patient will demonstrate GOOD DYNAMIC STANDING balance within 7 day(s). 4. Patient will ambulate 300ft+ with MODIFIED INDEPENDENCE within 7 days. 5. Patient will tolerate 25+ minutes of therapeutic activity/exercise and/or neuromuscular re-education while maintaining stable vitals to improve functional strength and activity tolerance within 7 days. PHYSICAL THERAPY: Daily Note AM   (Link to Caseload Tracking: PT Visit Days : 3  Time In/Out PT Charge Capture  Rehab Caseload Tracker  Orders    Leah Romero is a 78 y.o. male   PRIMARY DIAGNOSIS: Acute osteomyelitis of right foot ([address]  Septicemia ([address] [A41.9]  Acute osteomyelitis of right foot ([address] [M86.171]  Acute hematogenous osteomyelitis of left foot ([address] [M86.072]  Procedure(s) (LRB):  Excisional debridment right foot wound (Right)  6 Days Post-Op  Inpatient: Payor: Nino Tariq / Plan: [employer] GOLD PLUS HMO / Product Type: *No Product type* /     ASSESSMENT:     REHAB RECOMMENDATIONS:   Recommendation to date pending progress:  Setting:  Home Health Therapy    Equipment:    To Be Determined     ASSESSMENT:  Mr. Lev Ryder was sitting up in recliner chair upon contact and agreeable to PT. Patient performed transfer to standing with supervision and cues for technique. Patient ambulated 250' with use of rolling walker, CGA-SBA and occasional cues for sequencing/walker utilization. Patient returned to recliner chair where he participated in LE exercises with good ability noted. Patient then ambulated an additional 100' without use of rolling walker with no LOB noted. Steady progress. Will continue PT efforts.      SUBJECTIVE:   Mr. Lev Ryder states, \"I'll go to [employer] Lives With: Family  Type of Home: House  Home Layout: Two level  Entrance Stairs - Number of Steps: 3-4  ADL Assistance: Independent  Ambulation Assistance: Independent  OBJECTIVE:     PAIN: VITALS / O2: PRECAUTION / Paulina Lazier / DRAINS:   Pre Treatment:   Pain Assessment: None - Denies Pain      Post Treatment: 0 Vitals        Oxygen    Wound Vac    RESTRICTIONS/PRECAUTIONS:  Restrictions/Precautions  Restrictions/Precautions: Weight Bearing  Lower Extremity Weight Bearing Restrictions  Right Lower Extremity Weight Bearing: Weight Bearing As Tolerated  Restrictions/Precautions: Weight Bearing     MOBILITY: I Mod I S SBA CGA Min Mod Max Total  NT x2 Comments:   Bed Mobility    Rolling [] [] [] [] [] [] [] [] [] [] []    Supine to Sit [] [] [] [] [] [] [] [] [] [] []    Scooting [] [] [] [] [] [] [] [] [] [] []    Sit to Supine [] [] [] [] [] [] [] [] [] [] []    Transfers    Sit to Stand [] [] [x] [] [] [] [] [] [] [] []    Bed to Chair [] [] [] [] [] [] [] [] [] [] []    Stand to Sit [] [] [] [] [] [] [] [] [] [] []     [] [] [] [] [] [] [] [] [] [] []    I=Independent, Mod I=Modified Independent, S=Supervision, SBA=Standby Assistance, CGA=Contact Guard Assistance,   Min=Minimal Assistance, Mod=Moderate Assistance, Max=Maximal Assistance, Total=Total Assistance, NT=Not Tested    BALANCE: Good Fair+ Fair Fair- Poor NT Comments   Sitting Static [x] [] [] [] [] []    Sitting Dynamic [x] [] [] [] [] []              Standing Static [x] [] [] [] [] []    Standing Dynamic [] [x] [] [] [] []      GAIT: I Mod I S SBA CGA Min Mod Max Total  NT x2 Comments:   Level of Assistance [] [] [] [x] [x] [] [] [] [] [] []    Distance   250 feet with RW, 100' no AD    DME Rolling Walker    Gait Quality Decreased damien , Decreased step clearance, and Decreased step length    Weightbearing Status      Stairs      I=Independent, Mod I=Modified Independent, S=Supervision, SBA=Standby Assistance, CGA=Contact Guard Assistance,   Min=Minimal Assistance, Mod=Moderate Assistance, Max=Maximal Assistance, Total=Total Assistance, NT=Not Tested    PLAN:   FREQUENCY AND DURATION: 3 times/week for duration of hospital stay or until stated goals are met, whichever comes first.    TREATMENT:   TREATMENT:   Therapeutic Activity (23 Minutes): Therapeutic activity included Scooting, Transfer Training, Ambulation on level ground, Sitting balance , Standing balance, and LE exercises to improve functional Activity tolerance, Balance, Mobility, and Strength. TREATMENT GRID:  N/A    AFTER TREATMENT PRECAUTIONS: Bed/Chair Locked, Call light within reach, Chair, Needs within reach, and RN notified    INTERDISCIPLINARY COLLABORATION:  RN/ PCT and PT/ PTA    EDUCATION:      TIME IN/OUT:  Time In: 0830  Time Out: [address]  Minutes: [address] [facility].  ISABEL Shetty

## 2023-02-28 NOTE — PROGRESS NOTES
ACUTE OCCUPATIONAL THERAPY GOALS:   (Developed with and agreed upon by patient and/or caregiver.)  Re-evaluation completed on 2/24/23 due to s/p debridement and placement of wound vac  (1.) Kali Gordillo  will complete lower body bathing and dressing with INDEPENDENCE and adaptive equipment as needed. (2.) Kali Gordillo will complete toileting with INDEPENDENCE. (3.) Kali Gordillo will tolerate 25 minutes of OT treatment with 1-2 rest breaks to increase activity tolerance for ADLs. (4.) Kali Gordillo will complete functional transfers with INDEPENDENCE and adaptive equipment as needed. (5.) Kali Gordillo will complete functional ADL task standing at sink INDEPENDENCE and adaptive equipment as needed. Timeframe: 7 visits       OCCUPATIONAL THERAPY: Daily Note    OT Visit Days: 2   Time In/Out  OT Charge Capture  Rehab Caseload Tracker  OT Orders    Kali Gordillo is a 78 y.o. male   PRIMARY DIAGNOSIS: Acute osteomyelitis of right foot ([address]  Septicemia ([address] [A41.9]  Acute osteomyelitis of right foot ([address] [M86.171]  Acute hematogenous osteomyelitis of left foot ([address] [M86.072]  Procedure(s) (LRB):  Excisional debridment right foot wound (Right)  6 Days Post-Op  Inpatient: Payor: Mignon Potter / Plan: [employer] GOLD PLUS HMO / Product Type: *No Product type* /     ASSESSMENT:     REHAB RECOMMENDATIONS: CURRENT LEVEL OF FUNCTION:  (Most Recently Demonstrated)   Recommendation to date pending progress:  Setting:  Home Health Therapy    Equipment:    To Be Determined Bathing:  Not Tested  Dressing:  Stand by Assist  Feeding/Grooming:  Stand by Assist  Toileting:  Stand by Assist  Functional Mobility:  Stand by Assist     ASSESSMENT:  Mr. Ella Garcia is progressing well towards OT goals. Today, pt was received supine in bed. Completed bed mobility, LB dressing, functional transfers, ambulation with RW, toileting, and grooming tasks standing at the sink with overall SBA.  Pt did require verbal cues for safe use of RW during session. Mr. Marlinda Mcburney continues to demonstrate overall deficits in strength, balance, activity tolerance, and ADL performance. Continue OT efforts and POC in order to address functional deficits and OT goals stated above.         SUBJECTIVE:     Mr. Marlinda Mcburney states, \"now I want to go to rehab\"     Social/Functional Lives With: Family  Type of Home: House  Home Layout: Two level  Entrance Stairs - Number of Steps: 3-4  ADL Assistance: Independent  Ambulation Assistance: Independent    OBJECTIVE:     Svetlana Candelaria / Alejandra Stewart / Renetta Jasmin: Wound Vac    RESTRICTIONS/PRECAUTIONS:  Restrictions/Precautions  Restrictions/Precautions: Weight Bearing  Lower Extremity Weight Bearing Restrictions  Right Lower Extremity Weight Bearing: Weight Bearing As Tolerated        PAIN: VITALS / O2:   Pre Treatment:            Post Treatment: no change  Vitals          Oxygen        MOBILITY: I Mod I S SBA CGA Min Mod Max Total  NT x2 Comments:   Bed Mobility    Rolling [] [] [] [] [] [] [] [] [] [x] []    Supine to Sit [] [] [] [x] [] [] [] [] [] [] []    Scooting [] [] [] [x] [] [] [] [] [] [] []    Sit to Supine [] [] [] [x] [] [] [] [] [] [] []    Transfers    Sit to Stand [] [] [] [x] [] [] [] [] [] [] []    Bed to Chair [] [] [] [] [] [] [] [] [] [x] [] Pt reported he sat in the chair earlier in the day and politely declined    Stand to Sit [] [] [] [x] [] [] [] [] [] [] []    Tub/Shower [] [] [] [] [] [] [] [] [] [x] []     Toilet [] [] [] [x] [] [] [] [] [] [] []      [] [] [] [] [] [] [] [] [] [] []    I=Independent, Mod I=Modified Independent, S=Supervision/Setup, SBA=Standby Assistance, CGA=Contact Guard Assistance, Min=Minimal Assistance, Mod=Moderate Assistance, Max=Maximal Assistance, Total=Total Assistance, NT=Not Tested    ACTIVITIES OF DAILY LIVING: I Mod I S SBA CGA Min Mod Max Total NT Comments   BASIC ADLs:              Upper Body   Bathing [] [] [] [] [] [] [] [] [] [x]    Lower Body Bathing [] [] [] [] [] [] [] [] [] [x] Toileting [] [] [] [x] [] [] [] [] [] []    Upper Body Dressing [] [] [] [] [] [] [] [] [] [x]    Lower Body Dressing [] [] [] [x] [] [] [] [] [] [] socks   Feeding [] [] [] [] [] [] [] [] [] [x]    Grooming [] [] [] [x] [] [] [] [] [] [] Washed hands and brushed teeth standing at the sink   Personal Device Care [] [] [] [] [] [] [] [] [] [x]    Functional Mobility [] [] [] [x] [] [] [] [] [] [] RW   I=Independent, Mod I=Modified Independent, S=Supervision/Setup, SBA=Standby Assistance, CGA=Contact Guard Assistance, Min=Minimal Assistance, Mod=Moderate Assistance, Max=Maximal Assistance, Total=Total Assistance, NT=Not Tested    BALANCE: Good Fair+ Fair Fair- Poor NT Comments   Sitting Static [x] [] [] [] [] []    Sitting Dynamic [x] [] [] [] [] []              Standing Static [x] [] [] [] [] []    Standing Dynamic [] [x] [] [] [] []        PLAN:     FREQUENCY/DURATION   OT Plan of Care: 3 times/week for duration of hospital stay or until stated goals are met, whichever comes first.    TREATMENT:     TREATMENT:   Therapeutic Activity (10 Minutes): Patient participated in therapeutic activities including bed mobility training, functional transfer training, toilet transfer training, functional mobility of household distances, bathroom mobility, sitting tolerance activity, and standing tolerance activity with minimal verbal cues in order to increase safety awareness. Self Care (16 minutes): Patient participated in toileting, lower body dressing, and grooming ADLs in unsupported sitting and standing with minimal verbal cueing to increase safety awareness. Patient also participated in functional mobility and functional transfer training to increase independence, decrease assistance required, increase activity tolerance, and increase safety awareness.      TREATMENT GRID:  N/A    AFTER TREATMENT PRECAUTIONS: Bed, Call light within reach, Needs within reach, and RN notified    INTERDISCIPLINARY COLLABORATION:  RN/ PCT and OT/ MCCLURE    EDUCATION:       TOTAL TREATMENT DURATION AND TIME:  Time In: 1409  Time Out: [address] [facility]  Minutes: [address], OT

## 2023-02-28 NOTE — PROGRESS NOTES
Resting in bed at present time without complaints. IV is patent. Wound vac in place. No c/o of pain or nausea this shift. Hourly rounds completed, all needs met this shift. Bed locked, low position, call light in reach. Instructed to call if needed anything.

## 2023-03-01 LAB
ANION GAP SERPL CALC-SCNC: 6 MMOL/L (ref 2–11)
BACTERIA SPEC CULT: NORMAL
BASOPHILS # BLD: 0.1 K/UL (ref 0–0.2)
BASOPHILS NFR BLD: 1 % (ref 0–2)
BUN SERPL-MCNC: 28 MG/DL (ref 8–23)
CALCIUM SERPL-MCNC: 10.1 MG/DL (ref 8.3–10.4)
CHLORIDE SERPL-SCNC: 108 MMOL/L (ref 101–110)
CO2 SERPL-SCNC: 25 MMOL/L (ref 21–32)
CREAT SERPL-MCNC: 1.7 MG/DL (ref 0.8–1.5)
DIFFERENTIAL METHOD BLD: ABNORMAL
EOSINOPHIL # BLD: 0.5 K/UL (ref 0–0.8)
EOSINOPHIL NFR BLD: 4 % (ref 0.5–7.8)
ERYTHROCYTE [DISTWIDTH] IN BLOOD BY AUTOMATED COUNT: 13.2 % (ref 11.9–14.6)
GLUCOSE BLD STRIP.AUTO-MCNC: 121 MG/DL (ref 65–100)
GLUCOSE BLD STRIP.AUTO-MCNC: 147 MG/DL (ref 65–100)
GLUCOSE BLD STRIP.AUTO-MCNC: 151 MG/DL (ref 65–100)
GLUCOSE BLD STRIP.AUTO-MCNC: 171 MG/DL (ref 65–100)
GLUCOSE SERPL-MCNC: 135 MG/DL (ref 65–100)
HCT VFR BLD AUTO: 39.1 % (ref 41.1–50.3)
HGB BLD-MCNC: 12.4 G/DL (ref 13.6–17.2)
IMM GRANULOCYTES # BLD AUTO: 0.1 K/UL (ref 0–0.5)
IMM GRANULOCYTES NFR BLD AUTO: 1 % (ref 0–5)
LYMPHOCYTES # BLD: 3.1 K/UL (ref 0.5–4.6)
LYMPHOCYTES NFR BLD: 26 % (ref 13–44)
MCH RBC QN AUTO: 28 PG (ref 26.1–32.9)
MCHC RBC AUTO-ENTMCNC: 31.7 G/DL (ref 31.4–35)
MCV RBC AUTO: 88.3 FL (ref 82–102)
MONOCYTES # BLD: 1 K/UL (ref 0.1–1.3)
MONOCYTES NFR BLD: 8 % (ref 4–12)
NEUTS SEG # BLD: 7.2 K/UL (ref 1.7–8.2)
NEUTS SEG NFR BLD: 60 % (ref 43–78)
NRBC # BLD: 0 K/UL (ref 0–0.2)
PLATELET # BLD AUTO: 496 K/UL (ref 150–450)
PMV BLD AUTO: 8.3 FL (ref 9.4–12.3)
POTASSIUM SERPL-SCNC: 3.9 MMOL/L (ref 3.5–5.1)
RBC # BLD AUTO: 4.43 M/UL (ref 4.23–5.6)
SERVICE CMNT-IMP: ABNORMAL
SERVICE CMNT-IMP: NORMAL
SODIUM SERPL-SCNC: 139 MMOL/L (ref 133–143)
WBC # BLD AUTO: 11.9 K/UL (ref 4.3–11.1)

## 2023-03-01 PROCEDURE — 82962 GLUCOSE BLOOD TEST: CPT

## 2023-03-01 PROCEDURE — 80048 BASIC METABOLIC PNL TOTAL CA: CPT

## 2023-03-01 PROCEDURE — 97607 NEG PRS WND THR NDME<=50SQCM: CPT

## 2023-03-01 PROCEDURE — 36415 COLL VENOUS BLD VENIPUNCTURE: CPT

## 2023-03-01 PROCEDURE — 85025 COMPLETE CBC W/AUTO DIFF WBC: CPT

## 2023-03-01 PROCEDURE — 2580000003 HC RX 258: Performed by: FAMILY MEDICINE

## 2023-03-01 PROCEDURE — 6370000000 HC RX 637 (ALT 250 FOR IP): Performed by: INTERNAL MEDICINE

## 2023-03-01 PROCEDURE — 97530 THERAPEUTIC ACTIVITIES: CPT

## 2023-03-01 PROCEDURE — 97605 NEG PRS WND THER DME<=50SQCM: CPT

## 2023-03-01 PROCEDURE — 6370000000 HC RX 637 (ALT 250 FOR IP): Performed by: FAMILY MEDICINE

## 2023-03-01 PROCEDURE — 6370000000 HC RX 637 (ALT 250 FOR IP): Performed by: HOSPITALIST

## 2023-03-01 PROCEDURE — 1100000000 HC RM PRIVATE

## 2023-03-01 RX ORDER — SODIUM CHLORIDE 9 MG/ML
INJECTION, SOLUTION INTRAVENOUS CONTINUOUS
Status: DISCONTINUED | OUTPATIENT
Start: 2023-03-01 | End: 2023-03-02 | Stop reason: HOSPADM

## 2023-03-01 RX ADMIN — DOXYCYCLINE HYCLATE 100 MG: 100 CAPSULE ORAL at 07:41

## 2023-03-01 RX ADMIN — SODIUM CHLORIDE, PRESERVATIVE FREE 10 ML: 5 INJECTION INTRAVENOUS at 07:37

## 2023-03-01 RX ADMIN — AMOXICILLIN AND CLAVULANATE POTASSIUM 1 TABLET: 875; 125 TABLET, FILM COATED ORAL at 07:32

## 2023-03-01 RX ADMIN — DOXYCYCLINE HYCLATE 100 MG: 100 CAPSULE ORAL at 20:31

## 2023-03-01 RX ADMIN — AMOXICILLIN AND CLAVULANATE POTASSIUM 1 TABLET: 875; 125 TABLET, FILM COATED ORAL at 20:31

## 2023-03-01 RX ADMIN — Medication 100 MG: at 07:32

## 2023-03-01 RX ADMIN — TAMSULOSIN HYDROCHLORIDE 0.4 MG: 0.4 CAPSULE ORAL at 07:33

## 2023-03-01 RX ADMIN — SODIUM CHLORIDE: 9 INJECTION, SOLUTION INTRAVENOUS at 07:41

## 2023-03-01 RX ADMIN — SODIUM CHLORIDE: 9 INJECTION, SOLUTION INTRAVENOUS at 23:34

## 2023-03-01 ASSESSMENT — PAIN SCALES - GENERAL: PAINLEVEL_OUTOF10: 0

## 2023-03-01 NOTE — CARE COORDINATION
Additional notes for [address] wound vac to right LE:     Mr. Salty Sanchez is well-nourished, with a height of 5' 3\" (160 cm), Wt: 163 lb (73.9 kg), and BMI: 28.87 kg/m². RN Case Manager reviewed diet, including carb choices and adequate protein intake to promote timely wound healing. He verbalized understanding. Prior wound therapies were not tried as the vascular surgeon did an excisional debridement of right diabetic foot ulcer on February 22, 2023, and ordered a wound vac placed immediately while the patient was in the OR for that procedure. He still has on a wound vac as of today. Mr. Salty Sanchez was weight bearing as tolerated (WBAT) post-op. An offloading shoe was ordered and delivered from [employer] . He is successfully working with OT and PT.

## 2023-03-01 NOTE — PROGRESS NOTES
Pt is stable with bed in lowest position, wheels locked, and call light within reach. Hourly rounds completed. VSS. IV is patent and dressing is clean, dry, and intact. Pt ambulated to bathroom. No complaints throughout the night. Report to be given to day shift nurse.

## 2023-03-01 NOTE — PROGRESS NOTES
Hospitalist Progress Note   Admit Date:  2023 10:55 AM   Name:  Arcenio Rojas   Age:  78 y.o. Sex:  male  :  1945   MRN:  [id number]   Room:  601/    Presenting Complaint: Toe Pain     Reason(s) for Admission: Septicemia [facility]) [A41.9]  Acute osteomyelitis of right foot ([facility] [address] [M86.171]  Acute hematogenous osteomyelitis of left foot [facility]) [facility] Course:   Arcenio Rojas is a 78 y.o. male with medical history of hypertension, hyperlipidemia, diabetes mellitus, CKD stage III and alcohol dependence admitted with acute osteomyelitis of right foot. CT right foot concerning for osteomyelitis. Started on empiric antibiotics vancomycin/cefazolin. Ortho consulted and patient status post debridement on  and wound VAC placement. Lower extremity arterial duplex with BULMARO with mild stenosis. Wound culture grew Staph. Infectious disease consulted and recommend empiric Augmentin and doxycycline for at least 6 weeks of therapy. Subjective & 24hr Events (23): Patient is seen at the bedside. No acute event reported overnight. Patient reports he is feeling well. Pending placement. Assessment & Plan:     Acute osteomyelitis of right foot:  CT right foot concerning for osteomyelitis  S/p right foot debridement and wound VAC placement on  by Ortho  Wound culture with staph  LE arterial duplex with BULMARO with mild stenosis  ID consulted  and transitioned to Augmentin and doxycycline for at least 6 weeks of therapy, EOT 2023  Supportive care  Wound care    MEG:  Resolved  Continue to monitor     Hypertension:  Blood pressure stable  Continue home meds amlodipine and losartan     Diabetes mellitus:  Hold oral hypoglycemic  Continue sliding scale    Alcohol dependence:  No signs of withdrawal  Continue thiamine  Encouraged cessation    BPH:  Continue tamsulosin    PT/OT evals and PPD needed/ordered? Yes  Diet:  ADULT DIET;  Regular; 4 carb choices (60 gm/meal)  VTE prophylaxis: SCD's   Code status: Full Code    Hospital Problems:  Principal Problem:    Acute osteomyelitis of right foot ([facility] [address]  Active Problems:    Type 2 diabetes mellitus with chronic kidney disease    MEG (acute kidney injury) ([facility] [address]    EtOH dependence ([facility] [address]    GERD (gastroesophageal reflux disease)    Hypertension    Stage 3a chronic kidney disease (HCC)    Diabetic foot infection ([facility] [address]  Resolved Problems:    Hyponatremia    Lactic acidosis    Sinus tachycardia      Objective:   Patient Vitals for the past 24 hrs:   Temp Pulse Resp BP SpO2   03/01/23 1047 98.1 °F (36.7 °C) 67 16 124/62 100 %   03/01/23 0654 98.4 °F (36.9 °C) 61 18 118/68 99 %   03/01/23 0312 98.6 °F (37 °C) 71 18 129/66 97 %   02/28/23 2251 98.4 °F (36.9 °C) 65 18 (!) 115/56 98 %   02/28/23 1900 99.3 °F (37.4 °C) 67 18 118/62 100 %   02/28/23 1503 99.1 °F (37.3 °C) 74 20 113/62 98 %         Oxygen Therapy  SpO2: 100 %  Pulse via Oximetry: 59 beats per minute  Pulse Oximeter Device Mode: Continuous  Pulse Oximeter Device Location: Finger  O2 Device: None (Room air)  O2 Flow Rate (L/min): 3 L/min  O2 Delivery Method: Oral airway    Estimated body mass index is 28.87 kg/m² as calculated from the following:    Height as of this encounter: 5' 3\" (1.6 m). Weight as of this encounter: 163 lb (73.9 kg). Intake/Output Summary (Last 24 hours) at 3/1/2023 1347  Last data filed at 3/1/2023 0930  Gross per 24 hour   Intake --   Output 775 ml   Net -775 ml           Physical Exam:     Blood pressure 124/62, pulse 67, temperature 98.1 °F (36.7 °C), temperature source Oral, resp. rate 16, height 5' 3\" (1.6 m), weight 163 lb (73.9 kg), SpO2 100 %. General:    Well nourished. Head:  Normocephalic, atraumatic  Eyes:  Sclerae appear normal.  Pupils equally round. ENT:  Nares appear normal.  Moist oral mucosa  Neck:  No restricted ROM. Trachea midline   CV:   RRR. No m/r/g. No jugular venous distension. Lungs:   CTAB. No wheezing, rhonchi, or rales. Symmetric expansion. Abdomen:   Soft, nontender, nondistended. Extremities: Right foot dressing dry intact, wound VAC in place  Neuro:  CN II-XII grossly intact. Psych:  Normal mood and affect.       I have personally reviewed labs and tests:  Recent Labs:  Recent Results (from the past 48 hour(s))   POCT Glucose    Collection Time: 02/27/23  4:33 PM   Result Value Ref Range    POC Glucose 149 (H) 65 - 100 mg/dL    Performed by: Nick Morales    POCT Glucose    Collection Time: 02/27/23  8:22 PM   Result Value Ref Range    POC Glucose 165 (H) 65 - 100 mg/dL    Performed by: Jamie Crowley    POCT Glucose    Collection Time: 02/28/23  6:59 AM   Result Value Ref Range    POC Glucose 134 (H) 65 - 100 mg/dL    Performed by: Leticia Gutierrez    POCT Glucose    Collection Time: 02/28/23 11:08 AM   Result Value Ref Range    POC Glucose 188 (H) 65 - 100 mg/dL    Performed by: Leticia Gutierrez    POCT Glucose    Collection Time: 02/28/23  4:47 PM   Result Value Ref Range    POC Glucose 197 (H) 65 - 100 mg/dL    Performed by: Alex    POCT Glucose    Collection Time: 02/28/23  8:40 PM   Result Value Ref Range    POC Glucose 130 (H) 65 - 100 mg/dL    Performed by: Junaid    CBC with Auto Differential    Collection Time: 03/01/23  3:44 AM   Result Value Ref Range    WBC 11.9 (H) 4.3 - 11.1 K/uL    RBC 4.43 4.23 - 5.6 M/uL    Hemoglobin 12.4 (L) 13.6 - 17.2 g/dL    Hematocrit 39.1 (L) 41.1 - 50.3 %    MCV 88.3 82 - 102 FL    MCH 28.0 26.1 - 32.9 PG    MCHC 31.7 31.4 - 35.0 g/dL    RDW 13.2 11.9 - 14.6 %    Platelets 754 (H) 714 - 450 K/uL    MPV 8.3 (L) 9.4 - 12.3 FL    nRBC 0.00 0.0 - 0.2 K/uL    Differential Type AUTOMATED      Seg Neutrophils 60 43 - 78 %    Lymphocytes 26 13 - 44 %    Monocytes 8 4.0 - 12.0 %    Eosinophils % 4 0.5 - 7.8 %    Basophils 1 0.0 - 2.0 %    Immature Granulocytes 1 0.0 - 5.0 %    Segs Absolute 7.2 1.7 - 8.2 K/UL    Absolute Lymph # 3.1 0.5 - 4.6 K/UL    Absolute Mono # 1.0 0.1 - 1.3 K/UL    Absolute Eos # 0.5 0.0 - 0.8 K/UL    Basophils Absolute 0.1 0.0 - 0.2 K/UL    Absolute Immature Granulocyte 0.1 0.0 - 0.5 K/UL   Basic Metabolic Panel w/ Reflex to MG    Collection Time: 03/01/23  3:44 AM   Result Value Ref Range    Sodium 139 133 - 143 mmol/L    Potassium 3.9 3.5 - 5.1 mmol/L    Chloride 108 101 - 110 mmol/L    CO2 25 21 - 32 mmol/L    Anion Gap 6 2 - 11 mmol/L    Glucose 135 (H) 65 - 100 mg/dL    BUN 28 (H) 8 - 23 MG/DL    Creatinine 1.70 (H) 0.8 - 1.5 MG/DL    Est, Glom Filt Rate 42 (L) >60 ml/min/1.73m2    Calcium 10.1 8.3 - 10.4 MG/DL   POCT Glucose    Collection Time: 03/01/23  6:58 AM   Result Value Ref Range    POC Glucose 121 (H) 65 - 100 mg/dL    Performed by: Alex    POCT Glucose    Collection Time: 03/01/23 10:50 AM   Result Value Ref Range    POC Glucose 151 (H) 65 - 100 mg/dL    Performed by: Alex        I have personally reviewed imaging studies:  Other Studies:  Vascular duplex lower extremity arteries bilateral with BULMARO   Final Result   1. Noncompressible ankle pressures on the right possibly due to calcinosis. 2. Velocities indicate very mild stenosis within the right proximal superficial   femoral artery. XR CHEST PORTABLE   Final Result      No acute airspace disease. CT FOOT RIGHT WO CONTRAST   Final Result      Prominent soft tissue defect/ulceration along the medial and plantar aspect of   the big toe metatarsophalangeal joint contacting the underlying sesamoids and   metatarsal head, with dissecting gas extending in the medial cortex of the big   toe metatarsal head, base of the proximal phalanx, and bilateral sesamoids   compatible with osteomyelitis in these areas. Dissecting soft tissue gas along the plantar aspect of the big toe proximal   phalanx, IP joint, and base of the distal phalanx, likely spread of infection.       Recommend further evaluation with MRI with/without contrast to evaluate extent of infection. Current Meds:  Current Facility-Administered Medications   Medication Dose Route Frequency    0.9 % sodium chloride infusion   IntraVENous Continuous    amoxicillin-clavulanate (AUGMENTIN) 875-125 MG per tablet 1 tablet  1 tablet Oral 2 times per day    doxycycline hyclate (VIBRAMYCIN) capsule 100 mg  100 mg Oral 2 times per day    thiamine tablet 100 mg  100 mg Oral Daily    LORazepam (ATIVAN) injection 2 mg  2 mg IntraVENous Q2H PRN    glucose chewable tablet 16 g  4 tablet Oral PRN    dextrose bolus 10% 125 mL  125 mL IntraVENous PRN    Or    dextrose bolus 10% 250 mL  250 mL IntraVENous PRN    glucagon (rDNA) injection 1 mg  1 mg SubCUTAneous PRN    dextrose 10 % infusion   IntraVENous Continuous PRN    amLODIPine (NORVASC) tablet 10 mg  10 mg Oral Daily    atenolol (TENORMIN) tablet 25 mg  25 mg Oral Daily    losartan (COZAAR) tablet 100 mg  100 mg Oral Daily    tamsulosin (FLOMAX) capsule 0.4 mg  0.4 mg Oral Daily    insulin lispro (HUMALOG) injection vial 0-8 Units  0-8 Units SubCUTAneous TID WC    insulin lispro (HUMALOG) injection vial 0-4 Units  0-4 Units SubCUTAneous Nightly    sodium chloride flush 0.9 % injection 5-40 mL  5-40 mL IntraVENous 2 times per day    sodium chloride flush 0.9 % injection 5-40 mL  5-40 mL IntraVENous PRN    0.9 % sodium chloride infusion   IntraVENous PRN    ondansetron (ZOFRAN-ODT) disintegrating tablet 4 mg  4 mg Oral Q8H PRN    Or    ondansetron (ZOFRAN) injection 4 mg  4 mg IntraVENous Q6H PRN    polyethylene glycol (GLYCOLAX) packet 17 g  17 g Oral Daily PRN    acetaminophen (TYLENOL) tablet 650 mg  650 mg Oral Q6H PRN    Or    acetaminophen (TYLENOL) suppository 650 mg  650 mg Rectal Q6H PRN    morphine injection 2 mg  2 mg IntraVENous Q4H PRN    HYDROcodone-acetaminophen (NORCO) 5-325 MG per tablet 1 tablet  1 tablet Oral Q6H PRN       Signed:  Juli Nj MD    Part of this note may have been written by using a voice dictation software.   The note has been proof read but may still contain some grammatical/other typographical errors.

## 2023-03-01 NOTE — CARE COORDINATION
Dispo update:  Per Ms. Joesph Lara of [facility], [facility] pre-cert for transfer is still pending. Backup plan is home with [facility] and [facility] wound vac (in the room).

## 2023-03-01 NOTE — PROGRESS NOTES
Physician Progress Note      PATIENT:               Caio Stewart  [facility] #:                  [id number]  :                       1945  ADMIT DATE:       2023 10:55 AM  DISCH DATE:  RESPONDING  PROVIDER #:        Lus Najjar MD          QUERY TEXT:    Patient admitted with acute osteomyelitis of right foot. Noted documentation   of sepsis in H&P . In order to support the diagnosis of sepsis, please   include additional clinical indicators in your documentation. ? Or please   document if the diagnosis of sepsis has been ruled out after further study. ? The medical record reflects the following:  ?? Risk Factors: MEG, CKD3a, acute osteomyelitis of right foot, diabetic foot   infection, s/p excisional debridement of right metatarsal.  ?? Clinical Indicators: lactic acid 3.0, procalcitonin 0.52, temp 98.4, pulse   60, resp rate 16, sepsis and septicemia per  H&P . ?? Treatment: IV Zosyn, IV Augmentin, IV vancomycin, IV cefepime, IV   ceftriaxone, IV fluids, oral doxycycline, lab workup. Options provided:  -- Sepsis present as evidenced by, H&P .  -- Sepsis was ruled out after study  -- Other - I will add my own diagnosis  -- Disagree - Not applicable / Not valid  -- Disagree - Clinically unable to determine / Unknown  -- Refer to Clinical Documentation Reviewer    PROVIDER RESPONSE TEXT:    Sepsis was ruled out after study.     Query created by: Em Grant on 2023 8:09 AM      Electronically signed by:  Lus Najjar MD 3/1/2023 7:22 AM

## 2023-03-01 NOTE — PROGRESS NOTES
ACUTE PHYSICAL THERAPY GOALS:   (Developed with and agreed upon by patient and/or caregiver.)  1. Patient will perform bed mobility with INDEPENDENCE within 7 days. 2. Patient will transfer bed to chair with MODIFIED INDEPENDENCE within 7 days. 3. Patient will demonstrate GOOD DYNAMIC STANDING balance within 7 day(s). 4. Patient will ambulate 300ft+ with MODIFIED INDEPENDENCE within 7 days. 5. Patient will tolerate 25+ minutes of therapeutic activity/exercise and/or neuromuscular re-education while maintaining stable vitals to improve functional strength and activity tolerance within 7 days. PHYSICAL THERAPY: Daily Note AM   (Link to Caseload Tracking: PT Visit Days : 4  Time In/Out PT Charge Capture  Rehab Caseload Tracker  Orders    Richard Mcnamara is a 78 y.o. male   PRIMARY DIAGNOSIS: Acute osteomyelitis of right foot ([address]  Septicemia ([address] [A41.9]  Acute osteomyelitis of right foot ([address] [M86.171]  Acute hematogenous osteomyelitis of left foot ([address] [M86.072]  Procedure(s) (LRB):  Excisional debridment right foot wound (Right)  7 Days Post-Op  Inpatient: Payor: Diony Hence / Plan: [employer] GOLD PLUS HMO / Product Type: *No Product type* /     ASSESSMENT:     REHAB RECOMMENDATIONS:   Recommendation to date pending progress:  Setting:  Home Health Therapy    Equipment:    To Be Determined     ASSESSMENT:  Mr. Pamela Grier was sitting up in recliner chair upon contact and agreeable to PT. Patient performed transfer to standing with supervision and cues for technique. Patient ambulated 450' with use of rolling walker, CGA-SBA and occasional cues for sequencing/walker utilization/gait speed. Patient returned to recliner chair where he participated in LE exercises with good ability noted and cues for improved quality of exercise. Patient then ambulated an additional 250' without use of rolling walker with no LOB noted. Steady progress. Will continue PT efforts.      SUBJECTIVE:   Mr. Pamela Grier states, \"I'm not 17 anymore\"     Social/Functional Lives With: Family  Type of Home: House  Home Layout: Two level  Entrance Stairs - Number of Steps: 3-4  ADL Assistance: Independent  Ambulation Assistance: Independent  OBJECTIVE:     PAIN: VITALS / O2: Bimal Jack / Fransisco Gutierres / Safia Green:   Pre Treatment:   Pain Assessment: None - Denies Pain      Post Treatment: 0 Vitals        Oxygen    Wound Vac    RESTRICTIONS/PRECAUTIONS:  Restrictions/Precautions  Restrictions/Precautions: Weight Bearing  Lower Extremity Weight Bearing Restrictions  Right Lower Extremity Weight Bearing: Weight Bearing As Tolerated  Restrictions/Precautions: Weight Bearing     MOBILITY: I Mod I S SBA CGA Min Mod Max Total  NT x2 Comments:   Bed Mobility    Rolling [] [] [] [] [] [] [] [] [] [] []    Supine to Sit [] [] [] [] [] [] [] [] [] [] []    Scooting [] [] [] [] [] [] [] [] [] [] []    Sit to Supine [] [] [] [] [] [] [] [] [] [] []    Transfers    Sit to Stand [] [] [x] [] [] [] [] [] [] [] []    Bed to Chair [] [] [] [] [] [] [] [] [] [] []    Stand to Sit [] [] [] [] [] [] [] [] [] [] []     [] [] [] [] [] [] [] [] [] [] []    I=Independent, Mod I=Modified Independent, S=Supervision, SBA=Standby Assistance, CGA=Contact Guard Assistance,   Min=Minimal Assistance, Mod=Moderate Assistance, Max=Maximal Assistance, Total=Total Assistance, NT=Not Tested    BALANCE: Good Fair+ Fair Fair- Poor NT Comments   Sitting Static [x] [] [] [] [] []    Sitting Dynamic [x] [] [] [] [] []              Standing Static [x] [] [] [] [] []    Standing Dynamic [] [x] [] [] [] []      GAIT: I Mod I S SBA CGA Min Mod Max Total  NT x2 Comments:   Level of Assistance [] [] [] [x] [x] [] [] [] [] [] []    Distance   450' RW, 250' no AD    DME Rolling Walker    Gait Quality Decreased damien , Decreased step clearance, and Decreased step length    Weightbearing Status      Stairs      I=Independent, Mod I=Modified Independent, S=Supervision, SBA=Standby Assistance, CGA=Contact Guard Assistance,   Min=Minimal Assistance, Mod=Moderate Assistance, Max=Maximal Assistance, Total=Total Assistance, NT=Not Tested    PLAN:   FREQUENCY AND DURATION: 3 times/week for duration of hospital stay or until stated goals are met, whichever comes first.    TREATMENT:   TREATMENT:   Therapeutic Activity (23 Minutes): Therapeutic activity included Scooting, Transfer Training, Ambulation on level ground, Sitting balance , Standing balance, and LE exercises to improve functional Activity tolerance, Balance, Mobility, and Strength. TREATMENT GRID:  N/A    AFTER TREATMENT PRECAUTIONS: Bed/Chair Locked, Call light within reach, Chair, Needs within reach, and RN notified    INTERDISCIPLINARY COLLABORATION:  RN/ PCT and PT/ PTA    EDUCATION:      TIME IN/OUT:  Time In: 1045  Time Out: 1108  Minutes: [facility].  ISABEL Shetty

## 2023-03-01 NOTE — WOUND CARE
Patient seen for routine VAC dressing change. Updated photo in chart. Discussed with patient and . Home VAC in room in case he has to go home. Wound improving but still has some slough. No odor today. Will follow while in acute care.

## 2023-03-02 VITALS
DIASTOLIC BLOOD PRESSURE: 63 MMHG | HEART RATE: 65 BPM | BODY MASS INDEX: 28.88 KG/M2 | OXYGEN SATURATION: 100 % | WEIGHT: 163 LBS | HEIGHT: 63 IN | RESPIRATION RATE: 19 BRPM | TEMPERATURE: 99 F | SYSTOLIC BLOOD PRESSURE: 129 MMHG

## 2023-03-02 LAB
ANION GAP SERPL CALC-SCNC: 3 MMOL/L (ref 2–11)
BASOPHILS # BLD: 0 K/UL (ref 0–0.2)
BASOPHILS NFR BLD: 0 % (ref 0–2)
BUN SERPL-MCNC: 23 MG/DL (ref 8–23)
CALCIUM SERPL-MCNC: 9.3 MG/DL (ref 8.3–10.4)
CHLORIDE SERPL-SCNC: 112 MMOL/L (ref 101–110)
CO2 SERPL-SCNC: 26 MMOL/L (ref 21–32)
CREAT SERPL-MCNC: 1.5 MG/DL (ref 0.8–1.5)
DIFFERENTIAL METHOD BLD: ABNORMAL
EOSINOPHIL # BLD: 0.5 K/UL (ref 0–0.8)
EOSINOPHIL NFR BLD: 6 % (ref 0.5–7.8)
ERYTHROCYTE [DISTWIDTH] IN BLOOD BY AUTOMATED COUNT: 13.2 % (ref 11.9–14.6)
GLUCOSE BLD STRIP.AUTO-MCNC: 177 MG/DL (ref 65–100)
GLUCOSE BLD STRIP.AUTO-MCNC: 96 MG/DL (ref 65–100)
GLUCOSE SERPL-MCNC: 120 MG/DL (ref 65–100)
HCT VFR BLD AUTO: 33.2 % (ref 41.1–50.3)
HGB BLD-MCNC: 10.6 G/DL (ref 13.6–17.2)
IMM GRANULOCYTES # BLD AUTO: 0 K/UL (ref 0–0.5)
IMM GRANULOCYTES NFR BLD AUTO: 0 % (ref 0–5)
LYMPHOCYTES # BLD: 2.1 K/UL (ref 0.5–4.6)
LYMPHOCYTES NFR BLD: 23 % (ref 13–44)
MCH RBC QN AUTO: 28.3 PG (ref 26.1–32.9)
MCHC RBC AUTO-ENTMCNC: 31.9 G/DL (ref 31.4–35)
MCV RBC AUTO: 88.5 FL (ref 82–102)
MONOCYTES # BLD: 0.9 K/UL (ref 0.1–1.3)
MONOCYTES NFR BLD: 10 % (ref 4–12)
NEUTS SEG # BLD: 5.4 K/UL (ref 1.7–8.2)
NEUTS SEG NFR BLD: 61 % (ref 43–78)
NRBC # BLD: 0 K/UL (ref 0–0.2)
PLATELET # BLD AUTO: 399 K/UL (ref 150–450)
PMV BLD AUTO: 8.5 FL (ref 9.4–12.3)
POTASSIUM SERPL-SCNC: 4.2 MMOL/L (ref 3.5–5.1)
RBC # BLD AUTO: 3.75 M/UL (ref 4.23–5.6)
SERVICE CMNT-IMP: ABNORMAL
SERVICE CMNT-IMP: NORMAL
SODIUM SERPL-SCNC: 141 MMOL/L (ref 133–143)
WBC # BLD AUTO: 8.9 K/UL (ref 4.3–11.1)

## 2023-03-02 PROCEDURE — 97530 THERAPEUTIC ACTIVITIES: CPT

## 2023-03-02 PROCEDURE — 6370000000 HC RX 637 (ALT 250 FOR IP): Performed by: HOSPITALIST

## 2023-03-02 PROCEDURE — 80048 BASIC METABOLIC PNL TOTAL CA: CPT

## 2023-03-02 PROCEDURE — 85025 COMPLETE CBC W/AUTO DIFF WBC: CPT

## 2023-03-02 PROCEDURE — 82962 GLUCOSE BLOOD TEST: CPT

## 2023-03-02 PROCEDURE — 6370000000 HC RX 637 (ALT 250 FOR IP): Performed by: INTERNAL MEDICINE

## 2023-03-02 PROCEDURE — 6370000000 HC RX 637 (ALT 250 FOR IP): Performed by: FAMILY MEDICINE

## 2023-03-02 PROCEDURE — 36415 COLL VENOUS BLD VENIPUNCTURE: CPT

## 2023-03-02 PROCEDURE — 2580000003 HC RX 258: Performed by: FAMILY MEDICINE

## 2023-03-02 RX ORDER — HYDROCODONE BITARTRATE AND ACETAMINOPHEN 5; 325 MG/1; MG/1
1 TABLET ORAL EVERY 6 HOURS PRN
Qty: 28 TABLET | Refills: 0 | Status: SHIPPED | OUTPATIENT
Start: 2023-03-02 | End: 2023-03-09

## 2023-03-02 RX ORDER — DOXYCYCLINE HYCLATE 100 MG/1
100 CAPSULE ORAL EVERY 12 HOURS SCHEDULED
Qty: 68 CAPSULE | Refills: 0 | Status: SHIPPED | OUTPATIENT
Start: 2023-03-02 | End: 2023-04-05

## 2023-03-02 RX ORDER — AMOXICILLIN AND CLAVULANATE POTASSIUM 875; 125 MG/1; MG/1
1 TABLET, FILM COATED ORAL EVERY 12 HOURS SCHEDULED
Qty: 68 TABLET | Refills: 0 | Status: SHIPPED | OUTPATIENT
Start: 2023-03-02 | End: 2023-04-05

## 2023-03-02 RX ADMIN — TAMSULOSIN HYDROCHLORIDE 0.4 MG: 0.4 CAPSULE ORAL at 09:20

## 2023-03-02 RX ADMIN — AMLODIPINE BESYLATE 10 MG: 10 TABLET ORAL at 09:20

## 2023-03-02 RX ADMIN — LOSARTAN POTASSIUM 100 MG: 50 TABLET, FILM COATED ORAL at 09:19

## 2023-03-02 RX ADMIN — DOXYCYCLINE HYCLATE 100 MG: 100 CAPSULE ORAL at 09:20

## 2023-03-02 RX ADMIN — ATENOLOL 25 MG: 25 TABLET ORAL at 09:20

## 2023-03-02 RX ADMIN — AMOXICILLIN AND CLAVULANATE POTASSIUM 1 TABLET: 875; 125 TABLET, FILM COATED ORAL at 09:20

## 2023-03-02 RX ADMIN — SODIUM CHLORIDE, PRESERVATIVE FREE 10 ML: 5 INJECTION INTRAVENOUS at 09:22

## 2023-03-02 RX ADMIN — Medication 100 MG: at 09:20

## 2023-03-02 ASSESSMENT — PAIN SCALES - GENERAL: PAINLEVEL_OUTOF10: 0

## 2023-03-02 NOTE — WOUND CARE
Patient seen for NPWT home machine switch. Patient dressing rolled and noted air leak. Patched areas of leaking and new machine attached. Update . Answered all patient questions.

## 2023-03-02 NOTE — PROGRESS NOTES
ACUTE PHYSICAL THERAPY GOALS:   (Developed with and agreed upon by patient and/or caregiver.)  1. Patient will perform bed mobility with INDEPENDENCE within 7 days. 2. Patient will transfer bed to chair with MODIFIED INDEPENDENCE within 7 days. 3. Patient will demonstrate GOOD DYNAMIC STANDING balance within 7 day(s). 4. Patient will ambulate 300ft+ with MODIFIED INDEPENDENCE within 7 days. 5. Patient will tolerate 25+ minutes of therapeutic activity/exercise and/or neuromuscular re-education while maintaining stable vitals to improve functional strength and activity tolerance within 7 days. PHYSICAL THERAPY: Daily Note AM   (Link to Caseload Tracking: PT Visit Days : 5  Time In/Out PT Charge Capture  Rehab Caseload Tracker  Orders    Eleanor Knowles is a 78 y.o. male   PRIMARY DIAGNOSIS: Acute osteomyelitis of right foot ([address]  Septicemia ([address] [A41.9]  Acute osteomyelitis of right foot ([address] [M86.171]  Acute hematogenous osteomyelitis of left foot ([address] [M86.072]  Procedure(s) (LRB):  Excisional debridment right foot wound (Right)  8 Days Post-Op  Inpatient: Payor: Carin [place] / Plan: [employer] GOLD PLUS HMO / Product Type: *No Product type* /     ASSESSMENT:     REHAB RECOMMENDATIONS:   Recommendation to date pending progress:  Setting:  Home Health Therapy    Equipment:    Rolling Walker     ASSESSMENT:  Mr. Erin Appiah was sitting up in recliner chair upon contact and agreeable to PT. Patient opted to try mobility with heel WB shoe and tennis shoe to LLE. Patient performed transfer to standing with supervision and cues for technique. Patient ambulated 450' with use of rolling walker, SBA and occasional cues for sequencing/walker utilization/gait speed. Patient returned to recliner chair where he participated in seated/standing LE exercises with good ability noted and cues for improved quality of exercise.  Patient also performed 10 reps of sit to stand without use of Ue's demonstrating good ability and technique as well as good static standing balance once standing. Encouraged patient to perform seated/standing exercises and sit to stand transfers at home for HEP. Patient verbalized understanding. Steady progress. Will continue PT efforts.      SUBJECTIVE:   Mr. Tessa Harry states, \"I'm a little tired\"     Social/Functional Lives With: Family  Type of Home: House  Home Layout: Two level  Entrance Stairs - Number of Steps: 3-4  ADL Assistance: Independent  Ambulation Assistance: Independent  OBJECTIVE:     PAIN: Garzuri Mowers / O2: Jody Hobson / Tc Nash / Claudio Ramirez:   Pre Treatment:   Pain Assessment: None - Denies Pain      Post Treatment: 0 Vitals        Oxygen    Wound Vac    RESTRICTIONS/PRECAUTIONS:  Restrictions/Precautions  Restrictions/Precautions: Weight Bearing  Lower Extremity Weight Bearing Restrictions  Right Lower Extremity Weight Bearing: Weight Bearing As Tolerated  Restrictions/Precautions: Weight Bearing     MOBILITY: I Mod I S SBA CGA Min Mod Max Total  NT x2 Comments:   Bed Mobility    Rolling [] [] [] [] [] [] [] [] [] [] []    Supine to Sit [] [] [] [] [] [] [] [] [] [] []    Scooting [] [] [] [] [] [] [] [] [] [] []    Sit to Supine [] [] [] [] [] [] [] [] [] [] []    Transfers    Sit to Stand [x] [] [x] [] [] [] [] [] [] [] []    Bed to Chair [] [] [] [] [] [] [] [] [] [] []    Stand to Sit [] [] [] [] [] [] [] [] [] [] []     [] [] [] [] [] [] [] [] [] [] []    I=Independent, Mod I=Modified Independent, S=Supervision, SBA=Standby Assistance, CGA=Contact Guard Assistance,   Min=Minimal Assistance, Mod=Moderate Assistance, Max=Maximal Assistance, Total=Total Assistance, NT=Not Tested    BALANCE: Good Fair+ Fair Fair- Poor NT Comments   Sitting Static [x] [] [] [] [] []    Sitting Dynamic [x] [] [] [] [] []              Standing Static [x] [] [] [] [] []    Standing Dynamic [] [x] [] [] [] []      GAIT: I Mod I S SBA CGA Min Mod Max Total  NT x2 Comments:   Level of Assistance [] [] [] [x] [x] [] [] [] [] [] [] Distance   450' RW    DME Rolling Walker    Gait Quality Decreased damien , Decreased step clearance, and Decreased step length    Weightbearing Status      Stairs      I=Independent, Mod I=Modified Independent, S=Supervision, SBA=Standby Assistance, CGA=Contact Guard Assistance,   Min=Minimal Assistance, Mod=Moderate Assistance, Max=Maximal Assistance, Total=Total Assistance, NT=Not Tested    PLAN:   FREQUENCY AND DURATION: 3 times/week for duration of hospital stay or until stated goals are met, whichever comes first.    TREATMENT:   TREATMENT:   Therapeutic Activity (23 Minutes): Therapeutic activity included Scooting, Transfer Training, Ambulation on level ground, Sitting balance , Standing balance, and seated/standing LE exercises to improve functional Activity tolerance, Balance, Mobility, and Strength. TREATMENT GRID:  N/A    AFTER TREATMENT PRECAUTIONS: Bed/Chair Locked, Call light within reach, Chair, Needs within reach, and RN notified    INTERDISCIPLINARY COLLABORATION:  RN/ PCT and PT/ PTA    EDUCATION:      TIME IN/OUT:  Time In: 1030  Time Out: [address]  Minutes: [address] [facility].  ISABEL Shetty

## 2023-03-02 NOTE — PROGRESS NOTES
..Discharge medications reviewed with the patient and appropriate educational materials and side effects teaching were provided. AVS summary provided and explained. Pt verbalized understanding. No questions asked. No paper rx scripts given. Walking boot given and placed over right foot.

## 2023-03-02 NOTE — CARE COORDINATION
Discharge note: [employer] denied request for STR at [facility]. Mr. Manuel Yates agrees to home health ([facility]) and [employer] wound vac (already delivered to room). Called [facility] at [phone] and confirmed that they received the referral, and can staff it 3x/week for RN (SN) for wound care, and also OT and PT (eval/tx). Yes, they can. Also, provided Mr. Manuel Yates with a RW from [facility]. No other needs voiced or identified.

## 2023-03-02 NOTE — PLAN OF CARE
Problem: Discharge Planning  Goal: Discharge to home or other facility with appropriate resources  3/1/2023 2028 by Loida Hart RN  Outcome: Progressing  3/1/2023 2026 by Loida Hart RN  Outcome: Progressing  Flowsheets (Taken 3/1/2023 1951)  Discharge to home or other facility with appropriate resources: Identify barriers to discharge with patient and caregiver     Problem: Pain  Goal: Verbalizes/displays adequate comfort level or baseline comfort level  3/1/2023 2028 by Loida Hart RN  Outcome: Progressing  3/1/2023 2026 by Loida Hart RN  Outcome: Progressing     Problem: Chronic Conditions and Co-morbidities  Goal: Patient's chronic conditions and co-morbidity symptoms are monitored and maintained or improved  3/1/2023 2028 by Loida Hart RN  Outcome: Progressing  3/1/2023 2026 by Loida Hart RN  Outcome: Progressing  Flowsheets (Taken 3/1/2023 1951)  Care Plan - Patient's Chronic Conditions and Co-Morbidity Symptoms are Monitored and Maintained or Improved: Monitor and assess patient's chronic conditions and comorbid symptoms for stability, deterioration, or improvement     Problem: Safety - Adult  Goal: Free from fall injury  3/1/2023 2028 by Loida Hart RN  Outcome: Progressing  3/1/2023 2026 by Loida Hart RN  Outcome: Progressing     Problem: Skin/Tissue Integrity  Goal: Absence of new skin breakdown  Description: 1. Monitor for areas of redness and/or skin breakdown  2. Assess vascular access sites hourly  3. Every 4-6 hours minimum:  Change oxygen saturation probe site  4. Every 4-6 hours:  If on nasal continuous positive airway pressure, respiratory therapy assess nares and determine need for appliance change or resting period.   3/1/2023 2028 by Loida Hart RN  Outcome: Progressing  3/1/2023 2026 by Loida Hart RN  Outcome: Progressing     Problem: Skin/Tissue Integrity - Adult  Goal: Incisions, wounds, or drain sites healing without S/S of infection  Outcome: Progressing     Problem: Infection - Adult  Goal: Absence of infection during hospitalization  Outcome: Progressing  Goal: Absence of fever/infection during anticipated neutropenic period  Outcome: Progressing

## 2023-03-02 NOTE — DISCHARGE SUMMARY
Hospitalist Discharge Summary   Admit Date:  2023 10:55 AM   DC Note date: 3/2/2023  Name:  Karin Porter   Age:  78 y.o. Sex:  male  :  1945   MRN:  [id number]   Room:  [facility]/  PCP:  Prabhakar Ibarra MD    Presenting Complaint: Toe Pain     Initial Admission Diagnosis: Septicemia [facility]) [A41.9]  Acute osteomyelitis of right foot ([address] [M86.171]  Acute hematogenous osteomyelitis of left foot ([address] [M86.072]     Problem List for this Hospitalization (present on admission):    Principal Problem:    Acute osteomyelitis of right foot ([address]  Active Problems:    Type 2 diabetes mellitus with chronic kidney disease    MEG (acute kidney injury) ([address]    EtOH dependence ([address]    GERD (gastroesophageal reflux disease)    Hypertension    Stage 3a chronic kidney disease ([address]    Diabetic foot infection ([address]  Resolved Problems:    Hyponatremia    Lactic acidosis    Sinus tachycardia    HPI:  Karin Porter is a 78 y.o. male with medical history of hypertension, hyperlipidemia, diabetes mellitus, CKD stage III and alcohol dependence presented to ED with worsening right foot pain. Patient reports he has right foot wound for couple of weeks. He thinks he might have stepped on something which caused this wound. Reports over the past few days, pain has been significantly worsened. He has not seen anyone for the wound. Has not taken any antibiotic outpatient. Denies chest pain, palpitation, nausea, vomiting, abdominal pain. Reports decreased oral intake. He is not on insulin at home and reports blood glucose well controlled on oral regimen. He drinks couple of beers a day. Reports he used to drink more than this. He is a former smoker, quit years ago. In the ED patient was tachycardic. Labs notable for WBC 10.6, hemoglobin 13.6, platelet 981, sodium 131, potassium 4, creatinine 2.60 (baseline ~1.8). Procalcitonin 0.52 and lactic acid 3. CT foot concerning for osteomyelitis.   Hospitalist consulted for admission. Hospital Course:  Rommel William is a 78 y.o. male with medical history of hypertension, hyperlipidemia, diabetes mellitus, CKD stage III and alcohol dependence admitted with acute osteomyelitis of right foot. CT right foot concerning for osteomyelitis. Started on empiric antibiotics vancomycin/cefazolin. Ortho consulted and patient status post debridement on 2/22 and wound VAC placement. Lower extremity arterial duplex with BULMARO with mild stenosis. Wound culture grew Staph. Infectious disease consulted and recommend empiric Augmentin and doxycycline for at least 6 weeks of therapy, EOT 4/5/2023. MEG resolved with gentle hydration. All other chronic conditions stable and we continued essential home meds. His insurance has declined rehab. PT/OT recommend home health on discharge. Case management to arrange wound VAC and wound care. No other complaint on the day of discharge. Patient is stable to discharge home today with close follow-up with PCP, orthopedic and ID. Patient is stable for discharge home today. Disposition: Home Health  Diet: ADULT DIET; Regular; 4 carb choices (60 gm/meal)  Code Status: Full Code    Follow Ups:   Contact information for follow-up providers     Shai Cruz MD. Schedule an appointment as soon as possible for a   visit in 2 week(s). Specialties: Orthopaedic Trauma Surgery, Orthopedic Surgery  Why: For wound re-check  Contact information:  [address]  [address]             Meliton Clark MD. Go on 3/15/2023. Specialty: Internal Medicine  Why: at 3:00  Contact information:  Reji Rosado MD Follow up. Specialty: Infectious Diseases  Why: OFFICE WILL CALL PATIENT WITH APPOINTMENT DATE AND TIME.   Contact information:  [address] [place] [address]  [phone]                   Contact information for after-discharge care     Discharge Destination     [facility] . Service: Skilled Nursing  Contact information:  [address] [facility]  [address] [address] [phone]                           Time spent in patient discharge and coordination 39 minutes. Follow up labs/diagnostics (ultimately defer to outpatient provider):  CBC, BMP every week    Plan was discussed with patient. All questions answered. Patient was stable at time of discharge. Instructions given to call a physician or return if any concerns. Current Discharge Medication List        START taking these medications    Details   amoxicillin-clavulanate (AUGMENTIN) 875-125 MG per tablet Take 1 tablet by mouth every 12 hours  Qty: 68 tablet, Refills: 0      doxycycline hyclate (VIBRAMYCIN) 100 MG capsule Take 1 capsule by mouth every 12 hours  Qty: 68 capsule, Refills: 0      HYDROcodone-acetaminophen (NORCO) 5-325 MG per tablet Take 1 tablet by mouth every 6 hours as needed for Pain for up to 7 days. Max Daily Amount: 4 tablets  Qty: 28 tablet, Refills: 0    Comments: Reduce doses taken as pain becomes manageable  Associated Diagnoses: Diabetic foot ulcer associated with type 2 diabetes mellitus, with fat layer exposed, unspecified laterality, unspecified part of foot ([facility]  Diabetic foot infection ([facility]           CONTINUE these medications which have NOT CHANGED    Details   tamsulosin (FLOMAX) 0.4 MG capsule Take 1 capsule by mouth daily  Qty: 90 capsule, Refills: 1    Associated Diagnoses: Benign prostatic hyperplasia with lower urinary tract symptoms, symptom details unspecified      SITagliptin (JANUVIA) 100 MG tablet Take 1 tablet by mouth daily  Qty: 90 tablet, Refills: 1    Associated Diagnoses: Type 2 diabetes mellitus with stage 3 chronic kidney disease, without long-term current use of insulin, unspecified whether stage 3a or 3b CKD (HCC)      losartan (COZAAR) 100 MG tablet Take 1 tablet by mouth daily  Qty: 90 tablet, Refills: 1    Associated Diagnoses: Essential hypertension      hydroCHLOROthiazide (HYDRODIURIL) 25 MG tablet TAKE 1 TABLET BY MOUTH EVERY DAY  Qty: 90 tablet, Refills: 1    Associated Diagnoses: Essential hypertension      atenolol (TENORMIN) 25 MG tablet Take 1 tablet by mouth daily  Qty: 90 tablet, Refills: 1    Associated Diagnoses: Essential hypertension      amLODIPine (NORVASC) 10 MG tablet Take 1 tablet by mouth daily  Qty: 90 tablet, Refills: 1    Associated Diagnoses: Essential hypertension             Some medications may have been reported old/obsolete and marked \"stop taking\" by the system; in reality pt was already off these meds; defer to outpatient or prescribing providers. Procedures done this admission:  Procedure(s) with comments:  Excisional debridment right foot wound - wound covered with wound vac negative pressure therapy      Consults this admission:  IP WOUND CARE NURSE CONSULT TO EVAL  IP CONSULT TO ORTHOPEDIC SURGERY  IP WOUND CARE NURSE CONSULT TO EVAL  IP CONSULT TO ORTHOTIST  IP [address] [place] [address] CONSULT TO INFECTIOUS DISEASES    Echocardiogram results:  No results found for this or any previous visit. Diagnostic Imaging/Tests:   XR CHEST PORTABLE    Result Date: 2/20/2023  No acute airspace disease. CT FOOT RIGHT WO CONTRAST    Result Date: 2/20/2023  Prominent soft tissue defect/ulceration along the medial and plantar aspect of the big toe metatarsophalangeal joint contacting the underlying sesamoids and metatarsal head, with dissecting gas extending in the medial cortex of the big toe metatarsal head, base of the proximal phalanx, and bilateral sesamoids compatible with osteomyelitis in these areas. Dissecting soft tissue gas along the plantar aspect of the big toe proximal phalanx, IP joint, and base of the distal phalanx, likely spread of infection. Recommend further evaluation with MRI with/without contrast to evaluate extent of infection.     Vascular duplex lower extremity arteries bilateral with BULMARO    Result Date: 2/21/2023  1. Noncompressible ankle pressures on the right possibly due to calcinosis. 2. Velocities indicate very mild stenosis within the right proximal superficial femoral artery. Labs: Results:       BMP, Mg, Phos Recent Labs     03/01/23 0344 03/02/23 0411    141   K 3.9 4.2    112*   CO2 25 26   ANIONGAP 6 3   BUN 28* 23   CREATININE 1.70* 1.50   LABGLOM 42* 48*   CALCIUM 10.1 9.3   GLUCOSE 135* 120*      CBC Recent Labs     03/01/23 0344 03/02/23 0411   WBC 11.9* 8.9   RBC 4.43 3.75*   HGB 12.4* 10.6*   HCT 39.1* 33.2*   MCV 88.3 88.5   MCH 28.0 28.3   MCHC 31.7 31.9   RDW 13.2 13.2   * 399   MPV 8.3* 8.5*   NRBC 0.00 0.00   SEGS 60 61   LYMPHOPCT 26 23   EOSRELPCT 4 6   MONOPCT 8 10   BASOPCT 1 0   IMMGRAN 1 0   SEGSABS 7.2 5.4   LYMPHSABS 3.1 2.1   EOSABS 0.5 0.5   MONOSABS 1.0 0.9   BASOSABS 0.1 0.0   ABSIMMGRAN 0.1 0.0      LFT No results for input(s): BILITOT, BILIDIR, ALKPHOS, AST, ALT, PROT, LABALBU, GLOB in the last 72 hours.    Cardiac  No results found for: NTPROBNP, TROPHS   Coags No results found for: PROTIME, INR, APTT   A1c Lab Results   Component Value Date/Time    LABA1C 7.3 02/21/2023 04:27 AM    LABA1C 5.9 09/16/2022 08:54 AM    LABA1C 7.5 01/24/2022 08:51 AM     02/21/2023 04:27 AM     09/16/2022 08:54 AM     01/24/2022 08:51 AM      Lipids Lab Results   Component Value Date/Time    CHOL 175 01/24/2022 08:51 AM    LDLCALC 100 01/24/2022 08:51 AM    LABVLDL 28 10/10/2019 12:49 PM    HDL 45 01/24/2022 08:51 AM    TRIG 174 01/24/2022 08:51 AM      Thyroid  No results found for: Candelario Nikolas     Most Recent UA Lab Results   Component Value Date/Time    COLORU Yellow 09/10/2020 11:10 AM    SPECGRAV 1.016 09/10/2020 11:10 AM    PROTEINU Negative 09/10/2020 11:10 AM    GLUCOSEU 250 02/20/2023 11:33 AM    GLUCOSEU 2+ 09/10/2020 11:10 AM    KETUA Negative 09/10/2020 11:10 AM BILIRUBINUR Negative 09/10/2020 11:10 AM    BLOODU Trace Intact 02/20/2023 11:33 AM    BLOODU Negative 09/10/2020 11:10 AM    UROBILINOGEN 0.2 09/10/2020 11:10 AM    NITRU Negative 09/10/2020 11:10 AM    LEUKOCYTESUR Negative 09/10/2020 11:10 AM        Recent Labs     02/22/23  1005 02/20/23  1933 02/20/23  1204 02/20/23  1121   CULTURE NO ANAEROBES ISOLATED 7 DAYS  MODERATE Staphylococcus simulans*  MODERATE MIXED SKIN TRELL ISOLATED MODERATE MIXED SKIN TRELL ISOLATED NO GROWTH 5 DAYS NO GROWTH 5 DAYS       All Labs from Last 24 Hrs:  Recent Results (from the past 24 hour(s))   POCT Glucose    Collection Time: 03/01/23  4:31 PM   Result Value Ref Range    POC Glucose 171 (H) 65 - 100 mg/dL    Performed by: Alex    POCT Glucose    Collection Time: 03/01/23  8:17 PM   Result Value Ref Range    POC Glucose 147 (H) 65 - 100 mg/dL    Performed by: Davis Phillips    CBC with Auto Differential    Collection Time: 03/02/23  4:11 AM   Result Value Ref Range    WBC 8.9 4.3 - 11.1 K/uL    RBC 3.75 (L) 4.23 - 5.6 M/uL    Hemoglobin 10.6 (L) 13.6 - 17.2 g/dL    Hematocrit 33.2 (L) 41.1 - 50.3 %    MCV 88.5 82 - 102 FL    MCH 28.3 26.1 - 32.9 PG    MCHC 31.9 31.4 - 35.0 g/dL    RDW 13.2 11.9 - 14.6 %    Platelets 105 742 - 367 K/uL    MPV 8.5 (L) 9.4 - 12.3 FL    nRBC 0.00 0.0 - 0.2 K/uL    Differential Type AUTOMATED      Seg Neutrophils 61 43 - 78 %    Lymphocytes 23 13 - 44 %    Monocytes 10 4.0 - 12.0 %    Eosinophils % 6 0.5 - 7.8 %    Basophils 0 0.0 - 2.0 %    Immature Granulocytes 0 0.0 - 5.0 %    Segs Absolute 5.4 1.7 - 8.2 K/UL    Absolute Lymph # 2.1 0.5 - 4.6 K/UL    Absolute Mono # 0.9 0.1 - 1.3 K/UL    Absolute Eos # 0.5 0.0 - 0.8 K/UL    Basophils Absolute 0.0 0.0 - 0.2 K/UL    Absolute Immature Granulocyte 0.0 0.0 - 0.5 K/UL   Basic Metabolic Panel w/ Reflex to MG    Collection Time: 03/02/23  4:11 AM   Result Value Ref Range    Sodium 141 133 - 143 mmol/L    Potassium 4.2 3.5 - 5.1 mmol/L Chloride 112 (H) 101 - 110 mmol/L    CO2 26 21 - 32 mmol/L    Anion Gap 3 2 - 11 mmol/L    Glucose 120 (H) 65 - 100 mg/dL    BUN 23 8 - 23 MG/DL    Creatinine 1.50 0.8 - 1.5 MG/DL    Est, Glom Filt Rate 48 (L) >60 ml/min/1.73m2    Calcium 9.3 8.3 - 10.4 MG/DL   POCT Glucose    Collection Time: 03/02/23  6:53 AM   Result Value Ref Range    POC Glucose 96 65 - 100 mg/dL    Performed by: Radha Oshea        No Known Allergies  Immunization History   Administered Date(s) Administered    COVID-19, MODERNA BLUE border, Primary or Immunocompromised, (age 12y+), IM, 100 mcg/0.5mL 04/30/2021, 05/28/2021, 12/01/2021    Influenza, FLUAD, (age 72 y+), Adjuvanted, 0.5mL 01/19/2021    Influenza, High Dose (Fluzone 65 yrs and older) 09/23/2021    Influenza, Triv, inactivated, subunit, adjuvanted, IM (Fluad 65 yrs and older) 10/10/2019    PPD Test 02/20/2023    Pneumococcal Polysaccharide ([id number]) 03/11/2020       Recent Vital Data:  Patient Vitals for the past 24 hrs:   Temp Pulse Resp BP SpO2   03/02/23 1127 99 °F (37.2 °C) 65 19 129/63 100 %   03/02/23 0910 -- 64 -- (!) 144/65 --   03/02/23 0652 98.6 °F (37 °C) 60 18 133/69 99 %   03/02/23 0341 98.6 °F (37 °C) 57 16 130/65 99 %   03/01/23 2313 98.8 °F (37.1 °C) 68 17 127/75 98 %   03/01/23 1856 99 °F (37.2 °C) 76 17 116/68 98 %   03/01/23 1715 -- 69 -- 127/69 --   03/01/23 1523 98.4 °F (36.9 °C) 65 18 (!) 141/62 96 %       Oxygen Therapy  SpO2: 100 %  Pulse via Oximetry: 59 beats per minute  Pulse Oximeter Device Mode: Continuous  Pulse Oximeter Device Location: Finger  O2 Device: None (Room air)  O2 Flow Rate (L/min): 3 L/min  O2 Delivery Method: Oral airway    Estimated body mass index is 28.87 kg/m² as calculated from the following:    Height as of this encounter: 5' 3\" (1.6 m). Weight as of this encounter: 163 lb (73.9 kg).     Intake/Output Summary (Last 24 hours) at 3/2/2023 1131  Last data filed at 3/2/2023 0943  Gross per 24 hour   Intake 1802 ml   Output 550 ml Net 1252 ml         Physical Exam:    General:    Well nourished. No overt distress  Head:  Normocephalic, atraumatic  Eyes:  Sclerae appear normal.  Pupils equally round. HENT:  Nares appear normal, no drainage. Moist mucous membranes  Neck:  No restricted ROM. Trachea midline  CV:   RRR. No m/r/g. No JVD  Lungs:   CTAB. No wheezing, rhonchi, or rales. Respirations even, unlabored  Abdomen:   Soft, nontender, nondistended. Extremities: Right foot dressing dry intact, wound VAC in place  Skin:     No rashes. Normal coloration  Neuro:  CN II-XII grossly intact. Psych:  Normal mood and affect. Signed:  Manoj Kasper MD    Part of this note may have been written by using a voice dictation software. The note has been proof read but may still contain some grammatical/other typographical errors.

## 2023-03-03 ENCOUNTER — CARE COORDINATION (OUTPATIENT)
Dept: CARE COORDINATION | Facility: CLINIC | Age: 78
End: 2023-03-03

## 2023-03-03 NOTE — CARE COORDINATION
Care Transitions Outreach Attempt    Call within 2 business days of discharge: Yes   Attempted to reach patient for transitions of care follow up. Unable to reach patient. Patient: Violeta Davis Patient : 1945 MRN: [id number]    Last Discharge  Street       Date Complaint Diagnosis Description Type Department Provider    23 Toe Pain Acute hematogenous osteomyelitis of left foot ([address] . .. ED to Hosp-Admission (Discharged) (ADMITTED) [facility] Tez Jennings MD; Ellie Griggs... Was this an external facility discharge?  No Discharge Facility: [facility]    Noted following upcoming appointments from discharge chart review:   [facility] follow up appointment(s):   Future Appointments   Date Time Provider Diana Tuttle   3/8/2023  1:00 PM Tracee Lilly [facility] AM[facility]   3/15/2023  3:00 PM Mar Goltz, MD [address] [facility] AMB   2023  9:00 AM Mar Goltz, MD [address] [facility] AMB     Non-BS follow up appointment(s): ID to call with appointment

## 2023-03-06 ENCOUNTER — CARE COORDINATION (OUTPATIENT)
Dept: CARE COORDINATION | Facility: CLINIC | Age: 78
End: 2023-03-06

## 2023-03-06 NOTE — CARE COORDINATION
Care Transitions Outreach Attempt    Call within 2 business days of discharge: Yes   Attempted to reach patient for transitions of care follow up. Unable to reach patient. Patient: Maude Irving Patient : 1945 MRN: [id number]    Last Discharge  [address]       Date Complaint Diagnosis Description Type Department Provider    23 Toe Pain Acute hematogenous osteomyelitis of left foot ([address] . .. ED to Hosp-Admission (Discharged) (ADMITTED) [facility] Robert Acosta MD; Madeleine Arcos... Was this an external facility discharge?  No Discharge Facility: [facility]    Noted following upcoming appointments from discharge chart review:   [facility] follow up appointment(s):   Future Appointments   Date Time Provider Diana Tuttle   3/8/2023  1:00 PM Tracee Hernandez [facility] AM[facility]   3/15/2023  3:00 PM Nimesh Morrison MD [address] [facility] AMB   2023  9:00 AM Nimesh Morrison MD [address] [facility] AMB     Non-BS follow up appointment(s): n/a

## 2023-03-06 NOTE — CARE COORDINATION
notified that patient called stating [place] has not come out to his home yet and his wound vac is malfunctioning. CM called [facility] to check on the status of the Start of Care. [facility]'s scheduling department states that they just received the referral in the scheduling department, even after CM stated that referral was sent on Thursday. CM advised that we really need someone out to the home today, as patient is having issues with his wound vac, dressing hasn't been changed, etc. Personnel stated that she was going to send a message to the coordinator for the Start of Care and will be reaching back out to . CM called Mr. Frank Flores to update him, LVM to call CM back.

## 2023-03-06 NOTE — CARE COORDINATION
LIBRA received email from Aditya Magdaleno with [employer]. \"We have been unable to reach Mr. Jeanette Garcia. We have tried every number multiple times for multiple days. We are not able to schedule him until we speak with him and verify all of his information. I pushed for a drive by, but we do not have the capacity to do that right now with the number of patients we are seeing. If you have spoken with him, please tell him to call us! [phone].      Thank you  Abbey\"      LIBRA asked Aditya Magdaleno to try patient's sister's phone number, Akbar Swain [phone]

## 2023-03-07 ENCOUNTER — CARE COORDINATION (OUTPATIENT)
Dept: CARE COORDINATION | Facility: CLINIC | Age: 78
End: 2023-03-07

## 2023-03-07 ENCOUNTER — TELEPHONE (OUTPATIENT)
Dept: INTERNAL MEDICINE CLINIC | Facility: CLINIC | Age: 78
End: 2023-03-07

## 2023-03-07 NOTE — CARE COORDINATION
Care Transitions Outreach Attempt    Call within 2 business days of discharge: Yes   Attempted to reach patient for transitions of care follow up. Unable to reach patient.    Patient: Roldan Edmondson Patient : 1945 MRN: [id number]    Last Discharge [facility] Facility       Date Complaint Diagnosis Description Type Department Provider    23 Toe Pain Acute hematogenous osteomyelitis of left foot (HCC) ... ED to Hosp-Admission (Discharged) (ADMITTED) [facility] Jonatan De Santiago MD; [place] Senior.              Was this an external facility discharge? No Discharge Facility: [facility]    Noted following upcoming appointments from discharge chart review:   [facility] follow up appointment(s):   Future Appointments   Date Time Provider Department Center   3/8/2023  1:00 PM RADHA De La Fuente [facility] AMB   3/15/2023  3:00 PM [facility]   2023  9:00 AM David Mandujano MD [facility] AMB     Non-[facility] follow up appointment(s): n/a

## 2023-03-15 ENCOUNTER — OFFICE VISIT (OUTPATIENT)
Dept: ORTHOPEDIC SURGERY | Age: 78
End: 2023-03-15

## 2023-03-15 ENCOUNTER — TELEPHONE (OUTPATIENT)
Dept: ORTHOPEDIC SURGERY | Age: 78
End: 2023-03-15

## 2023-03-15 DIAGNOSIS — M86.171 ACUTE OSTEOMYELITIS OF RIGHT FOOT (HCC): Primary | ICD-10-CM

## 2023-03-15 PROCEDURE — 99024 POSTOP FOLLOW-UP VISIT: CPT | Performed by: PHYSICIAN ASSISTANT

## 2023-03-15 NOTE — TELEPHONE ENCOUNTER
LVM that wound vac needs to be changed. There was a hole in it and we had to remove. We do not have supplies to changed that specific wound vac. Preferably it should be changed today or tomorrow.

## 2023-03-15 NOTE — PROGRESS NOTES
[facility]        Office Visit: [id number]        Patient ID:  Name: Romaine Singh  AGE/Gender: 78 y.o. male  MRN: [id number]  : 1945    Date of Service: 3/15/2023      ALLERGIES: No Known Allergies       S/P : Excisional debridement right diabetic foot wound 23    History:  The patient is seen today for follow-up wound check. The patient  underwent excisional debridement of the right  at [facility].  He has been receiving home health wound care and has a medella wound vac. they have no other complaints or concerns:       Physical Exam:       General:  On exam the patient is a pleasant 78 y.o. male in no acute distress, A&O x 3. Right foot: The right foot wound is slow to heal there is good bloody granulation tissue. No signs of worsening infection. No a ascending redness. Patient is using the offloader postop shoe        Assessment and Plan:   Status post excisional debridement right diabetic foot wound. This wound is slow to heal.  [facility]. We do not carry the wound VAC supplies here in our office so I placed him in a Xeroform bulky dressing and we reached out to his home health wound care nurse they can come out today and place another wound VAC on. Patient will follow-up in 3 weeks with Dr. Savannah Pulido or sooner if needed.     Electronically signed by:   RADHA Ace  3/15/2023,  2:00 PM 99.9

## 2023-03-27 ENCOUNTER — TELEPHONE (OUTPATIENT)
Dept: ORTHOPEDIC SURGERY | Age: 78
End: 2023-03-27

## 2023-03-27 NOTE — TELEPHONE ENCOUNTER
Patient's insurance will only approve for 2 times a week with the home health nurse, so she will be changing his wound vac 2x a week instead of 3.

## 2023-04-03 ENCOUNTER — TELEPHONE (OUTPATIENT)
Dept: ORTHOPEDIC SURGERY | Age: 78
End: 2023-04-03

## 2023-04-03 NOTE — TELEPHONE ENCOUNTER
She went out for wound care today and noticed he had about 25%  yellow slough to the wound bed. No foul odor. No fever. She just noticed it was different from last week so she wanted to notify the doctor.

## 2023-04-05 ENCOUNTER — OFFICE VISIT (OUTPATIENT)
Dept: ORTHOPEDIC SURGERY | Age: 78
End: 2023-04-05

## 2023-04-05 DIAGNOSIS — T81.89XD DELAYED SURGICAL WOUND HEALING, SUBSEQUENT ENCOUNTER: Primary | ICD-10-CM

## 2023-04-05 NOTE — PROGRESS NOTES
15.00     Types: Cigarettes     Quit date: 1989     Years since quittin.2    Smokeless tobacco: Never   Substance Use Topics    Alcohol use: Yes     Alcohol/week: 2.0 - 3.0 standard drinks         Physical Exam:     General:  On exam the patient is a pleasant 78 y.o. male in no acute distress, A&O x 3. Ambulates with a postop shoe w/o antalgic gait. HEENT: NC/AT, PERRL   Psych: normal mood, normal affect  Lungs: respirations even, normal breath sounds  MSK: On exam of the right foot there is some granulation of the wound bed. Still some active bleeding. The overall foot is malodorous but there is is no signs of infection of the wound itself. No significant swelling. No ascending redness. He has normal range of motion of the right ankle. Vascular: No distal edema or clubbing, Distal pulses are intact  Neurologic: Normal. Normal reflexes bilateral lower extremities. Assessment:     Delayed wound healing right foot      Medical Decision Making and Plan:    The patient is about 5 weeks status post excisional debridement of the right diabetic foot wound. At this is very slow to heal.  Arterial studies done in the hospital showed mild stenosis. Like to refer him to the [facility] wound healing center. At this point there does not appear to be any indication for further debridement.   Once he is established with the wound center we will see him on as-needed basis       Time spent in preparation, chart review, and direct patient care was 15 minutes    Electronically signed by:   RADHA Brock  2023,  10:58 AM

## 2023-04-19 PROBLEM — L97.514 DIABETIC ULCER OF RIGHT FOOT ASSOCIATED WITH TYPE 2 DIABETES MELLITUS, WITH NECROSIS OF BONE (HCC): Chronic | Status: ACTIVE | Noted: 2023-04-19

## 2023-04-19 PROBLEM — L97.514 DIABETIC ULCER OF RIGHT FOOT ASSOCIATED WITH TYPE 2 DIABETES MELLITUS, WITH NECROSIS OF BONE (HCC): Status: ACTIVE | Noted: 2023-04-19

## 2023-04-19 PROBLEM — E11.621 DIABETIC ULCER OF RIGHT FOOT ASSOCIATED WITH TYPE 2 DIABETES MELLITUS, WITH NECROSIS OF BONE (HCC): Chronic | Status: ACTIVE | Noted: 2023-04-19

## 2023-04-19 PROBLEM — M86.171 ACUTE OSTEOMYELITIS OF RIGHT FOOT (HCC): Chronic | Status: ACTIVE | Noted: 2023-02-20

## 2023-04-19 PROBLEM — E11.621 DIABETIC ULCER OF RIGHT FOOT ASSOCIATED WITH TYPE 2 DIABETES MELLITUS, WITH NECROSIS OF BONE (HCC): Status: ACTIVE | Noted: 2023-04-19

## 2023-04-21 ENCOUNTER — HOSPITAL ENCOUNTER (OUTPATIENT)
Dept: WOUND CARE | Age: 78
Discharge: HOME | End: 2023-04-21
Payer: MEDICARE

## 2023-04-21 VITALS
DIASTOLIC BLOOD PRESSURE: 61 MMHG | OXYGEN SATURATION: 98 % | HEART RATE: 65 BPM | RESPIRATION RATE: 18 BRPM | WEIGHT: 155 LBS | BODY MASS INDEX: 27.46 KG/M2 | TEMPERATURE: 98.4 F | HEIGHT: 63 IN | SYSTOLIC BLOOD PRESSURE: 116 MMHG

## 2023-04-21 DIAGNOSIS — L08.9 DIABETIC FOOT INFECTION (HCC): ICD-10-CM

## 2023-04-21 DIAGNOSIS — E11.621 DIABETIC ULCER OF RIGHT FOOT ASSOCIATED WITH TYPE 2 DIABETES MELLITUS, WITH NECROSIS OF BONE, UNSPECIFIED PART OF FOOT (HCC): Primary | ICD-10-CM

## 2023-04-21 DIAGNOSIS — E11.628 DIABETIC FOOT INFECTION (HCC): ICD-10-CM

## 2023-04-21 DIAGNOSIS — L97.514 DIABETIC ULCER OF RIGHT FOOT ASSOCIATED WITH TYPE 2 DIABETES MELLITUS, WITH NECROSIS OF BONE, UNSPECIFIED PART OF FOOT (HCC): Primary | ICD-10-CM

## 2023-04-21 PROCEDURE — 11043 DBRDMT MUSC&/FSCA 1ST 20/<: CPT

## 2023-04-21 RX ORDER — LIDOCAINE HYDROCHLORIDE 20 MG/ML
JELLY TOPICAL ONCE
OUTPATIENT
Start: 2023-04-21 | End: 2023-04-21

## 2023-04-21 RX ORDER — LIDOCAINE HYDROCHLORIDE 20 MG/ML
JELLY TOPICAL ONCE
Status: COMPLETED | OUTPATIENT
Start: 2023-04-21 | End: 2023-04-21

## 2023-04-21 RX ORDER — DOXYCYCLINE HYCLATE 100 MG/1
100 CAPSULE ORAL 2 TIMES DAILY
COMMUNITY

## 2023-04-21 RX ORDER — LIDOCAINE 40 MG/G
CREAM TOPICAL ONCE
OUTPATIENT
Start: 2023-04-21 | End: 2023-04-21

## 2023-04-21 RX ORDER — LIDOCAINE 50 MG/G
OINTMENT TOPICAL ONCE
OUTPATIENT
Start: 2023-04-21 | End: 2023-04-21

## 2023-04-21 RX ORDER — GINSENG 100 MG
CAPSULE ORAL ONCE
OUTPATIENT
Start: 2023-04-21 | End: 2023-04-21

## 2023-04-21 RX ORDER — GENTAMICIN SULFATE 1 MG/G
OINTMENT TOPICAL ONCE
OUTPATIENT
Start: 2023-04-21 | End: 2023-04-21

## 2023-04-21 RX ORDER — CLOBETASOL PROPIONATE 0.5 MG/G
OINTMENT TOPICAL ONCE
OUTPATIENT
Start: 2023-04-21 | End: 2023-04-21

## 2023-04-21 RX ORDER — AMOXICILLIN AND CLAVULANATE POTASSIUM 875; 125 MG/1; MG/1
1 TABLET, FILM COATED ORAL 2 TIMES DAILY
COMMUNITY

## 2023-04-21 RX ORDER — BACITRACIN ZINC AND POLYMYXIN B SULFATE 500; 1000 [USP'U]/G; [USP'U]/G
OINTMENT TOPICAL ONCE
OUTPATIENT
Start: 2023-04-21 | End: 2023-04-21

## 2023-04-21 RX ORDER — BLOOD SUGAR DIAGNOSTIC
STRIP MISCELLANEOUS
COMMUNITY
Start: 2023-03-09

## 2023-04-21 RX ORDER — BETAMETHASONE DIPROPIONATE 0.05 %
OINTMENT (GRAM) TOPICAL ONCE
OUTPATIENT
Start: 2023-04-21 | End: 2023-04-21

## 2023-04-21 RX ORDER — LIDOCAINE HYDROCHLORIDE 40 MG/ML
SOLUTION TOPICAL ONCE
OUTPATIENT
Start: 2023-04-21 | End: 2023-04-21

## 2023-04-21 RX ORDER — BACITRACIN, NEOMYCIN, POLYMYXIN B 400; 3.5; 5 [USP'U]/G; MG/G; [USP'U]/G
OINTMENT TOPICAL ONCE
OUTPATIENT
Start: 2023-04-21 | End: 2023-04-21

## 2023-04-21 RX ADMIN — LIDOCAINE HYDROCHLORIDE: 20 JELLY TOPICAL at 11:36

## 2023-04-21 NOTE — WOUND CARE
Discharge Instructions for  Cecliia Porter  [address] [place] [address]  [address] Jud[address]ata, [address] [facility] [address]  Phone [phone]   Fax [phone]      NAME:  Nataly Ramsey  YOB: 1945  MEDICAL RECORD NUMBER:  [id number]  DATE:  4/21/2023    Return Appointment:   1 week with Ella Monson DO      Instructions:   Right Plantar/Medial Foot:  Cleanse wound and periwound with wound cleanser or normal saline. VASHE cleanse post debridement in Wound Clinic  Gently pack alginate with silver rope into wound bed filling wound space. Do not pack tightly. Cover with ABD. Wrap with Kerlix. Dressing change 3x weekly. [facility] please change dressing 2x Monday and Wednesday. Wound Clinic dressing change 1x (Friday). Continue offloading with Forefoot Offloader/DARCO Wedge. Wear Offloader at all times. Limit walking as much as possible  Protein 60 gms daily. Infectious Disease : call [phone] to reschedule appointment. Continue antibiotics: Augmentin and Doxycycline as prescribed by ID. Should you experience increased redness, swelling, pain, foul odor, size of wound(s), or have a temperature over 101 degrees please contact the [address] [place] [address] Road at [phone] or if after hours contact your primary care physician or go to the hospital emergency department. PLEASE NOTE: IF YOU ARE UNABLE TO OBTAIN WOUND SUPPLIES, CONTINUE TO USE THE SUPPLIES YOU HAVE AVAILABLE UNTIL YOU ARE ABLE TO REACH US. IT IS MOST IMPORTANT TO KEEP THE WOUND COVERED AT ALL TIMES.     Electronically signed Ashley Mac PT, [address] [facility] on 4/21/2023 at 11:45 AM

## 2023-04-21 NOTE — FLOWSHEET NOTE
04/21/23 1125   Wound 04/14/23 Foot Right;Plantar #1 delayed healing surgical debridement 2/22/23   Date First Assessed/Time First Assessed: 04/14/23 1053   Present on Hospital Admission: Yes  Wound Approximate Age at First Assessment (Weeks): 6 weeks  Primary Wound Type: Diabetic Ulcer  Location: Foot  Wound Location Orientation: Right;Plantar  Wou. ..    Wound Image    Wound Etiology Diabetic Meza 3   Dressing Status Old drainage noted   Wound Cleansed Cleansed with saline   Dressing/Treatment Alginate with Ag   Offloading for Diabetic Foot Ulcers Forefoot offloading shoe   Wound Length (cm) 2.8 cm   Wound Width (cm) 3.8 cm   Wound Depth (cm) 0.8 cm   Wound Surface Area (cm^2) 10.64 cm^2   Change in Wound Size % (l*w) 17.52   Wound Volume (cm^3) 8.512 cm^3   Wound Healing % 56   Post-Procedure Length (cm) 2.8 cm   Post-Procedure Width (cm) 3.8 cm   Post-Procedure Depth (cm) 0.8 cm   Post-Procedure Surface Area (cm^2) 10.64 cm^2   Post-Procedure Volume (cm^3) 8.512 cm^3   Wound Assessment Slough;Pink/red   Drainage Amount Moderate   Drainage Description Serosanguinous   Odor None   Charla-wound Assessment Edematous   Wound Thickness Description not for Pressure Injury Full thickness   Pain Assessment   Pain Assessment None - Denies Pain     Post-debridement

## 2023-04-21 NOTE — FLOWSHEET NOTE
04/21/23 1125   Wound 04/14/23 Foot Right;Plantar #1 delayed healing surgical debridement 2/22/23   Date First Assessed/Time First Assessed: 04/14/23 1053   Present on Hospital Admission: Yes  Wound Approximate Age at First Assessment (Weeks): 6 weeks  Primary Wound Type: Diabetic Ulcer  Location: Foot  Wound Location Orientation: Right;Plantar  Wou. ..    Wound Image    Wound Etiology Diabetic Meza 3   Dressing Status Old drainage noted   Wound Cleansed Cleansed with saline   Dressing/Treatment Alginate with Ag   Offloading for Diabetic Foot Ulcers Forefoot offloading shoe   Wound Length (cm) 2.8 cm   Wound Width (cm) 0.8 cm   Wound Depth (cm) 0.8 cm   Wound Surface Area (cm^2) 2.24 cm^2   Change in Wound Size % (l*w) 82.64   Wound Volume (cm^3) 1.792 cm^3   Wound Healing % 91   Wound Assessment Slough;Pink/red   Drainage Amount Moderate   Drainage Description Serosanguinous   Odor None   Charla-wound Assessment Edematous   Wound Thickness Description not for Pressure Injury Full thickness   Pain Assessment   Pain Assessment None - Denies Pain

## 2023-04-21 NOTE — FLOWSHEET NOTE
04/21/23 1125   Wound 04/14/23 Foot Right;Plantar #1 delayed healing surgical debridement 2/22/23   Date First Assessed/Time First Assessed: 04/14/23 1053   Present on Hospital Admission: Yes  Wound Approximate Age at First Assessment (Weeks): 6 weeks  Primary Wound Type: Diabetic Ulcer  Location: Foot  Wound Location Orientation: Right;Plantar  Wou. ..    Wound Image    Wound Etiology Diabetic Meza 3   Dressing Status Old drainage noted   Wound Cleansed Cleansed with saline   Dressing/Treatment Alginate with Ag   Offloading for Diabetic Foot Ulcers Forefoot offloading shoe   Wound Length (cm) 2.8 cm   Wound Width (cm) 3.8 cm   Wound Depth (cm) 0.8 cm   Wound Surface Area (cm^2) 10.64 cm^2   Change in Wound Size % (l*w) 17.52   Wound Volume (cm^3) 8.512 cm^3   Wound Healing % 56   Wound Assessment Slough;Pink/red   Drainage Amount Moderate   Drainage Description Serosanguinous   Odor None   Charla-wound Assessment Edematous   Wound Thickness Description not for Pressure Injury Full thickness   Pain Assessment   Pain Assessment None - Denies Pain

## 2023-04-21 NOTE — DISCHARGE INSTRUCTIONS
Return Appointment:   1 week with Aman Bird DO        Instructions:   Right Plantar/Medial Foot:  Cleanse wound and periwound with wound cleanser or normal saline. VASHE cleanse post debridement in Wound Clinic  Gently pack alginate with silver rope into wound bed filling wound space. Do not pack tightly. Cover with ABD. Wrap with Kerlix. Dressing change 3x weekly. [facility] please change dressing 2x Monday and Wednesday. Wound Clinic dressing change 1x (Friday). Continue offloading with Forefoot Offloader/DARCO Wedge. Wear Offloader at all times. Limit walking as much as possible  Protein 60 gms daily. Infectious Disease : call [phone] to reschedule appointment. Continue antibiotics: Augmentin and Doxycycline as prescribed by ID.

## 2023-04-27 PROBLEM — E11.22 TYPE 2 DIABETES MELLITUS WITH CHRONIC KIDNEY DISEASE (HCC): Chronic | Status: ACTIVE | Noted: 2022-09-16

## 2023-04-28 ENCOUNTER — HOSPITAL ENCOUNTER (OUTPATIENT)
Dept: WOUND CARE | Age: 78
Discharge: HOME | End: 2023-04-28
Payer: MEDICARE

## 2023-04-28 VITALS
DIASTOLIC BLOOD PRESSURE: 60 MMHG | HEART RATE: 61 BPM | TEMPERATURE: 98 F | RESPIRATION RATE: 18 BRPM | SYSTOLIC BLOOD PRESSURE: 100 MMHG

## 2023-04-28 PROCEDURE — 11042 DBRDMT SUBQ TIS 1ST 20SQCM/<: CPT

## 2023-04-28 NOTE — FLOWSHEET NOTE
04/28/23 1118   Wound 04/14/23 Foot Right;Plantar #1 delayed healing surgical debridement 2/22/23   Date First Assessed/Time First Assessed: 04/14/23 1053   Present on Hospital Admission: Yes  Wound Approximate Age at First Assessment (Weeks): 6 weeks  Primary Wound Type: Diabetic Ulcer  Location: Foot  Wound Location Orientation: Right;Plantar  Wou. .. Wound Image     Wound Etiology Diabetic Meza 3   Dressing Status Old drainage noted   Wound Cleansed Cleansed with saline   Dressing/Treatment Alginate with Ag   Offloading for Diabetic Foot Ulcers Forefoot offloading shoe   Wound Length (cm) 2.8 cm   Wound Width (cm) 3.2 cm   Wound Depth (cm) 0.7 cm   Wound Surface Area (cm^2) 8.96 cm^2   Change in Wound Size % (l*w) 30.54   Wound Volume (cm^3) 6.272 cm^3   Wound Healing % 68   Post-Procedure Length (cm) 2.8 cm   Post-Procedure Width (cm) 3.2 cm   Post-Procedure Depth (cm) 0.8 cm   Post-Procedure Surface Area (cm^2) 8.96 cm^2   Post-Procedure Volume (cm^3) 7.168 cm^3   Wound Assessment Slough; Hyper granulation tissue;Pink/red   Drainage Amount Moderate   Drainage Description Serosanguinous   Odor None   Charla-wound Assessment Edematous   Wound Thickness Description not for Pressure Injury Full thickness   Pain Assessment   Pain Assessment None - Denies Pain

## 2023-04-28 NOTE — WOUND CARE
Discharge Instructions for  Cecilia Poretr  [address] [place] [address] Judie Zapata, [address]  Phone [phone]   Fax [phone]      NAME:  Bhargav Villa  YOB: 1945  MEDICAL RECORD NUMBER:  [id number]  DATE:  4/28/2023    Return Appointment:   1 week with Nicol Maravilla, DO      Instructions:   Right Plantar/Medial Foot:  Cleanse wound and periwound with wound cleanser or normal saline. VASHE cleanse post debridement in Wound Clinic  Hydrofera Blue: Cut to wound size, moisten with saline, and apply to wound bed. Cover with ABD. Wrap with Kerlix. Dressing change 3x weekly. [facility] please change dressing 2x Monday and Wednesday. Wound Clinic dressing change 1x (Friday). Continue offloading with Forefoot Offloader/DARCO Wedge. Wear Offloader at all times. Limit walking as much as possible  Protein 60 gms daily. Infectious Disease : call [phone] to reschedule appointment. Continue antibiotics: Augmentin and Doxycycline as prescribed by ID. Should you experience increased redness, swelling, pain, foul odor, size of wound(s), or have a temperature over 101 degrees please contact the [address] [place] [address] Road at [phone] or if after hours contact your primary care physician or go to the hospital emergency department. PLEASE NOTE: IF YOU ARE UNABLE TO OBTAIN WOUND SUPPLIES, CONTINUE TO USE THE SUPPLIES YOU HAVE AVAILABLE UNTIL YOU ARE ABLE TO REACH US. IT IS MOST IMPORTANT TO KEEP THE WOUND COVERED AT ALL TIMES.     Electronically signed Sade Wheatley RN, [address] [place] [address],[address] on 4/28/2023 at 11:42 AM

## 2023-05-05 ENCOUNTER — HOSPITAL ENCOUNTER (OUTPATIENT)
Dept: WOUND CARE | Age: 78
Discharge: HOME | End: 2023-05-05
Payer: MEDICARE

## 2023-05-05 VITALS
HEART RATE: 52 BPM | DIASTOLIC BLOOD PRESSURE: 60 MMHG | SYSTOLIC BLOOD PRESSURE: 107 MMHG | TEMPERATURE: 97.4 F | BODY MASS INDEX: 27.46 KG/M2 | HEIGHT: 63 IN | WEIGHT: 155 LBS | RESPIRATION RATE: 18 BRPM

## 2023-05-05 DIAGNOSIS — E11.628 DIABETIC FOOT INFECTION (HCC): ICD-10-CM

## 2023-05-05 DIAGNOSIS — E11.621 DIABETIC ULCER OF RIGHT FOOT ASSOCIATED WITH TYPE 2 DIABETES MELLITUS, WITH NECROSIS OF BONE, UNSPECIFIED PART OF FOOT (HCC): Primary | ICD-10-CM

## 2023-05-05 DIAGNOSIS — L97.514 DIABETIC ULCER OF RIGHT FOOT ASSOCIATED WITH TYPE 2 DIABETES MELLITUS, WITH NECROSIS OF BONE, UNSPECIFIED PART OF FOOT (HCC): Primary | ICD-10-CM

## 2023-05-05 DIAGNOSIS — L08.9 DIABETIC FOOT INFECTION (HCC): ICD-10-CM

## 2023-05-05 PROCEDURE — 11043 DBRDMT MUSC&/FSCA 1ST 20/<: CPT

## 2023-05-05 RX ORDER — LIDOCAINE HYDROCHLORIDE 20 MG/ML
JELLY TOPICAL ONCE
OUTPATIENT
Start: 2023-05-05 | End: 2023-05-05

## 2023-05-05 RX ORDER — GENTAMICIN SULFATE 1 MG/G
OINTMENT TOPICAL ONCE
OUTPATIENT
Start: 2023-05-05 | End: 2023-05-05

## 2023-05-05 RX ORDER — GINSENG 100 MG
CAPSULE ORAL ONCE
OUTPATIENT
Start: 2023-05-05 | End: 2023-05-05

## 2023-05-05 RX ORDER — LIDOCAINE 40 MG/G
CREAM TOPICAL ONCE
OUTPATIENT
Start: 2023-05-05 | End: 2023-05-05

## 2023-05-05 RX ORDER — LIDOCAINE 50 MG/G
OINTMENT TOPICAL ONCE
OUTPATIENT
Start: 2023-05-05 | End: 2023-05-05

## 2023-05-05 RX ORDER — BETAMETHASONE DIPROPIONATE 0.05 %
OINTMENT (GRAM) TOPICAL ONCE
OUTPATIENT
Start: 2023-05-05 | End: 2023-05-05

## 2023-05-05 RX ORDER — BACITRACIN, NEOMYCIN, POLYMYXIN B 400; 3.5; 5 [USP'U]/G; MG/G; [USP'U]/G
OINTMENT TOPICAL ONCE
OUTPATIENT
Start: 2023-05-05 | End: 2023-05-05

## 2023-05-05 RX ORDER — LIDOCAINE HYDROCHLORIDE 40 MG/ML
SOLUTION TOPICAL ONCE
OUTPATIENT
Start: 2023-05-05 | End: 2023-05-05

## 2023-05-05 RX ORDER — BACITRACIN ZINC AND POLYMYXIN B SULFATE 500; 1000 [USP'U]/G; [USP'U]/G
OINTMENT TOPICAL ONCE
OUTPATIENT
Start: 2023-05-05 | End: 2023-05-05

## 2023-05-05 RX ORDER — CLOBETASOL PROPIONATE 0.5 MG/G
OINTMENT TOPICAL ONCE
OUTPATIENT
Start: 2023-05-05 | End: 2023-05-05

## 2023-05-05 NOTE — DISCHARGE INSTRUCTIONS
Right Plantar/Medial Foot:  Cleanse wound and periwound with wound cleanser or normal saline. VASHE cleanse post debridement in Wound Clinic  Hydrofera Blue: Cut to wound size, moisten with saline, and apply to wound bed. Cover with ABD. Wrap with Kerlix. Dressing change 3x weekly. [facility] please change dressing 2x Monday and Wednesday. Wound Clinic dressing change 1x (Friday). Continue offloading with Forefoot Offloader/DARCO Wedge. Wear Offloader at all times. Limit walking as much as possible  Protein 60 gms daily. Infectious Disease : call [phone] to reschedule appointment. Continue antibiotics: Augmentin and Doxycycline as prescribed by ID. Next appointment:   Total Contact Cast being considered.   Wear loose fitting trousers for the possible application of Total Contact Cast.

## 2023-05-05 NOTE — WOUND CARE
Discharge Instructions for  Cecilia Porter  [address] [place] [address]  [address] Jud[address] Zapata, [address] [facility] [address]  Phone [phone]   Fax [phone]      NAME:  Maricruz Mccray  YOB: 1945  MEDICAL RECORD NUMBER:  [id number]  DATE:  5/5/2023    Return Appointment:   1 week with Gissel Kinney DO      Instructions:   Right Plantar/Medial Foot:  Cleanse wound and periwound with wound cleanser or normal saline. VASHE cleanse post debridement in Wound Clinic  Hydrofera Blue: Cut to wound size, moisten with saline, and apply to wound bed. Cover with ABD. Wrap with Kerlix. Dressing change 3x weekly. [facility] please change dressing 2x Monday and Wednesday. Wound Clinic dressing change 1x (Friday). Continue offloading with Forefoot Offloader/DARCO Wedge. Wear Offloader at all times. Limit walking as much as possible  Protein 60 gms daily. Infectious Disease : call [phone] to reschedule appointment. Continue antibiotics: Augmentin and Doxycycline as prescribed by ID. Next appointment:   Total Contact Cast being considered. Wear loose fitting trousers for the possible application of Total Contact Cast.      Should you experience increased redness, swelling, pain, foul odor, size of wound(s), or have a temperature over 101 degrees please contact the [address] [place] [address] Road at [phone] or if after hours contact your primary care physician or go to the hospital emergency department. PLEASE NOTE: IF YOU ARE UNABLE TO OBTAIN WOUND SUPPLIES, CONTINUE TO USE THE SUPPLIES YOU HAVE AVAILABLE UNTIL YOU ARE ABLE TO REACH US. IT IS MOST IMPORTANT TO KEEP THE WOUND COVERED AT ALL TIMES. Electronically signed Temi Lizarraga.  Rex Lake RN on 5/5/2023 at 10:00 AM

## 2023-05-05 NOTE — FLOWSHEET NOTE
ASSESSMENT and POST DEBRIDEMENT NOTES:   05/05/23 0950   Wound 04/14/23 Foot Right;Plantar #1 delayed healing surgical debridement 2/22/23   Date First Assessed/Time First Assessed: 04/14/23 1053   Present on Hospital Admission: Yes  Wound Approximate Age at First Assessment (Weeks): 6 weeks  Primary Wound Type: Diabetic Ulcer  Location: Foot  Wound Location Orientation: Right;Plantar  Wou. .. Wound Image     Wound Etiology Diabetic Meza 3   Dressing Status Old drainage noted   Wound Cleansed Cleansed with saline; Vashe   Dressing/Treatment Hydrofera blue   Offloading for Diabetic Foot Ulcers Forefoot offloading shoe   Wound Length (cm) 2.2 cm   Wound Width (cm) 2.9 cm   Wound Depth (cm) 1.1 cm   Wound Surface Area (cm^2) 6.38 cm^2   Change in Wound Size % (l*w) 50.54   Wound Volume (cm^3) 7.018 cm^3   Wound Healing % 64   Post-Procedure Length (cm) 2.6 cm   Post-Procedure Width (cm) 3 cm   Post-Procedure Depth (cm) 1 cm   Post-Procedure Surface Area (cm^2) 7.8 cm^2   Post-Procedure Volume (cm^3) 7.8 cm^3   Wound Assessment Slough;Granulation tissue;Pink/red; Exposed structure muscle   Drainage Amount Moderate   Drainage Description Serosanguinous   Odor None   Charla-wound Assessment Intact   Margins Unattached edges; Attached edges   Wound Thickness Description not for Pressure Injury Full thickness   Pain Assessment   Pain Assessment None - Denies Pain     Patient is not currently on ANTICOAGULANT THERAPY.

## 2023-05-16 ENCOUNTER — HOSPITAL ENCOUNTER (OUTPATIENT)
Dept: WOUND CARE | Age: 78
Discharge: HOME | End: 2023-05-16
Payer: MEDICARE

## 2023-05-16 VITALS
RESPIRATION RATE: 20 BRPM | HEART RATE: 67 BPM | SYSTOLIC BLOOD PRESSURE: 118 MMHG | OXYGEN SATURATION: 96 % | DIASTOLIC BLOOD PRESSURE: 61 MMHG | WEIGHT: 153 LBS | BODY MASS INDEX: 27.11 KG/M2 | HEIGHT: 63 IN | TEMPERATURE: 98.2 F

## 2023-05-16 DIAGNOSIS — L97.514 DIABETIC ULCER OF RIGHT FOOT ASSOCIATED WITH TYPE 2 DIABETES MELLITUS, WITH NECROSIS OF BONE, UNSPECIFIED PART OF FOOT (HCC): Primary | ICD-10-CM

## 2023-05-16 DIAGNOSIS — E11.628 DIABETIC FOOT INFECTION (HCC): ICD-10-CM

## 2023-05-16 DIAGNOSIS — L08.9 DIABETIC FOOT INFECTION (HCC): ICD-10-CM

## 2023-05-16 DIAGNOSIS — E11.621 DIABETIC ULCER OF RIGHT FOOT ASSOCIATED WITH TYPE 2 DIABETES MELLITUS, WITH NECROSIS OF BONE, UNSPECIFIED PART OF FOOT (HCC): Primary | ICD-10-CM

## 2023-05-16 PROCEDURE — 11043 DBRDMT MUSC&/FSCA 1ST 20/<: CPT

## 2023-05-16 PROCEDURE — 11055 PARING/CUTG B9 HYPRKER LES 1: CPT

## 2023-05-16 RX ORDER — GINSENG 100 MG
CAPSULE ORAL ONCE
OUTPATIENT
Start: 2023-05-16 | End: 2023-05-16

## 2023-05-16 RX ORDER — LIDOCAINE 50 MG/G
OINTMENT TOPICAL ONCE
OUTPATIENT
Start: 2023-05-16 | End: 2023-05-16

## 2023-05-16 RX ORDER — BACITRACIN, NEOMYCIN, POLYMYXIN B 400; 3.5; 5 [USP'U]/G; MG/G; [USP'U]/G
OINTMENT TOPICAL ONCE
OUTPATIENT
Start: 2023-05-16 | End: 2023-05-16

## 2023-05-16 RX ORDER — LIDOCAINE HYDROCHLORIDE 20 MG/ML
JELLY TOPICAL ONCE
OUTPATIENT
Start: 2023-05-16 | End: 2023-05-16

## 2023-05-16 RX ORDER — LIDOCAINE HYDROCHLORIDE 40 MG/ML
SOLUTION TOPICAL ONCE
OUTPATIENT
Start: 2023-05-16 | End: 2023-05-16

## 2023-05-16 RX ORDER — CLOBETASOL PROPIONATE 0.5 MG/G
OINTMENT TOPICAL ONCE
OUTPATIENT
Start: 2023-05-16 | End: 2023-05-16

## 2023-05-16 RX ORDER — LIDOCAINE HYDROCHLORIDE 20 MG/ML
JELLY TOPICAL ONCE
Status: COMPLETED | OUTPATIENT
Start: 2023-05-16 | End: 2023-05-16

## 2023-05-16 RX ORDER — LIDOCAINE 40 MG/G
CREAM TOPICAL ONCE
OUTPATIENT
Start: 2023-05-16 | End: 2023-05-16

## 2023-05-16 RX ORDER — BETAMETHASONE DIPROPIONATE 0.05 %
OINTMENT (GRAM) TOPICAL ONCE
OUTPATIENT
Start: 2023-05-16 | End: 2023-05-16

## 2023-05-16 RX ORDER — GENTAMICIN SULFATE 1 MG/G
OINTMENT TOPICAL ONCE
OUTPATIENT
Start: 2023-05-16 | End: 2023-05-16

## 2023-05-16 RX ORDER — BACITRACIN ZINC AND POLYMYXIN B SULFATE 500; 1000 [USP'U]/G; [USP'U]/G
OINTMENT TOPICAL ONCE
OUTPATIENT
Start: 2023-05-16 | End: 2023-05-16

## 2023-05-16 RX ADMIN — LIDOCAINE HYDROCHLORIDE: 20 JELLY TOPICAL at 15:16

## 2023-05-16 NOTE — WOUND CARE
Discharge Instructions for  Cecilia Porter  [address] [place] [address]  4 Judie Zapata, [address]  Phone [phone]   Fax [phone]      NAME:  Helio Jensen  YOB: 1945  MEDICAL RECORD NUMBER:  [id number]  DATE:  5/16/2023    Return Appointment:   1 week with Kevan Benavides DO      Instructions:   Right Plantar/Medial Foot:  Cleanse wound and periwound with wound cleanser or normal saline. VASHE cleanse post debridement in Wound Clinic  Hydrofera Blue: Cut to wound size, moisten with saline, and apply to wound bed. Cover with ABD. Wrap with Kerlix. Dressing change 3x weekly. Left foot (base of left great toe)  Place a small amount of xeroform gauze and cover with gauze and tape  Change 3x weekly. [facility] please change dressing 2x Monday and Wednesday. Wound Clinic dressing change 1x (Friday). Continue offloading with Forefoot Offloader/DARCO Wedge. Wear Offloader at all times. Limit walking as much as possible  Protein 60 gms daily. Infectious Disease : call [phone] to reschedule appointment. Continue antibiotics: Augmentin and Doxycycline as prescribed by ID. Next appointment:   Total Contact Cast being considered. Wear loose fitting trousers for the possible application of Total Contact Cast.      Should you experience increased redness, swelling, pain, foul odor, size of wound(s), or have a temperature over 101 degrees please contact the [address] [place] [address] Road at [phone] or if after hours contact your primary care physician or go to the hospital emergency department. PLEASE NOTE: IF YOU ARE UNABLE TO OBTAIN WOUND SUPPLIES, CONTINUE TO USE THE SUPPLIES YOU HAVE AVAILABLE UNTIL YOU ARE ABLE TO REACH US. IT IS MOST IMPORTANT TO KEEP THE WOUND COVERED AT ALL TIMES.     Electronically signed Danny Sheldon RN on 5/16/2023 at 3:00 PM

## 2023-05-16 NOTE — DISCHARGE INSTRUCTIONS
Discharge Instructions for  Cecilia Porter  [address] [place] [address] Judie Zapata, [address] [facility] [address]  Phone [phone]   Fax [phone]      NAME:  Veronica Johnson  YOB: 1945  MEDICAL RECORD NUMBER:  [id number]  DATE:  @ED@    Return Appointment:   1 week with Andrew Vasquez, DO      Instructions: ***        Should you experience increased redness, swelling, pain, foul odor, size of wound(s), or have a temperature over 101 degrees please contact the [address] [place] [address] Road at [phone] or if after hours contact your primary care physician or go to the hospital emergency department. PLEASE NOTE: IF YOU ARE UNABLE TO OBTAIN WOUND SUPPLIES, CONTINUE TO USE THE SUPPLIES YOU HAVE AVAILABLE UNTIL YOU ARE ABLE TO REACH US. IT IS MOST IMPORTANT TO KEEP THE WOUND COVERED AT ALL TIMES.     Electronically signed Ab Corrigan RN on 5/16/2023 at 3:04 PM

## 2023-05-16 NOTE — FLOWSHEET NOTE
05/16/23 1431   Wound 04/14/23 Foot Right;Plantar #1 delayed healing surgical debridement 2/22/23   Date First Assessed/Time First Assessed: 04/14/23 1053   Present on Hospital Admission: Yes  Wound Approximate Age at First Assessment (Weeks): 6 weeks  Primary Wound Type: Diabetic Ulcer  Location: Foot  Wound Location Orientation: Right;Plantar  Wou. ..    Wound Image     Wound Etiology Diabetic Meza 3   Dressing Status Old drainage noted   Wound Cleansed Soap and water;Vashe   Dressing/Treatment Hydrofera blue   Offloading for Diabetic Foot Ulcers Forefoot offloading shoe   Wound Length (cm) 2.4 cm   Wound Width (cm) 2.7 cm   Wound Depth (cm) 0.7 cm   Wound Surface Area (cm^2) 6.48 cm^2   Change in Wound Size % (l*w) 49.77   Wound Volume (cm^3) 4.536 cm^3   Wound Healing % 77   Post-Procedure Length (cm) 2.4 cm   Post-Procedure Width (cm) 2.7 cm   Post-Procedure Depth (cm) 0.7 cm   Post-Procedure Surface Area (cm^2) 6.48 cm^2   Post-Procedure Volume (cm^3) 4.536 cm^3   Wound Assessment Pink/red   Drainage Amount Moderate   Drainage Description Serosanguinous   Odor None   Charla-wound Assessment Intact   Wound Thickness Description not for Pressure Injury Full thickness

## 2023-05-18 ENCOUNTER — TELEPHONE (OUTPATIENT)
Dept: WOUND CARE | Age: 78
End: 2023-05-18

## 2023-05-19 ENCOUNTER — OFFICE VISIT (OUTPATIENT)
Dept: INTERNAL MEDICINE CLINIC | Facility: CLINIC | Age: 78
End: 2023-05-19
Payer: MEDICARE

## 2023-05-19 VITALS
HEIGHT: 63 IN | WEIGHT: 156 LBS | BODY MASS INDEX: 27.64 KG/M2 | TEMPERATURE: 98.1 F | OXYGEN SATURATION: 100 % | HEART RATE: 60 BPM | SYSTOLIC BLOOD PRESSURE: 132 MMHG | DIASTOLIC BLOOD PRESSURE: 64 MMHG

## 2023-05-19 DIAGNOSIS — K21.9 GASTROESOPHAGEAL REFLUX DISEASE, UNSPECIFIED WHETHER ESOPHAGITIS PRESENT: ICD-10-CM

## 2023-05-19 DIAGNOSIS — N18.30 STAGE 3 CHRONIC KIDNEY DISEASE, UNSPECIFIED WHETHER STAGE 3A OR 3B CKD (HCC): ICD-10-CM

## 2023-05-19 DIAGNOSIS — E11.22 TYPE 2 DIABETES MELLITUS WITH STAGE 3 CHRONIC KIDNEY DISEASE, WITHOUT LONG-TERM CURRENT USE OF INSULIN, UNSPECIFIED WHETHER STAGE 3A OR 3B CKD (HCC): Primary | ICD-10-CM

## 2023-05-19 DIAGNOSIS — I10 ESSENTIAL HYPERTENSION: ICD-10-CM

## 2023-05-19 DIAGNOSIS — N18.30 TYPE 2 DIABETES MELLITUS WITH STAGE 3 CHRONIC KIDNEY DISEASE, WITHOUT LONG-TERM CURRENT USE OF INSULIN, UNSPECIFIED WHETHER STAGE 3A OR 3B CKD (HCC): Primary | ICD-10-CM

## 2023-05-19 DIAGNOSIS — N40.1 BENIGN PROSTATIC HYPERPLASIA WITH LOWER URINARY TRACT SYMPTOMS, SYMPTOM DETAILS UNSPECIFIED: ICD-10-CM

## 2023-05-19 PROCEDURE — 1123F ACP DISCUSS/DSCN MKR DOCD: CPT | Performed by: INTERNAL MEDICINE

## 2023-05-19 PROCEDURE — 1036F TOBACCO NON-USER: CPT | Performed by: INTERNAL MEDICINE

## 2023-05-19 PROCEDURE — G8427 DOCREV CUR MEDS BY ELIG CLIN: HCPCS | Performed by: INTERNAL MEDICINE

## 2023-05-19 PROCEDURE — 2022F DILAT RTA XM EVC RTNOPTHY: CPT | Performed by: INTERNAL MEDICINE

## 2023-05-19 PROCEDURE — 3078F DIAST BP <80 MM HG: CPT | Performed by: INTERNAL MEDICINE

## 2023-05-19 PROCEDURE — 3017F COLORECTAL CA SCREEN DOC REV: CPT | Performed by: INTERNAL MEDICINE

## 2023-05-19 PROCEDURE — 3075F SYST BP GE 130 - 139MM HG: CPT | Performed by: INTERNAL MEDICINE

## 2023-05-19 PROCEDURE — 3051F HG A1C>EQUAL 7.0%<8.0%: CPT | Performed by: INTERNAL MEDICINE

## 2023-05-19 PROCEDURE — G8417 CALC BMI ABV UP PARAM F/U: HCPCS | Performed by: INTERNAL MEDICINE

## 2023-05-19 PROCEDURE — 99214 OFFICE O/P EST MOD 30 MIN: CPT | Performed by: INTERNAL MEDICINE

## 2023-05-19 RX ORDER — ATENOLOL 25 MG/1
25 TABLET ORAL DAILY
Qty: 90 TABLET | Refills: 1 | Status: SHIPPED | OUTPATIENT
Start: 2023-05-19

## 2023-05-19 RX ORDER — TAMSULOSIN HYDROCHLORIDE 0.4 MG/1
0.4 CAPSULE ORAL DAILY
Qty: 90 CAPSULE | Refills: 1 | Status: SHIPPED | OUTPATIENT
Start: 2023-05-19

## 2023-05-19 RX ORDER — HYDROCHLOROTHIAZIDE 25 MG/1
TABLET ORAL
Qty: 90 TABLET | Refills: 1 | Status: SHIPPED | OUTPATIENT
Start: 2023-05-19

## 2023-05-19 RX ORDER — LOSARTAN POTASSIUM 100 MG/1
100 TABLET ORAL DAILY
Qty: 90 TABLET | Refills: 1 | Status: SHIPPED | OUTPATIENT
Start: 2023-05-19

## 2023-05-19 RX ORDER — AMLODIPINE BESYLATE 10 MG/1
10 TABLET ORAL DAILY
Qty: 90 TABLET | Refills: 1 | Status: SHIPPED | OUTPATIENT
Start: 2023-05-19

## 2023-05-19 SDOH — ECONOMIC STABILITY: HOUSING INSECURITY
IN THE LAST 12 MONTHS, WAS THERE A TIME WHEN YOU DID NOT HAVE A STEADY PLACE TO SLEEP OR SLEPT IN A SHELTER (INCLUDING NOW)?: NO

## 2023-05-19 SDOH — ECONOMIC STABILITY: INCOME INSECURITY: HOW HARD IS IT FOR YOU TO PAY FOR THE VERY BASICS LIKE FOOD, HOUSING, MEDICAL CARE, AND HEATING?: NOT HARD AT ALL

## 2023-05-19 SDOH — ECONOMIC STABILITY: FOOD INSECURITY: WITHIN THE PAST 12 MONTHS, THE FOOD YOU BOUGHT JUST DIDN'T LAST AND YOU DIDN'T HAVE MONEY TO GET MORE.: NEVER TRUE

## 2023-05-19 SDOH — ECONOMIC STABILITY: FOOD INSECURITY: WITHIN THE PAST 12 MONTHS, YOU WORRIED THAT YOUR FOOD WOULD RUN OUT BEFORE YOU GOT MONEY TO BUY MORE.: NEVER TRUE

## 2023-05-19 ASSESSMENT — ANXIETY QUESTIONNAIRES
2. NOT BEING ABLE TO STOP OR CONTROL WORRYING: 0
IF YOU CHECKED OFF ANY PROBLEMS ON THIS QUESTIONNAIRE, HOW DIFFICULT HAVE THESE PROBLEMS MADE IT FOR YOU TO DO YOUR WORK, TAKE CARE OF THINGS AT HOME, OR GET ALONG WITH OTHER PEOPLE: NOT DIFFICULT AT ALL
4. TROUBLE RELAXING: 0
1. FEELING NERVOUS, ANXIOUS, OR ON EDGE: 0
GAD7 TOTAL SCORE: 0
6. BECOMING EASILY ANNOYED OR IRRITABLE: 0
5. BEING SO RESTLESS THAT IT IS HARD TO SIT STILL: 0
7. FEELING AFRAID AS IF SOMETHING AWFUL MIGHT HAPPEN: 0
3. WORRYING TOO MUCH ABOUT DIFFERENT THINGS: 0

## 2023-05-19 ASSESSMENT — ENCOUNTER SYMPTOMS: SHORTNESS OF BREATH: 0

## 2023-05-19 ASSESSMENT — PATIENT HEALTH QUESTIONNAIRE - PHQ9
SUM OF ALL RESPONSES TO PHQ9 QUESTIONS 1 & 2: 0
SUM OF ALL RESPONSES TO PHQ QUESTIONS 1-9: 0
SUM OF ALL RESPONSES TO PHQ QUESTIONS 1-9: 0
2. FEELING DOWN, DEPRESSED OR HOPELESS: 0
SUM OF ALL RESPONSES TO PHQ QUESTIONS 1-9: 0
SUM OF ALL RESPONSES TO PHQ QUESTIONS 1-9: 0
1. LITTLE INTEREST OR PLEASURE IN DOING THINGS: 0

## 2023-05-19 NOTE — PROGRESS NOTES
Marcus Anne M.D. Internal Medicine  [address] , [address]   Office : [phone]  Fax : [phone]    Chief Complaint   Patient presents with    Diabetes     4 mo follow up       History of Present Illness:  Venkat Lucas is a 78 y.o. male. HPI    Diabetes Mellitus  Patient presents  for follow up on diabetes. Onset of symptoms was several years ago. Patient describes symptoms as foot ulcerations. Course to date has been well controlled. Diet and Lifestyle: follows a diabetic diet regularly  exercises sporadically  nonsmoker  Home glucose monitoring:  Results average n/a. Patient denies polyuria and polydipsia. No wounds in lower limbs. Most recent HbA1c was   Hemoglobin A1C   Date Value Ref Range Status   02/21/2023 7.3 (H) 4.8 - 5.6 % Final       Hypertension  Patient is in for Hypertension which is stable. Diet and Lifestyle: generally follows a low sodium diet  Home BP Monitoring: is not measured at home. Patient's recent blood pressures were   BP Readings from Last 3 Encounters:   05/19/23 132/64   05/16/23 118/61   05/05/23 107/60   .   taking medications as instructed, no medication side effects noted, no TIA's, no chest pain on exertion, no dyspnea on exertion, no swelling of ankles. Cardiac risk factors consist of advanced age (older than 54 for men, 72 for women), diabetes mellitus, dyslipidemia, hypertension, and male gender. Lab Results   Component Value Date/Time     03/02/2023 04:11 AM    K 4.2 03/02/2023 04:11 AM     03/02/2023 04:11 AM    CO2 26 03/02/2023 04:11 AM    BUN 23 03/02/2023 04:11 AM    CREATININE 1.50 03/02/2023 04:11 AM    GLUCOSE 120 03/02/2023 04:11 AM    CALCIUM 9.3 03/02/2023 04:11 AM    LABGLOM 48 03/02/2023 04:11 AM        CKD Stage 3  Being followed by Nephrology.   Next appt in July GERD  He has not yet had the colonoscopy yet due to foot ulcer    Alcohol Dependence  Declines

## 2023-05-23 ENCOUNTER — HOSPITAL ENCOUNTER (OUTPATIENT)
Dept: WOUND CARE | Age: 78
Discharge: HOME | End: 2023-05-23
Payer: MEDICARE

## 2023-05-23 VITALS
WEIGHT: 154 LBS | SYSTOLIC BLOOD PRESSURE: 116 MMHG | HEIGHT: 63 IN | HEART RATE: 63 BPM | DIASTOLIC BLOOD PRESSURE: 58 MMHG | BODY MASS INDEX: 27.29 KG/M2

## 2023-05-23 DIAGNOSIS — L08.9 DIABETIC FOOT INFECTION (HCC): ICD-10-CM

## 2023-05-23 DIAGNOSIS — E11.621 DIABETIC ULCER OF RIGHT FOOT ASSOCIATED WITH TYPE 2 DIABETES MELLITUS, WITH NECROSIS OF BONE, UNSPECIFIED PART OF FOOT (HCC): Primary | ICD-10-CM

## 2023-05-23 DIAGNOSIS — E11.628 DIABETIC FOOT INFECTION (HCC): ICD-10-CM

## 2023-05-23 DIAGNOSIS — L97.514 DIABETIC ULCER OF RIGHT FOOT ASSOCIATED WITH TYPE 2 DIABETES MELLITUS, WITH NECROSIS OF BONE, UNSPECIFIED PART OF FOOT (HCC): Primary | ICD-10-CM

## 2023-05-23 PROCEDURE — 29445 APPL RIGID TOT CNTC LEG CAST: CPT

## 2023-05-23 PROCEDURE — 11043 DBRDMT MUSC&/FSCA 1ST 20/<: CPT

## 2023-05-23 RX ORDER — LIDOCAINE HYDROCHLORIDE 20 MG/ML
JELLY TOPICAL ONCE
OUTPATIENT
Start: 2023-05-23 | End: 2023-05-23

## 2023-05-23 RX ORDER — LIDOCAINE 50 MG/G
OINTMENT TOPICAL ONCE
OUTPATIENT
Start: 2023-05-23 | End: 2023-05-23

## 2023-05-23 RX ORDER — BETAMETHASONE DIPROPIONATE 0.05 %
OINTMENT (GRAM) TOPICAL ONCE
OUTPATIENT
Start: 2023-05-23 | End: 2023-05-23

## 2023-05-23 RX ORDER — GINSENG 100 MG
CAPSULE ORAL ONCE
OUTPATIENT
Start: 2023-05-23 | End: 2023-05-23

## 2023-05-23 RX ORDER — BACITRACIN, NEOMYCIN, POLYMYXIN B 400; 3.5; 5 [USP'U]/G; MG/G; [USP'U]/G
OINTMENT TOPICAL ONCE
OUTPATIENT
Start: 2023-05-23 | End: 2023-05-23

## 2023-05-23 RX ORDER — GENTAMICIN SULFATE 1 MG/G
OINTMENT TOPICAL ONCE
OUTPATIENT
Start: 2023-05-23 | End: 2023-05-23

## 2023-05-23 RX ORDER — CLOBETASOL PROPIONATE 0.5 MG/G
OINTMENT TOPICAL ONCE
OUTPATIENT
Start: 2023-05-23 | End: 2023-05-23

## 2023-05-23 RX ORDER — LIDOCAINE HYDROCHLORIDE 20 MG/ML
JELLY TOPICAL ONCE
Status: COMPLETED | OUTPATIENT
Start: 2023-05-23 | End: 2023-05-23

## 2023-05-23 RX ORDER — LIDOCAINE 40 MG/G
CREAM TOPICAL ONCE
OUTPATIENT
Start: 2023-05-23 | End: 2023-05-23

## 2023-05-23 RX ORDER — BACITRACIN ZINC AND POLYMYXIN B SULFATE 500; 1000 [USP'U]/G; [USP'U]/G
OINTMENT TOPICAL ONCE
OUTPATIENT
Start: 2023-05-23 | End: 2023-05-23

## 2023-05-23 RX ORDER — LIDOCAINE HYDROCHLORIDE 40 MG/ML
SOLUTION TOPICAL ONCE
OUTPATIENT
Start: 2023-05-23 | End: 2023-05-23

## 2023-05-23 RX ADMIN — LIDOCAINE HYDROCHLORIDE: 20 JELLY TOPICAL at 09:56

## 2023-05-23 NOTE — WOUND CARE
Discharge Instructions for  Cecilia Porter  [address] [place] [address]  4 Judie Zapata, [address] [facility] [address]  Phone [phone]   Fax [phone]      NAME:  Cleola Siemens  YOB: 1945  MEDICAL RECORD NUMBER:  [id number]  DATE:  5/23/2023    Return Appointment:   1 week with Harry Pineda, DO  Thursday or Friday for TC cast change    Instructions:   Right Plantar/Medial  & left 1st met head Foot:  Cleanse wound and periwound with wound cleanser or normal saline. VASHE cleanse post debridement in Wound Clinic  Hydrofera Blue: Cut to wound size, moisten with saline, and apply to wound bed. Cover with ABD. Total Contact Cast on right foot. If cast becomes too tight, try elevating your legs to assist fluid drainage. Do not get wound wet in shower, pool or tub. May purchase cast cover at local pharmacy to keep dry in shower. Elevate legs when sitting. Avoid prolonged standing or sitting with legs in dependent position. Eliminate salt intake as this promotes fluid retention and swelling  Please increase dietary protein to at least 60 grams per day to support cell rejuvenation. May use supplements such as Ensure Max, Naga, & Glucerna (samples & coupons provided at this visit). Be sure to spread intake throughout the day for improved absorption. [facility] please hold visits while patient is in cast.   Infectious Disease : call [phone] to reschedule appointment. Continue antibiotics: Augmentin and Doxycycline as prescribed by ID. Should you experience increased redness, swelling, pain, foul odor, size of wound(s), or have a temperature over 101 degrees please contact the [address] [place] [address] Road at [phone] or if after hours contact your primary care physician or go to the hospital emergency department. PLEASE NOTE: IF YOU ARE UNABLE TO OBTAIN WOUND SUPPLIES, CONTINUE TO USE THE SUPPLIES YOU HAVE AVAILABLE UNTIL YOU ARE ABLE TO REACH US.  IT IS MOST

## 2023-05-23 NOTE — FLOWSHEET NOTE
Pre & post debridement     05/23/23 0956   Right Leg Edema Point of Measurement   Leg circumference 33 cm   Ankle circumference 22 cm   Foot circumference 24.5 cm   Compression Therapy Compression not ordered   Left Leg Edema Point of Measurement   Leg circumference 32.5 cm   Ankle circumference 20 cm   Foot circumference 22 cm   Compression Therapy Compression not ordered   RLE Neurovascular Assessment   Capillary Refill Less than/Equal to 3 seconds   Color Appropriate for Ethnicity   Temperature Warm   RLE Sensation  Decreased   R Pedal Pulse +3   LLE Neurovascular Assessment   Capillary Refill Less than/Equal to 3 seconds   Color Appropriate for Ethnicity   Temperature Warm   LLE Sensation  Decreased   L Pedal Pulse +3   Wound 05/23/23 Left;Plantar #2 1st met head   Date First Assessed/Time First Assessed: 05/23/23 0957   Present on Hospital Admission: Yes  Wound Approximate Age at First Assessment (Weeks): 4 weeks  Primary Wound Type: Diabetic Ulcer  Wound Location Orientation: Left;Plantar  Wound Description (C... Wound Image     Wound Etiology Diabetic Meza 1   Dressing Status Intact   Wound Cleansed Cleansed with saline; Soap and water;Vashe   Dressing/Treatment Hydrofera blue   Offloading for Diabetic Foot Ulcers Offloading ordered   Wound Length (cm) 2.5 cm   Wound Width (cm) 1.7 cm   Wound Depth (cm) 0.1 cm   Wound Surface Area (cm^2) 4.25 cm^2   Wound Volume (cm^3) 0.425 cm^3   Post-Procedure Length (cm) 2.5 cm   Post-Procedure Width (cm) 1.7 cm   Post-Procedure Depth (cm) 0.1 cm   Post-Procedure Surface Area (cm^2) 4.25 cm^2   Post-Procedure Volume (cm^3) 0.425 cm^3   Wound Assessment Dry   Drainage Amount Small   Drainage Description Serosanguinous   Odor None   Charla-wound Assessment Hyperkeratosis (callous)   Margins Attached edges   Wound Thickness Description not for Pressure Injury Full thickness   Wound 04/14/23 Foot Right;Plantar #1 delayed healing surgical debridement 2/22/23   Date First

## 2023-05-23 NOTE — WOUND CARE
Total Contact Cast    NAME:  Jo Daveis  YOB: 1945  MEDICAL RECORD NUMBER:  [id number]  DATE:  5/23/2023    Goal:  Patient will maintain integrity of cast, avoid mobility hazards, and report complications that may occur (foul odor, pain, numbness, cracked cast). Removed old contact cast if indicated and wash extremity with soap and water. Applied ordered dressing. Applied foam padding  Applied cast padding included in the kit   Applied per nursing and Vance Batter, DO  Applied to: [] Left Lower Leg [x] Right Lower Leg  Allow cast to dry. Instructed patient to report to health care provider, including wound care center, any back pain, hip pain, or leg pain, numbness of toes, or any odor coming from the cast.   Instructed patient not to stick any foreign objects down into cast.   Instructed patient to utilize assistive devices(crutches, cane or walker) as ordered   Instructed patient to continue offloading as directed.      Applied cast per  Guidelines    Electronically signed by Kalpana Baker RN on 5/23/2023 at 11:13 AM

## 2023-05-23 NOTE — DISCHARGE INSTRUCTIONS
Shayna Bennett, RN  Registered Nurse  Wound Care      Signed  Date of Service:  5/23/2023  9:30 AM     Signed                                                                                                                                                      Discharge Instructions for  Cecilia Porter  [address] [place] [address] Judie Zapata, [address]  Phone [phone]   Fax [phone]        NAME:  Ro Cordoba  YOB: 1945  MEDICAL RECORD NUMBER:  [id number]  DATE:  5/23/2023     Return Appointment:   1 week with Shaan Hopper, DO  Thursday or Friday for TC cast change     Instructions:   Right Plantar/Medial  & left 1st met head Foot:  Cleanse wound and periwound with wound cleanser or normal saline. VASHE cleanse post debridement in Wound Clinic  Hydrofera Blue: Cut to wound size, moisten with saline, and apply to wound bed. Cover with ABD. Total Contact Cast on right foot. If cast becomes too tight, try elevating your legs to assist fluid drainage. Do not get wound wet in shower, pool or tub. May purchase cast cover at local pharmacy to keep dry in shower. Elevate legs when sitting. Avoid prolonged standing or sitting with legs in dependent position. Eliminate salt intake as this promotes fluid retention and swelling  Please increase dietary protein to at least 60 grams per day to support cell rejuvenation. May use supplements such as Ensure Max, Naga, & Glucerna (samples & coupons provided at this visit). Be sure to spread intake throughout the day for improved absorption. [facility] please hold visits while patient is in cast.   Infectious Disease : call [phone] to reschedule appointment. Continue antibiotics: Augmentin and Doxycycline as prescribed by ID.      Should you experience increased redness, swelling, pain, foul odor, size of wound(s), or have a temperature over 101 degrees please contact the Wound Healing

## 2023-05-25 PROBLEM — E11.43 TYPE 2 DIABETES MELLITUS WITH DIABETIC AUTONOMIC NEUROPATHY, WITHOUT LONG-TERM CURRENT USE OF INSULIN (HCC): Status: ACTIVE | Noted: 2022-09-16

## 2023-05-25 PROBLEM — L84 CALLUS OF FOOT: Status: ACTIVE | Noted: 2023-05-25

## 2023-05-26 ENCOUNTER — HOSPITAL ENCOUNTER (OUTPATIENT)
Dept: WOUND CARE | Age: 78
Discharge: HOME | End: 2023-05-26
Payer: MEDICARE

## 2023-05-26 VITALS
BODY MASS INDEX: 27.29 KG/M2 | RESPIRATION RATE: 18 BRPM | WEIGHT: 154 LBS | SYSTOLIC BLOOD PRESSURE: 112 MMHG | HEIGHT: 63 IN | TEMPERATURE: 98.5 F | DIASTOLIC BLOOD PRESSURE: 60 MMHG | HEART RATE: 59 BPM

## 2023-05-26 PROCEDURE — 29445 APPL RIGID TOT CNTC LEG CAST: CPT

## 2023-05-26 NOTE — FLOWSHEET NOTE
05/26/23 1002   Wound 05/23/23 Left;Plantar #2 1st met head   Date First Assessed/Time First Assessed: 05/23/23 0957   Present on Hospital Admission: Yes  Wound Approximate Age at First Assessment (Weeks): 4 weeks  Primary Wound Type: Diabetic Ulcer  Wound Location Orientation: Left;Plantar  Wound Description (C... Wound Image    Wound Etiology Diabetic Meza 1   Dressing Status Intact   Wound Cleansed Cleansed with saline   Dressing/Treatment Hydrofera blue   Offloading for Diabetic Foot Ulcers Offloading ordered   Wound Length (cm) 0.9 cm   Wound Width (cm) 0.5 cm   Wound Depth (cm) 0.1 cm   Wound Surface Area (cm^2) 0.45 cm^2   Change in Wound Size % (l*w) 89.41   Wound Volume (cm^3) 0.045 cm^3   Wound Healing % 89   Wound Assessment Pink/red   Drainage Amount Moderate   Drainage Description Serosanguinous   Odor None   Charla-wound Assessment Hyperkeratosis (callous)   Wound Thickness Description not for Pressure Injury Full thickness   Wound 04/14/23 Foot Right;Plantar #1 delayed healing surgical debridement 2/22/23   Date First Assessed/Time First Assessed: 04/14/23 1053   Present on Hospital Admission: Yes  Wound Approximate Age at First Assessment (Weeks): 6 weeks  Primary Wound Type: Diabetic Ulcer  Location: Foot  Wound Location Orientation: Right;Plantar  Wou. ..    Wound Image    Wound Etiology Diabetic Meza 3   Dressing Status New drainage noted   Wound Cleansed Cleansed with saline   Dressing/Treatment Hydrofera blue   Offloading for Diabetic Foot Ulcers Total contact cast   Wound Length (cm) 2.2 cm   Wound Width (cm) 2.7 cm   Wound Depth (cm) 0.5 cm   Wound Surface Area (cm^2) 5.94 cm^2   Change in Wound Size % (l*w) 53.95   Wound Volume (cm^3) 2.97 cm^3   Wound Healing % 85   Wound Assessment Pink/red   Drainage Amount Large   Drainage Description Serosanguinous   Odor None   Charla-wound Assessment Intact   Wound Thickness Description not for Pressure Injury Full thickness   Pain Assessment   Pain

## 2023-05-26 NOTE — WOUND CARE
Total Contact Cast    NAME:  Karin Porter  YOB: 1945  MEDICAL RECORD NUMBER:  [id number]  DATE:  5/26/2023    Goal:  Patient will maintain integrity of cast, avoid mobility hazards, and report complications that may occur (foul odor, pain, numbness, cracked cast). Removed old contact cast if indicated and wash extremity with soap and water. Applied ordered dressing. Applied foam padding  Applied cast padding included in the kit   Applied per nursing and Keith Dorman,   Applied to: [] Left Lower Leg [x] Right Lower Leg  Allow cast to dry. Instructed patient to report to health care provider, including wound care center, any back pain, hip pain, or leg pain, numbness of toes, or any odor coming from the cast.   Instructed patient not to stick any foreign objects down into cast.   Instructed patient to utilize assistive devices(crutches, cane or walker) as ordered   Instructed patient to continue offloading as directed.      Applied cast per  Guidelines    Electronically signed by Myra Sinclair RN on 5/26/2023 at 3:16 PM

## 2023-05-26 NOTE — PROGRESS NOTES
Total Contact Cast    Performed by: Richard Deng DO  Consent obtained: Yes    Total Contact Cast was applied in the [facility] to right lower leg, Dressing over wound(s), cast padding, foam padding was applied per Wound Care Nurse, I, as the provider, applied the final layer of fiberglass cast material , Instructed patient to report, any back pain, hip pain, or leg pain, numbness of toes, or any odor coming from the cast , Instructed patient to continue offloading as directed, and Instructed patient to utilize assistive devices(crutches, cane or walker) as orderedProcedure Note  Indications: Based on my examination of this patient's wound(s)/ulcer(s) today, debridement is required to promote healing and evaluate the wound base. Return Appointment:   1 week with Latisha Tamayo DO  Thursday or Friday for TC cast change     Instructions:   Right Plantar/Medial  & left 1st met head Foot:  Cleanse wound and periwound with wound cleanser or normal saline. VASHE cleanse post debridement in Wound Clinic  Hydrofera Blue: Cut to wound size, moisten with saline, and apply to wound bed. Cover with ABD. Total Contact Cast on right foot. If cast becomes too tight, try elevating your legs to assist fluid drainage. Do not get wound wet in shower, pool or tub. May purchase cast cover at local pharmacy to keep dry in shower. Elevate legs when sitting. Avoid prolonged standing or sitting with legs in dependent position. Eliminate salt intake as this promotes fluid retention and swelling  Please increase dietary protein to at least 60 grams per day to support cell rejuvenation. May use supplements such as Ensure Max, Naga, & Glucerna (samples & coupons provided at this visit). Be sure to spread intake throughout the day for improved absorption. [facility] please hold visits while patient is in cast.   Infectious Disease : call [phone] to reschedule appointment.    Continue

## 2023-05-31 ENCOUNTER — HOSPITAL ENCOUNTER (OUTPATIENT)
Dept: WOUND CARE | Age: 78
Discharge: HOME | End: 2023-05-31
Payer: MEDICARE

## 2023-05-31 VITALS
WEIGHT: 155.4 LBS | DIASTOLIC BLOOD PRESSURE: 59 MMHG | HEIGHT: 63 IN | HEART RATE: 59 BPM | RESPIRATION RATE: 18 BRPM | TEMPERATURE: 97.8 F | SYSTOLIC BLOOD PRESSURE: 117 MMHG | BODY MASS INDEX: 27.54 KG/M2

## 2023-05-31 DIAGNOSIS — L08.9 DIABETIC FOOT INFECTION (HCC): ICD-10-CM

## 2023-05-31 DIAGNOSIS — E11.628 DIABETIC FOOT INFECTION (HCC): ICD-10-CM

## 2023-05-31 DIAGNOSIS — L97.514 DIABETIC ULCER OF RIGHT FOOT ASSOCIATED WITH TYPE 2 DIABETES MELLITUS, WITH NECROSIS OF BONE, UNSPECIFIED PART OF FOOT (HCC): Primary | ICD-10-CM

## 2023-05-31 DIAGNOSIS — E11.621 DIABETIC ULCER OF RIGHT FOOT ASSOCIATED WITH TYPE 2 DIABETES MELLITUS, WITH NECROSIS OF BONE, UNSPECIFIED PART OF FOOT (HCC): Primary | ICD-10-CM

## 2023-05-31 PROBLEM — E11.43 TYPE 2 DIABETES MELLITUS WITH DIABETIC AUTONOMIC NEUROPATHY, WITHOUT LONG-TERM CURRENT USE OF INSULIN (HCC): Chronic | Status: ACTIVE | Noted: 2022-09-16

## 2023-05-31 PROCEDURE — 11042 DBRDMT SUBQ TIS 1ST 20SQCM/<: CPT

## 2023-05-31 RX ORDER — GENTAMICIN SULFATE 1 MG/G
OINTMENT TOPICAL ONCE
OUTPATIENT
Start: 2023-05-31 | End: 2023-05-31

## 2023-05-31 RX ORDER — BACITRACIN ZINC AND POLYMYXIN B SULFATE 500; 1000 [USP'U]/G; [USP'U]/G
OINTMENT TOPICAL ONCE
OUTPATIENT
Start: 2023-05-31 | End: 2023-05-31

## 2023-05-31 RX ORDER — LIDOCAINE 40 MG/G
CREAM TOPICAL ONCE
OUTPATIENT
Start: 2023-05-31 | End: 2023-05-31

## 2023-05-31 RX ORDER — LIDOCAINE HYDROCHLORIDE 20 MG/ML
JELLY TOPICAL ONCE
OUTPATIENT
Start: 2023-05-31 | End: 2023-05-31

## 2023-05-31 RX ORDER — CLOBETASOL PROPIONATE 0.5 MG/G
OINTMENT TOPICAL ONCE
OUTPATIENT
Start: 2023-05-31 | End: 2023-05-31

## 2023-05-31 RX ORDER — GINSENG 100 MG
CAPSULE ORAL ONCE
OUTPATIENT
Start: 2023-05-31 | End: 2023-05-31

## 2023-05-31 RX ORDER — BACITRACIN, NEOMYCIN, POLYMYXIN B 400; 3.5; 5 [USP'U]/G; MG/G; [USP'U]/G
OINTMENT TOPICAL ONCE
OUTPATIENT
Start: 2023-05-31 | End: 2023-05-31

## 2023-05-31 RX ORDER — BETAMETHASONE DIPROPIONATE 0.05 %
OINTMENT (GRAM) TOPICAL ONCE
OUTPATIENT
Start: 2023-05-31 | End: 2023-05-31

## 2023-05-31 RX ORDER — LIDOCAINE HYDROCHLORIDE 40 MG/ML
SOLUTION TOPICAL ONCE
OUTPATIENT
Start: 2023-05-31 | End: 2023-05-31

## 2023-05-31 RX ORDER — LIDOCAINE 50 MG/G
OINTMENT TOPICAL ONCE
OUTPATIENT
Start: 2023-05-31 | End: 2023-05-31

## 2023-05-31 NOTE — WOUND CARE
Discharge Instructions for  [address]  [address] [place] [address]  4 Judie Zapata, [address]  Phone [phone]   Fax [phone]      NAME:  Celia Ma  YOB: 1945  MEDICAL RECORD NUMBER:  [id number]  DATE:  5/31/2023    Return Appointment:   1 week with Armaan Dominguez DO      Instructions:   Left 1st met head Foot:  Cleanse wound and periwound with wound cleanser or normal saline. VASHE cleanse post debridement in Wound Clinic  Hydrofera Blue: Cut to wound size, moisten with saline, and apply to wound bed. Cover with ABD and gauze. Change 3 times weekly. Right plantar foot:  Clean with Vashe. Apply alginate with silver. Cut product to wound size and apply to wound bed. Cover with optilock and wrap with gauze. Apply total contact cast.  Change weekly in wound cetner. Total Contact Cast on right foot. If cast becomes too tight, try elevating your legs to assist fluid drainage. Do not get wound wet in shower, pool or tub. May purchase cast cover at local pharmacy to keep dry in shower. Elevate legs when sitting. Avoid prolonged standing or sitting with legs in dependent position. Eliminate salt intake as this promotes fluid retention and swelling  Please increase dietary protein to at least 60 grams per day to support cell rejuvenation. May use supplements such as Ensure Max, Naga, & Glucerna (samples & coupons provided at this visit). Be sure to spread intake throughout the day for improved absorption. [facility] for 2 x weekly visits for left foot wound assessment and dressing change. Infectious Disease : call [phone] to reschedule appointment. Continue antibiotics: Augmentin and Doxycycline as prescribed by ID.     Should you experience increased redness, swelling, pain, foul odor, size of wound(s), or have a temperature over 101 degrees please contact the [address] [place] [address] Road at [phone] or if after hours contact your

## 2023-05-31 NOTE — WOUND CARE
Total Contact Cast    NAME:  Raj De Los Santos  YOB: 1945  MEDICAL RECORD NUMBER:  [id number]  DATE:  5/31/2023    Goal:  Patient will maintain integrity of cast, avoid mobility hazards, and report complications that may occur (foul odor, pain, numbness, cracked cast). Removed old contact cast if indicated and wash extremity with soap and water. Applied ordered dressing. Applied foam padding  Applied cast padding included in the kit   Applied per nursing and Ronal Cole, DO  Applied to: [] Left Lower Leg [x] Right Lower Leg  Allow cast to dry. Instructed patient to report to health care provider, including wound care center, any back pain, hip pain, or leg pain, numbness of toes, or any odor coming from the cast.   Instructed patient not to stick any foreign objects down into cast.   Instructed patient to utilize assistive devices(crutches, cane or walker) as ordered   Instructed patient to continue offloading as directed.      Applied cast per  Guidelines    Electronically signed by Beverly Villa RN on 5/31/2023 at 10:20 AM

## 2023-06-05 ENCOUNTER — HOSPITAL ENCOUNTER (OUTPATIENT)
Dept: WOUND CARE | Age: 78
Discharge: HOME | End: 2023-06-05
Payer: MEDICARE

## 2023-06-05 VITALS — DIASTOLIC BLOOD PRESSURE: 78 MMHG | TEMPERATURE: 97.4 F | SYSTOLIC BLOOD PRESSURE: 128 MMHG | HEART RATE: 70 BPM

## 2023-06-05 DIAGNOSIS — L97.514 DIABETIC ULCER OF RIGHT FOOT ASSOCIATED WITH TYPE 2 DIABETES MELLITUS, WITH NECROSIS OF BONE, UNSPECIFIED PART OF FOOT (HCC): Primary | ICD-10-CM

## 2023-06-05 DIAGNOSIS — E11.621 DIABETIC ULCER OF RIGHT FOOT ASSOCIATED WITH TYPE 2 DIABETES MELLITUS, WITH NECROSIS OF BONE, UNSPECIFIED PART OF FOOT (HCC): Primary | ICD-10-CM

## 2023-06-05 DIAGNOSIS — E11.628 DIABETIC FOOT INFECTION (HCC): ICD-10-CM

## 2023-06-05 DIAGNOSIS — L08.9 DIABETIC FOOT INFECTION (HCC): ICD-10-CM

## 2023-06-05 PROCEDURE — 11042 DBRDMT SUBQ TIS 1ST 20SQCM/<: CPT

## 2023-06-05 RX ORDER — LIDOCAINE HYDROCHLORIDE 20 MG/ML
JELLY TOPICAL ONCE
OUTPATIENT
Start: 2023-06-05 | End: 2023-06-05

## 2023-06-05 RX ORDER — LIDOCAINE 50 MG/G
OINTMENT TOPICAL ONCE
OUTPATIENT
Start: 2023-06-05 | End: 2023-06-05

## 2023-06-05 RX ORDER — CLOBETASOL PROPIONATE 0.5 MG/G
OINTMENT TOPICAL ONCE
OUTPATIENT
Start: 2023-06-05 | End: 2023-06-05

## 2023-06-05 RX ORDER — LIDOCAINE 40 MG/G
CREAM TOPICAL ONCE
OUTPATIENT
Start: 2023-06-05 | End: 2023-06-05

## 2023-06-05 RX ORDER — GENTAMICIN SULFATE 1 MG/G
OINTMENT TOPICAL ONCE
OUTPATIENT
Start: 2023-06-05 | End: 2023-06-05

## 2023-06-05 RX ORDER — BACITRACIN ZINC AND POLYMYXIN B SULFATE 500; 1000 [USP'U]/G; [USP'U]/G
OINTMENT TOPICAL ONCE
OUTPATIENT
Start: 2023-06-05 | End: 2023-06-05

## 2023-06-05 RX ORDER — BETAMETHASONE DIPROPIONATE 0.05 %
OINTMENT (GRAM) TOPICAL ONCE
OUTPATIENT
Start: 2023-06-05 | End: 2023-06-05

## 2023-06-05 RX ORDER — IBUPROFEN 200 MG
TABLET ORAL ONCE
OUTPATIENT
Start: 2023-06-05 | End: 2023-06-05

## 2023-06-05 RX ORDER — LIDOCAINE HYDROCHLORIDE 20 MG/ML
JELLY TOPICAL ONCE
Status: DISCONTINUED | OUTPATIENT
Start: 2023-06-05 | End: 2023-06-06 | Stop reason: HOSPADM

## 2023-06-05 RX ORDER — GINSENG 100 MG
CAPSULE ORAL ONCE
OUTPATIENT
Start: 2023-06-05 | End: 2023-06-05

## 2023-06-05 RX ORDER — LIDOCAINE HYDROCHLORIDE 40 MG/ML
SOLUTION TOPICAL ONCE
OUTPATIENT
Start: 2023-06-05 | End: 2023-06-05

## 2023-06-05 NOTE — WOUND CARE
Total Contact Cast    NAME:  Spring Eastman  YOB: 1945  MEDICAL RECORD NUMBER:  [id number]  DATE:  6/5/2023    Goal:  Patient will maintain integrity of cast, avoid mobility hazards, and report complications that may occur (foul odor, pain, numbness, cracked cast). Removed old contact cast if indicated and wash extremity with soap and water. Applied ordered dressing. Applied foam padding  Applied cast padding included in the kit   Applied per nursing and Blease Poisson, DO  Applied to: [] Left Lower Leg [x] Right Lower Leg  Allow cast to dry. Instructed patient to report to health care provider, including wound care center, any back pain, hip pain, or leg pain, numbness of toes, or any odor coming from the cast.   Instructed patient not to stick any foreign objects down into cast.   Instructed patient to utilize assistive devices(crutches, cane or walker) as ordered   Instructed patient to continue offloading as directed.      Applied cast per  Guidelines    Electronically signed by Dick Byrnes RN on 6/5/2023 at 2:36 PM

## 2023-06-05 NOTE — WOUND CARE
Discharge Instructions for  Cecilia Porter  [address] [place] [address]  4 Judie Zapata, [address]  Phone [phone]   Fax [phone]      NAME:  Makayla Fernández  YOB: 1945  MEDICAL RECORD NUMBER:  [id number]  DATE:  6/5/2023    Return Appointment:   1 week with Santo Starr DO      Instructions:   Left 1st met head Foot:  Cleanse wound and periwound with wound cleanser or normal saline. VASHE cleanse post debridement in Wound Clinic  Hydrofera Blue: Cut to wound size, moisten with saline, and apply to wound bed. Cover with ABD and gauze. Change 3 times weekly. Right plantar foot:  Clean with Vashe. Apply alginate with silver. Cut product to wound size and apply to wound bed. Cover with optilock and wrap with gauze. Apply total contact cast.  Change weekly in wound cetner. Total Contact Cast on right foot. If cast becomes too tight, try elevating your legs to assist fluid drainage. Do not get wound wet in shower, pool or tub. May purchase cast cover at local pharmacy to keep dry in shower. Elevate legs when sitting. Avoid prolonged standing or sitting with legs in dependent position. Eliminate salt intake as this promotes fluid retention and swelling  Please increase dietary protein to at least 60 grams per day to support cell rejuvenation. May use supplements such as Ensure Max, Naga, & Glucerna (samples & coupons provided at this visit). Be sure to spread intake throughout the day for improved absorption. [facility] for 2 x weekly visits for left foot wound assessment and dressing change. Infectious Disease : call [phone] to reschedule appointment. Continue antibiotics: Augmentin and Doxycycline as prescribed by ID.     Should you experience increased redness, swelling, pain, foul odor, size of wound(s), or have a temperature over 101 degrees please contact the [address] [place] [address] Road at [phone] or if after hours contact your

## 2023-06-05 NOTE — DISCHARGE INSTRUCTIONS
Discharge Instructions for  Cecilia Porter  [address] [place] [address] Judie Zapata, [address]  Phone [phone]   Fax [phone]      NAME:  Nelson Sinclair  YOB: 1945  MEDICAL RECORD NUMBER:  [id number]  DATE:  @ED@    Return Appointment:   1 week with Maricruz Tristan DO      Instructions: ***        Should you experience increased redness, swelling, pain, foul odor, size of wound(s), or have a temperature over 101 degrees please contact the [address] [place] [address] Road at [phone] or if after hours contact your primary care physician or go to the hospital emergency department. PLEASE NOTE: IF YOU ARE UNABLE TO OBTAIN WOUND SUPPLIES, CONTINUE TO USE THE SUPPLIES YOU HAVE AVAILABLE UNTIL YOU ARE ABLE TO REACH US. IT IS MOST IMPORTANT TO KEEP THE WOUND COVERED AT ALL TIMES.     Electronically signed Sam Jones RN on 6/5/2023 at 1:51 PM

## 2023-06-05 NOTE — FLOWSHEET NOTE
06/05/23 1348   Wound 05/23/23 Left;Plantar #2 1st met head   Date First Assessed/Time First Assessed: 05/23/23 0957   Present on Hospital Admission: Yes  Wound Approximate Age at First Assessment (Weeks): 4 weeks  Primary Wound Type: Diabetic Ulcer  Wound Location Orientation: Left;Plantar  Wound Description (C... Wound Image     Wound Etiology Diabetic Meza 1   Dressing Status Intact   Wound Cleansed Cleansed with saline   Dressing/Treatment Hydrofera blue   Offloading for Diabetic Foot Ulcers Offloading ordered   Wound Length (cm) 0.3 cm   Wound Width (cm) 0.2 cm   Wound Depth (cm) 0.1 cm   Wound Surface Area (cm^2) 0.06 cm^2   Change in Wound Size % (l*w) 98.59   Wound Volume (cm^3) 0.006 cm^3   Wound Healing % 99   Post-Procedure Length (cm) 0.3 cm   Post-Procedure Width (cm) 0.2 cm   Post-Procedure Depth (cm) 0.1 cm   Post-Procedure Surface Area (cm^2) 0.06 cm^2   Post-Procedure Volume (cm^3) 0.006 cm^3   Wound Assessment Pink/red   Drainage Amount Small   Drainage Description Serosanguinous   Odor None   Charla-wound Assessment Hyperkeratosis (callous)   Wound Thickness Description not for Pressure Injury Full thickness   Wound 04/14/23 Foot Right;Plantar #1 delayed healing surgical debridement 2/22/23   Date First Assessed/Time First Assessed: 04/14/23 1053   Present on Hospital Admission: Yes  Wound Approximate Age at First Assessment (Weeks): 6 weeks  Primary Wound Type: Diabetic Ulcer  Location: Foot  Wound Location Orientation: Right;Plantar  Wou. ..    Wound Image     Wound Etiology Diabetic Meza 3   Dressing Status Old drainage noted;New drainage noted   Wound Cleansed Cleansed with saline   Dressing/Treatment Hydrofera blue   Offloading for Diabetic Foot Ulcers Total contact cast   Wound Length (cm) 2 cm   Wound Width (cm) 2.5 cm   Wound Depth (cm) 0.4 cm   Wound Surface Area (cm^2) 5 cm^2   Change in Wound Size % (l*w) 61.24   Wound Volume (cm^3) 2 cm^3   Wound Healing % 90   Post-Procedure
Full thickness

## 2023-06-20 ENCOUNTER — HOSPITAL ENCOUNTER (OUTPATIENT)
Dept: WOUND CARE | Age: 78
Discharge: HOME | End: 2023-06-20
Payer: MEDICARE

## 2023-06-20 VITALS
RESPIRATION RATE: 18 BRPM | HEART RATE: 58 BPM | HEIGHT: 63 IN | SYSTOLIC BLOOD PRESSURE: 134 MMHG | WEIGHT: 161 LBS | BODY MASS INDEX: 28.53 KG/M2 | DIASTOLIC BLOOD PRESSURE: 77 MMHG | TEMPERATURE: 98.1 F

## 2023-06-20 PROCEDURE — 11042 DBRDMT SUBQ TIS 1ST 20SQCM/<: CPT

## 2023-06-20 NOTE — WOUND CARE
Discharge Instructions for  Cecilia Porter  [address] [place] [address]  [address] Judie Zapata, [address]  Phone [phone]   Fax [phone]      NAME:  Aimee Arteaga  YOB: 1945  MEDICAL RECORD NUMBER:  [id number]  DATE:  6/20/2023    Return Appointment:   1 week with Rachele Hook DO    Instructions:   Left 1st met head Foot:  Wound is healed. Keep skin clean with soap and water. Apply vaseline daily. Right plantar foot:  Clean with Vashe. Apply alginate with silver. Cut product to wound size and apply to wound bed. Cover with optilock and wrap with gauze. Apply total contact cast.  Change weekly in wound cetner. Total Contact Cast on right foot. If cast becomes too tight, try elevating your legs to assist fluid drainage. Do not get wound wet in shower, pool or tub. May purchase cast cover at local pharmacy to keep dry in shower. Elevate legs when sitting. Avoid prolonged standing or sitting with legs in dependent position. Eliminate salt intake as this promotes fluid retention and swelling  Please increase dietary protein to at least 60 grams per day to support cell rejuvenation. May use supplements such as Ensure Max, Naga, & Glucerna (samples & coupons provided at this visit). Be sure to spread intake throughout the day for improved absorption. Okay to discharge from [address] [place] [address]    Infectious Disease : call [phone] to reschedule appointment. Continue antibiotics: Augmentin and Doxycycline as prescribed by ID. Should you experience increased redness, swelling, pain, foul odor, size of wound(s), or have a temperature over 101 degrees please contact the [address] [place] [address] Road at [phone] or if after hours contact your primary care physician or go to the hospital emergency department. PLEASE NOTE: IF YOU ARE UNABLE TO OBTAIN WOUND SUPPLIES, CONTINUE TO USE THE SUPPLIES YOU HAVE AVAILABLE UNTIL YOU ARE ABLE TO REACH US.  IT IS MOST IMPORTANT TO

## 2023-06-20 NOTE — WOUND CARE
Total Contact Cast    NAME:  Shira Nix  YOB: 1945  MEDICAL RECORD NUMBER:  [id number]  DATE:  6/20/2023    Goal:  Patient will maintain integrity of cast, avoid mobility hazards, and report complications that may occur (foul odor, pain, numbness, cracked cast). Removed old contact cast if indicated and wash extremity with soap and water. Applied ordered dressing. Applied foam padding  Applied cast padding included in the kit   Applied per nursing and Sade Banegas, DO  Applied to: [] Left Lower Leg [x] Right Lower Leg  Allow cast to dry. Instructed patient to report to health care provider, including wound care center, any back pain, hip pain, or leg pain, numbness of toes, or any odor coming from the cast.   Instructed patient not to stick any foreign objects down into cast.   Instructed patient to utilize assistive devices(crutches, cane or walker) as ordered   Instructed patient to continue offloading as directed.      Applied cast per  Guidelines    Electronically signed by Vivek Tirado RN on 6/20/2023 at 11:23 AM

## 2023-06-20 NOTE — FLOWSHEET NOTE
06/20/23 0953   Wound 05/23/23 Left;Plantar #2 1st met head   Date First Assessed/Time First Assessed: 05/23/23 0957   Present on Hospital Admission: Yes  Wound Approximate Age at First Assessment (Weeks): 4 weeks  Primary Wound Type: Diabetic Ulcer  Wound Location Orientation: Left;Plantar  Wound Description (C... Wound Image    Wound Etiology Diabetic Meza 1   Dressing Status Intact   Wound Cleansed Cleansed with saline   Dressing/Treatment Hydrofera blue   Offloading for Diabetic Foot Ulcers Offloading ordered   Wound Length (cm) 0 cm   Wound Width (cm) 0 cm   Wound Depth (cm) 0 cm   Wound Surface Area (cm^2) 0 cm^2   Change in Wound Size % (l*w) 100   Wound Volume (cm^3) 0 cm^3   Wound Healing % 100   Wound Assessment Epithelialization   Drainage Amount None   Odor None   Charla-wound Assessment Hyperkeratosis (callous)   Wound Thickness Description not for Pressure Injury Full thickness   Wound 04/14/23 Foot Right;Plantar #1 delayed healing surgical debridement 2/22/23   Date First Assessed/Time First Assessed: 04/14/23 1053   Present on Hospital Admission: Yes  Wound Approximate Age at First Assessment (Weeks): 6 weeks  Primary Wound Type: Diabetic Ulcer  Location: Foot  Wound Location Orientation: Right;Plantar  Wou. ..    Wound Image     Wound Etiology Diabetic Meza 3   Dressing Status Old drainage noted   Wound Cleansed Cleansed with saline   Dressing/Treatment Alginate with Ag   Offloading for Diabetic Foot Ulcers Total contact cast   Wound Length (cm) 1.2 cm   Wound Width (cm) 1.8 cm   Wound Depth (cm) 0.3 cm   Wound Surface Area (cm^2) 2.16 cm^2   Change in Wound Size % (l*w) 83.26   Wound Volume (cm^3) 0.648 cm^3   Wound Healing % 97   Post-Procedure Length (cm) 1.2 cm   Post-Procedure Width (cm) 1.8 cm   Post-Procedure Depth (cm) 0.3 cm   Post-Procedure Surface Area (cm^2) 2.16 cm^2   Post-Procedure Volume (cm^3) 0.648 cm^3   Wound Assessment Granulation tissue   Drainage Amount Large   Drainage

## 2023-06-27 ENCOUNTER — HOSPITAL ENCOUNTER (OUTPATIENT)
Dept: WOUND CARE | Age: 78
Discharge: HOME | End: 2023-06-27
Payer: MEDICARE

## 2023-06-27 VITALS
RESPIRATION RATE: 18 BRPM | HEIGHT: 63 IN | HEART RATE: 59 BPM | DIASTOLIC BLOOD PRESSURE: 66 MMHG | TEMPERATURE: 98 F | WEIGHT: 165 LBS | SYSTOLIC BLOOD PRESSURE: 120 MMHG | BODY MASS INDEX: 29.23 KG/M2

## 2023-06-27 DIAGNOSIS — L08.9 DIABETIC FOOT INFECTION (HCC): ICD-10-CM

## 2023-06-27 DIAGNOSIS — E11.628 DIABETIC FOOT INFECTION (HCC): ICD-10-CM

## 2023-06-27 DIAGNOSIS — L97.514 DIABETIC ULCER OF RIGHT FOOT ASSOCIATED WITH TYPE 2 DIABETES MELLITUS, WITH NECROSIS OF BONE, UNSPECIFIED PART OF FOOT (HCC): Primary | ICD-10-CM

## 2023-06-27 DIAGNOSIS — E11.621 DIABETIC ULCER OF RIGHT FOOT ASSOCIATED WITH TYPE 2 DIABETES MELLITUS, WITH NECROSIS OF BONE, UNSPECIFIED PART OF FOOT (HCC): Primary | ICD-10-CM

## 2023-06-27 PROCEDURE — 11042 DBRDMT SUBQ TIS 1ST 20SQCM/<: CPT

## 2023-06-27 RX ORDER — BACITRACIN ZINC AND POLYMYXIN B SULFATE 500; 1000 [USP'U]/G; [USP'U]/G
OINTMENT TOPICAL ONCE
OUTPATIENT
Start: 2023-06-27 | End: 2023-06-27

## 2023-06-27 RX ORDER — GINSENG 100 MG
CAPSULE ORAL ONCE
OUTPATIENT
Start: 2023-06-27 | End: 2023-06-27

## 2023-06-27 RX ORDER — IBUPROFEN 200 MG
TABLET ORAL ONCE
OUTPATIENT
Start: 2023-06-27 | End: 2023-06-27

## 2023-06-27 RX ORDER — LIDOCAINE 40 MG/G
CREAM TOPICAL ONCE
OUTPATIENT
Start: 2023-06-27 | End: 2023-06-27

## 2023-06-27 RX ORDER — LIDOCAINE HYDROCHLORIDE 40 MG/ML
SOLUTION TOPICAL ONCE
OUTPATIENT
Start: 2023-06-27 | End: 2023-06-27

## 2023-06-27 RX ORDER — BETAMETHASONE DIPROPIONATE 0.05 %
OINTMENT (GRAM) TOPICAL ONCE
OUTPATIENT
Start: 2023-06-27 | End: 2023-06-27

## 2023-06-27 RX ORDER — LIDOCAINE HYDROCHLORIDE 20 MG/ML
JELLY TOPICAL ONCE
OUTPATIENT
Start: 2023-06-27 | End: 2023-06-27

## 2023-06-27 RX ORDER — GENTAMICIN SULFATE 1 MG/G
OINTMENT TOPICAL ONCE
OUTPATIENT
Start: 2023-06-27 | End: 2023-06-27

## 2023-06-27 RX ORDER — CLOBETASOL PROPIONATE 0.5 MG/G
OINTMENT TOPICAL ONCE
OUTPATIENT
Start: 2023-06-27 | End: 2023-06-27

## 2023-06-27 RX ORDER — LIDOCAINE 50 MG/G
OINTMENT TOPICAL ONCE
OUTPATIENT
Start: 2023-06-27 | End: 2023-06-27

## 2023-06-27 RX ORDER — LIDOCAINE HYDROCHLORIDE 20 MG/ML
JELLY TOPICAL ONCE
Status: COMPLETED | OUTPATIENT
Start: 2023-06-27 | End: 2023-06-27

## 2023-06-27 RX ADMIN — LIDOCAINE HYDROCHLORIDE: 20 JELLY TOPICAL at 11:48

## 2023-06-29 PROBLEM — N17.9 AKI (ACUTE KIDNEY INJURY) (HCC): Chronic | Status: ACTIVE | Noted: 2023-02-20

## 2023-07-13 ENCOUNTER — HOSPITAL ENCOUNTER (OUTPATIENT)
Dept: WOUND CARE | Age: 78
Discharge: HOME | End: 2023-07-13
Payer: MEDICARE

## 2023-07-13 VITALS
HEART RATE: 61 BPM | HEIGHT: 63 IN | SYSTOLIC BLOOD PRESSURE: 132 MMHG | DIASTOLIC BLOOD PRESSURE: 63 MMHG | TEMPERATURE: 98.7 F | WEIGHT: 161 LBS | RESPIRATION RATE: 18 BRPM | BODY MASS INDEX: 28.53 KG/M2

## 2023-07-13 DIAGNOSIS — L97.514 DIABETIC ULCER OF RIGHT FOOT ASSOCIATED WITH TYPE 2 DIABETES MELLITUS, WITH NECROSIS OF BONE, UNSPECIFIED PART OF FOOT (HCC): Primary | ICD-10-CM

## 2023-07-13 DIAGNOSIS — L08.9 DIABETIC FOOT INFECTION (HCC): ICD-10-CM

## 2023-07-13 DIAGNOSIS — E11.628 DIABETIC FOOT INFECTION (HCC): ICD-10-CM

## 2023-07-13 DIAGNOSIS — E11.621 DIABETIC ULCER OF RIGHT FOOT ASSOCIATED WITH TYPE 2 DIABETES MELLITUS, WITH NECROSIS OF BONE, UNSPECIFIED PART OF FOOT (HCC): Primary | ICD-10-CM

## 2023-07-13 PROCEDURE — 11042 DBRDMT SUBQ TIS 1ST 20SQCM/<: CPT

## 2023-07-13 PROCEDURE — 29445 APPL RIGID TOT CNTC LEG CAST: CPT

## 2023-07-13 RX ORDER — GENTAMICIN SULFATE 1 MG/G
OINTMENT TOPICAL ONCE
OUTPATIENT
Start: 2023-07-13 | End: 2023-07-13

## 2023-07-13 RX ORDER — LIDOCAINE 50 MG/G
OINTMENT TOPICAL ONCE
OUTPATIENT
Start: 2023-07-13 | End: 2023-07-13

## 2023-07-13 RX ORDER — LIDOCAINE HYDROCHLORIDE 20 MG/ML
JELLY TOPICAL ONCE
OUTPATIENT
Start: 2023-07-13 | End: 2023-07-13

## 2023-07-13 RX ORDER — IBUPROFEN 200 MG
TABLET ORAL ONCE
OUTPATIENT
Start: 2023-07-13 | End: 2023-07-13

## 2023-07-13 RX ORDER — DOXYCYCLINE HYCLATE 100 MG
100 TABLET ORAL 2 TIMES DAILY
Qty: 60 TABLET | Refills: 1 | Status: SHIPPED | OUTPATIENT
Start: 2023-07-13 | End: 2023-09-11

## 2023-07-13 RX ORDER — CLOBETASOL PROPIONATE 0.5 MG/G
OINTMENT TOPICAL ONCE
OUTPATIENT
Start: 2023-07-13 | End: 2023-07-13

## 2023-07-13 RX ORDER — LIDOCAINE 40 MG/G
CREAM TOPICAL ONCE
OUTPATIENT
Start: 2023-07-13 | End: 2023-07-13

## 2023-07-13 RX ORDER — BETAMETHASONE DIPROPIONATE 0.05 %
OINTMENT (GRAM) TOPICAL ONCE
OUTPATIENT
Start: 2023-07-13 | End: 2023-07-13

## 2023-07-13 RX ORDER — GINSENG 100 MG
CAPSULE ORAL ONCE
OUTPATIENT
Start: 2023-07-13 | End: 2023-07-13

## 2023-07-13 RX ORDER — LIDOCAINE HYDROCHLORIDE 40 MG/ML
SOLUTION TOPICAL ONCE
OUTPATIENT
Start: 2023-07-13 | End: 2023-07-13

## 2023-07-13 RX ORDER — AMOXICILLIN AND CLAVULANATE POTASSIUM 875; 125 MG/1; MG/1
1 TABLET, FILM COATED ORAL 2 TIMES DAILY
Qty: 60 TABLET | Refills: 1 | Status: SHIPPED | OUTPATIENT
Start: 2023-07-13 | End: 2023-09-11

## 2023-07-13 RX ORDER — BACITRACIN ZINC AND POLYMYXIN B SULFATE 500; 1000 [USP'U]/G; [USP'U]/G
OINTMENT TOPICAL ONCE
OUTPATIENT
Start: 2023-07-13 | End: 2023-07-13

## 2023-07-13 NOTE — WOUND CARE
Total Contact Cast    NAME:  Ashwini Greer  YOB: 1945  MEDICAL RECORD NUMBER:  [id number]  DATE:  7/13/2023    Goal:  Patient will maintain integrity of cast, avoid mobility hazards, and report complications that may occur (foul odor, pain, numbness, cracked cast). Removed old contact cast if indicated and wash extremity with soap and water. Applied ordered dressing. Applied foam padding  Applied cast padding included in the kit   Applied per nursing and Shorty Every, DO  Applied to: [] Left Lower Leg [x] Right Lower Leg  Allow cast to dry. Instructed patient to report to health care provider, including wound care center, any back pain, hip pain, or leg pain, numbness of toes, or any odor coming from the cast.   Instructed patient not to stick any foreign objects down into cast.   Instructed patient to utilize assistive devices(crutches, cane or walker) as ordered   Instructed patient to continue offloading as directed.      Applied cast per  Guidelines    Electronically signed by Harmony Persaud RN on 7/13/2023 at 3:31 PM

## 2023-07-13 NOTE — DISCHARGE INSTRUCTIONS
Discharge Instructions for  [address] [facility]  [address] [facility], [address] [place] [address]  Phone [phone]   Fax [phone]      NAME:  Mari Smith  YOB: 1945  MEDICAL RECORD NUMBER:  [id number]  DATE:  @ED@    Return Appointment:   1 week with Humberto Anny, DO      Instructions: Left 1st met head Foot:  Wound is healed. Keep skin clean with soap and water. Apply vaseline daily. Right plantar foot:  Clean with Vashe. Apply alginate with silver. Cut product to wound size and apply to wound bed. Cover with optilock and wrap with gauze. Apply total contact cast.  Change weekly in wound cetner. Total Contact Cast on right foot. If cast becomes too tight, try elevating your legs to assist fluid drainage. Do not get wound wet in shower, pool or tub. May purchase cast cover at local pharmacy to keep dry in shower. Elevate legs when sitting. Avoid prolonged standing or sitting with legs in dependent position. Eliminate salt intake as this promotes fluid retention and swelling  Please increase dietary protein to at least 60 grams per day to support cell rejuvenation. May use supplements such as Ensure Max, Naga, & Glucerna (samples & coupons provided at this visit). Be sure to spread intake throughout the day for improved absorption. Infectious Disease : call [phone] to reschedule appointment. Continue antibiotics: Augmentin and Doxycycline as prescribed by ID. Additional refill sent to your pharmacy at today's visit for Augmentin and Doxy. Should you experience increased redness, swelling, pain, foul odor, size of wound(s), or have a temperature over 101 degrees please contact the [id number] at [phone] or if after hours contact your primary care physician or go to the hospital emergency department.     PLEASE NOTE: IF YOU ARE UNABLE TO OBTAIN WOUND SUPPLIES, CONTINUE TO USE THE SUPPLIES YOU HAVE AVAILABLE UNTIL

## 2023-07-13 NOTE — WOUND CARE
Discharge Instructions for  [address] [facility]  [address] [facility], [address]  Phone [phone]   Fax [phone]      NAME:  Ashok Dover  YOB: 1945  MEDICAL RECORD NUMBER:  [id number]  DATE:  7/13/2023    Return Appointment:   1 week with Lindsey Valdez DO    Instructions:   Left 1st met head Foot:  Wound is healed. Keep skin clean with soap and water. Apply vaseline daily. Right plantar foot:  Clean with Vashe. Apply alginate with silver. Cut product to wound size and apply to wound bed. Cover with optilock and wrap with gauze. Apply total contact cast.  Change weekly in wound cetner. Total Contact Cast on right foot. If cast becomes too tight, try elevating your legs to assist fluid drainage. Do not get wound wet in shower, pool or tub. May purchase cast cover at local pharmacy to keep dry in shower. Elevate legs when sitting. Avoid prolonged standing or sitting with legs in dependent position. Eliminate salt intake as this promotes fluid retention and swelling  Please increase dietary protein to at least 60 grams per day to support cell rejuvenation. May use supplements such as Ensure Max, Naga, & Glucerna (samples & coupons provided at this visit). Be sure to spread intake throughout the day for improved absorption. Infectious Disease : call [phone] to reschedule appointment. Continue antibiotics: Augmentin and Doxycycline as prescribed by ID. Additional refill sent to your pharmacy at today's visit for Augmentin and Doxy. Should you experience increased redness, swelling, pain, foul odor, size of wound(s), or have a temperature over 101 degrees please contact the [address] at [phone] or if after hours contact your primary care physician or go to the hospital emergency department.     PLEASE NOTE: IF YOU ARE UNABLE TO OBTAIN WOUND SUPPLIES, CONTINUE TO USE THE SUPPLIES YOU HAVE AVAILABLE UNTIL YOU

## 2023-07-13 NOTE — FLOWSHEET NOTE
07/13/23 1442   Wound 04/14/23 Foot Right;Plantar #1 delayed healing surgical debridement 2/22/23   Date First Assessed/Time First Assessed: 04/14/23 1053   Present on Hospital Admission: Yes  Wound Approximate Age at First Assessment (Weeks): 6 weeks  Primary Wound Type: Diabetic Ulcer  Location: Foot  Wound Location Orientation: Right;Plantar  Wou. ..    Wound Image     Wound Etiology Diabetic Meza 3   Dressing Status Old drainage noted   Wound Cleansed Soap and water;Vashe   Dressing/Treatment Alginate with Ag   Offloading for Diabetic Foot Ulcers Total contact cast   Wound Length (cm) 1 cm   Wound Width (cm) 1 cm   Wound Depth (cm) 0.6 cm   Wound Surface Area (cm^2) 1 cm^2   Change in Wound Size % (l*w) 92.25   Wound Volume (cm^3) 0.6 cm^3   Wound Healing % 97   Post-Procedure Length (cm) 1.5 cm   Post-Procedure Width (cm) 1.5 cm   Post-Procedure Depth (cm) 0.6 cm   Post-Procedure Surface Area (cm^2) 2.25 cm^2   Post-Procedure Volume (cm^3) 1.35 cm^3   Wound Assessment Granulation tissue   Drainage Amount Moderate   Drainage Description Serosanguinous   Odor None   Charla-wound Assessment Hyperkeratosis (callous)   Wound Thickness Description not for Pressure Injury Full thickness

## 2023-07-21 ENCOUNTER — HOSPITAL ENCOUNTER (OUTPATIENT)
Dept: WOUND CARE | Age: 78
Discharge: HOME | End: 2023-07-21
Payer: MEDICARE

## 2023-07-21 VITALS — HEIGHT: 63 IN | WEIGHT: 165 LBS | BODY MASS INDEX: 29.23 KG/M2

## 2023-07-21 DIAGNOSIS — L97.514 DIABETIC ULCER OF RIGHT FOOT ASSOCIATED WITH TYPE 2 DIABETES MELLITUS, WITH NECROSIS OF BONE, UNSPECIFIED PART OF FOOT (HCC): Primary | ICD-10-CM

## 2023-07-21 DIAGNOSIS — E11.628 DIABETIC FOOT INFECTION (HCC): ICD-10-CM

## 2023-07-21 DIAGNOSIS — L08.9 DIABETIC FOOT INFECTION (HCC): ICD-10-CM

## 2023-07-21 DIAGNOSIS — E11.621 DIABETIC ULCER OF RIGHT FOOT ASSOCIATED WITH TYPE 2 DIABETES MELLITUS, WITH NECROSIS OF BONE, UNSPECIFIED PART OF FOOT (HCC): Primary | ICD-10-CM

## 2023-07-21 PROCEDURE — 11042 DBRDMT SUBQ TIS 1ST 20SQCM/<: CPT

## 2023-07-21 RX ORDER — BACITRACIN ZINC AND POLYMYXIN B SULFATE 500; 1000 [USP'U]/G; [USP'U]/G
OINTMENT TOPICAL ONCE
OUTPATIENT
Start: 2023-07-21 | End: 2023-07-21

## 2023-07-21 RX ORDER — LIDOCAINE HYDROCHLORIDE 20 MG/ML
JELLY TOPICAL ONCE
OUTPATIENT
Start: 2023-07-21 | End: 2023-07-21

## 2023-07-21 RX ORDER — BETAMETHASONE DIPROPIONATE 0.05 %
OINTMENT (GRAM) TOPICAL ONCE
OUTPATIENT
Start: 2023-07-21 | End: 2023-07-21

## 2023-07-21 RX ORDER — LIDOCAINE 40 MG/G
CREAM TOPICAL ONCE
OUTPATIENT
Start: 2023-07-21 | End: 2023-07-21

## 2023-07-21 RX ORDER — CLOBETASOL PROPIONATE 0.5 MG/G
OINTMENT TOPICAL ONCE
OUTPATIENT
Start: 2023-07-21 | End: 2023-07-21

## 2023-07-21 RX ORDER — LIDOCAINE HYDROCHLORIDE 20 MG/ML
JELLY TOPICAL ONCE
Status: DISCONTINUED | OUTPATIENT
Start: 2023-07-21 | End: 2023-07-22 | Stop reason: HOSPADM

## 2023-07-21 RX ORDER — LIDOCAINE HYDROCHLORIDE 40 MG/ML
SOLUTION TOPICAL ONCE
OUTPATIENT
Start: 2023-07-21 | End: 2023-07-21

## 2023-07-21 RX ORDER — GINSENG 100 MG
CAPSULE ORAL ONCE
OUTPATIENT
Start: 2023-07-21 | End: 2023-07-21

## 2023-07-21 RX ORDER — GENTAMICIN SULFATE 1 MG/G
OINTMENT TOPICAL ONCE
OUTPATIENT
Start: 2023-07-21 | End: 2023-07-21

## 2023-07-21 RX ORDER — IBUPROFEN 200 MG
TABLET ORAL ONCE
OUTPATIENT
Start: 2023-07-21 | End: 2023-07-21

## 2023-07-21 RX ORDER — LIDOCAINE 50 MG/G
OINTMENT TOPICAL ONCE
OUTPATIENT
Start: 2023-07-21 | End: 2023-07-21

## 2023-07-21 NOTE — FLOWSHEET NOTE
Pre & post debridement     07/21/23 1437   Right Leg Edema Point of Measurement   Leg circumference 32.5 cm   Ankle circumference 21 cm   Foot circumference 25 cm   Compression Therapy Other  (TCC)   RLE Neurovascular Assessment   Capillary Refill Less than/Equal to 3 seconds   Color Appropriate for Ethnicity   Temperature Warm   RLE Sensation  Decreased   R Pedal Pulse +3   Wound 04/14/23 Foot Right;Plantar #1 delayed healing surgical debridement 2/22/23   Date First Assessed/Time First Assessed: 04/14/23 1053   Present on Hospital Admission: Yes  Wound Approximate Age at First Assessment (Weeks): 6 weeks  Primary Wound Type: Diabetic Ulcer  Location: Foot  Wound Location Orientation: Right;Plantar  Wou. .. Wound Image     Wound Etiology Diabetic Meza 3   Dressing Status Old drainage noted   Wound Cleansed Cleansed with saline; Soap and water;Vashe   Dressing/Treatment Alginate with Ag   Offloading for Diabetic Foot Ulcers Total contact cast   Wound Length (cm) 1.4 cm   Wound Width (cm) 1 cm   Wound Depth (cm) 0.5 cm   Wound Surface Area (cm^2) 1.4 cm^2   Change in Wound Size % (l*w) 89.15   Wound Volume (cm^3) 0.7 cm^3   Wound Healing % 96   Wound Assessment Granulation tissue   Drainage Amount Moderate   Drainage Description Serosanguinous   Odor None   Charla-wound Assessment Intact   Margins Defined edges; Attached edges   Wound Thickness Description not for Pressure Injury Full thickness

## 2023-07-21 NOTE — DISCHARGE INSTRUCTIONS
Lakeisha Espinosa RN  Registered Nurse  Wound Care      Signed  Date of Service:  7/21/2023  2:00 PM     Signed                                                                                                                  Discharge Instructions for  [facility]  [address], [address] [place] [address]  Phone [phone]   Fax [phone]        NAME:  Viraj Chan  YOB: 1945  MEDICAL RECORD NUMBER:  [id number]  DATE:  7/21/2023     Return Appointment:   1 week with Wyatt Andrews DO     Instructions:   Left 1st met head Foot:  Wound is healed. Keep skin clean with soap and water. Apply vaseline daily. Right plantar foot:  Clean with Vashe. Apply alginate with silver. Cut product to wound size and apply to wound bed. Cover with optilock and wrap with gauze. Apply total contact cast.  Change weekly in wound cetner. Total Contact Cast on right foot. If cast becomes too tight, try elevating your legs to assist fluid drainage. Do not get wound wet in shower, pool or tub. May purchase cast cover at local pharmacy to keep dry in shower. Elevate legs when sitting. Avoid prolonged standing or sitting with legs in dependent position. Eliminate salt intake as this promotes fluid retention and swelling  Please increase dietary protein to at least 60 grams per day to support cell rejuvenation. May use supplements such as Ensure Max, Naga, & Glucerna   Be sure to spread intake throughout the day for improved absorption. Infectious Disease : call [phone] to reschedule appointment. Continue antibiotics: Augmentin and Doxycycline as prescribed by ID. Please  additional refill of antibiotics sent to your pharmacy.       Should you experience increased redness, swelling, pain, foul odor, size of wound(s), or have a temperature over 101 degrees please contact the [address] at [phone] or if after hours contact your primary

## 2023-07-21 NOTE — WOUND CARE
Total Contact Cast    NAME:  Jeniffer Reeves  YOB: 1945  MEDICAL RECORD NUMBER:  [id number]  DATE:  7/21/2023    Goal:  Patient will maintain integrity of cast, avoid mobility hazards, and report complications that may occur (foul odor, pain, numbness, cracked cast). Removed old contact cast if indicated and wash extremity with soap and water. Applied ordered dressing. Applied foam padding  Applied cast padding included in the kit   Applied per nursing and Rebecca Celine, DO  Applied to: [] Left Lower Leg [x] Right Lower Leg  Allow cast to dry. Instructed patient to report to health care provider, including wound care center, any back pain, hip pain, or leg pain, numbness of toes, or any odor coming from the cast.   Instructed patient not to stick any foreign objects down into cast.   Instructed patient to utilize assistive devices(crutches, cane or walker) as ordered   Instructed patient to continue offloading as directed.      Applied cast per  Guidelines    Electronically signed by Patrick Ross RN on 7/21/2023 at 3:04 PM
WOUND COVERED AT ALL TIMES.     Electronically signed Sara Tenorio RN on 7/21/2023 at 2:41 PM

## 2023-07-25 ENCOUNTER — HOSPITAL ENCOUNTER (OUTPATIENT)
Dept: WOUND CARE | Age: 78
Discharge: HOME | End: 2023-07-25
Payer: MEDICARE

## 2023-07-25 VITALS
WEIGHT: 163 LBS | BODY MASS INDEX: 28.88 KG/M2 | HEIGHT: 63 IN | DIASTOLIC BLOOD PRESSURE: 65 MMHG | SYSTOLIC BLOOD PRESSURE: 114 MMHG | HEART RATE: 58 BPM

## 2023-07-25 DIAGNOSIS — L97.514 DIABETIC ULCER OF RIGHT FOOT ASSOCIATED WITH TYPE 2 DIABETES MELLITUS, WITH NECROSIS OF BONE, UNSPECIFIED PART OF FOOT (HCC): Primary | ICD-10-CM

## 2023-07-25 DIAGNOSIS — E11.621 DIABETIC ULCER OF RIGHT FOOT ASSOCIATED WITH TYPE 2 DIABETES MELLITUS, WITH NECROSIS OF BONE, UNSPECIFIED PART OF FOOT (HCC): Primary | ICD-10-CM

## 2023-07-25 DIAGNOSIS — L08.9 DIABETIC FOOT INFECTION (HCC): ICD-10-CM

## 2023-07-25 DIAGNOSIS — E11.628 DIABETIC FOOT INFECTION (HCC): ICD-10-CM

## 2023-07-25 PROCEDURE — 29445 APPL RIGID TOT CNTC LEG CAST: CPT

## 2023-07-25 RX ORDER — LIDOCAINE HYDROCHLORIDE 20 MG/ML
JELLY TOPICAL ONCE
OUTPATIENT
Start: 2023-07-25 | End: 2023-07-25

## 2023-07-25 RX ORDER — LIDOCAINE 50 MG/G
OINTMENT TOPICAL ONCE
OUTPATIENT
Start: 2023-07-25 | End: 2023-07-25

## 2023-07-25 RX ORDER — CLOBETASOL PROPIONATE 0.5 MG/G
OINTMENT TOPICAL ONCE
OUTPATIENT
Start: 2023-07-25 | End: 2023-07-25

## 2023-07-25 RX ORDER — LIDOCAINE HYDROCHLORIDE 40 MG/ML
SOLUTION TOPICAL ONCE
OUTPATIENT
Start: 2023-07-25 | End: 2023-07-25

## 2023-07-25 RX ORDER — BETAMETHASONE DIPROPIONATE 0.05 %
OINTMENT (GRAM) TOPICAL ONCE
OUTPATIENT
Start: 2023-07-25 | End: 2023-07-25

## 2023-07-25 RX ORDER — GINSENG 100 MG
CAPSULE ORAL ONCE
OUTPATIENT
Start: 2023-07-25 | End: 2023-07-25

## 2023-07-25 RX ORDER — IBUPROFEN 200 MG
TABLET ORAL ONCE
OUTPATIENT
Start: 2023-07-25 | End: 2023-07-25

## 2023-07-25 RX ORDER — GENTAMICIN SULFATE 1 MG/G
OINTMENT TOPICAL ONCE
OUTPATIENT
Start: 2023-07-25 | End: 2023-07-25

## 2023-07-25 RX ORDER — LIDOCAINE 40 MG/G
CREAM TOPICAL ONCE
OUTPATIENT
Start: 2023-07-25 | End: 2023-07-25

## 2023-07-25 RX ORDER — BACITRACIN ZINC AND POLYMYXIN B SULFATE 500; 1000 [USP'U]/G; [USP'U]/G
OINTMENT TOPICAL ONCE
OUTPATIENT
Start: 2023-07-25 | End: 2023-07-25

## 2023-07-25 NOTE — FLOWSHEET NOTE
07/25/23 0940   Right Leg Edema Point of Measurement   Leg circumference 32 cm   Ankle circumference 20.5 cm   Foot circumference 23 cm   Compression Therapy Other  (TCC)   RLE Neurovascular Assessment   Capillary Refill Less than/Equal to 3 seconds   Color Appropriate for Ethnicity   Temperature Warm   RLE Sensation  Decreased   R Pedal Pulse +3   Wound 04/14/23 Foot Right;Plantar #1 delayed healing surgical debridement 2/22/23   Date First Assessed/Time First Assessed: 04/14/23 1053   Present on Hospital Admission: Yes  Wound Approximate Age at First Assessment (Weeks): 6 weeks  Primary Wound Type: Diabetic Ulcer  Location: Foot  Wound Location Orientation: Right;Plantar  Wou. .. Wound Image    Wound Etiology Diabetic Meza 3   Dressing Status Old drainage noted   Wound Cleansed Cleansed with saline; Soap and water;Vashe   Dressing/Treatment Alginate with Ag  (Exudry pad)   Offloading for Diabetic Foot Ulcers Total contact cast   Wound Length (cm) 1.4 cm   Wound Width (cm) 0.6 cm   Wound Depth (cm) 0.5 cm   Wound Surface Area (cm^2) 0.84 cm^2   Change in Wound Size % (l*w) 93.49   Wound Volume (cm^3) 0.42 cm^3   Wound Healing % 98   Wound Assessment Epithelialization;Granulation tissue   Drainage Amount Moderate   Drainage Description Serosanguinous   Odor None   Charla-wound Assessment Intact   Margins Defined edges   Wound Thickness Description not for Pressure Injury Full thickness

## 2023-07-25 NOTE — DISCHARGE INSTRUCTIONS
Amarilys Barr RN  Registered Nurse  Wound Care      Signed  Date of Service:  7/25/2023  9:00 AM     Signed                                                                                                                                          Discharge Instructions for  [facility]  [address], [address] [place] [address]  Phone [phone]   Fax [phone]        NAME:  Vivek Salter  YOB: 1945  MEDICAL RECORD NUMBER:  [id number]  DATE:  7/25/2023     Return Appointment:   1 week with Hortencia Renteria DO     Instructions:   Right plantar foot:  Cleanse intact skin with soap & water, wound with normal saline or wound cleanser. Vashe Wound Solution (hypochlorous acid) soak placed on wound bed for a minimum of 60 seconds prior to dressing application. Apply alginate with silver. Cut product to wound size and apply to wound bed. Cover with Exudry pad & secure with rolled gauze. Total Contact Cast on right foot. If cast becomes too tight, try elevating your legs to assist fluid drainage. Do not get wound wet in shower, pool or tub. May purchase cast cover at local pharmacy to keep dry in shower. Elevate legs when sitting. Avoid prolonged standing or sitting with legs in dependent position. Eliminate salt intake as this promotes fluid retention and swelling  Please increase dietary protein to at least 60 grams per day to support cell rejuvenation. May use supplements such as Ensure Max, Naga, & Glucerna   Be sure to spread intake throughout the day for improved absorption. Infectious Disease : call [phone] to reschedule appointment. Continue antibiotics: Augmentin and Doxycycline as prescribed by ID.       Should you experience increased redness, swelling, pain, foul odor, size of wound(s), or have a temperature over 101 degrees please contact the [id number] at [phone] or if after hours contact your primary care

## 2023-08-01 ENCOUNTER — HOSPITAL ENCOUNTER (OUTPATIENT)
Dept: WOUND CARE | Age: 78
Discharge: HOME | End: 2023-08-01
Payer: MEDICARE

## 2023-08-01 VITALS
SYSTOLIC BLOOD PRESSURE: 123 MMHG | WEIGHT: 163 LBS | HEART RATE: 56 BPM | TEMPERATURE: 97 F | RESPIRATION RATE: 18 BRPM | HEIGHT: 63 IN | DIASTOLIC BLOOD PRESSURE: 62 MMHG | BODY MASS INDEX: 28.88 KG/M2

## 2023-08-01 DIAGNOSIS — L97.514 DIABETIC ULCER OF RIGHT FOOT ASSOCIATED WITH TYPE 2 DIABETES MELLITUS, WITH NECROSIS OF BONE, UNSPECIFIED PART OF FOOT (HCC): Primary | ICD-10-CM

## 2023-08-01 DIAGNOSIS — E11.621 DIABETIC ULCER OF RIGHT FOOT ASSOCIATED WITH TYPE 2 DIABETES MELLITUS, WITH NECROSIS OF BONE, UNSPECIFIED PART OF FOOT (HCC): Primary | ICD-10-CM

## 2023-08-01 DIAGNOSIS — E11.628 DIABETIC FOOT INFECTION (HCC): ICD-10-CM

## 2023-08-01 DIAGNOSIS — L08.9 DIABETIC FOOT INFECTION (HCC): ICD-10-CM

## 2023-08-01 PROCEDURE — 11042 DBRDMT SUBQ TIS 1ST 20SQCM/<: CPT

## 2023-08-01 RX ORDER — CLOBETASOL PROPIONATE 0.5 MG/G
OINTMENT TOPICAL ONCE
OUTPATIENT
Start: 2023-08-01 | End: 2023-08-01

## 2023-08-01 RX ORDER — GENTAMICIN SULFATE 1 MG/G
OINTMENT TOPICAL ONCE
OUTPATIENT
Start: 2023-08-01 | End: 2023-08-01

## 2023-08-01 RX ORDER — LIDOCAINE HYDROCHLORIDE 40 MG/ML
SOLUTION TOPICAL ONCE
OUTPATIENT
Start: 2023-08-01 | End: 2023-08-01

## 2023-08-01 RX ORDER — GINSENG 100 MG
CAPSULE ORAL ONCE
OUTPATIENT
Start: 2023-08-01 | End: 2023-08-01

## 2023-08-01 RX ORDER — BACITRACIN ZINC AND POLYMYXIN B SULFATE 500; 1000 [USP'U]/G; [USP'U]/G
OINTMENT TOPICAL ONCE
OUTPATIENT
Start: 2023-08-01 | End: 2023-08-01

## 2023-08-01 RX ORDER — IBUPROFEN 200 MG
TABLET ORAL ONCE
OUTPATIENT
Start: 2023-08-01 | End: 2023-08-01

## 2023-08-01 RX ORDER — LIDOCAINE 50 MG/G
OINTMENT TOPICAL ONCE
OUTPATIENT
Start: 2023-08-01 | End: 2023-08-01

## 2023-08-01 RX ORDER — LIDOCAINE HYDROCHLORIDE 20 MG/ML
JELLY TOPICAL ONCE
OUTPATIENT
Start: 2023-08-01 | End: 2023-08-01

## 2023-08-01 RX ORDER — LIDOCAINE 40 MG/G
CREAM TOPICAL ONCE
OUTPATIENT
Start: 2023-08-01 | End: 2023-08-01

## 2023-08-01 RX ORDER — BETAMETHASONE DIPROPIONATE 0.05 %
OINTMENT (GRAM) TOPICAL ONCE
OUTPATIENT
Start: 2023-08-01 | End: 2023-08-01

## 2023-08-01 NOTE — WOUND CARE
Total Contact Cast    NAME:  Frank Flores  YOB: 1945  MEDICAL RECORD NUMBER:  [id number]  DATE:  8/1/2023    Goal:  Patient will maintain integrity of cast, avoid mobility hazards, and report complications that may occur (foul odor, pain, numbness, cracked cast). Removed old contact cast if indicated and wash extremity with soap and water. Applied ordered dressing. Applied foam padding  Applied cast padding included in the kit   Applied per nursing and Yue Bermeoals, DO  Applied to: [] Left Lower Leg [x] Right Lower Leg  Allow cast to dry. Instructed patient to report to health care provider, including wound care center, any back pain, hip pain, or leg pain, numbness of toes, or any odor coming from the cast.   Instructed patient not to stick any foreign objects down into cast.   Instructed patient to utilize assistive devices(crutches, cane or walker) as ordered   Instructed patient to continue offloading as directed.      Applied cast per  Guidelines    Electronically signed by Jane Arenas RN on 8/1/2023 at 10:16 AM

## 2023-08-01 NOTE — WOUND CARE
[facility]                     [address] [facility] [address]    PATIENT NAME:      CHRISTINA BEYER   YOB: 1976  CSN:               [id number]  MRN:               [id number]  ADMIT DATE:        05/01/2022 03:04:00  PHYSICIAN:         Michael Ordoñez MD                          OPERATIVE REPORT      DATE OF SURGERY:        SURGEON:  Michael Ordoñez MD    ADMITTING DIAGNOSIS:  A large abscess on the right perianal region.    PROCEDURES:  Incision, drainage, debridement and washout of a right perianal   abscess, 15 cm long, about 4 or 5 cm wide and about 7 cm deep.       Patient is a 45-year-old  female with a history of a large right   perirectal and perineal abscess that was 15 cm long, 4 cm wide and about 7 cm   deep.  Patient did well.      Patient underwent lithotomy positioning, general anesthetic and then had   incision and drainage done with a 15-blade scalpel of the abscessed area.  It   was evacuated.  Cultures were obtained, aerobic, anaerobic, and Gram stain.    Patient had good hemostasis.  Bovie cautery was used for hemostasis along with   TXA and thrombin spray and surgical gauze.  Sterile dressings were applied.  No   problems were encountered.  Patient tolerated the procedure well.     I appreciate the consultation referral from Dr. Shaun Sahu.      Wound Care will be consulted and further treatment is pending these results.        ______________________________  MD KAMERON Andrews/ANIBAL  DD:  05/01/2022  Time:  07:23PM  DT:  05/01/2022  Time:  07:49PM  Job #:  [id number]      OPERATIVE REPORT       Discharge Instructions for  [address]  [facility]  [address], [address]   Phone [phone]   Fax [phone]      NAME:  Zach Nunez  YOB: 1945  MEDICAL RECORD NUMBER:  [id number]  DATE:  8/1/2023    Return Appointment:   1 week with Usman Gustafson DO    Instructions:   Right plantar foot:  Cleanse intact skin with soap & water, wound with normal saline or wound cleanser. Vashe Wound Solution (hypochlorous acid) soak placed on wound bed for a minimum of 60 seconds prior to dressing application. Hydrofera Blue: Cut to wound size, moisten with saline, and apply to wound bed. Cover with Exudry pad & secure with rolled gauze. Total Contact Cast on right foot. If cast becomes too tight, try elevating your legs to assist fluid drainage. Do not get wound wet in shower, pool or tub. May purchase cast cover at local pharmacy to keep dry in shower. Elevate legs when sitting. Avoid prolonged standing or sitting with legs in dependent position. Eliminate salt intake as this promotes fluid retention and swelling  Please increase dietary protein to at least 60 grams per day to support cell rejuvenation. May use supplements such as Ensure Max, Naga, & Glucerna   Be sure to spread intake throughout the day for improved absorption. Infectious Disease : call [phone] to reschedule appointment. Continue antibiotics: Augmentin and Doxycycline as prescribed by ID. Should you experience increased redness, swelling, pain, foul odor, size of wound(s), or have a temperature over 101 degrees please contact the [address] at [phone] or if after hours contact your primary care physician or go to the hospital emergency department. PLEASE NOTE: IF YOU ARE UNABLE TO OBTAIN WOUND SUPPLIES, CONTINUE TO USE THE SUPPLIES YOU HAVE AVAILABLE UNTIL YOU ARE ABLE TO REACH US.  IT IS MOST IMPORTANT TO KEEP THE WOUND COVERED AT ALL actual

## 2023-08-01 NOTE — FLOWSHEET NOTE
08/01/23 0951   Wound 04/14/23 Foot Right;Plantar #1 delayed healing surgical debridement 2/22/23   Date First Assessed/Time First Assessed: 04/14/23 1053   Present on Hospital Admission: Yes  Wound Approximate Age at First Assessment (Weeks): 6 weeks  Primary Wound Type: Diabetic Ulcer  Location: Foot  Wound Location Orientation: Right;Plantar  Wou. ..    Wound Image    Wound Etiology Diabetic Meza 3   Dressing Status Old drainage noted   Wound Cleansed Cleansed with saline   Dressing/Treatment Alginate with Ag   Offloading for Diabetic Foot Ulcers Total contact cast   Wound Length (cm) 0.3 cm   Wound Width (cm) 0.3 cm   Wound Depth (cm) 0.5 cm   Wound Surface Area (cm^2) 0.09 cm^2   Change in Wound Size % (l*w) 99.3   Wound Volume (cm^3) 0.045 cm^3   Wound Healing % 100   Wound Assessment Pink/red;Epithelialization   Drainage Amount Moderate   Drainage Description Serous   Odor None   Charla-wound Assessment Intact   Wound Thickness Description not for Pressure Injury Full thickness   Pain Assessment   Pain Assessment None - Denies Pain

## 2023-08-08 ENCOUNTER — HOSPITAL ENCOUNTER (OUTPATIENT)
Dept: WOUND CARE | Age: 78
Discharge: HOME | End: 2023-08-08
Payer: MEDICARE

## 2023-08-08 VITALS
DIASTOLIC BLOOD PRESSURE: 58 MMHG | BODY MASS INDEX: 29.23 KG/M2 | WEIGHT: 165 LBS | HEART RATE: 54 BPM | HEIGHT: 63 IN | SYSTOLIC BLOOD PRESSURE: 129 MMHG

## 2023-08-08 DIAGNOSIS — L97.514 DIABETIC ULCER OF RIGHT FOOT ASSOCIATED WITH TYPE 2 DIABETES MELLITUS, WITH NECROSIS OF BONE, UNSPECIFIED PART OF FOOT (HCC): Primary | ICD-10-CM

## 2023-08-08 DIAGNOSIS — E11.628 DIABETIC FOOT INFECTION (HCC): ICD-10-CM

## 2023-08-08 DIAGNOSIS — E11.621 DIABETIC ULCER OF RIGHT FOOT ASSOCIATED WITH TYPE 2 DIABETES MELLITUS, WITH NECROSIS OF BONE, UNSPECIFIED PART OF FOOT (HCC): Primary | ICD-10-CM

## 2023-08-08 DIAGNOSIS — L08.9 DIABETIC FOOT INFECTION (HCC): ICD-10-CM

## 2023-08-08 PROCEDURE — 11042 DBRDMT SUBQ TIS 1ST 20SQCM/<: CPT

## 2023-08-08 RX ORDER — LIDOCAINE 50 MG/G
OINTMENT TOPICAL ONCE
OUTPATIENT
Start: 2023-08-08 | End: 2023-08-08

## 2023-08-08 RX ORDER — GINSENG 100 MG
CAPSULE ORAL ONCE
OUTPATIENT
Start: 2023-08-08 | End: 2023-08-08

## 2023-08-08 RX ORDER — LIDOCAINE HYDROCHLORIDE 40 MG/ML
SOLUTION TOPICAL ONCE
OUTPATIENT
Start: 2023-08-08 | End: 2023-08-08

## 2023-08-08 RX ORDER — LIDOCAINE HYDROCHLORIDE 20 MG/ML
JELLY TOPICAL ONCE
Status: COMPLETED | OUTPATIENT
Start: 2023-08-08 | End: 2023-08-08

## 2023-08-08 RX ORDER — IBUPROFEN 200 MG
TABLET ORAL ONCE
OUTPATIENT
Start: 2023-08-08 | End: 2023-08-08

## 2023-08-08 RX ORDER — BETAMETHASONE DIPROPIONATE 0.05 %
OINTMENT (GRAM) TOPICAL ONCE
OUTPATIENT
Start: 2023-08-08 | End: 2023-08-08

## 2023-08-08 RX ORDER — LIDOCAINE HYDROCHLORIDE 20 MG/ML
JELLY TOPICAL ONCE
OUTPATIENT
Start: 2023-08-08 | End: 2023-08-08

## 2023-08-08 RX ORDER — GENTAMICIN SULFATE 1 MG/G
OINTMENT TOPICAL ONCE
OUTPATIENT
Start: 2023-08-08 | End: 2023-08-08

## 2023-08-08 RX ORDER — LIDOCAINE 40 MG/G
CREAM TOPICAL ONCE
OUTPATIENT
Start: 2023-08-08 | End: 2023-08-08

## 2023-08-08 RX ORDER — BACITRACIN ZINC AND POLYMYXIN B SULFATE 500; 1000 [USP'U]/G; [USP'U]/G
OINTMENT TOPICAL ONCE
OUTPATIENT
Start: 2023-08-08 | End: 2023-08-08

## 2023-08-08 RX ORDER — CLOBETASOL PROPIONATE 0.5 MG/G
OINTMENT TOPICAL ONCE
OUTPATIENT
Start: 2023-08-08 | End: 2023-08-08

## 2023-08-08 RX ADMIN — LIDOCAINE HYDROCHLORIDE: 20 JELLY TOPICAL at 10:26

## 2023-08-08 NOTE — FLOWSHEET NOTE
08/08/23 0953   Wound 04/14/23 Foot Right;Plantar #1 delayed healing surgical debridement 2/22/23   Date First Assessed/Time First Assessed: 04/14/23 1053   Present on Hospital Admission: Yes  Wound Approximate Age at First Assessment (Weeks): 6 weeks  Primary Wound Type: Diabetic Ulcer  Location: Foot  Wound Location Orientation: Right;Plantar  Wou. .. Wound Image     Wound Etiology Diabetic Meza 3   Dressing Status Old drainage noted   Wound Cleansed Cleansed with saline; Soap and water   Dressing/Treatment Hydrofera blue   Offloading for Diabetic Foot Ulcers Total contact cast   Wound Length (cm) 0.5 cm   Wound Width (cm) 0.3 cm   Wound Depth (cm) 0.3 cm   Wound Surface Area (cm^2) 0.15 cm^2   Change in Wound Size % (l*w) 98.84   Wound Volume (cm^3) 0.045 cm^3   Wound Healing % 100   Post-Procedure Length (cm) 0.5 cm   Post-Procedure Width (cm) 0.5 cm   Post-Procedure Depth (cm) 0.3 cm   Post-Procedure Surface Area (cm^2) 0.25 cm^2   Post-Procedure Volume (cm^3) 0.075 cm^3   Wound Assessment Pink/red   Drainage Amount Moderate   Drainage Description Serosanguinous   Odor None   Charla-wound Assessment Hyperkeratosis (callous)   Wound Thickness Description not for Pressure Injury Full thickness     Post debridement

## 2023-08-08 NOTE — DISCHARGE INSTRUCTIONS
Discharge Instructions for  [address] [facility]  [address] [facility], [address] [place] [address]  Phone [phone]   Fax [phone]      NAME:  Yimi Mcdonnell  YOB: 1945  MEDICAL RECORD NUMBER:  [id number]  DATE:  @ED@    Return Appointment:   1 week with Bryson Early, DO      Instructions: Right plantar foot:  Cleanse intact skin with soap & water, wound with normal saline or wound cleanser. Vashe Wound Solution (hypochlorous acid) soak placed on wound bed for a minimum of 60 seconds prior to dressing application. Hydrofera Blue: Cut to wound size, moisten with saline, and apply to wound bed. Cover with Exudry pad & secure with rolled gauze. Do not get wound wet in shower, pool or tub. May purchase cast cover at local pharmacy to keep dry in shower. Change 3 times per week     HH to restart to change 3 times per week until restarting Total contact cast     Elevate legs when sitting. Avoid prolonged standing or sitting with legs in dependent position. Eliminate salt intake as this promotes fluid retention and swelling  Please increase dietary protein to at least 60 grams per day to support cell rejuvenation. May use supplements such as Ensure Max, Naga, & Glucerna   Be sure to spread intake throughout the day for improved absorption. Infectious Disease : call [phone] to reschedule appointment. Continue antibiotics: Augmentin and Doxycycline as prescribed by ID. MRI ordered. Please call [phone] to speak directly with a  to schedule your appointment if you have not received an automated call within 24 hours. Should you experience increased redness, swelling, pain, foul odor, size of wound(s), or have a temperature over 101 degrees please contact the [id number] at [phone] or if after hours contact your primary care physician or go to the hospital emergency department.     PLEASE NOTE: IF YOU ARE UNABLE TO OBTAIN

## 2023-08-08 NOTE — WOUND CARE
Discharge Instructions for  [address] [facility]  [address] [facility], [address] [place] [address]  Phone [phone]   Fax [phone]      NAME:  Casey Crews  YOB: 1945  MEDICAL RECORD NUMBER:  [id number]  DATE:  8/8/2023    Return Appointment:   1 week with Ginger Beauchamp DO    Instructions:   Right plantar foot:  Cleanse intact skin with soap & water, wound with normal saline or wound cleanser. Vashe Wound Solution (hypochlorous acid) soak placed on wound bed for a minimum of 60 seconds prior to dressing application. Hydrofera Blue: Cut to wound size, moisten with saline, and apply to wound bed. Cover with Exudry pad & secure with rolled gauze. Do not get wound wet in shower, pool or tub. May purchase cast cover at local pharmacy to keep dry in shower. Change 3 times per week    HH to restart to change 3 times per week until restarting Total contact cast    Elevate legs when sitting. Avoid prolonged standing or sitting with legs in dependent position. Eliminate salt intake as this promotes fluid retention and swelling  Please increase dietary protein to at least 60 grams per day to support cell rejuvenation. May use supplements such as Ensure Max, Naga, & Glucerna   Be sure to spread intake throughout the day for improved absorption. Infectious Disease : call [phone] to reschedule appointment. Continue antibiotics: Augmentin and Doxycycline as prescribed by ID. MRI ordered. Please call [phone] to speak directly with a  to schedule your appointment if you have not received an automated call within 24 hours. Should you experience increased redness, swelling, pain, foul odor, size of wound(s), or have a temperature over 101 degrees please contact the [id number] at [phone] or if after hours contact your primary care physician or go to the hospital emergency department.     PLEASE NOTE: IF YOU ARE UNABLE TO OBTAIN WOUND

## 2023-08-08 NOTE — FLOWSHEET NOTE
08/08/23 0953   Wound 04/14/23 Foot Right;Plantar #1 delayed healing surgical debridement 2/22/23   Date First Assessed/Time First Assessed: 04/14/23 1053   Present on Hospital Admission: Yes  Wound Approximate Age at First Assessment (Weeks): 6 weeks  Primary Wound Type: Diabetic Ulcer  Location: Foot  Wound Location Orientation: Right;Plantar  Wou. .. Wound Image    Wound Etiology Diabetic Meza 3   Dressing Status Old drainage noted   Wound Cleansed Cleansed with saline; Soap and water   Dressing/Treatment Hydrofera blue   Offloading for Diabetic Foot Ulcers Total contact cast   Wound Length (cm) 0.5 cm   Wound Width (cm) 0.3 cm   Wound Depth (cm) 0.3 cm   Wound Surface Area (cm^2) 0.15 cm^2   Change in Wound Size % (l*w) 98.84   Wound Volume (cm^3) 0.045 cm^3   Wound Healing % 100   Wound Assessment Pink/red   Drainage Amount Moderate   Drainage Description Serosanguinous   Odor None   Charla-wound Assessment Hyperkeratosis (callous)   Wound Thickness Description not for Pressure Injury Full thickness

## 2023-08-15 ENCOUNTER — HOSPITAL ENCOUNTER (OUTPATIENT)
Dept: WOUND CARE | Age: 78
Discharge: HOME | End: 2023-08-15
Payer: MEDICARE

## 2023-08-15 VITALS — WEIGHT: 165 LBS | HEIGHT: 63 IN | BODY MASS INDEX: 29.23 KG/M2

## 2023-08-15 PROCEDURE — 11043 DBRDMT MUSC&/FSCA 1ST 20/<: CPT

## 2023-08-15 NOTE — WOUND CARE
Discharge Instructions for  [address] [facility]  [address] , [address] [place] [address]  Phone [phone]   Fax [phone]      NAME:  Melani Adams  YOB: 1945  MEDICAL RECORD NUMBER:  [id number]  DATE:  8/15/2023    Return Appointment:   1 week with Otis Abreu DO    Instructions:   Right plantar foot:  Cleanse intact skin with soap & water, wound with normal saline or wound cleanser. Vashe Wound Solution (hypochlorous acid) soak placed on wound bed for a minimum of 60 seconds prior to dressing application. Hydrofera Blue: Cut to wound size, moisten with saline, and apply to wound bed. Cover with Exudry pad & secure with rolled gauze. Do not get wound wet in shower, pool or tub. May purchase cast cover at local pharmacy to keep dry in shower. Change 3 times per week    HH to restart to change 3 times per week until restarting Total contact cast    Elevate legs when sitting. Avoid prolonged standing or sitting with legs in dependent position. Eliminate salt intake as this promotes fluid retention and swelling  Please increase dietary protein to at least 60 grams per day to support cell rejuvenation. May use supplements such as Ensure Max, Naga, & Glucerna   Be sure to spread intake throughout the day for improved absorption. Infectious Disease : call [phone] to reschedule appointment. Continue antibiotics: Augmentin and Doxycycline as prescribed by ID. MRI ordered STAT. Appointment scheduled for 08/16 at 5 pm in [place] office. Should you experience increased redness, swelling, pain, foul odor, size of wound(s), or have a temperature over 101 degrees please contact the [id number] at [phone] or if after hours contact your primary care physician or go to the hospital emergency department.     PLEASE NOTE: IF YOU ARE UNABLE TO OBTAIN WOUND SUPPLIES, CONTINUE TO USE THE SUPPLIES YOU HAVE AVAILABLE UNTIL YOU ARE ABLE TO

## 2023-08-15 NOTE — FLOWSHEET NOTE
Pre & post debridement     08/15/23 1124   Right Leg Edema Point of Measurement   Leg circumference 33.5 cm   Ankle circumference 23 cm   Foot circumference 23 cm   Compression Therapy Compression not ordered   RLE Neurovascular Assessment   Capillary Refill Less than/Equal to 3 seconds   Color Appropriate for Ethnicity   Temperature Warm   RLE Sensation  Decreased   R Pedal Pulse +3   Wound 04/14/23 Foot Right;Plantar #1 delayed healing surgical debridement 2/22/23   Date First Assessed/Time First Assessed: 04/14/23 1053   Present on Hospital Admission: Yes  Wound Approximate Age at First Assessment (Weeks): 6 weeks  Primary Wound Type: Diabetic Ulcer  Location: Foot  Wound Location Orientation: Right;Plantar  Wou. .. Wound Image     Wound Etiology Diabetic Meza 3   Dressing Status Intact; Old drainage noted   Wound Cleansed Cleansed with saline; Soap and water;Vashe   Dressing/Treatment Hydrofera blue   Offloading for Diabetic Foot Ulcers Offloading ordered; Forefoot offloading shoe   Wound Length (cm) 0.7 cm   Wound Width (cm) 0.4 cm   Wound Depth (cm) 0.8 cm   Wound Surface Area (cm^2) 0.28 cm^2   Change in Wound Size % (l*w) 97.83   Wound Volume (cm^3) 0.224 cm^3   Wound Healing % 99   Post-Procedure Length (cm) 0.7 cm   Post-Procedure Width (cm) 0.4 cm   Post-Procedure Depth (cm) 0.8 cm   Post-Procedure Surface Area (cm^2) 0.28 cm^2   Post-Procedure Volume (cm^3) 0.224 cm^3   Wound Assessment Pink/red; Exposed structure bone   Drainage Amount Moderate (25-50%)   Drainage Description Serosanguinous   Odor None   Charla-wound Assessment Intact   Margins Defined edges   Wound Thickness Description not for Pressure Injury Full thickness

## 2023-08-15 NOTE — DISCHARGE INSTRUCTIONS
Amarilys Barr RN  Registered Nurse  Wound Care      Signed  Date of Service:  8/15/2023 10:30 AM     Signed                                                                                                                  Discharge Instructions for  [address], [address] [place] [address]  Phone [phone]   Fax [phone]        NAME:  Vivek Salter  YOB: 1945  MEDICAL RECORD NUMBER:  [id number]  DATE:  8/15/2023     Return Appointment:   1 week with Hortencia Renteria DO     Instructions:   Right plantar foot:  Cleanse intact skin with soap & water, wound with normal saline or wound cleanser. Vashe Wound Solution (hypochlorous acid) soak placed on wound bed for a minimum of 60 seconds prior to dressing application. Hydrofera Blue: Cut to wound size, moisten with saline, and apply to wound bed. Cover with Exudry pad & secure with rolled gauze. Do not get wound wet in shower, pool or tub. May purchase cast cover at local pharmacy to keep dry in shower. Change 3 times per week     HH to restart to change 3 times per week until restarting Total contact cast     Elevate legs when sitting. Avoid prolonged standing or sitting with legs in dependent position. Eliminate salt intake as this promotes fluid retention and swelling  Please increase dietary protein to at least 60 grams per day to support cell rejuvenation. May use supplements such as Ensure Max, Naga, & Glucerna   Be sure to spread intake throughout the day for improved absorption. Infectious Disease : call [phone] to reschedule appointment. Continue antibiotics: Augmentin and Doxycycline as prescribed by ID. MRI ordered STAT. Appointment scheduled for 08/16 at 5 pm in [place] office.     Should you experience increased redness, swelling, pain, foul odor, size of wound(s), or have a temperature over 101 degrees please contact the [id number] at

## 2023-08-16 ENCOUNTER — HOSPITAL ENCOUNTER (OUTPATIENT)
Age: 78
Discharge: HOME | End: 2023-08-18
Payer: MEDICARE

## 2023-08-16 DIAGNOSIS — E11.628 DIABETIC FOOT INFECTION (HCC): ICD-10-CM

## 2023-08-16 DIAGNOSIS — L97.514 DIABETIC ULCER OF RIGHT FOOT ASSOCIATED WITH TYPE 2 DIABETES MELLITUS, WITH NECROSIS OF BONE, UNSPECIFIED PART OF FOOT (HCC): ICD-10-CM

## 2023-08-16 DIAGNOSIS — L08.9 DIABETIC FOOT INFECTION (HCC): ICD-10-CM

## 2023-08-16 DIAGNOSIS — E11.621 DIABETIC ULCER OF RIGHT FOOT ASSOCIATED WITH TYPE 2 DIABETES MELLITUS, WITH NECROSIS OF BONE, UNSPECIFIED PART OF FOOT (HCC): ICD-10-CM

## 2023-08-16 PROCEDURE — 6360000004 HC RX CONTRAST MEDICATION: Performed by: FAMILY MEDICINE

## 2023-08-16 PROCEDURE — 73720 MRI LWR EXTREMITY W/O&W/DYE: CPT

## 2023-08-16 PROCEDURE — A9579 GAD-BASE MR CONTRAST NOS,1ML: HCPCS | Performed by: FAMILY MEDICINE

## 2023-08-16 RX ADMIN — GADOTERIDOL 15 ML: 279.3 INJECTION, SOLUTION INTRAVENOUS at 17:58

## 2023-08-22 DIAGNOSIS — I10 ESSENTIAL HYPERTENSION: ICD-10-CM

## 2023-08-22 DIAGNOSIS — E11.22 TYPE 2 DIABETES MELLITUS WITH STAGE 3 CHRONIC KIDNEY DISEASE, WITHOUT LONG-TERM CURRENT USE OF INSULIN, UNSPECIFIED WHETHER STAGE 3A OR 3B CKD (HCC): ICD-10-CM

## 2023-08-22 DIAGNOSIS — N18.30 TYPE 2 DIABETES MELLITUS WITH STAGE 3 CHRONIC KIDNEY DISEASE, WITHOUT LONG-TERM CURRENT USE OF INSULIN, UNSPECIFIED WHETHER STAGE 3A OR 3B CKD (HCC): ICD-10-CM

## 2023-08-22 RX ORDER — AMLODIPINE BESYLATE 10 MG/1
TABLET ORAL
Qty: 90 TABLET | Refills: 1 | OUTPATIENT
Start: 2023-08-22

## 2023-08-22 RX ORDER — SITAGLIPTIN 100 MG/1
TABLET, FILM COATED ORAL
Qty: 90 TABLET | Refills: 1 | OUTPATIENT
Start: 2023-08-22

## 2023-08-22 RX ORDER — HYDROCHLOROTHIAZIDE 25 MG/1
TABLET ORAL
Qty: 90 TABLET | Refills: 1 | OUTPATIENT
Start: 2023-08-22

## 2023-08-22 RX ORDER — ATENOLOL 25 MG/1
TABLET ORAL
Qty: 90 TABLET | Refills: 1 | OUTPATIENT
Start: 2023-08-22

## 2023-08-22 RX ORDER — LOSARTAN POTASSIUM 100 MG/1
TABLET ORAL
Qty: 90 TABLET | Refills: 1 | OUTPATIENT
Start: 2023-08-22

## 2023-08-23 ENCOUNTER — HOSPITAL ENCOUNTER (OUTPATIENT)
Dept: WOUND CARE | Age: 78
Discharge: HOME | End: 2023-08-23
Payer: MEDICARE

## 2023-08-23 VITALS
BODY MASS INDEX: 28.53 KG/M2 | HEIGHT: 63 IN | WEIGHT: 161 LBS | SYSTOLIC BLOOD PRESSURE: 133 MMHG | HEART RATE: 59 BPM | DIASTOLIC BLOOD PRESSURE: 62 MMHG

## 2023-08-23 DIAGNOSIS — L08.9 DIABETIC FOOT INFECTION (HCC): ICD-10-CM

## 2023-08-23 DIAGNOSIS — M86.671 CHRONIC REFRACTORY OSTEOMYELITIS OF RIGHT FOOT (HCC): Chronic | ICD-10-CM

## 2023-08-23 DIAGNOSIS — E11.621 DIABETIC ULCER OF RIGHT FOOT ASSOCIATED WITH TYPE 2 DIABETES MELLITUS, WITH NECROSIS OF BONE, UNSPECIFIED PART OF FOOT (HCC): Primary | ICD-10-CM

## 2023-08-23 DIAGNOSIS — L97.514 DIABETIC ULCER OF RIGHT FOOT ASSOCIATED WITH TYPE 2 DIABETES MELLITUS, WITH NECROSIS OF BONE, UNSPECIFIED PART OF FOOT (HCC): Primary | ICD-10-CM

## 2023-08-23 DIAGNOSIS — E11.628 DIABETIC FOOT INFECTION (HCC): ICD-10-CM

## 2023-08-23 LAB
ALBUMIN SERPL-MCNC: 3.8 G/DL (ref 3.2–4.6)
ALBUMIN/GLOB SERPL: 0.9 (ref 0.4–1.6)
ALP SERPL-CCNC: 70 U/L (ref 50–136)
ALT SERPL-CCNC: 32 U/L (ref 12–65)
ANION GAP SERPL CALC-SCNC: 5 MMOL/L (ref 2–11)
AST SERPL-CCNC: 23 U/L (ref 15–37)
BILIRUB SERPL-MCNC: 0.2 MG/DL (ref 0.2–1.1)
BUN SERPL-MCNC: 31 MG/DL (ref 8–23)
CALCIUM SERPL-MCNC: 10.2 MG/DL (ref 8.3–10.4)
CHLORIDE SERPL-SCNC: 107 MMOL/L (ref 101–110)
CO2 SERPL-SCNC: 27 MMOL/L (ref 21–32)
CREAT SERPL-MCNC: 2.1 MG/DL (ref 0.8–1.5)
CRP SERPL-MCNC: 0.5 MG/DL (ref 0–0.9)
ERYTHROCYTE [DISTWIDTH] IN BLOOD BY AUTOMATED COUNT: 13.5 % (ref 11.9–14.6)
ERYTHROCYTE [SEDIMENTATION RATE] IN BLOOD: 45 MM/HR
GLOBULIN SER CALC-MCNC: 4.3 G/DL (ref 2.8–4.5)
GLUCOSE SERPL-MCNC: 171 MG/DL (ref 65–100)
HCT VFR BLD AUTO: 42.9 % (ref 41.1–50.3)
HGB BLD-MCNC: 13.5 G/DL (ref 13.6–17.2)
MCH RBC QN AUTO: 27.8 PG (ref 26.1–32.9)
MCHC RBC AUTO-ENTMCNC: 31.5 G/DL (ref 31.4–35)
MCV RBC AUTO: 88.3 FL (ref 82–102)
NRBC # BLD: 0 K/UL (ref 0–0.2)
PLATELET # BLD AUTO: 346 K/UL (ref 150–450)
PMV BLD AUTO: 9.5 FL (ref 9.4–12.3)
POTASSIUM SERPL-SCNC: 4.5 MMOL/L (ref 3.5–5.1)
PROT SERPL-MCNC: 8.1 G/DL (ref 6.3–8.2)
RBC # BLD AUTO: 4.86 M/UL (ref 4.23–5.6)
SODIUM SERPL-SCNC: 139 MMOL/L (ref 133–143)
WBC # BLD AUTO: 7 K/UL (ref 4.3–11.1)

## 2023-08-23 PROCEDURE — 11044 DBRDMT BONE 1ST 20 SQ CM/<: CPT

## 2023-08-23 RX ORDER — LIDOCAINE 40 MG/G
CREAM TOPICAL ONCE
OUTPATIENT
Start: 2023-08-23 | End: 2023-08-23

## 2023-08-23 RX ORDER — LIDOCAINE HYDROCHLORIDE 40 MG/ML
SOLUTION TOPICAL ONCE
OUTPATIENT
Start: 2023-08-23 | End: 2023-08-23

## 2023-08-23 RX ORDER — IBUPROFEN 200 MG
TABLET ORAL ONCE
OUTPATIENT
Start: 2023-08-23 | End: 2023-08-23

## 2023-08-23 RX ORDER — BETAMETHASONE DIPROPIONATE 0.05 %
OINTMENT (GRAM) TOPICAL ONCE
OUTPATIENT
Start: 2023-08-23 | End: 2023-08-23

## 2023-08-23 RX ORDER — LIDOCAINE HYDROCHLORIDE 20 MG/ML
JELLY TOPICAL ONCE
OUTPATIENT
Start: 2023-08-23 | End: 2023-08-23

## 2023-08-23 RX ORDER — LIDOCAINE 50 MG/G
OINTMENT TOPICAL ONCE
OUTPATIENT
Start: 2023-08-23 | End: 2023-08-23

## 2023-08-23 RX ORDER — BACITRACIN ZINC AND POLYMYXIN B SULFATE 500; 1000 [USP'U]/G; [USP'U]/G
OINTMENT TOPICAL ONCE
OUTPATIENT
Start: 2023-08-23 | End: 2023-08-23

## 2023-08-23 RX ORDER — GINSENG 100 MG
CAPSULE ORAL ONCE
OUTPATIENT
Start: 2023-08-23 | End: 2023-08-23

## 2023-08-23 RX ORDER — CLOBETASOL PROPIONATE 0.5 MG/G
OINTMENT TOPICAL ONCE
OUTPATIENT
Start: 2023-08-23 | End: 2023-08-23

## 2023-08-23 RX ORDER — GENTAMICIN SULFATE 1 MG/G
OINTMENT TOPICAL ONCE
OUTPATIENT
Start: 2023-08-23 | End: 2023-08-23

## 2023-08-23 RX ORDER — LIDOCAINE HYDROCHLORIDE 20 MG/ML
JELLY TOPICAL ONCE
Status: DISCONTINUED | OUTPATIENT
Start: 2023-08-23 | End: 2023-08-24 | Stop reason: HOSPADM

## 2023-08-23 NOTE — WOUND CARE
RN HYPERBARIC OXYGEN THERAPY RISK ASSESSMENT TOOL   [facility]     Brittani Souza  MEDICAL RECORD NUMBER:  [id number]  AGE: 78 y.o. GENDER: male  : 1945  EPISODE DATE:  2023       PAST MEDICAL HISTORY      Diagnosis Date    Chronic abdominal pain     EGD, Colonoscopy, CT Abdomen/pelvis-colon polyps, scattered diverticula    Colon polyps     EtOH dependence (HCC)     GERD (gastroesophageal reflux disease)     Helicobacter pylori gastritis     Hypertension     Stage III chronic kidney disease ([facility])     Type 2 diabetes mellitus without complication ([facility]) [address]    does not check blood sugars       PAST SURGICAL HISTORY  Past Surgical History:   Procedure Laterality Date    COLONOSCOPY N/A 2019    COLONOSCOPY/ 26 performed by Mario Ruth MD at [facility] ENDOSCOPY    COLONOSCOPY  2016    polyps    FOOT DEBRIDEMENT Right 2023    Excisional debridment right foot wound performed by Christina Kwon MD at [facility] MAIN OR    UPPER GASTROINTESTINAL ENDOSCOPY  2016    unremarkable       FAMILY HISTORY  Family History   Problem Relation Age of Onset    Cancer Father         bone    Arrhythmia Sister     Hypertension Mother     Kidney Disease Mother        SOCIAL HISTORY  Social History     Tobacco Use    Smoking status: Former     Packs/day: 1.00     Years: 15.00     Pack years: 15.00     Types: Cigarettes     Quit date: 1989     Years since quittin.6    Smokeless tobacco: Never   Substance Use Topics    Alcohol use:  Yes     Alcohol/week: 2.0 - 3.0 standard drinks    Drug use: Yes     Types: Marijuana (Weed)       ALLERGIES  No Known Allergies    MEDICATIONS  Current Outpatient Medications on File Prior to Encounter   Medication Sig Dispense Refill    amoxicillin-clavulanate (AUGMENTIN) 875-125 MG per tablet Take 1 tablet by mouth 2 times daily 60 tablet 1    doxycycline hyclate (VIBRA-TABS) 100 MG tablet Take 1 tablet by mouth 2 times daily 60 tablet 1

## 2023-08-23 NOTE — WOUND CARE
Discharge Instructions for  [facility]  [address], [address]  Phone [phone]   Fax [phone]      NAME:  Garcia Viramontes  YOB: 1945  MEDICAL RECORD NUMBER:  [id number]  DATE:  8/23/2023    Return Appointment:   1 week with Darlen Sacks, DO    Instructions:   Right plantar foot:  Cleanse intact skin with soap & water, wound with normal saline or wound cleanser. Vashe Wound Solution (hypochlorous acid) soak placed on wound bed for a minimum of 60 seconds prior to dressing application. Silver Nitrate & Surgicel applied to wound bed. Surgicel  left in place post debridement of bone to control bleeding. Hydrofera Blue: Cut to wound size, moisten with saline, and apply to wound bed. Cover with Exudry pad & secure with rolled gauze. Do not get wound wet in shower, pool or tub. May purchase cast cover at local pharmacy to keep dry in shower. Change 3 times per week. HH to restart dressing changes. Elevate legs when sitting to reduce swelling. Swelling interferes with wound healing. Eliminate salt intake as this promotes fluid retention and swelling    Please increase dietary protein to at least 60 grams per day to support cell rejuvenation. May use supplements such as Ensure Max, Naga, & Glucerna   Be sure to spread intake throughout the day for improved absorption. Continue antibiotics: Augmentin and Doxycycline as prescribed by ID. MRI results discussed. HBO Therapy explained by Thom Jackson RN. Insurance/ transportation availability initiated through current transportation company [employer] ([phone]). Labs ordered at this visit. Please proceed to [address] for services.     Should you experience increased redness, swelling, pain, foul odor, size of wound(s), or have a temperature over 101 degrees please contact the [id number] at [phone] or if after hours contact your primary care physician or go

## 2023-08-23 NOTE — DISCHARGE INSTRUCTIONS
Caroline Herrera RN  Registered Nurse  Wound Care      Addendum  Date of Service:  8/23/2023  9:40 AM                                                                                                                                           Discharge Instructions for  Kari Eckert  [address], [address] [place] [address]  Phone [phone]   Fax [phone]        NAME:  Eliza Hernandez  YOB: 1945  MEDICAL RECORD NUMBER:  [id number]  DATE:  8/23/2023     Return Appointment:   1 week with Lynette Oneill DO     Instructions:   Right plantar foot:  Cleanse intact skin with soap & water, wound with normal saline or wound cleanser. Vashe Wound Solution (hypochlorous acid) soak placed on wound bed for a minimum of 60 seconds prior to dressing application. Silver Nitrate & Surgicel applied to wound bed. Surgicel  left in place post debridement of bone to control bleeding. Hydrofera Blue: Cut to wound size, moisten with saline, and apply to wound bed. Cover with Exudry pad & secure with rolled gauze. Do not get wound wet in shower, pool or tub. May purchase cast cover at local pharmacy to keep dry in shower. Change 3 times per week. HH to restart dressing changes. Elevate legs when sitting to reduce swelling. Swelling interferes with wound healing. Eliminate salt intake as this promotes fluid retention and swelling     Please increase dietary protein to at least 60 grams per day to support cell rejuvenation. May use supplements such as Ensure Max, Naga, & Glucerna   Be sure to spread intake throughout the day for improved absorption. Continue antibiotics: Augmentin and Doxycycline as prescribed by ID. MRI results discussed. HBO Therapy explained by Jen Brewster RN. Insurance/ transportation availability initiated through current transportation company [employer] ([phone]). Labs ordered at this visit.  Please proceed to [address] for

## 2023-08-24 NOTE — PROGRESS NOTES
All these options were discussed with patient in detail today. MRI discussed in detail. Chronic infection of bone discussed in detail. Return Appointment:   1 week with Cheryle Marble, DO     Instructions:   Right plantar foot:  Cleanse intact skin with soap & water, wound with normal saline or wound cleanser. Vashe Wound Solution (hypochlorous acid) soak placed on wound bed for a minimum of 60 seconds prior to dressing application. Silver Nitrate & Surgicel applied to wound bed. Surgicel  left in place post debridement of bone to control bleeding. Hydrofera Blue: Cut to wound size, moisten with saline, and apply to wound bed. Cover with Exudry pad & secure with rolled gauze. Do not get wound wet in shower, pool or tub. May purchase cast cover at local pharmacy to keep dry in shower. Change 3 times per week. HH to restart dressing changes. Elevate legs when sitting to reduce swelling. Swelling interferes with wound healing. Eliminate salt intake as this promotes fluid retention and swelling     Please increase dietary protein to at least 60 grams per day to support cell rejuvenation. May use supplements such as Ensure Max, Naga, & Glucerna   Be sure to spread intake throughout the day for improved absorption. Continue antibiotics: Augmentin and Doxycycline as prescribed by ID. MRI results discussed. HBO Therapy explained by Katelyn Hess RN. Insurance/ transportation availability initiated through current transportation company [employer] ([phone]). Labs ordered at this visit. Please proceed to [address] for services. Other associated diagnoses or problems addressed:  Diabetes. Pertinent imaging reviewed including independent interpretation include:  None    Pertinent labs reviewed. Medical records and review of external note (s) from other providers done as well.     New lab or imaging orders placed:  None     Prescription drug management: N/A     Discussion of management
Component Value Date/Time     08/23/2023 11:48 AM    K 4.5 08/23/2023 11:48 AM     08/23/2023 11:48 AM    CO2 27 08/23/2023 11:48 AM    BUN 31 08/23/2023 11:48 AM    CREATININE 2.10 08/23/2023 11:48 AM    GFRAA 31 09/16/2022 08:54 AM    AGRATIO 1.5 09/10/2020 11:02 AM    LABGLOM 32 08/23/2023 11:48 AM    GLUCOSE 171 08/23/2023 11:48 AM    PROT 8.1 08/23/2023 11:48 AM    LABALBU 3.8 08/23/2023 11:48 AM    CALCIUM 10.2 08/23/2023 11:48 AM    BILITOT 0.2 08/23/2023 11:48 AM    ALKPHOS 70 08/23/2023 11:48 AM    ALKPHOS 47 01/24/2022 08:51 AM    AST 23 08/23/2023 11:48 AM    ALT 32 08/23/2023 11:48 AM     Albumin:    Lab Results   Component Value Date/Time    LABALBU 3.8 08/23/2023 11:48 AM     PT/INR:  No results found for: PROTIME, INR  HgBA1c:    Lab Results   Component Value Date/Time    LABA1C 7.3 02/21/2023 04:27 AM     Other pertinent diagnostics: None. 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)    Relevant Orders    Sedimentation rate, manual    C-Reactive Protein (Completed)    Comprehensive Metabolic Panel (Completed)    CBC (Completed)     The use of Hyperbaric Oxygen Therapy is supported in this patient for:     Hyperbaric Oxygen Therapy (HBOT) for Chronic Refractory Osteomyelitis ()    This patient is in need of HBOT for  as it has been shown to be beneficial in restoring normal oxygen levels in infected bone, enhancing WBC phagocytic activity, promoting cell wall transport of certain antibiotics, reducing compromised tissue edema and inflammation, promoting collagen formation, and promoting capillary angiogenesis in both hypoxic bone and surrounding soft tissues.      Goals of HBOT for this patient: HBOT will

## 2023-08-24 NOTE — FLOWSHEET NOTE
Pre & post debridement     08/23/23 1008   Right Leg Edema Point of Measurement   Leg circumference 34 cm   Ankle circumference 21 cm   Foot circumference 23 cm   Compression Therapy Compression not ordered   RLE Neurovascular Assessment   Capillary Refill Less than/Equal to 3 seconds   Color Appropriate for Ethnicity   Temperature Warm   RLE Sensation  Decreased   R Pedal Pulse +3   Wound 04/14/23 Foot Right;Plantar #1 delayed healing surgical debridement 2/22/23   Date First Assessed/Time First Assessed: 04/14/23 1053   Present on Hospital Admission: Yes  Wound Approximate Age at First Assessment (Weeks): 6 weeks  Primary Wound Type: Diabetic Ulcer  Location: Foot  Wound Location Orientation: Right;Plantar  Wou. .. Wound Image     Wound Etiology Diabetic Meza 3   Dressing Status Old drainage noted   Wound Cleansed Cleansed with saline; Soap and water;Vashe   Dressing/Treatment Hydrofera blue   Offloading for Diabetic Foot Ulcers Offloading ordered; Forefoot offloading shoe   Wound Length (cm) 0.3 cm   Wound Width (cm) 0.4 cm   Wound Depth (cm) 1.4 cm   Wound Surface Area (cm^2) 0.12 cm^2   Change in Wound Size % (l*w) 99.07   Wound Volume (cm^3) 0.168 cm^3   Wound Healing % 99   Post-Procedure Length (cm) 1 cm   Post-Procedure Width (cm) 1 cm   Post-Procedure Depth (cm) 1.4 cm   Post-Procedure Surface Area (cm^2) 1 cm^2   Post-Procedure Volume (cm^3) 1.4 cm^3   Wound Assessment Pink/red   Drainage Amount Moderate (25-50%)   Drainage Description Serosanguinous   Odor None   Charla-wound Assessment Intact   Margins Defined edges; Attached edges   Wound Thickness Description not for Pressure Injury Full thickness   Pain Assessment   Pain Assessment None - Denies Pain

## 2023-08-30 ENCOUNTER — HOSPITAL ENCOUNTER (OUTPATIENT)
Dept: WOUND CARE | Age: 78
Discharge: HOME | End: 2023-08-30
Payer: MEDICARE

## 2023-08-30 VITALS
BODY MASS INDEX: 28.88 KG/M2 | SYSTOLIC BLOOD PRESSURE: 108 MMHG | DIASTOLIC BLOOD PRESSURE: 74 MMHG | HEIGHT: 63 IN | RESPIRATION RATE: 18 BRPM | HEART RATE: 61 BPM | WEIGHT: 163 LBS

## 2023-08-30 DIAGNOSIS — M86.671 CHRONIC REFRACTORY OSTEOMYELITIS OF RIGHT FOOT (HCC): Primary | ICD-10-CM

## 2023-08-30 DIAGNOSIS — E11.621 DIABETIC ULCER OF RIGHT FOOT ASSOCIATED WITH TYPE 2 DIABETES MELLITUS, WITH NECROSIS OF BONE, UNSPECIFIED PART OF FOOT (HCC): ICD-10-CM

## 2023-08-30 DIAGNOSIS — L08.9 DIABETIC FOOT INFECTION (HCC): ICD-10-CM

## 2023-08-30 DIAGNOSIS — L97.514 DIABETIC ULCER OF RIGHT FOOT ASSOCIATED WITH TYPE 2 DIABETES MELLITUS, WITH NECROSIS OF BONE, UNSPECIFIED PART OF FOOT (HCC): ICD-10-CM

## 2023-08-30 DIAGNOSIS — E11.628 DIABETIC FOOT INFECTION (HCC): ICD-10-CM

## 2023-08-30 PROCEDURE — 11042 DBRDMT SUBQ TIS 1ST 20SQCM/<: CPT

## 2023-08-30 RX ORDER — LIDOCAINE HYDROCHLORIDE 20 MG/ML
JELLY TOPICAL ONCE
OUTPATIENT
Start: 2023-08-30 | End: 2023-08-30

## 2023-08-30 RX ORDER — GENTAMICIN SULFATE 1 MG/G
OINTMENT TOPICAL ONCE
OUTPATIENT
Start: 2023-08-30 | End: 2023-08-30

## 2023-08-30 RX ORDER — GINSENG 100 MG
CAPSULE ORAL ONCE
OUTPATIENT
Start: 2023-08-30 | End: 2023-08-30

## 2023-08-30 RX ORDER — LIDOCAINE 50 MG/G
OINTMENT TOPICAL ONCE
OUTPATIENT
Start: 2023-08-30 | End: 2023-08-30

## 2023-08-30 RX ORDER — LIDOCAINE HYDROCHLORIDE 20 MG/ML
JELLY TOPICAL ONCE
Status: COMPLETED | OUTPATIENT
Start: 2023-08-30 | End: 2023-08-30

## 2023-08-30 RX ORDER — LIDOCAINE 40 MG/G
CREAM TOPICAL ONCE
OUTPATIENT
Start: 2023-08-30 | End: 2023-08-30

## 2023-08-30 RX ORDER — BETAMETHASONE DIPROPIONATE 0.05 %
OINTMENT (GRAM) TOPICAL ONCE
OUTPATIENT
Start: 2023-08-30 | End: 2023-08-30

## 2023-08-30 RX ORDER — LIDOCAINE HYDROCHLORIDE 40 MG/ML
SOLUTION TOPICAL ONCE
OUTPATIENT
Start: 2023-08-30 | End: 2023-08-30

## 2023-08-30 RX ORDER — BACITRACIN ZINC AND POLYMYXIN B SULFATE 500; 1000 [USP'U]/G; [USP'U]/G
OINTMENT TOPICAL ONCE
OUTPATIENT
Start: 2023-08-30 | End: 2023-08-30

## 2023-08-30 RX ORDER — CLOBETASOL PROPIONATE 0.5 MG/G
OINTMENT TOPICAL ONCE
OUTPATIENT
Start: 2023-08-30 | End: 2023-08-30

## 2023-08-30 RX ORDER — IBUPROFEN 200 MG
TABLET ORAL ONCE
OUTPATIENT
Start: 2023-08-30 | End: 2023-08-30

## 2023-08-30 RX ADMIN — LIDOCAINE HYDROCHLORIDE: 20 JELLY TOPICAL at 11:34

## 2023-08-30 NOTE — WOUND CARE
Total Contact Cast    NAME:  Ashwini Greer  YOB: 1945  MEDICAL RECORD NUMBER:  [id number]  DATE:  8/30/2023    Goal:  Patient will maintain integrity of cast, avoid mobility hazards, and report complications that may occur (foul odor, pain, numbness, cracked cast). Removed old contact cast if indicated and wash extremity with soap and water. Applied ordered dressing. Applied foam padding  Applied cast padding included in the kit   Applied per nursing and Shorty Every, DO  Applied to: [] Left Lower Leg [x] Right Lower Leg  Allow cast to dry. Instructed patient to report to health care provider, including wound care center, any back pain, hip pain, or leg pain, numbness of toes, or any odor coming from the cast.   Instructed patient not to stick any foreign objects down into cast.   Instructed patient to utilize assistive devices(crutches, cane or walker) as ordered   Instructed patient to continue offloading as directed.      Applied cast per  Guidelines    Electronically signed by Merle Skelton RN on 8/30/2023 at 12:02 PM
UNTIL YOU ARE ABLE TO REACH US. IT IS MOST IMPORTANT TO KEEP THE WOUND COVERED AT ALL TIMES.     Electronically signed Peter Kirkland RN on 8/30/2023 at 11:37 AM

## 2023-08-30 NOTE — FLOWSHEET NOTE
08/30/23 1121   Wound 04/14/23 Foot Right;Plantar #1 delayed healing surgical debridement 2/22/23   Date First Assessed/Time First Assessed: 04/14/23 1053   Present on Hospital Admission: Yes  Wound Approximate Age at First Assessment (Weeks): 6 weeks  Primary Wound Type: Diabetic Ulcer  Location: Foot  Wound Location Orientation: Right;Plantar  Wou. ..    Wound Image    Wound Etiology Diabetic Meza 3   Dressing Status Old drainage noted   Wound Cleansed Cleansed with saline   Dressing/Treatment Hydrofera blue   Offloading for Diabetic Foot Ulcers Forefoot offloading shoe   Wound Length (cm) 0.4 cm   Wound Width (cm) 0.5 cm   Wound Depth (cm) 0.8 cm   Wound Surface Area (cm^2) 0.2 cm^2   Change in Wound Size % (l*w) 98.45   Wound Volume (cm^3) 0.16 cm^3   Wound Healing % 99   Wound Assessment Pink/red   Drainage Amount Moderate (25-50%)   Drainage Description Serosanguinous   Odor Mild   Charla-wound Assessment Intact   Wound Thickness Description not for Pressure Injury Full thickness   Pain Assessment   Pain Assessment None - Denies Pain

## 2023-09-06 ENCOUNTER — HOSPITAL ENCOUNTER (OUTPATIENT)
Dept: WOUND CARE | Age: 78
Discharge: HOME | End: 2023-09-06
Payer: MEDICARE

## 2023-09-06 VITALS
TEMPERATURE: 97.5 F | SYSTOLIC BLOOD PRESSURE: 131 MMHG | OXYGEN SATURATION: 100 % | HEART RATE: 68 BPM | DIASTOLIC BLOOD PRESSURE: 56 MMHG | RESPIRATION RATE: 18 BRPM

## 2023-09-06 DIAGNOSIS — E11.621 DIABETIC ULCER OF RIGHT MIDFOOT ASSOCIATED WITH TYPE 2 DIABETES MELLITUS, WITH NECROSIS OF BONE (HCC): ICD-10-CM

## 2023-09-06 DIAGNOSIS — M86.671 CHRONIC REFRACTORY OSTEOMYELITIS OF RIGHT FOOT (HCC): Primary | ICD-10-CM

## 2023-09-06 DIAGNOSIS — L97.414 DIABETIC ULCER OF RIGHT MIDFOOT ASSOCIATED WITH TYPE 2 DIABETES MELLITUS, WITH NECROSIS OF BONE (HCC): ICD-10-CM

## 2023-09-06 LAB
GLUCOSE BLD STRIP.AUTO-MCNC: 163 MG/DL (ref 65–100)
GLUCOSE BLD STRIP.AUTO-MCNC: 184 MG/DL (ref 65–100)
SERVICE CMNT-IMP: ABNORMAL
SERVICE CMNT-IMP: ABNORMAL

## 2023-09-06 PROCEDURE — 82962 GLUCOSE BLOOD TEST: CPT

## 2023-09-06 PROCEDURE — G0277 HBOT, FULL BODY CHAMBER, 30M: HCPCS

## 2023-09-07 ENCOUNTER — HOSPITAL ENCOUNTER (OUTPATIENT)
Dept: WOUND CARE | Age: 78
Discharge: HOME | End: 2023-09-07
Payer: MEDICARE

## 2023-09-07 VITALS
RESPIRATION RATE: 18 BRPM | DIASTOLIC BLOOD PRESSURE: 70 MMHG | SYSTOLIC BLOOD PRESSURE: 143 MMHG | HEART RATE: 61 BPM | TEMPERATURE: 97.5 F | OXYGEN SATURATION: 98 %

## 2023-09-07 VITALS
OXYGEN SATURATION: 100 % | TEMPERATURE: 97.5 F | HEART RATE: 52 BPM | SYSTOLIC BLOOD PRESSURE: 142 MMHG | RESPIRATION RATE: 18 BRPM | DIASTOLIC BLOOD PRESSURE: 74 MMHG

## 2023-09-07 DIAGNOSIS — E11.621 DIABETIC ULCER OF RIGHT MIDFOOT ASSOCIATED WITH TYPE 2 DIABETES MELLITUS, WITH NECROSIS OF BONE (HCC): ICD-10-CM

## 2023-09-07 DIAGNOSIS — L97.514 DIABETIC ULCER OF RIGHT FOOT ASSOCIATED WITH TYPE 2 DIABETES MELLITUS, WITH NECROSIS OF BONE, UNSPECIFIED PART OF FOOT (HCC): ICD-10-CM

## 2023-09-07 DIAGNOSIS — L08.9 DIABETIC FOOT INFECTION (HCC): ICD-10-CM

## 2023-09-07 DIAGNOSIS — M86.671 CHRONIC REFRACTORY OSTEOMYELITIS OF RIGHT FOOT (HCC): Primary | ICD-10-CM

## 2023-09-07 DIAGNOSIS — E11.621 DIABETIC ULCER OF RIGHT FOOT ASSOCIATED WITH TYPE 2 DIABETES MELLITUS, WITH NECROSIS OF BONE, UNSPECIFIED PART OF FOOT (HCC): ICD-10-CM

## 2023-09-07 DIAGNOSIS — L97.414 DIABETIC ULCER OF RIGHT MIDFOOT ASSOCIATED WITH TYPE 2 DIABETES MELLITUS, WITH NECROSIS OF BONE (HCC): ICD-10-CM

## 2023-09-07 DIAGNOSIS — E11.628 DIABETIC FOOT INFECTION (HCC): ICD-10-CM

## 2023-09-07 LAB
GLUCOSE BLD STRIP.AUTO-MCNC: 134 MG/DL (ref 65–100)
GLUCOSE BLD STRIP.AUTO-MCNC: 193 MG/DL (ref 65–100)
SERVICE CMNT-IMP: ABNORMAL
SERVICE CMNT-IMP: ABNORMAL

## 2023-09-07 PROCEDURE — 82962 GLUCOSE BLOOD TEST: CPT

## 2023-09-07 PROCEDURE — G0277 HBOT, FULL BODY CHAMBER, 30M: HCPCS

## 2023-09-07 PROCEDURE — 11043 DBRDMT MUSC&/FSCA 1ST 20/<: CPT

## 2023-09-07 RX ORDER — LIDOCAINE HYDROCHLORIDE 20 MG/ML
JELLY TOPICAL ONCE
OUTPATIENT
Start: 2023-09-07 | End: 2023-09-07

## 2023-09-07 RX ORDER — LIDOCAINE 50 MG/G
OINTMENT TOPICAL ONCE
OUTPATIENT
Start: 2023-09-07 | End: 2023-09-07

## 2023-09-07 RX ORDER — CLOBETASOL PROPIONATE 0.5 MG/G
OINTMENT TOPICAL ONCE
OUTPATIENT
Start: 2023-09-07 | End: 2023-09-07

## 2023-09-07 RX ORDER — GINSENG 100 MG
CAPSULE ORAL ONCE
OUTPATIENT
Start: 2023-09-07 | End: 2023-09-07

## 2023-09-07 RX ORDER — LIDOCAINE 40 MG/G
CREAM TOPICAL ONCE
OUTPATIENT
Start: 2023-09-07 | End: 2023-09-07

## 2023-09-07 RX ORDER — BACITRACIN ZINC AND POLYMYXIN B SULFATE 500; 1000 [USP'U]/G; [USP'U]/G
OINTMENT TOPICAL ONCE
OUTPATIENT
Start: 2023-09-07 | End: 2023-09-07

## 2023-09-07 RX ORDER — GENTAMICIN SULFATE 1 MG/G
OINTMENT TOPICAL ONCE
OUTPATIENT
Start: 2023-09-07 | End: 2023-09-07

## 2023-09-07 RX ORDER — BETAMETHASONE DIPROPIONATE 0.05 %
OINTMENT (GRAM) TOPICAL ONCE
OUTPATIENT
Start: 2023-09-07 | End: 2023-09-07

## 2023-09-07 RX ORDER — LIDOCAINE HYDROCHLORIDE 40 MG/ML
SOLUTION TOPICAL ONCE
OUTPATIENT
Start: 2023-09-07 | End: 2023-09-07

## 2023-09-07 RX ORDER — IBUPROFEN 200 MG
TABLET ORAL ONCE
OUTPATIENT
Start: 2023-09-07 | End: 2023-09-07

## 2023-09-07 NOTE — WOUND CARE
Total Contact Cast    NAME:  Vivek Salter  YOB: 1945  MEDICAL RECORD NUMBER:  [id number]  DATE:  9/7/2023    Goal:  Patient will maintain integrity of cast, avoid mobility hazards, and report complications that may occur (foul odor, pain, numbness, cracked cast). Removed old contact cast if indicated and wash extremity with soap and water  Applied ordered dressing over wound(s)   Applied cast padding  Applied foam padding  Applied per Dr. Connor Davila was applied in the [address] [place] [address] to right lower leg  Applied cast material fiberglass  Allow cast to dry   Instructed patient to report to health care provider, including wound care center, any back pain, hip pain, or leg pain, numbness of toes, or any odor coming from the cast.   Instructed patient not to stick any foreign objects down into cast.  Instructed patient to utilize assistive devices(crutches, cane or walker) as ordered. Instructed patient to continue offloading as directed.      Applied cast per  Guidelines    Electronically signed by Maranda Hamilton RN on 9/7/2023 at 11:34 AM
TIMES.     Electronically signed Smitha Menendez RN on 9/7/2023 at 11:44 AM

## 2023-09-07 NOTE — FLOWSHEET NOTE
09/07/23 1131   Wound 04/14/23 Foot Right;Plantar #1 delayed healing surgical debridement 2/22/23   Date First Assessed/Time First Assessed: 04/14/23 1053   Present on Hospital Admission: Yes  Wound Approximate Age at First Assessment (Weeks): 6 weeks  Primary Wound Type: Diabetic Ulcer  Location: Foot  Wound Location Orientation: Right;Plantar  Wou. ..    Wound Image     Wound Etiology Diabetic Meza 3   Dressing Status Old drainage noted   Wound Cleansed Vashe   Dressing/Treatment Hydrofera blue   Offloading for Diabetic Foot Ulcers Total contact cast   Wound Length (cm) 0.3 cm   Wound Width (cm) 0.5 cm   Wound Depth (cm) 0.9 cm   Wound Surface Area (cm^2) 0.15 cm^2   Change in Wound Size % (l*w) 98.84   Wound Volume (cm^3) 0.135 cm^3   Wound Healing % 99   Post-Procedure Length (cm) 0.4 cm   Post-Procedure Width (cm) 0.5 cm   Post-Procedure Depth (cm) 0.9 cm   Post-Procedure Surface Area (cm^2) 0.2 cm^2   Post-Procedure Volume (cm^3) 0.18 cm^3   Wound Assessment Pink/red  (Probes to bone)   Drainage Amount Moderate (25-50%)   Drainage Description Serosanguinous   Odor None   Charla-wound Assessment Intact   Wound Thickness Description not for Pressure Injury Full thickness

## 2023-09-08 ENCOUNTER — HOSPITAL ENCOUNTER (OUTPATIENT)
Dept: WOUND CARE | Age: 78
Discharge: HOME | End: 2023-09-08
Payer: MEDICARE

## 2023-09-08 VITALS
OXYGEN SATURATION: 100 % | TEMPERATURE: 97.6 F | HEART RATE: 56 BPM | RESPIRATION RATE: 18 BRPM | SYSTOLIC BLOOD PRESSURE: 150 MMHG | DIASTOLIC BLOOD PRESSURE: 71 MMHG

## 2023-09-08 DIAGNOSIS — E11.621 DIABETIC ULCER OF RIGHT MIDFOOT ASSOCIATED WITH TYPE 2 DIABETES MELLITUS, WITH NECROSIS OF BONE (HCC): ICD-10-CM

## 2023-09-08 DIAGNOSIS — L97.414 DIABETIC ULCER OF RIGHT MIDFOOT ASSOCIATED WITH TYPE 2 DIABETES MELLITUS, WITH NECROSIS OF BONE (HCC): ICD-10-CM

## 2023-09-08 DIAGNOSIS — M86.671 CHRONIC REFRACTORY OSTEOMYELITIS OF RIGHT FOOT (HCC): Primary | ICD-10-CM

## 2023-09-08 LAB
GLUCOSE BLD STRIP.AUTO-MCNC: 132 MG/DL (ref 65–100)
GLUCOSE BLD STRIP.AUTO-MCNC: 190 MG/DL (ref 65–100)
SERVICE CMNT-IMP: ABNORMAL
SERVICE CMNT-IMP: ABNORMAL

## 2023-09-08 PROCEDURE — G0277 HBOT, FULL BODY CHAMBER, 30M: HCPCS

## 2023-09-08 PROCEDURE — 82962 GLUCOSE BLOOD TEST: CPT

## 2023-09-11 ENCOUNTER — HOSPITAL ENCOUNTER (OUTPATIENT)
Dept: WOUND CARE | Age: 78
Discharge: HOME | End: 2023-09-11
Payer: MEDICARE

## 2023-09-11 VITALS
DIASTOLIC BLOOD PRESSURE: 76 MMHG | RESPIRATION RATE: 18 BRPM | OXYGEN SATURATION: 100 % | SYSTOLIC BLOOD PRESSURE: 129 MMHG | HEART RATE: 66 BPM | TEMPERATURE: 97.5 F

## 2023-09-11 DIAGNOSIS — E11.621 DIABETIC ULCER OF RIGHT MIDFOOT ASSOCIATED WITH TYPE 2 DIABETES MELLITUS, WITH NECROSIS OF BONE (HCC): ICD-10-CM

## 2023-09-11 DIAGNOSIS — L97.414 DIABETIC ULCER OF RIGHT MIDFOOT ASSOCIATED WITH TYPE 2 DIABETES MELLITUS, WITH NECROSIS OF BONE (HCC): ICD-10-CM

## 2023-09-11 DIAGNOSIS — M86.671 CHRONIC REFRACTORY OSTEOMYELITIS OF RIGHT FOOT (HCC): Primary | ICD-10-CM

## 2023-09-11 LAB
GLUCOSE BLD STRIP.AUTO-MCNC: 133 MG/DL (ref 65–100)
GLUCOSE BLD STRIP.AUTO-MCNC: 176 MG/DL (ref 65–100)
SERVICE CMNT-IMP: ABNORMAL
SERVICE CMNT-IMP: ABNORMAL

## 2023-09-11 PROCEDURE — G0277 HBOT, FULL BODY CHAMBER, 30M: HCPCS

## 2023-09-11 PROCEDURE — 99183 HYPERBARIC OXYGEN THERAPY: CPT | Performed by: SURGERY

## 2023-09-11 PROCEDURE — 82962 GLUCOSE BLOOD TEST: CPT

## 2023-09-12 ENCOUNTER — HOSPITAL ENCOUNTER (OUTPATIENT)
Dept: WOUND CARE | Age: 78
Discharge: HOME | End: 2023-09-12
Payer: MEDICARE

## 2023-09-12 ENCOUNTER — APPOINTMENT (OUTPATIENT)
Dept: WOUND CARE | Age: 78
End: 2023-09-12
Payer: MEDICARE

## 2023-09-12 VITALS
OXYGEN SATURATION: 99 % | TEMPERATURE: 97.7 F | HEART RATE: 54 BPM | DIASTOLIC BLOOD PRESSURE: 63 MMHG | SYSTOLIC BLOOD PRESSURE: 146 MMHG | RESPIRATION RATE: 18 BRPM

## 2023-09-12 DIAGNOSIS — E11.621 DIABETIC ULCER OF RIGHT MIDFOOT ASSOCIATED WITH TYPE 2 DIABETES MELLITUS, WITH NECROSIS OF BONE (HCC): ICD-10-CM

## 2023-09-12 DIAGNOSIS — M86.671 CHRONIC REFRACTORY OSTEOMYELITIS OF RIGHT FOOT (HCC): Primary | ICD-10-CM

## 2023-09-12 DIAGNOSIS — L97.414 DIABETIC ULCER OF RIGHT MIDFOOT ASSOCIATED WITH TYPE 2 DIABETES MELLITUS, WITH NECROSIS OF BONE (HCC): ICD-10-CM

## 2023-09-12 LAB
GLUCOSE BLD STRIP.AUTO-MCNC: 156 MG/DL (ref 65–100)
GLUCOSE BLD STRIP.AUTO-MCNC: 218 MG/DL (ref 65–100)
SERVICE CMNT-IMP: ABNORMAL
SERVICE CMNT-IMP: ABNORMAL

## 2023-09-12 PROCEDURE — G0277 HBOT, FULL BODY CHAMBER, 30M: HCPCS

## 2023-09-12 PROCEDURE — 82962 GLUCOSE BLOOD TEST: CPT

## 2023-09-13 ENCOUNTER — HOSPITAL ENCOUNTER (OUTPATIENT)
Dept: WOUND CARE | Age: 78
Discharge: HOME | End: 2023-09-13
Payer: MEDICARE

## 2023-09-13 VITALS
DIASTOLIC BLOOD PRESSURE: 70 MMHG | OXYGEN SATURATION: 100 % | HEART RATE: 54 BPM | SYSTOLIC BLOOD PRESSURE: 143 MMHG | RESPIRATION RATE: 18 BRPM | TEMPERATURE: 97.6 F

## 2023-09-13 VITALS
OXYGEN SATURATION: 98 % | TEMPERATURE: 97.6 F | SYSTOLIC BLOOD PRESSURE: 137 MMHG | HEART RATE: 61 BPM | RESPIRATION RATE: 18 BRPM | DIASTOLIC BLOOD PRESSURE: 68 MMHG

## 2023-09-13 DIAGNOSIS — L08.9 DIABETIC FOOT INFECTION (HCC): ICD-10-CM

## 2023-09-13 DIAGNOSIS — E11.621 DIABETIC ULCER OF RIGHT MIDFOOT ASSOCIATED WITH TYPE 2 DIABETES MELLITUS, WITH NECROSIS OF BONE (HCC): ICD-10-CM

## 2023-09-13 DIAGNOSIS — L97.514 DIABETIC ULCER OF RIGHT FOOT ASSOCIATED WITH TYPE 2 DIABETES MELLITUS, WITH NECROSIS OF BONE, UNSPECIFIED PART OF FOOT (HCC): ICD-10-CM

## 2023-09-13 DIAGNOSIS — L97.414 DIABETIC ULCER OF RIGHT MIDFOOT ASSOCIATED WITH TYPE 2 DIABETES MELLITUS, WITH NECROSIS OF BONE (HCC): ICD-10-CM

## 2023-09-13 DIAGNOSIS — E11.628 DIABETIC FOOT INFECTION (HCC): ICD-10-CM

## 2023-09-13 DIAGNOSIS — M86.671 CHRONIC REFRACTORY OSTEOMYELITIS OF RIGHT FOOT (HCC): Primary | ICD-10-CM

## 2023-09-13 DIAGNOSIS — E11.621 DIABETIC ULCER OF RIGHT FOOT ASSOCIATED WITH TYPE 2 DIABETES MELLITUS, WITH NECROSIS OF BONE, UNSPECIFIED PART OF FOOT (HCC): ICD-10-CM

## 2023-09-13 LAB
GLUCOSE BLD STRIP.AUTO-MCNC: 139 MG/DL (ref 65–100)
GLUCOSE BLD STRIP.AUTO-MCNC: 209 MG/DL (ref 65–100)
SERVICE CMNT-IMP: ABNORMAL
SERVICE CMNT-IMP: ABNORMAL

## 2023-09-13 PROCEDURE — G0277 HBOT, FULL BODY CHAMBER, 30M: HCPCS

## 2023-09-13 PROCEDURE — 82962 GLUCOSE BLOOD TEST: CPT

## 2023-09-13 PROCEDURE — 11042 DBRDMT SUBQ TIS 1ST 20SQCM/<: CPT

## 2023-09-13 RX ORDER — BETAMETHASONE DIPROPIONATE 0.05 %
OINTMENT (GRAM) TOPICAL ONCE
OUTPATIENT
Start: 2023-09-13 | End: 2023-09-13

## 2023-09-13 RX ORDER — LIDOCAINE 50 MG/G
OINTMENT TOPICAL ONCE
OUTPATIENT
Start: 2023-09-13 | End: 2023-09-13

## 2023-09-13 RX ORDER — LIDOCAINE HYDROCHLORIDE 40 MG/ML
SOLUTION TOPICAL ONCE
OUTPATIENT
Start: 2023-09-13 | End: 2023-09-13

## 2023-09-13 RX ORDER — GINSENG 100 MG
CAPSULE ORAL ONCE
OUTPATIENT
Start: 2023-09-13 | End: 2023-09-13

## 2023-09-13 RX ORDER — BACITRACIN ZINC AND POLYMYXIN B SULFATE 500; 1000 [USP'U]/G; [USP'U]/G
OINTMENT TOPICAL ONCE
OUTPATIENT
Start: 2023-09-13 | End: 2023-09-13

## 2023-09-13 RX ORDER — GENTAMICIN SULFATE 1 MG/G
OINTMENT TOPICAL ONCE
OUTPATIENT
Start: 2023-09-13 | End: 2023-09-13

## 2023-09-13 RX ORDER — IBUPROFEN 200 MG
TABLET ORAL ONCE
OUTPATIENT
Start: 2023-09-13 | End: 2023-09-13

## 2023-09-13 RX ORDER — LIDOCAINE 40 MG/G
CREAM TOPICAL ONCE
OUTPATIENT
Start: 2023-09-13 | End: 2023-09-13

## 2023-09-13 RX ORDER — LIDOCAINE HYDROCHLORIDE 20 MG/ML
JELLY TOPICAL ONCE
OUTPATIENT
Start: 2023-09-13 | End: 2023-09-13

## 2023-09-13 RX ORDER — CLOBETASOL PROPIONATE 0.5 MG/G
OINTMENT TOPICAL ONCE
OUTPATIENT
Start: 2023-09-13 | End: 2023-09-13

## 2023-09-13 NOTE — FLOWSHEET NOTE
09/13/23 1016   Wound 04/14/23 Foot Right;Plantar #1 delayed healing surgical debridement 2/22/23   Date First Assessed/Time First Assessed: 04/14/23 1053   Present on Hospital Admission: Yes  Wound Approximate Age at First Assessment (Weeks): 6 weeks  Primary Wound Type: Diabetic Ulcer  Location: Foot  Wound Location Orientation: Right;Plantar  Wou. ..    Wound Image     Wound Etiology Diabetic Meza 3   Dressing Status Old drainage noted   Wound Cleansed Vashe   Dressing/Treatment Hydrofera blue   Offloading for Diabetic Foot Ulcers Total contact cast   Wound Length (cm) 0.2 cm   Wound Width (cm) 0.3 cm   Wound Depth (cm) 0.7 cm   Wound Surface Area (cm^2) 0.06 cm^2   Change in Wound Size % (l*w) 99.53   Wound Volume (cm^3) 0.042 cm^3   Wound Healing % 100   Post-Procedure Length (cm) 0.3 cm   Post-Procedure Width (cm) 0.5 cm   Post-Procedure Depth (cm) 0.7 cm   Post-Procedure Surface Area (cm^2) 0.15 cm^2   Post-Procedure Volume (cm^3) 0.105 cm^3   Wound Assessment Pink/red  (probes to bone)   Drainage Amount Moderate (25-50%)   Drainage Description Serosanguinous   Odor None   Charla-wound Assessment Intact   Wound Thickness Description not for Pressure Injury Full thickness

## 2023-09-13 NOTE — DISCHARGE INSTRUCTIONS
Right plantar foot:  Cleanse intact skin with soap & water, wound with normal saline or wound cleanser. Vashe Wound Solution (hypochlorous acid) soak placed on wound bed for a minimum of 60 seconds prior to dressing application. Hydrofera Blue: Cut to wound size, moisten with saline, and apply to wound bed. Secure with ABD or foam.  Apply TCC to right leg. Change weekly in wound center. Do not get wound wet in shower, pool or tub. May purchase cast cover at local pharmacy to keep dry in shower. Elevate legs when sitting to reduce swelling. Swelling interferes with wound healing. Eliminate salt intake as this promotes fluid retention and swelling     Please increase dietary protein to at least 60 grams per day to support cell rejuvenation. May use supplements such as Ensure Max, Naga, & Glucerna   Be sure to spread intake throughout the day for improved absorption. Continue antibiotics: Augmentin and Doxycycline as prescribed by ID. Insurance/ transportation availability initiated through current transportation company [employer] ([phone]). Continue Daily HBO Treatments!

## 2023-09-13 NOTE — WOUND CARE
Total Contact Cast    NAME:  Jacqui Calvin  YOB: 1945  MEDICAL RECORD NUMBER:  [id number]  DATE:  9/13/2023    Goal:  Patient will maintain integrity of cast, avoid mobility hazards, and report complications that may occur (foul odor, pain, numbness, cracked cast). Removed old contact cast if indicated and wash extremity with soap and water  Applied ordered dressing over wound(s)   Applied cast padding  Applied foam padding  Applied per Dr. Solares Iba was applied in the [address] [place] [address] to right lower leg  Applied cast material fiberglass  Allow cast to dry   Instructed patient to report to health care provider, including wound care center, any back pain, hip pain, or leg pain, numbness of toes, or any odor coming from the cast.   Instructed patient not to stick any foreign objects down into cast.  Instructed patient to utilize assistive devices(crutches, cane or walker) as ordered. Instructed patient to continue offloading as directed.      Applied cast per  Guidelines    Electronically signed by Bg Hogan RN on 9/13/2023 at 11:38 AM

## 2023-09-13 NOTE — WOUND CARE
Discharge Instructions for  [address] [facility]  [address] [facility], [address] [place] [address]  Phone [phone]   Fax [phone]      NAME:  Kevin Clement  YOB: 1945  MEDICAL RECORD NUMBER:  [id number]  DATE:  9/13/2023    Return Appointment:   1 week with Hallie Iyer DO      Instructions:     Right plantar foot:  Cleanse intact skin with soap & water, wound with normal saline or wound cleanser. Vashe Wound Solution (hypochlorous acid) soak placed on wound bed for a minimum of 60 seconds prior to dressing application. Hydrofera Blue: Cut to wound size, moisten with saline, and apply to wound bed. Secure with ABD or foam.  Apply TCC to right leg. Change weekly in wound center. Do not get wound wet in shower, pool or tub. May purchase cast cover at local pharmacy to keep dry in shower. Elevate legs when sitting to reduce swelling. Swelling interferes with wound healing. Eliminate salt intake as this promotes fluid retention and swelling     Please increase dietary protein to at least 60 grams per day to support cell rejuvenation. May use supplements such as Ensure Max, Naga, & Glucerna   Be sure to spread intake throughout the day for improved absorption. Continue antibiotics: Augmentin and Doxycycline as prescribed by ID. Insurance/ transportation availability initiated through current transportation company [employer] ([phone]). Continue Daily HBO Treatments! Should you experience increased redness, swelling, pain, foul odor, size of wound(s), or have a temperature over 101 degrees please contact the [facility] [address] at [phone] or if after hours contact your primary care physician or go to the hospital emergency department. PLEASE NOTE: IF YOU ARE UNABLE TO OBTAIN WOUND SUPPLIES, CONTINUE TO USE THE SUPPLIES YOU HAVE AVAILABLE UNTIL YOU ARE ABLE TO REACH US.  IT IS MOST IMPORTANT TO KEEP THE WOUND COVERED

## 2023-09-14 ENCOUNTER — HOSPITAL ENCOUNTER (OUTPATIENT)
Dept: WOUND CARE | Age: 78
Discharge: HOME | End: 2023-09-14
Payer: MEDICARE

## 2023-09-14 VITALS
OXYGEN SATURATION: 99 % | SYSTOLIC BLOOD PRESSURE: 138 MMHG | RESPIRATION RATE: 18 BRPM | TEMPERATURE: 97.6 F | DIASTOLIC BLOOD PRESSURE: 68 MMHG | HEART RATE: 57 BPM

## 2023-09-14 DIAGNOSIS — E11.621 DIABETIC ULCER OF RIGHT MIDFOOT ASSOCIATED WITH TYPE 2 DIABETES MELLITUS, WITH NECROSIS OF BONE (HCC): ICD-10-CM

## 2023-09-14 DIAGNOSIS — L97.414 DIABETIC ULCER OF RIGHT MIDFOOT ASSOCIATED WITH TYPE 2 DIABETES MELLITUS, WITH NECROSIS OF BONE (HCC): ICD-10-CM

## 2023-09-14 DIAGNOSIS — M86.671 CHRONIC REFRACTORY OSTEOMYELITIS OF RIGHT FOOT (HCC): Primary | ICD-10-CM

## 2023-09-14 LAB
GLUCOSE BLD STRIP.AUTO-MCNC: 148 MG/DL (ref 65–100)
GLUCOSE BLD STRIP.AUTO-MCNC: 215 MG/DL (ref 65–100)
SERVICE CMNT-IMP: ABNORMAL
SERVICE CMNT-IMP: ABNORMAL

## 2023-09-14 PROCEDURE — G0277 HBOT, FULL BODY CHAMBER, 30M: HCPCS

## 2023-09-14 PROCEDURE — 82962 GLUCOSE BLOOD TEST: CPT

## 2023-09-14 PROCEDURE — 99183 HYPERBARIC OXYGEN THERAPY: CPT | Performed by: SURGERY

## 2023-09-15 ENCOUNTER — HOSPITAL ENCOUNTER (OUTPATIENT)
Dept: WOUND CARE | Age: 78
Discharge: HOME | End: 2023-09-15
Payer: MEDICARE

## 2023-09-15 VITALS
DIASTOLIC BLOOD PRESSURE: 71 MMHG | OXYGEN SATURATION: 99 % | RESPIRATION RATE: 18 BRPM | TEMPERATURE: 98.1 F | SYSTOLIC BLOOD PRESSURE: 130 MMHG | HEART RATE: 53 BPM

## 2023-09-15 DIAGNOSIS — L97.414 DIABETIC ULCER OF RIGHT MIDFOOT ASSOCIATED WITH TYPE 2 DIABETES MELLITUS, WITH NECROSIS OF BONE (HCC): ICD-10-CM

## 2023-09-15 DIAGNOSIS — E11.621 DIABETIC ULCER OF RIGHT MIDFOOT ASSOCIATED WITH TYPE 2 DIABETES MELLITUS, WITH NECROSIS OF BONE (HCC): ICD-10-CM

## 2023-09-15 DIAGNOSIS — M86.671 CHRONIC REFRACTORY OSTEOMYELITIS OF RIGHT FOOT (HCC): Primary | ICD-10-CM

## 2023-09-15 LAB
GLUCOSE BLD STRIP.AUTO-MCNC: 164 MG/DL (ref 65–100)
GLUCOSE BLD STRIP.AUTO-MCNC: 216 MG/DL (ref 65–100)
SERVICE CMNT-IMP: ABNORMAL
SERVICE CMNT-IMP: ABNORMAL

## 2023-09-15 PROCEDURE — G0277 HBOT, FULL BODY CHAMBER, 30M: HCPCS

## 2023-09-15 PROCEDURE — 82962 GLUCOSE BLOOD TEST: CPT

## 2023-09-18 ENCOUNTER — HOSPITAL ENCOUNTER (OUTPATIENT)
Dept: WOUND CARE | Age: 78
Discharge: HOME | End: 2023-09-18
Payer: MEDICARE

## 2023-09-18 VITALS
RESPIRATION RATE: 18 BRPM | HEART RATE: 52 BPM | DIASTOLIC BLOOD PRESSURE: 63 MMHG | TEMPERATURE: 97.5 F | OXYGEN SATURATION: 100 % | SYSTOLIC BLOOD PRESSURE: 118 MMHG

## 2023-09-18 DIAGNOSIS — M86.671 CHRONIC REFRACTORY OSTEOMYELITIS OF RIGHT FOOT (HCC): Primary | ICD-10-CM

## 2023-09-18 DIAGNOSIS — L97.414 DIABETIC ULCER OF RIGHT MIDFOOT ASSOCIATED WITH TYPE 2 DIABETES MELLITUS, WITH NECROSIS OF BONE (HCC): ICD-10-CM

## 2023-09-18 DIAGNOSIS — E11.621 DIABETIC ULCER OF RIGHT MIDFOOT ASSOCIATED WITH TYPE 2 DIABETES MELLITUS, WITH NECROSIS OF BONE (HCC): ICD-10-CM

## 2023-09-18 LAB
GLUCOSE BLD STRIP.AUTO-MCNC: 215 MG/DL (ref 65–100)
GLUCOSE BLD STRIP.AUTO-MCNC: 242 MG/DL (ref 65–100)
SERVICE CMNT-IMP: ABNORMAL
SERVICE CMNT-IMP: ABNORMAL

## 2023-09-18 PROCEDURE — 99183 HYPERBARIC OXYGEN THERAPY: CPT | Performed by: SURGERY

## 2023-09-18 PROCEDURE — 82962 GLUCOSE BLOOD TEST: CPT

## 2023-09-18 PROCEDURE — G0277 HBOT, FULL BODY CHAMBER, 30M: HCPCS

## 2023-09-19 ENCOUNTER — HOSPITAL ENCOUNTER (OUTPATIENT)
Dept: WOUND CARE | Age: 78
Discharge: HOME | End: 2023-09-19
Payer: MEDICARE

## 2023-09-19 VITALS
DIASTOLIC BLOOD PRESSURE: 65 MMHG | SYSTOLIC BLOOD PRESSURE: 138 MMHG | RESPIRATION RATE: 18 BRPM | TEMPERATURE: 97.6 F | HEART RATE: 56 BPM | OXYGEN SATURATION: 100 %

## 2023-09-19 DIAGNOSIS — E11.621 DIABETIC ULCER OF RIGHT MIDFOOT ASSOCIATED WITH TYPE 2 DIABETES MELLITUS, WITH NECROSIS OF BONE (HCC): ICD-10-CM

## 2023-09-19 DIAGNOSIS — M86.671 CHRONIC REFRACTORY OSTEOMYELITIS OF RIGHT FOOT (HCC): Primary | ICD-10-CM

## 2023-09-19 DIAGNOSIS — L97.414 DIABETIC ULCER OF RIGHT MIDFOOT ASSOCIATED WITH TYPE 2 DIABETES MELLITUS, WITH NECROSIS OF BONE (HCC): ICD-10-CM

## 2023-09-19 LAB
GLUCOSE BLD STRIP.AUTO-MCNC: 147 MG/DL (ref 65–100)
GLUCOSE BLD STRIP.AUTO-MCNC: 178 MG/DL (ref 65–100)
SERVICE CMNT-IMP: ABNORMAL
SERVICE CMNT-IMP: ABNORMAL

## 2023-09-19 PROCEDURE — 82962 GLUCOSE BLOOD TEST: CPT

## 2023-09-19 PROCEDURE — G0277 HBOT, FULL BODY CHAMBER, 30M: HCPCS

## 2023-09-20 ENCOUNTER — HOSPITAL ENCOUNTER (OUTPATIENT)
Dept: WOUND CARE | Age: 78
Discharge: HOME | End: 2023-09-20
Payer: MEDICARE

## 2023-09-20 VITALS
OXYGEN SATURATION: 100 % | DIASTOLIC BLOOD PRESSURE: 68 MMHG | RESPIRATION RATE: 18 BRPM | SYSTOLIC BLOOD PRESSURE: 136 MMHG | TEMPERATURE: 97.8 F | HEART RATE: 68 BPM

## 2023-09-20 VITALS
SYSTOLIC BLOOD PRESSURE: 136 MMHG | OXYGEN SATURATION: 98 % | HEART RATE: 68 BPM | DIASTOLIC BLOOD PRESSURE: 68 MMHG | RESPIRATION RATE: 18 BRPM | TEMPERATURE: 97.8 F

## 2023-09-20 DIAGNOSIS — M86.671 CHRONIC REFRACTORY OSTEOMYELITIS OF RIGHT FOOT (HCC): Primary | ICD-10-CM

## 2023-09-20 DIAGNOSIS — E11.22 TYPE 2 DIABETES MELLITUS WITH STAGE 3 CHRONIC KIDNEY DISEASE, WITHOUT LONG-TERM CURRENT USE OF INSULIN, UNSPECIFIED WHETHER STAGE 3A OR 3B CKD (HCC): ICD-10-CM

## 2023-09-20 DIAGNOSIS — E11.628 DIABETIC FOOT INFECTION (HCC): ICD-10-CM

## 2023-09-20 DIAGNOSIS — L97.514 DIABETIC ULCER OF RIGHT FOOT ASSOCIATED WITH TYPE 2 DIABETES MELLITUS, WITH NECROSIS OF BONE, UNSPECIFIED PART OF FOOT (HCC): ICD-10-CM

## 2023-09-20 DIAGNOSIS — L08.9 DIABETIC FOOT INFECTION (HCC): ICD-10-CM

## 2023-09-20 DIAGNOSIS — N18.30 TYPE 2 DIABETES MELLITUS WITH STAGE 3 CHRONIC KIDNEY DISEASE, WITHOUT LONG-TERM CURRENT USE OF INSULIN, UNSPECIFIED WHETHER STAGE 3A OR 3B CKD (HCC): ICD-10-CM

## 2023-09-20 DIAGNOSIS — L97.414 DIABETIC ULCER OF RIGHT MIDFOOT ASSOCIATED WITH TYPE 2 DIABETES MELLITUS, WITH NECROSIS OF BONE (HCC): ICD-10-CM

## 2023-09-20 DIAGNOSIS — E11.621 DIABETIC ULCER OF RIGHT MIDFOOT ASSOCIATED WITH TYPE 2 DIABETES MELLITUS, WITH NECROSIS OF BONE (HCC): ICD-10-CM

## 2023-09-20 DIAGNOSIS — E11.621 DIABETIC ULCER OF RIGHT FOOT ASSOCIATED WITH TYPE 2 DIABETES MELLITUS, WITH NECROSIS OF BONE, UNSPECIFIED PART OF FOOT (HCC): ICD-10-CM

## 2023-09-20 DIAGNOSIS — I10 ESSENTIAL HYPERTENSION: ICD-10-CM

## 2023-09-20 LAB
GLUCOSE BLD STRIP.AUTO-MCNC: 170 MG/DL (ref 65–100)
GLUCOSE BLD STRIP.AUTO-MCNC: 231 MG/DL (ref 65–100)
SERVICE CMNT-IMP: ABNORMAL
SERVICE CMNT-IMP: ABNORMAL

## 2023-09-20 PROCEDURE — 11043 DBRDMT MUSC&/FSCA 1ST 20/<: CPT

## 2023-09-20 PROCEDURE — 82962 GLUCOSE BLOOD TEST: CPT

## 2023-09-20 PROCEDURE — G0277 HBOT, FULL BODY CHAMBER, 30M: HCPCS

## 2023-09-20 RX ORDER — LIDOCAINE 40 MG/G
CREAM TOPICAL ONCE
OUTPATIENT
Start: 2023-09-20 | End: 2023-09-20

## 2023-09-20 RX ORDER — LIDOCAINE HYDROCHLORIDE 20 MG/ML
JELLY TOPICAL ONCE
OUTPATIENT
Start: 2023-09-20 | End: 2023-09-20

## 2023-09-20 RX ORDER — IBUPROFEN 200 MG
TABLET ORAL ONCE
OUTPATIENT
Start: 2023-09-20 | End: 2023-09-20

## 2023-09-20 RX ORDER — GINSENG 100 MG
CAPSULE ORAL ONCE
OUTPATIENT
Start: 2023-09-20 | End: 2023-09-20

## 2023-09-20 RX ORDER — CLOBETASOL PROPIONATE 0.5 MG/G
OINTMENT TOPICAL ONCE
OUTPATIENT
Start: 2023-09-20 | End: 2023-09-20

## 2023-09-20 RX ORDER — GENTAMICIN SULFATE 1 MG/G
OINTMENT TOPICAL ONCE
OUTPATIENT
Start: 2023-09-20 | End: 2023-09-20

## 2023-09-20 RX ORDER — TRIAMCINOLONE ACETONIDE 1 MG/G
OINTMENT TOPICAL ONCE
OUTPATIENT
Start: 2023-09-20 | End: 2023-09-20

## 2023-09-20 RX ORDER — BACITRACIN ZINC AND POLYMYXIN B SULFATE 500; 1000 [USP'U]/G; [USP'U]/G
OINTMENT TOPICAL ONCE
OUTPATIENT
Start: 2023-09-20 | End: 2023-09-20

## 2023-09-20 RX ORDER — LIDOCAINE HYDROCHLORIDE 40 MG/ML
SOLUTION TOPICAL ONCE
OUTPATIENT
Start: 2023-09-20 | End: 2023-09-20

## 2023-09-20 RX ORDER — LIDOCAINE 50 MG/G
OINTMENT TOPICAL ONCE
OUTPATIENT
Start: 2023-09-20 | End: 2023-09-20

## 2023-09-20 RX ORDER — BETAMETHASONE DIPROPIONATE 0.05 %
OINTMENT (GRAM) TOPICAL ONCE
OUTPATIENT
Start: 2023-09-20 | End: 2023-09-20

## 2023-09-20 NOTE — WOUND CARE
Total Contact Cast    NAME:  Zeina Martinez  YOB: 1945  MEDICAL RECORD NUMBER:  [id number]  DATE:  9/20/2023    Goal:  Patient will maintain integrity of cast, avoid mobility hazards, and report complications that may occur (foul odor, pain, numbness, cracked cast). Removed old contact cast if indicated and wash extremity with soap and water  Applied ordered dressing over wound(s)   Applied cast padding  Applied foam padding  Applied per Dr. Fajardo Self was applied in the [address] [place] [address] to right lower leg  Applied cast material fiberglass  Allow cast to dry   Instructed patient to report to health care provider, including wound care center, any back pain, hip pain, or leg pain, numbness of toes, or any odor coming from the cast.   Instructed patient not to stick any foreign objects down into cast.  Instructed patient to utilize assistive devices(crutches, cane or walker) as ordered. Instructed patient to continue offloading as directed.      Applied cast per  Guidelines    Electronically signed by Jose Frazier RN on 9/20/2023 at 11:56 AM

## 2023-09-20 NOTE — WOUND CARE
Discharge Instructions for  [address] [facility]  [address] [facility], [address] [place] [address]  Phone [phone]   Fax [phone]      NAME:  Viraj Chan  YOB: 1945  MEDICAL RECORD NUMBER:  [id number]  DATE:  9/20/2023    Return Appointment:   1 week with Wyatt Andrews DO      Instructions:   Right Plantar Foot:  Cleanse intact skin with soap & water, wound with normal saline or wound cleanser. Vashe Wound Solution (hypochlorous acid) soak placed on wound bed for a minimum of 60 seconds prior to dressing application. Hydrofera Blue: Cut to wound size, moisten with saline, and apply to wound bed. Secure with ABD or foam.  Apply TCC to right leg. WEAR BOOT AT ALL TIMES WITH CAST, DO NOT GET WET WEAR CAST COVER AND NO DRIVING WITH CAST ON RIGHT FOOT. Change weekly in wound center. Do not get wound wet in shower, pool or tub. May purchase cast cover at local pharmacy to keep dry in shower. Elevate legs when sitting to reduce swelling. Swelling interferes with wound healing. Eliminate salt intake as this promotes fluid retention and swelling     Please increase dietary protein to at least 60 grams per day to support cell rejuvenation. May use supplements such as Ensure Max, Naga, & Glucerna   Be sure to spread intake throughout the day for improved absorption. Continue antibiotics: Augmentin and Doxycycline as prescribed by ID. Insurance/ transportation availability initiated through current transportation company [employer] ([phone]). Continue Daily HBO Treatments! Should you experience increased redness, swelling, pain, foul odor, size of wound(s), or have a temperature over 101 degrees please contact the [id number] at [phone] or if after hours contact your primary care physician or go to the hospital emergency department.     PLEASE NOTE: IF YOU ARE UNABLE TO OBTAIN WOUND SUPPLIES, CONTINUE TO USE THE

## 2023-09-20 NOTE — PATIENT INSTRUCTIONS
Right plantar foot:  Cleanse intact skin with soap & water, wound with normal saline or wound cleanser. Vashe Wound Solution (hypochlorous acid) soak placed on wound bed for a minimum of 60 seconds prior to dressing application. Hydrofera Blue: Cut to wound size, moisten with saline, and apply to wound bed. Secure with ABD or foam.  Apply TCC to right leg. Change weekly in wound center. Do not get wound wet in shower, pool or tub. May purchase cast cover at local pharmacy to keep dry in shower. Elevate legs when sitting to reduce swelling. Swelling interferes with wound healing. Eliminate salt intake as this promotes fluid retention and swelling     Please increase dietary protein to at least 60 grams per day to support cell rejuvenation. May use supplements such as Ensure Max, Naga, & Glucerna   Be sure to spread intake throughout the day for improved absorption. Continue antibiotics: Augmentin and Doxycycline as prescribed by ID. Insurance/ transportation availability initiated through current transportation company [employer] ([phone]). Continue Daily HBO Treatments!

## 2023-09-20 NOTE — FLOWSHEET NOTE
09/20/23 1149   Wound 04/14/23 Foot Right;Plantar #1 delayed healing surgical debridement 2/22/23   Date First Assessed/Time First Assessed: 04/14/23 1053   Present on Hospital Admission: Yes  Wound Approximate Age at First Assessment (Weeks): 6 weeks  Primary Wound Type: Diabetic Ulcer  Location: Foot  Wound Location Orientation: Right;Plantar  Wou. ..    Wound Image     Wound Etiology Diabetic Meza 3   Dressing Status Old drainage noted   Wound Cleansed Vashe   Dressing/Treatment Hydrofera blue   Offloading for Diabetic Foot Ulcers Total contact cast   Wound Length (cm) 0.3 cm   Wound Width (cm) 0.5 cm   Wound Depth (cm) 0.7 cm   Wound Surface Area (cm^2) 0.15 cm^2   Change in Wound Size % (l*w) 98.84   Wound Volume (cm^3) 0.105 cm^3   Wound Healing % 99   Post-Procedure Length (cm) 0.5 cm   Post-Procedure Width (cm) 0.7 cm   Post-Procedure Depth (cm) 0.8 cm   Post-Procedure Surface Area (cm^2) 0.35 cm^2   Post-Procedure Volume (cm^3) 0.28 cm^3   Wound Assessment Pink/red  (probes to bone but bone not exposed)   Drainage Amount Moderate (25-50%)   Drainage Description Serosanguinous   Odor None   Charla-wound Assessment Intact   Wound Thickness Description not for Pressure Injury Full thickness

## 2023-09-21 ENCOUNTER — HOSPITAL ENCOUNTER (OUTPATIENT)
Dept: WOUND CARE | Age: 78
Discharge: HOME | End: 2023-09-21
Payer: MEDICARE

## 2023-09-21 VITALS
OXYGEN SATURATION: 100 % | DIASTOLIC BLOOD PRESSURE: 65 MMHG | HEART RATE: 52 BPM | SYSTOLIC BLOOD PRESSURE: 123 MMHG | RESPIRATION RATE: 18 BRPM | TEMPERATURE: 97.6 F

## 2023-09-21 DIAGNOSIS — E11.621 DIABETIC ULCER OF RIGHT MIDFOOT ASSOCIATED WITH TYPE 2 DIABETES MELLITUS, WITH NECROSIS OF BONE (HCC): ICD-10-CM

## 2023-09-21 DIAGNOSIS — L97.414 DIABETIC ULCER OF RIGHT MIDFOOT ASSOCIATED WITH TYPE 2 DIABETES MELLITUS, WITH NECROSIS OF BONE (HCC): ICD-10-CM

## 2023-09-21 DIAGNOSIS — M86.671 CHRONIC REFRACTORY OSTEOMYELITIS OF RIGHT FOOT (HCC): Primary | ICD-10-CM

## 2023-09-21 LAB
GLUCOSE BLD STRIP.AUTO-MCNC: 139 MG/DL (ref 65–100)
GLUCOSE BLD STRIP.AUTO-MCNC: 219 MG/DL (ref 65–100)
SERVICE CMNT-IMP: ABNORMAL
SERVICE CMNT-IMP: ABNORMAL

## 2023-09-21 PROCEDURE — 99183 HYPERBARIC OXYGEN THERAPY: CPT | Performed by: SURGERY

## 2023-09-21 PROCEDURE — 82962 GLUCOSE BLOOD TEST: CPT

## 2023-09-21 PROCEDURE — G0277 HBOT, FULL BODY CHAMBER, 30M: HCPCS

## 2023-09-21 NOTE — TELEPHONE ENCOUNTER
Spoke to patient, he does want to use [address] [place] [address] - please send refills as requested.

## 2023-09-21 NOTE — TELEPHONE ENCOUNTER
I reviewed the pts chart. Last appt: 5/19/23  Next appt: 9/29/23  Rx Last Sent: all were last sent on 5/19/23 for 90 days x1, BUT they were sent to [facility].  The request is for [address].  I tried to contact the pt to ensure that he is wanting his Rxs sent to [address], but he has no voicemail and did not answer

## 2023-09-22 ENCOUNTER — HOSPITAL ENCOUNTER (OUTPATIENT)
Dept: WOUND CARE | Age: 78
Discharge: HOME | End: 2023-09-22
Payer: MEDICARE

## 2023-09-22 VITALS
HEART RATE: 49 BPM | RESPIRATION RATE: 18 BRPM | TEMPERATURE: 97.9 F | DIASTOLIC BLOOD PRESSURE: 74 MMHG | OXYGEN SATURATION: 99 % | SYSTOLIC BLOOD PRESSURE: 124 MMHG

## 2023-09-22 VITALS
OXYGEN SATURATION: 99 % | HEART RATE: 49 BPM | DIASTOLIC BLOOD PRESSURE: 74 MMHG | SYSTOLIC BLOOD PRESSURE: 124 MMHG | TEMPERATURE: 97.9 F | RESPIRATION RATE: 18 BRPM

## 2023-09-22 DIAGNOSIS — M86.671 CHRONIC REFRACTORY OSTEOMYELITIS OF RIGHT FOOT (HCC): Primary | ICD-10-CM

## 2023-09-22 DIAGNOSIS — E11.621 DIABETIC ULCER OF RIGHT MIDFOOT ASSOCIATED WITH TYPE 2 DIABETES MELLITUS, WITH NECROSIS OF BONE (HCC): ICD-10-CM

## 2023-09-22 DIAGNOSIS — L97.414 DIABETIC ULCER OF RIGHT MIDFOOT ASSOCIATED WITH TYPE 2 DIABETES MELLITUS, WITH NECROSIS OF BONE (HCC): ICD-10-CM

## 2023-09-22 LAB
GLUCOSE BLD STRIP.AUTO-MCNC: 162 MG/DL (ref 65–100)
GLUCOSE BLD STRIP.AUTO-MCNC: 222 MG/DL (ref 65–100)
SERVICE CMNT-IMP: ABNORMAL
SERVICE CMNT-IMP: ABNORMAL

## 2023-09-22 PROCEDURE — 29445 APPL RIGID TOT CNTC LEG CAST: CPT

## 2023-09-22 PROCEDURE — G0277 HBOT, FULL BODY CHAMBER, 30M: HCPCS

## 2023-09-22 PROCEDURE — 82962 GLUCOSE BLOOD TEST: CPT

## 2023-09-22 NOTE — FLOWSHEET NOTE
09/22/23 1141   Wound 04/14/23 Foot Right;Plantar #1 delayed healing surgical debridement 2/22/23   Date First Assessed/Time First Assessed: 04/14/23 1053   Present on Hospital Admission: Yes  Wound Approximate Age at First Assessment (Weeks): 6 weeks  Primary Wound Type: Diabetic Ulcer  Location: Foot  Wound Location Orientation: Right;Plantar  Wou. ..    Wound Image    Wound Etiology Diabetic Meza 3   Dressing Status Old drainage noted   Wound Cleansed Vashe   Dressing/Treatment Hydrofera blue   Offloading for Diabetic Foot Ulcers Total contact cast   Wound Length (cm) 0.5 cm   Wound Width (cm) 0.5 cm   Wound Depth (cm) 0.6 cm   Wound Surface Area (cm^2) 0.25 cm^2   Change in Wound Size % (l*w) 98.06   Wound Volume (cm^3) 0.15 cm^3   Wound Healing % 99   Wound Assessment Pink/red   Drainage Amount Moderate (25-50%)   Drainage Description Serosanguinous   Odor None   Charla-wound Assessment Intact   Wound Thickness Description not for Pressure Injury Full thickness

## 2023-09-22 NOTE — WOUND CARE
Total Contact Cast    NAME:  Kevin Clement  YOB: 1945  MEDICAL RECORD NUMBER:  [id number]  DATE:  9/22/2023    Goal:  Patient will maintain integrity of cast, avoid mobility hazards, and report complications that may occur (foul odor, pain, numbness, cracked cast). Removed old contact cast if indicated and wash extremity with soap and water  Applied ordered dressing over wound(s)   Applied cast padding  Applied foam padding  Applied per Dr. Zuleika Singh was applied in the [address] [place] [address] to right lower leg  Applied cast material fiberglass  Allow cast to dry   Instructed patient to report to health care provider, including wound care center, any back pain, hip pain, or leg pain, numbness of toes, or any odor coming from the cast.   Instructed patient not to stick any foreign objects down into cast.  Instructed patient to utilize assistive devices(crutches, cane or walker) as ordered. Instructed patient to continue offloading as directed. Applied cast per  Guidelines    Electronically signed by Lety Arguelles RN on 9/22/2023 at 11:42 AM           Patient complained that cast was rubbing leg and toes and requested that it be changed today.

## 2023-09-25 ENCOUNTER — HOSPITAL ENCOUNTER (OUTPATIENT)
Dept: WOUND CARE | Age: 78
Discharge: HOME | End: 2023-09-25
Payer: MEDICARE

## 2023-09-25 VITALS
OXYGEN SATURATION: 99 % | DIASTOLIC BLOOD PRESSURE: 62 MMHG | TEMPERATURE: 97.7 F | SYSTOLIC BLOOD PRESSURE: 136 MMHG | HEART RATE: 60 BPM | RESPIRATION RATE: 18 BRPM

## 2023-09-25 DIAGNOSIS — L97.414 DIABETIC ULCER OF RIGHT MIDFOOT ASSOCIATED WITH TYPE 2 DIABETES MELLITUS, WITH NECROSIS OF BONE (HCC): ICD-10-CM

## 2023-09-25 DIAGNOSIS — E11.621 DIABETIC ULCER OF RIGHT MIDFOOT ASSOCIATED WITH TYPE 2 DIABETES MELLITUS, WITH NECROSIS OF BONE (HCC): ICD-10-CM

## 2023-09-25 DIAGNOSIS — M86.671 CHRONIC REFRACTORY OSTEOMYELITIS OF RIGHT FOOT (HCC): Primary | ICD-10-CM

## 2023-09-25 LAB
GLUCOSE BLD STRIP.AUTO-MCNC: 148 MG/DL (ref 65–100)
GLUCOSE BLD STRIP.AUTO-MCNC: 204 MG/DL (ref 65–100)
SERVICE CMNT-IMP: ABNORMAL
SERVICE CMNT-IMP: ABNORMAL

## 2023-09-25 PROCEDURE — G0277 HBOT, FULL BODY CHAMBER, 30M: HCPCS

## 2023-09-25 PROCEDURE — 82962 GLUCOSE BLOOD TEST: CPT

## 2023-09-25 PROCEDURE — 99183 HYPERBARIC OXYGEN THERAPY: CPT | Performed by: SURGERY

## 2023-09-25 RX ORDER — SITAGLIPTIN 100 MG/1
100 TABLET, FILM COATED ORAL DAILY
Qty: 90 TABLET | Refills: 1 | OUTPATIENT
Start: 2023-09-25

## 2023-09-25 RX ORDER — HYDROCHLOROTHIAZIDE 25 MG/1
TABLET ORAL
Qty: 90 TABLET | Refills: 1 | OUTPATIENT
Start: 2023-09-25

## 2023-09-25 RX ORDER — ATENOLOL 25 MG/1
TABLET ORAL
Qty: 90 TABLET | Refills: 1 | OUTPATIENT
Start: 2023-09-25

## 2023-09-25 RX ORDER — AMLODIPINE BESYLATE 10 MG/1
10 TABLET ORAL DAILY
Qty: 90 TABLET | Refills: 1 | OUTPATIENT
Start: 2023-09-25

## 2023-09-25 RX ORDER — LOSARTAN POTASSIUM 100 MG/1
100 TABLET ORAL DAILY
Qty: 90 TABLET | Refills: 1 | OUTPATIENT
Start: 2023-09-25

## 2023-09-26 ENCOUNTER — HOSPITAL ENCOUNTER (OUTPATIENT)
Dept: WOUND CARE | Age: 78
Discharge: HOME | End: 2023-09-26
Payer: MEDICARE

## 2023-09-26 VITALS
SYSTOLIC BLOOD PRESSURE: 137 MMHG | HEART RATE: 49 BPM | OXYGEN SATURATION: 100 % | TEMPERATURE: 97.6 F | RESPIRATION RATE: 18 BRPM | DIASTOLIC BLOOD PRESSURE: 82 MMHG

## 2023-09-26 DIAGNOSIS — L97.414 DIABETIC ULCER OF RIGHT MIDFOOT ASSOCIATED WITH TYPE 2 DIABETES MELLITUS, WITH NECROSIS OF BONE (HCC): ICD-10-CM

## 2023-09-26 DIAGNOSIS — E11.621 DIABETIC ULCER OF RIGHT MIDFOOT ASSOCIATED WITH TYPE 2 DIABETES MELLITUS, WITH NECROSIS OF BONE (HCC): ICD-10-CM

## 2023-09-26 DIAGNOSIS — M86.671 CHRONIC REFRACTORY OSTEOMYELITIS OF RIGHT FOOT (HCC): Primary | ICD-10-CM

## 2023-09-26 LAB
GLUCOSE BLD STRIP.AUTO-MCNC: 137 MG/DL (ref 65–100)
GLUCOSE BLD STRIP.AUTO-MCNC: 206 MG/DL (ref 65–100)
SERVICE CMNT-IMP: ABNORMAL
SERVICE CMNT-IMP: ABNORMAL

## 2023-09-26 PROCEDURE — 99183 HYPERBARIC OXYGEN THERAPY: CPT | Performed by: FAMILY MEDICINE

## 2023-09-26 PROCEDURE — 82962 GLUCOSE BLOOD TEST: CPT

## 2023-09-26 PROCEDURE — G0277 HBOT, FULL BODY CHAMBER, 30M: HCPCS

## 2023-09-27 ENCOUNTER — HOSPITAL ENCOUNTER (OUTPATIENT)
Dept: WOUND CARE | Age: 78
Discharge: HOME | End: 2023-09-27
Payer: MEDICARE

## 2023-09-27 VITALS
DIASTOLIC BLOOD PRESSURE: 63 MMHG | HEART RATE: 51 BPM | RESPIRATION RATE: 18 BRPM | SYSTOLIC BLOOD PRESSURE: 125 MMHG | TEMPERATURE: 98.1 F | OXYGEN SATURATION: 100 %

## 2023-09-27 VITALS
DIASTOLIC BLOOD PRESSURE: 82 MMHG | SYSTOLIC BLOOD PRESSURE: 138 MMHG | OXYGEN SATURATION: 99 % | RESPIRATION RATE: 18 BRPM | HEART RATE: 51 BPM | TEMPERATURE: 98.1 F

## 2023-09-27 DIAGNOSIS — L97.514 DIABETIC ULCER OF RIGHT FOOT ASSOCIATED WITH TYPE 2 DIABETES MELLITUS, WITH NECROSIS OF BONE, UNSPECIFIED PART OF FOOT (HCC): ICD-10-CM

## 2023-09-27 DIAGNOSIS — E11.621 DIABETIC ULCER OF RIGHT FOOT ASSOCIATED WITH TYPE 2 DIABETES MELLITUS, WITH NECROSIS OF BONE, UNSPECIFIED PART OF FOOT (HCC): ICD-10-CM

## 2023-09-27 DIAGNOSIS — E11.621 DIABETIC ULCER OF RIGHT MIDFOOT ASSOCIATED WITH TYPE 2 DIABETES MELLITUS, WITH NECROSIS OF BONE (HCC): ICD-10-CM

## 2023-09-27 DIAGNOSIS — L08.9 DIABETIC FOOT INFECTION (HCC): ICD-10-CM

## 2023-09-27 DIAGNOSIS — E11.628 DIABETIC FOOT INFECTION (HCC): ICD-10-CM

## 2023-09-27 DIAGNOSIS — L97.414 DIABETIC ULCER OF RIGHT MIDFOOT ASSOCIATED WITH TYPE 2 DIABETES MELLITUS, WITH NECROSIS OF BONE (HCC): ICD-10-CM

## 2023-09-27 DIAGNOSIS — M86.671 CHRONIC REFRACTORY OSTEOMYELITIS OF RIGHT FOOT (HCC): Primary | ICD-10-CM

## 2023-09-27 LAB
GLUCOSE BLD STRIP.AUTO-MCNC: 144 MG/DL (ref 65–100)
GLUCOSE BLD STRIP.AUTO-MCNC: 193 MG/DL (ref 65–100)
SERVICE CMNT-IMP: ABNORMAL
SERVICE CMNT-IMP: ABNORMAL

## 2023-09-27 PROCEDURE — 11042 DBRDMT SUBQ TIS 1ST 20SQCM/<: CPT | Performed by: FAMILY MEDICINE

## 2023-09-27 PROCEDURE — G0277 HBOT, FULL BODY CHAMBER, 30M: HCPCS

## 2023-09-27 PROCEDURE — 82962 GLUCOSE BLOOD TEST: CPT

## 2023-09-27 PROCEDURE — 11042 DBRDMT SUBQ TIS 1ST 20SQCM/<: CPT

## 2023-09-27 RX ORDER — LIDOCAINE HYDROCHLORIDE 40 MG/ML
SOLUTION TOPICAL ONCE
OUTPATIENT
Start: 2023-09-27 | End: 2023-09-27

## 2023-09-27 RX ORDER — BETAMETHASONE DIPROPIONATE 0.05 %
OINTMENT (GRAM) TOPICAL ONCE
OUTPATIENT
Start: 2023-09-27 | End: 2023-09-27

## 2023-09-27 RX ORDER — LIDOCAINE 40 MG/G
CREAM TOPICAL ONCE
OUTPATIENT
Start: 2023-09-27 | End: 2023-09-27

## 2023-09-27 RX ORDER — LIDOCAINE HYDROCHLORIDE 20 MG/ML
JELLY TOPICAL ONCE
Status: COMPLETED | OUTPATIENT
Start: 2023-09-27 | End: 2023-09-27

## 2023-09-27 RX ORDER — LIDOCAINE HYDROCHLORIDE 20 MG/ML
JELLY TOPICAL ONCE
OUTPATIENT
Start: 2023-09-27 | End: 2023-09-27

## 2023-09-27 RX ORDER — GENTAMICIN SULFATE 1 MG/G
OINTMENT TOPICAL ONCE
OUTPATIENT
Start: 2023-09-27 | End: 2023-09-27

## 2023-09-27 RX ORDER — TRIAMCINOLONE ACETONIDE 1 MG/G
OINTMENT TOPICAL ONCE
OUTPATIENT
Start: 2023-09-27 | End: 2023-09-27

## 2023-09-27 RX ORDER — LIDOCAINE 50 MG/G
OINTMENT TOPICAL ONCE
OUTPATIENT
Start: 2023-09-27 | End: 2023-09-27

## 2023-09-27 RX ORDER — CLOBETASOL PROPIONATE 0.5 MG/G
OINTMENT TOPICAL ONCE
OUTPATIENT
Start: 2023-09-27 | End: 2023-09-27

## 2023-09-27 RX ORDER — IBUPROFEN 200 MG
TABLET ORAL ONCE
OUTPATIENT
Start: 2023-09-27 | End: 2023-09-27

## 2023-09-27 RX ORDER — BACITRACIN ZINC AND POLYMYXIN B SULFATE 500; 1000 [USP'U]/G; [USP'U]/G
OINTMENT TOPICAL ONCE
OUTPATIENT
Start: 2023-09-27 | End: 2023-09-27

## 2023-09-27 RX ORDER — GINSENG 100 MG
CAPSULE ORAL ONCE
OUTPATIENT
Start: 2023-09-27 | End: 2023-09-27

## 2023-09-27 RX ADMIN — LIDOCAINE HYDROCHLORIDE: 20 JELLY TOPICAL at 10:39

## 2023-09-27 NOTE — PROGRESS NOTES
DO Lawrence  Consent obtained: Yes  Time out taken: Yes  Pain Control:         Devitalized Tissue Debrided: fibrin, biofilm, slough, necrotic/eschar, and callus    Pre Debridement Measurements:  Are located in the [facility]  Documentation Flow Sheet    Diabetic/Pressure/Non Pressure Ulcers only:  Ulcer: Diabetic ulcer, fat layer exposed     Wound/Ulcer #: 1  Post Debridement Measurements:  Wound/Ulcer Descriptions are Pre Debridement except measurements:  Wound 04/14/23 Foot Right;Plantar #1 delayed healing surgical debridement 2/22/23 (Active)   Wound Image    09/27/23 0919   Wound Etiology Diabetic Meza 3 09/27/23 0919   Dressing Status Old drainage noted 09/27/23 0919   Wound Cleansed Vashe 09/27/23 0919   Dressing/Treatment Hydrofera blue 09/27/23 0919   Offloading for Diabetic Foot Ulcers Total contact cast 09/27/23 0919   Wound Length (cm) 0.3 cm 09/27/23 0919   Wound Width (cm) 0.3 cm 09/27/23 0919   Wound Depth (cm) 0.7 cm 09/27/23 0919   Wound Surface Area (cm^2) 0.09 cm^2 09/27/23 0919   Change in Wound Size % (l*w) 99.3 09/27/23 0919   Wound Volume (cm^3) 0.063 cm^3 09/27/23 0919   Wound Healing % 100 09/27/23 0919   Post-Procedure Length (cm) 0.5 cm 09/27/23 0919   Post-Procedure Width (cm) 0.5 cm 09/27/23 0919   Post-Procedure Depth (cm) 0.9 cm 09/27/23 0919   Post-Procedure Surface Area (cm^2) 0.25 cm^2 09/27/23 0919   Post-Procedure Volume (cm^3) 0.225 cm^3 09/27/23 0919   Wound Assessment Pink/red 09/27/23 0919   Drainage Amount Moderate (25-50%) 09/27/23 0919   Drainage Description Serosanguinous 09/27/23 0919   Odor None 09/27/23 0919   Charla-wound Assessment Intact 09/27/23 0919   Margins Defined edges; Attached edges 08/23/23 1008   Wound Thickness Description not for Pressure Injury Full thickness 09/27/23 0919   Number of days: 166     Incision 02/22/23 Foot Right;Lateral (Active)   Number of days: 217     Total Surface Area Debrided:  0.25 sq cm   Estimated Blood Loss: Minimal amount blood

## 2023-09-27 NOTE — PATIENT INSTRUCTIONS
Right plantar foot:  Cleanse intact skin with soap & water, wound with normal saline or wound cleanser. Vashe Wound Solution (hypochlorous acid) soak placed on wound bed for a minimum of 60 seconds prior to dressing application. Hydrofera Blue: Cut to wound size, moisten with saline, and apply to wound bed. Secure with ABD or foam.  Apply TCC to right leg. Change weekly in wound center. Do not get wound wet in shower, pool or tub. May purchase cast cover at local pharmacy to keep dry in shower. Elevate legs when sitting to reduce swelling. Swelling interferes with wound healing. Eliminate salt intake as this promotes fluid retention and swelling     Please increase dietary protein to at least 60 grams per day to support cell rejuvenation. May use supplements such as Ensure Max, Naag, & Glucerna   Be sure to spread intake throughout the day for improved absorption. Continue antibiotics: Augmentin and Doxycycline as prescribed by ID. Insurance/ transportation availability initiated through current transportation company [employer] ([phone]). Continue Daily HBO Treatments!

## 2023-09-27 NOTE — FLOWSHEET NOTE
09/27/23 0919   Wound 04/14/23 Foot Right;Plantar #1 delayed healing surgical debridement 2/22/23   Date First Assessed/Time First Assessed: 04/14/23 1053   Present on Hospital Admission: Yes  Wound Approximate Age at First Assessment (Weeks): 6 weeks  Primary Wound Type: Diabetic Ulcer  Location: Foot  Wound Location Orientation: Right;Plantar  Wou. ..    Wound Image     Wound Etiology Diabetic Meza 3   Dressing Status Old drainage noted   Wound Cleansed Vashe   Dressing/Treatment Hydrofera blue   Offloading for Diabetic Foot Ulcers Total contact cast   Wound Length (cm) 0.3 cm   Wound Width (cm) 0.3 cm   Wound Depth (cm) 0.7 cm   Wound Surface Area (cm^2) 0.09 cm^2   Change in Wound Size % (l*w) 99.3   Wound Volume (cm^3) 0.063 cm^3   Wound Healing % 100   Post-Procedure Length (cm) 0.5 cm   Post-Procedure Width (cm) 0.5 cm   Post-Procedure Depth (cm) 0.9 cm   Post-Procedure Surface Area (cm^2) 0.25 cm^2   Post-Procedure Volume (cm^3) 0.225 cm^3   Wound Assessment Pink/red   Drainage Amount Moderate (25-50%)   Drainage Description Serosanguinous   Odor None   Charla-wound Assessment Intact   Wound Thickness Description not for Pressure Injury Full thickness

## 2023-09-28 ENCOUNTER — HOSPITAL ENCOUNTER (OUTPATIENT)
Dept: WOUND CARE | Age: 78
Discharge: HOME | End: 2023-09-28
Payer: MEDICARE

## 2023-09-28 VITALS
TEMPERATURE: 98.6 F | RESPIRATION RATE: 18 BRPM | HEART RATE: 52 BPM | DIASTOLIC BLOOD PRESSURE: 86 MMHG | SYSTOLIC BLOOD PRESSURE: 148 MMHG | OXYGEN SATURATION: 99 %

## 2023-09-28 DIAGNOSIS — M86.671 CHRONIC REFRACTORY OSTEOMYELITIS OF RIGHT FOOT (HCC): Primary | ICD-10-CM

## 2023-09-28 DIAGNOSIS — L97.414 DIABETIC ULCER OF RIGHT MIDFOOT ASSOCIATED WITH TYPE 2 DIABETES MELLITUS, WITH NECROSIS OF BONE (HCC): ICD-10-CM

## 2023-09-28 DIAGNOSIS — E11.621 DIABETIC ULCER OF RIGHT MIDFOOT ASSOCIATED WITH TYPE 2 DIABETES MELLITUS, WITH NECROSIS OF BONE (HCC): ICD-10-CM

## 2023-09-28 LAB
GLUCOSE BLD STRIP.AUTO-MCNC: 128 MG/DL (ref 65–100)
GLUCOSE BLD STRIP.AUTO-MCNC: 240 MG/DL (ref 65–100)
SERVICE CMNT-IMP: ABNORMAL
SERVICE CMNT-IMP: ABNORMAL

## 2023-09-28 PROCEDURE — 82962 GLUCOSE BLOOD TEST: CPT

## 2023-09-28 PROCEDURE — G0277 HBOT, FULL BODY CHAMBER, 30M: HCPCS

## 2023-09-28 PROCEDURE — 99183 HYPERBARIC OXYGEN THERAPY: CPT | Performed by: SURGERY

## 2023-09-29 ENCOUNTER — OFFICE VISIT (OUTPATIENT)
Dept: INTERNAL MEDICINE CLINIC | Facility: CLINIC | Age: 78
End: 2023-09-29
Payer: MEDICARE

## 2023-09-29 VITALS
HEIGHT: 63 IN | BODY MASS INDEX: 29.8 KG/M2 | DIASTOLIC BLOOD PRESSURE: 67 MMHG | WEIGHT: 168.2 LBS | HEART RATE: 52 BPM | OXYGEN SATURATION: 100 % | TEMPERATURE: 98.1 F | SYSTOLIC BLOOD PRESSURE: 132 MMHG

## 2023-09-29 DIAGNOSIS — R41.3 MEMORY LOSS: ICD-10-CM

## 2023-09-29 DIAGNOSIS — N18.30 TYPE 2 DIABETES MELLITUS WITH STAGE 3 CHRONIC KIDNEY DISEASE, WITHOUT LONG-TERM CURRENT USE OF INSULIN, UNSPECIFIED WHETHER STAGE 3A OR 3B CKD (HCC): ICD-10-CM

## 2023-09-29 DIAGNOSIS — F10.20 ALCOHOL DEPENDENCE, UNCOMPLICATED (HCC): ICD-10-CM

## 2023-09-29 DIAGNOSIS — I10 ESSENTIAL HYPERTENSION: ICD-10-CM

## 2023-09-29 DIAGNOSIS — E11.621 DIABETIC ULCER OF RIGHT MIDFOOT ASSOCIATED WITH TYPE 2 DIABETES MELLITUS, WITH NECROSIS OF BONE (HCC): Chronic | ICD-10-CM

## 2023-09-29 DIAGNOSIS — E11.22 TYPE 2 DIABETES MELLITUS WITH STAGE 3 CHRONIC KIDNEY DISEASE, WITHOUT LONG-TERM CURRENT USE OF INSULIN, UNSPECIFIED WHETHER STAGE 3A OR 3B CKD (HCC): ICD-10-CM

## 2023-09-29 DIAGNOSIS — E11.22 TYPE 2 DIABETES MELLITUS WITH STAGE 3 CHRONIC KIDNEY DISEASE, WITHOUT LONG-TERM CURRENT USE OF INSULIN, UNSPECIFIED WHETHER STAGE 3A OR 3B CKD (HCC): Primary | ICD-10-CM

## 2023-09-29 DIAGNOSIS — Z00.00 MEDICARE ANNUAL WELLNESS VISIT, SUBSEQUENT: ICD-10-CM

## 2023-09-29 DIAGNOSIS — N40.1 BENIGN PROSTATIC HYPERPLASIA WITH LOWER URINARY TRACT SYMPTOMS, SYMPTOM DETAILS UNSPECIFIED: ICD-10-CM

## 2023-09-29 DIAGNOSIS — Z23 ENCOUNTER FOR IMMUNIZATION: ICD-10-CM

## 2023-09-29 DIAGNOSIS — N18.30 TYPE 2 DIABETES MELLITUS WITH STAGE 3 CHRONIC KIDNEY DISEASE, WITHOUT LONG-TERM CURRENT USE OF INSULIN, UNSPECIFIED WHETHER STAGE 3A OR 3B CKD (HCC): Primary | ICD-10-CM

## 2023-09-29 DIAGNOSIS — L97.414 DIABETIC ULCER OF RIGHT MIDFOOT ASSOCIATED WITH TYPE 2 DIABETES MELLITUS, WITH NECROSIS OF BONE (HCC): Chronic | ICD-10-CM

## 2023-09-29 DIAGNOSIS — Z12.5 SCREENING FOR PROSTATE CANCER: ICD-10-CM

## 2023-09-29 LAB
ALBUMIN SERPL-MCNC: 3.8 G/DL (ref 3.2–4.6)
ALBUMIN/GLOB SERPL: 1 (ref 0.4–1.6)
ALP SERPL-CCNC: 45 U/L (ref 50–136)
ALT SERPL-CCNC: 35 U/L (ref 12–65)
ANION GAP SERPL CALC-SCNC: 8 MMOL/L (ref 2–11)
AST SERPL-CCNC: 23 U/L (ref 15–37)
BASOPHILS # BLD: 0.1 K/UL (ref 0–0.2)
BASOPHILS NFR BLD: 1 % (ref 0–2)
BILIRUB DIRECT SERPL-MCNC: 0.1 MG/DL
BILIRUB SERPL-MCNC: 0.4 MG/DL (ref 0.2–1.1)
BUN SERPL-MCNC: 47 MG/DL (ref 8–23)
CALCIUM SERPL-MCNC: 10.7 MG/DL (ref 8.3–10.4)
CHLORIDE SERPL-SCNC: 106 MMOL/L (ref 101–110)
CHOLEST SERPL-MCNC: 179 MG/DL
CO2 SERPL-SCNC: 27 MMOL/L (ref 21–32)
CREAT SERPL-MCNC: 3.3 MG/DL (ref 0.8–1.5)
DIFFERENTIAL METHOD BLD: ABNORMAL
EOSINOPHIL # BLD: 0.1 K/UL (ref 0–0.8)
EOSINOPHIL NFR BLD: 1 % (ref 0.5–7.8)
ERYTHROCYTE [DISTWIDTH] IN BLOOD BY AUTOMATED COUNT: 14.2 % (ref 11.9–14.6)
EST. AVERAGE GLUCOSE BLD GHB EST-MCNC: 174 MG/DL
GLOBULIN SER CALC-MCNC: 4 G/DL (ref 2.8–4.5)
GLUCOSE SERPL-MCNC: 146 MG/DL (ref 65–100)
HBA1C MFR BLD: 7.7 % (ref 4.8–5.6)
HCT VFR BLD AUTO: 42.1 % (ref 41.1–50.3)
HDLC SERPL-MCNC: 43 MG/DL (ref 40–60)
HDLC SERPL: 4.2
HGB BLD-MCNC: 13.5 G/DL (ref 13.6–17.2)
IMM GRANULOCYTES # BLD AUTO: 0 K/UL (ref 0–0.5)
IMM GRANULOCYTES NFR BLD AUTO: 0 % (ref 0–5)
LDLC SERPL CALC-MCNC: 105 MG/DL
LYMPHOCYTES # BLD: 2.3 K/UL (ref 0.5–4.6)
LYMPHOCYTES NFR BLD: 23 % (ref 13–44)
MCH RBC QN AUTO: 28.1 PG (ref 26.1–32.9)
MCHC RBC AUTO-ENTMCNC: 32.1 G/DL (ref 31.4–35)
MCV RBC AUTO: 87.5 FL (ref 82–102)
MONOCYTES # BLD: 0.9 K/UL (ref 0.1–1.3)
MONOCYTES NFR BLD: 9 % (ref 4–12)
NEUTS SEG # BLD: 6.8 K/UL (ref 1.7–8.2)
NEUTS SEG NFR BLD: 66 % (ref 43–78)
NRBC # BLD: 0 K/UL (ref 0–0.2)
PLATELET # BLD AUTO: 337 K/UL (ref 150–450)
PMV BLD AUTO: 9.5 FL (ref 9.4–12.3)
POTASSIUM SERPL-SCNC: 4.7 MMOL/L (ref 3.5–5.1)
PROT SERPL-MCNC: 7.8 G/DL (ref 6.3–8.2)
PSA SERPL-MCNC: 0.8 NG/ML
RBC # BLD AUTO: 4.81 M/UL (ref 4.23–5.6)
SODIUM SERPL-SCNC: 141 MMOL/L (ref 133–143)
TRIGL SERPL-MCNC: 155 MG/DL (ref 35–150)
TSH, 3RD GENERATION: 1.11 UIU/ML (ref 0.36–3.74)
VIT B12 SERPL-MCNC: 414 PG/ML (ref 193–986)
VLDLC SERPL CALC-MCNC: 31 MG/DL (ref 6–23)
WBC # BLD AUTO: 10.1 K/UL (ref 4.3–11.1)

## 2023-09-29 PROCEDURE — 1123F ACP DISCUSS/DSCN MKR DOCD: CPT | Performed by: INTERNAL MEDICINE

## 2023-09-29 PROCEDURE — 3017F COLORECTAL CA SCREEN DOC REV: CPT | Performed by: INTERNAL MEDICINE

## 2023-09-29 PROCEDURE — G0439 PPPS, SUBSEQ VISIT: HCPCS | Performed by: INTERNAL MEDICINE

## 2023-09-29 PROCEDURE — 3075F SYST BP GE 130 - 139MM HG: CPT | Performed by: INTERNAL MEDICINE

## 2023-09-29 PROCEDURE — G0008 ADMIN INFLUENZA VIRUS VAC: HCPCS | Performed by: INTERNAL MEDICINE

## 2023-09-29 PROCEDURE — 99214 OFFICE O/P EST MOD 30 MIN: CPT | Performed by: INTERNAL MEDICINE

## 2023-09-29 PROCEDURE — 3051F HG A1C>EQUAL 7.0%<8.0%: CPT | Performed by: INTERNAL MEDICINE

## 2023-09-29 PROCEDURE — G8417 CALC BMI ABV UP PARAM F/U: HCPCS | Performed by: INTERNAL MEDICINE

## 2023-09-29 PROCEDURE — 1036F TOBACCO NON-USER: CPT | Performed by: INTERNAL MEDICINE

## 2023-09-29 PROCEDURE — 2022F DILAT RTA XM EVC RTNOPTHY: CPT | Performed by: INTERNAL MEDICINE

## 2023-09-29 PROCEDURE — G8427 DOCREV CUR MEDS BY ELIG CLIN: HCPCS | Performed by: INTERNAL MEDICINE

## 2023-09-29 PROCEDURE — 90694 VACC AIIV4 NO PRSRV 0.5ML IM: CPT | Performed by: INTERNAL MEDICINE

## 2023-09-29 PROCEDURE — 3078F DIAST BP <80 MM HG: CPT | Performed by: INTERNAL MEDICINE

## 2023-09-29 RX ORDER — AMLODIPINE BESYLATE 10 MG/1
10 TABLET ORAL DAILY
Qty: 90 TABLET | Refills: 1 | Status: SHIPPED | OUTPATIENT
Start: 2023-09-29

## 2023-09-29 RX ORDER — HYDROCHLOROTHIAZIDE 25 MG/1
TABLET ORAL
Qty: 90 TABLET | Refills: 1 | Status: SHIPPED | OUTPATIENT
Start: 2023-09-29

## 2023-09-29 RX ORDER — LOSARTAN POTASSIUM 100 MG/1
100 TABLET ORAL DAILY
Qty: 90 TABLET | Refills: 1 | Status: SHIPPED | OUTPATIENT
Start: 2023-09-29

## 2023-09-29 RX ORDER — ATENOLOL 25 MG/1
25 TABLET ORAL DAILY
Qty: 90 TABLET | Refills: 1 | Status: SHIPPED | OUTPATIENT
Start: 2023-09-29

## 2023-09-29 RX ORDER — TAMSULOSIN HYDROCHLORIDE 0.4 MG/1
0.4 CAPSULE ORAL DAILY
Qty: 90 CAPSULE | Refills: 1 | Status: SHIPPED | OUTPATIENT
Start: 2023-09-29

## 2023-09-29 ASSESSMENT — LIFESTYLE VARIABLES
HOW OFTEN DO YOU HAVE A DRINK CONTAINING ALCOHOL: MONTHLY OR LESS
HOW MANY STANDARD DRINKS CONTAINING ALCOHOL DO YOU HAVE ON A TYPICAL DAY: 1 OR 2

## 2023-09-29 ASSESSMENT — ENCOUNTER SYMPTOMS: SHORTNESS OF BREATH: 0

## 2023-09-29 NOTE — PROGRESS NOTES
Felecia Valenzuela M.D. Internal Medicine  [facility], [address] [place] [address]  Office : [phone]  Fax : [phone]    Chief Complaint   Patient presents with    Diabetes       History of Present Illness:  Casey Crews is a 78 y.o. male. HPI    Diabetes Mellitus  Patient presents  for follow up on diabetes. Onset of symptoms was several years ago. Patient describes symptoms as foot ulcerations. Course to date has been well controlled. Diet and Lifestyle: follows a diabetic diet regularly  exercises sporadically  nonsmoker  Home glucose monitoring:  Results average n/a. Patient denies polyuria and polydipsia. No wounds in lower limbs. Most recent HbA1c was   Hemoglobin A1C   Date Value Ref Range Status   02/21/2023 7.3 (H) 4.8 - 5.6 % Final       Hypertension  Patient is in for Hypertension which is stable. Diet and Lifestyle: generally follows a low sodium diet  Home BP Monitoring: is not measured at home. Patient's recent blood pressures were   . taking medications as instructed, no medication side effects noted, no TIA's, no chest pain on exertion, no dyspnea on exertion, no swelling of ankles. Cardiac risk factors consist of advanced age (older than 54 for men, 72 for women), diabetes mellitus, hypertension, and male gender. Lab Results   Component Value Date/Time     08/23/2023 11:48 AM    K 4.5 08/23/2023 11:48 AM     08/23/2023 11:48 AM    CO2 27 08/23/2023 11:48 AM    BUN 31 08/23/2023 11:48 AM    CREATININE 2.10 08/23/2023 11:48 AM    GLUCOSE 171 08/23/2023 11:48 AM    CALCIUM 10.2 08/23/2023 11:48 AM    LABGLOM 32 08/23/2023 11:48 AM        CKD Stage 3  Being followed by Nephrology. Missed last aptt    GERD  He has not yet had the colonoscopy yet due to foot ulcer    Alcohol Dependence  Declines assistance with decreasing alcohol. Discussed [facility] and AA last time but did not go.   Says he drinking 2 or less beers per

## 2023-09-29 NOTE — PROGRESS NOTES
Marv Fregoso M.D. Internal Medicine  [address] [facility], [address] [place] [address]  Office : [phone]  Fax : [phone]  Chief Complaint   Patient presents with    Medicare [address] Medicare Annual Wellness Visit    Tessa Lockett is here for Medicare AWV    Assessment & Plan   Medicare annual wellness visit, subsequent  Essential hypertension  The following orders have not been finalized:  -     hydroCHLOROthiazide (HYDRODIURIL) 25 MG tablet  -     amLODIPine (NORVASC) 10 MG tablet  -     losartan (COZAAR) 100 MG tablet  -     atenolol (TENORMIN) 25 MG tablet  Type 2 diabetes mellitus with stage 3 chronic kidney disease, without long-term current use of insulin, unspecified whether stage 3a or 3b CKD ([address])  The following orders have not been finalized:  -     SITagliptin (JANUVIA) 100 MG tablet  Benign prostatic hyperplasia with lower urinary tract symptoms, symptom details unspecified  The following orders have not been finalized:  -     tamsulosin (FLOMAX) 0.4 MG capsule  Encounter for immunization    Recommendations for Preventive Services Due: see orders and patient instructions/AVS.  Recommended screening schedule for the next 5-10 years is provided to the patient in written form: see Patient Instructions/AVS.     No follow-ups on file. Subjective       Patient's complete Health Risk Assessment and screening values have been reviewed and are found in Flowsheets. The following problems were reviewed today and where indicated follow up appointments were made and/or referrals ordered. Positive Risk Factor Screenings with Interventions:       Cognitive:    Words recalled: 0 Words Recalled           Total Score Interpretation: Abnormal Mini-Cog      Interventions:  Discussed need to decrease alcohol and take a multivitamin daily      Drug Use:          Interpretation:  1-2: Low level - Monitor, re-assess at a later date  3-5:

## 2023-10-02 ENCOUNTER — HOSPITAL ENCOUNTER (OUTPATIENT)
Dept: WOUND CARE | Age: 78
Discharge: HOME | End: 2023-10-02
Payer: MEDICARE

## 2023-10-02 VITALS
DIASTOLIC BLOOD PRESSURE: 59 MMHG | RESPIRATION RATE: 18 BRPM | TEMPERATURE: 97.4 F | SYSTOLIC BLOOD PRESSURE: 117 MMHG | OXYGEN SATURATION: 100 % | HEART RATE: 52 BPM

## 2023-10-02 DIAGNOSIS — M86.671 CHRONIC REFRACTORY OSTEOMYELITIS OF RIGHT FOOT (HCC): Primary | ICD-10-CM

## 2023-10-02 DIAGNOSIS — L97.414 DIABETIC ULCER OF RIGHT MIDFOOT ASSOCIATED WITH TYPE 2 DIABETES MELLITUS, WITH NECROSIS OF BONE (HCC): ICD-10-CM

## 2023-10-02 DIAGNOSIS — E11.621 DIABETIC ULCER OF RIGHT MIDFOOT ASSOCIATED WITH TYPE 2 DIABETES MELLITUS, WITH NECROSIS OF BONE (HCC): ICD-10-CM

## 2023-10-02 LAB
GLUCOSE BLD STRIP.AUTO-MCNC: 129 MG/DL (ref 65–100)
GLUCOSE BLD STRIP.AUTO-MCNC: 134 MG/DL (ref 65–100)
GLUCOSE BLD STRIP.AUTO-MCNC: 149 MG/DL (ref 65–100)
SERVICE CMNT-IMP: ABNORMAL

## 2023-10-02 PROCEDURE — G0277 HBOT, FULL BODY CHAMBER, 30M: HCPCS

## 2023-10-02 PROCEDURE — 99183 HYPERBARIC OXYGEN THERAPY: CPT | Performed by: SURGERY

## 2023-10-02 PROCEDURE — 82962 GLUCOSE BLOOD TEST: CPT

## 2023-10-04 ENCOUNTER — HOSPITAL ENCOUNTER (OUTPATIENT)
Dept: WOUND CARE | Age: 78
Discharge: HOME | End: 2023-10-04
Payer: MEDICARE

## 2023-10-04 VITALS
RESPIRATION RATE: 18 BRPM | TEMPERATURE: 97.9 F | DIASTOLIC BLOOD PRESSURE: 66 MMHG | SYSTOLIC BLOOD PRESSURE: 115 MMHG | HEART RATE: 60 BPM | OXYGEN SATURATION: 100 %

## 2023-10-04 VITALS
HEART RATE: 60 BPM | OXYGEN SATURATION: 100 % | RESPIRATION RATE: 18 BRPM | SYSTOLIC BLOOD PRESSURE: 115 MMHG | DIASTOLIC BLOOD PRESSURE: 66 MMHG | TEMPERATURE: 97.9 F

## 2023-10-04 DIAGNOSIS — E11.621 DIABETIC ULCER OF RIGHT FOOT ASSOCIATED WITH TYPE 2 DIABETES MELLITUS, WITH NECROSIS OF BONE, UNSPECIFIED PART OF FOOT (HCC): ICD-10-CM

## 2023-10-04 DIAGNOSIS — M86.671 CHRONIC REFRACTORY OSTEOMYELITIS OF RIGHT FOOT (HCC): Primary | ICD-10-CM

## 2023-10-04 DIAGNOSIS — L08.9 DIABETIC FOOT INFECTION (HCC): ICD-10-CM

## 2023-10-04 DIAGNOSIS — L97.414 DIABETIC ULCER OF RIGHT MIDFOOT ASSOCIATED WITH TYPE 2 DIABETES MELLITUS, WITH NECROSIS OF BONE (HCC): ICD-10-CM

## 2023-10-04 DIAGNOSIS — L97.514 DIABETIC ULCER OF RIGHT FOOT ASSOCIATED WITH TYPE 2 DIABETES MELLITUS, WITH NECROSIS OF BONE, UNSPECIFIED PART OF FOOT (HCC): ICD-10-CM

## 2023-10-04 DIAGNOSIS — E11.621 DIABETIC ULCER OF RIGHT MIDFOOT ASSOCIATED WITH TYPE 2 DIABETES MELLITUS, WITH NECROSIS OF BONE (HCC): ICD-10-CM

## 2023-10-04 DIAGNOSIS — E11.628 DIABETIC FOOT INFECTION (HCC): ICD-10-CM

## 2023-10-04 LAB
GLUCOSE BLD STRIP.AUTO-MCNC: 146 MG/DL (ref 65–100)
GLUCOSE BLD STRIP.AUTO-MCNC: 205 MG/DL (ref 65–100)
SERVICE CMNT-IMP: ABNORMAL
SERVICE CMNT-IMP: ABNORMAL

## 2023-10-04 PROCEDURE — 99213 OFFICE O/P EST LOW 20 MIN: CPT | Performed by: FAMILY MEDICINE

## 2023-10-04 PROCEDURE — 11042 DBRDMT SUBQ TIS 1ST 20SQCM/<: CPT

## 2023-10-04 PROCEDURE — G0277 HBOT, FULL BODY CHAMBER, 30M: HCPCS

## 2023-10-04 PROCEDURE — 11042 DBRDMT SUBQ TIS 1ST 20SQCM/<: CPT | Performed by: FAMILY MEDICINE

## 2023-10-04 PROCEDURE — 82962 GLUCOSE BLOOD TEST: CPT

## 2023-10-04 RX ORDER — LIDOCAINE HYDROCHLORIDE 40 MG/ML
SOLUTION TOPICAL ONCE
OUTPATIENT
Start: 2023-10-04 | End: 2023-10-04

## 2023-10-04 RX ORDER — LIDOCAINE HYDROCHLORIDE 20 MG/ML
JELLY TOPICAL ONCE
OUTPATIENT
Start: 2023-10-04 | End: 2023-10-04

## 2023-10-04 RX ORDER — TRIAMCINOLONE ACETONIDE 1 MG/G
OINTMENT TOPICAL ONCE
OUTPATIENT
Start: 2023-10-04 | End: 2023-10-04

## 2023-10-04 RX ORDER — GENTAMICIN SULFATE 1 MG/G
OINTMENT TOPICAL ONCE
OUTPATIENT
Start: 2023-10-04 | End: 2023-10-04

## 2023-10-04 RX ORDER — GINSENG 100 MG
CAPSULE ORAL ONCE
OUTPATIENT
Start: 2023-10-04 | End: 2023-10-04

## 2023-10-04 RX ORDER — BETAMETHASONE DIPROPIONATE 0.05 %
OINTMENT (GRAM) TOPICAL ONCE
OUTPATIENT
Start: 2023-10-04 | End: 2023-10-04

## 2023-10-04 RX ORDER — BACITRACIN ZINC AND POLYMYXIN B SULFATE 500; 1000 [USP'U]/G; [USP'U]/G
OINTMENT TOPICAL ONCE
OUTPATIENT
Start: 2023-10-04 | End: 2023-10-04

## 2023-10-04 RX ORDER — LIDOCAINE 50 MG/G
OINTMENT TOPICAL ONCE
OUTPATIENT
Start: 2023-10-04 | End: 2023-10-04

## 2023-10-04 RX ORDER — IBUPROFEN 200 MG
TABLET ORAL ONCE
OUTPATIENT
Start: 2023-10-04 | End: 2023-10-04

## 2023-10-04 RX ORDER — CLOBETASOL PROPIONATE 0.5 MG/G
OINTMENT TOPICAL ONCE
OUTPATIENT
Start: 2023-10-04 | End: 2023-10-04

## 2023-10-04 RX ORDER — LIDOCAINE 40 MG/G
CREAM TOPICAL ONCE
OUTPATIENT
Start: 2023-10-04 | End: 2023-10-04

## 2023-10-04 NOTE — WOUND CARE
Total Contact Cast    NAME:  Randolph Mendoza  YOB: 1945  MEDICAL RECORD NUMBER:  [id number]  DATE:  10/4/2023    Goal:  Patient will maintain integrity of cast, avoid mobility hazards, and report complications that may occur (foul odor, pain, numbness, cracked cast). Removed old contact cast if indicated and wash extremity with soap and water  Applied ordered dressing over wound(s)         Did not reapply cast today 10/4/23.       Applied cast per  Guidelines    Electronically signed by Brittni Geronimo RN on 10/4/2023 at 11:45 AM

## 2023-10-04 NOTE — FLOWSHEET NOTE
10/04/23 1040   Wound 04/14/23 Foot Right;Plantar #1 delayed healing surgical debridement 2/22/23   Date First Assessed/Time First Assessed: 04/14/23 1053   Present on Hospital Admission: Yes  Wound Approximate Age at First Assessment (Weeks): 6 weeks  Primary Wound Type: Diabetic Ulcer  Location: Foot  Wound Location Orientation: Right;Plantar  Wou. ..    Wound Image     Wound Etiology Diabetic Meza 3   Dressing Status Old drainage noted   Wound Cleansed Vashe   Dressing/Treatment Hydrofera blue   Offloading for Diabetic Foot Ulcers Total contact cast   Wound Length (cm) 0.4 cm   Wound Width (cm) 0.4 cm   Wound Depth (cm) 0.9 cm   Wound Surface Area (cm^2) 0.16 cm^2   Change in Wound Size % (l*w) 98.76   Wound Volume (cm^3) 0.144 cm^3   Wound Healing % 99   Post-Procedure Length (cm) 0.4 cm   Post-Procedure Width (cm) 0.5 cm   Post-Procedure Depth (cm) 0.9 cm   Post-Procedure Surface Area (cm^2) 0.2 cm^2   Post-Procedure Volume (cm^3) 0.18 cm^3   Wound Assessment Pink/red   Drainage Amount Moderate (25-50%)   Drainage Description Serosanguinous   Odor None   Charla-wound Assessment Intact   Wound Thickness Description not for Pressure Injury Full thickness

## 2023-10-04 NOTE — DISCHARGE INSTRUCTIONS
Right Plantar Foot:  Cleanse intact skin with soap & water, wound with normal saline or wound cleanser. Vashe Wound Solution (hypochlorous acid) soak placed on wound bed for a minimum of 60 seconds prior to dressing application. Hydrofera Blue: Cut to wound size, moisten with saline, and apply to wound bed. Secure with ABD or foam.  Cover with rolled gauze and coban. Change daily for a week. HOLD TCC FOR WEEK, THEN RE-ASSESS. Offload with boot or Darco shoe and insert. Do not get wound wet in shower, pool or tub. May purchase cast cover at local pharmacy to keep dry in shower. Elevate legs when sitting to reduce swelling. Swelling interferes with wound healing. Eliminate salt intake as this promotes fluid retention and swelling     Please increase dietary protein to at least 60 grams per day to support cell rejuvenation. May use supplements such as Ensure Max, Naga, & Glucerna   Be sure to spread intake throughout the day for improved absorption. Continue antibiotics: Augmentin and Doxycycline as prescribed by ID. Insurance/ transportation availability initiated through current transportation company [employer] ([phone]). Continue Daily HBO Treatments! *POA REFERREAL PLACED TODAY WITH DR. Shira Day.  AWAIT PHONE [address] [facility]

## 2023-10-04 NOTE — WOUND CARE
Discharge Instructions for  [address] [facility]  [address] [facility], [address] [place] [address]  Phone [phone]   Fax [phone]      NAME:  Tiff Kenney  YOB: 1945  MEDICAL RECORD NUMBER:  [id number]  DATE:  10/4/2023    Return Appointment:   1 week with Davis Engel DO      Instructions:   Right Plantar Foot:  Cleanse intact skin with soap & water, wound with normal saline or wound cleanser. Vashe Wound Solution (hypochlorous acid) soak placed on wound bed for a minimum of 60 seconds prior to dressing application. Hydrofera Blue: Cut to wound size, moisten with saline, and apply to wound bed. Secure with ABD or foam.  Cover with rolled gauze and coban. Change daily for a week. HOLD TCC FOR WEEK, THEN RE-ASSESS. Offload with boot or Darco shoe and insert. Do not get wound wet in shower, pool or tub. May purchase cast cover at local pharmacy to keep dry in shower. Elevate legs when sitting to reduce swelling. Swelling interferes with wound healing. Eliminate salt intake as this promotes fluid retention and swelling     Please increase dietary protein to at least 60 grams per day to support cell rejuvenation. May use supplements such as Ensure Max, Naga, & Glucerna   Be sure to spread intake throughout the day for improved absorption. Continue antibiotics: Augmentin and Doxycycline as prescribed by ID. Insurance/ transportation availability initiated through current transportation company [employer] ([phone]). Continue Daily HBO Treatments! *POA REFERREAL PLACED TODAY WITH DR. Jordan Nunez. AWAIT PHONE [address]           Should you experience increased redness, swelling, pain, foul odor, size of wound(s), or have a temperature over 101 degrees please contact the [address] at [phone] or if after hours contact your primary care physician or go to the hospital emergency department.     PLEASE NOTE: IF YOU ARE UNABLE

## 2023-10-05 ENCOUNTER — HOSPITAL ENCOUNTER (OUTPATIENT)
Dept: WOUND CARE | Age: 78
Discharge: HOME | End: 2023-10-05
Payer: MEDICARE

## 2023-10-05 VITALS
DIASTOLIC BLOOD PRESSURE: 75 MMHG | SYSTOLIC BLOOD PRESSURE: 133 MMHG | OXYGEN SATURATION: 100 % | HEART RATE: 55 BPM | TEMPERATURE: 98 F | RESPIRATION RATE: 18 BRPM

## 2023-10-05 DIAGNOSIS — L97.414 DIABETIC ULCER OF RIGHT MIDFOOT ASSOCIATED WITH TYPE 2 DIABETES MELLITUS, WITH NECROSIS OF BONE (HCC): ICD-10-CM

## 2023-10-05 DIAGNOSIS — M86.671 CHRONIC REFRACTORY OSTEOMYELITIS OF RIGHT FOOT (HCC): Primary | ICD-10-CM

## 2023-10-05 DIAGNOSIS — E11.621 DIABETIC ULCER OF RIGHT MIDFOOT ASSOCIATED WITH TYPE 2 DIABETES MELLITUS, WITH NECROSIS OF BONE (HCC): ICD-10-CM

## 2023-10-05 LAB
GLUCOSE BLD STRIP.AUTO-MCNC: 165 MG/DL (ref 65–100)
GLUCOSE BLD STRIP.AUTO-MCNC: 253 MG/DL (ref 65–100)
SERVICE CMNT-IMP: ABNORMAL
SERVICE CMNT-IMP: ABNORMAL

## 2023-10-05 PROCEDURE — 82962 GLUCOSE BLOOD TEST: CPT

## 2023-10-05 PROCEDURE — 99183 HYPERBARIC OXYGEN THERAPY: CPT | Performed by: FAMILY MEDICINE

## 2023-10-05 PROCEDURE — G0277 HBOT, FULL BODY CHAMBER, 30M: HCPCS

## 2023-10-05 NOTE — PROGRESS NOTES
Foot:  Cleanse intact skin with soap & water, wound with normal saline or wound cleanser. Vashe Wound Solution (hypochlorous acid) soak placed on wound bed for a minimum of 60 seconds prior to dressing application. Hydrofera Blue: Cut to wound size, moisten with saline, and apply to wound bed. Secure with ABD or foam.  Cover with rolled gauze and coban. Change daily for a week. HOLD TCC FOR WEEK, THEN RE-ASSESS. Offload with boot or Darco shoe and insert. Do not get wound wet in shower, pool or tub. May purchase cast cover at local pharmacy to keep dry in shower. Elevate legs when sitting to reduce swelling. Swelling interferes with wound healing. Eliminate salt intake as this promotes fluid retention and swelling     Please increase dietary protein to at least 60 grams per day to support cell rejuvenation. May use supplements such as Ensure Max, Naga, & Glucerna   Be sure to spread intake throughout the day for improved absorption. Continue antibiotics: Augmentin and Doxycycline as prescribed by ID. Insurance/ transportation availability initiated through current transportation company [employer] ([phone]). Continue Daily HBO Treatments! *POA REFERREAL PLACED TODAY WITH DR. Nella Yañez. AWAIT PHONE [address] [facility]     Other associated diagnoses or problems addressed:  Chronic osteomyelitis    Pertinent imaging reviewed including independent interpretation include:  None    Pertinent labs reviewed. Medical records and review of external note (s) from other providers done as well. New lab or imaging orders placed:  None     Prescription drug management: N/A     Discussion of management or test interpretation with N/A and another provider. Comorbid conditions affecting wound healing: As per PMH which was reviewed. Risk of complications and/or mortality of patient management:   This patient has a moderate risk of morbidity and mortality from additional diagnostic testing

## 2023-10-06 ENCOUNTER — HOSPITAL ENCOUNTER (OUTPATIENT)
Dept: WOUND CARE | Age: 78
Discharge: HOME | End: 2023-10-06
Payer: MEDICARE

## 2023-10-06 VITALS
HEART RATE: 49 BPM | TEMPERATURE: 97.5 F | RESPIRATION RATE: 18 BRPM | DIASTOLIC BLOOD PRESSURE: 76 MMHG | OXYGEN SATURATION: 99 % | SYSTOLIC BLOOD PRESSURE: 137 MMHG

## 2023-10-06 DIAGNOSIS — E11.621 DIABETIC ULCER OF RIGHT MIDFOOT ASSOCIATED WITH TYPE 2 DIABETES MELLITUS, WITH NECROSIS OF BONE (HCC): ICD-10-CM

## 2023-10-06 DIAGNOSIS — M86.671 CHRONIC REFRACTORY OSTEOMYELITIS OF RIGHT FOOT (HCC): Primary | ICD-10-CM

## 2023-10-06 DIAGNOSIS — L97.414 DIABETIC ULCER OF RIGHT MIDFOOT ASSOCIATED WITH TYPE 2 DIABETES MELLITUS, WITH NECROSIS OF BONE (HCC): ICD-10-CM

## 2023-10-06 LAB
GLUCOSE BLD STRIP.AUTO-MCNC: 148 MG/DL (ref 65–100)
GLUCOSE BLD STRIP.AUTO-MCNC: 162 MG/DL (ref 65–100)
SERVICE CMNT-IMP: ABNORMAL
SERVICE CMNT-IMP: ABNORMAL

## 2023-10-06 PROCEDURE — 82962 GLUCOSE BLOOD TEST: CPT

## 2023-10-06 PROCEDURE — G0277 HBOT, FULL BODY CHAMBER, 30M: HCPCS

## 2023-10-06 NOTE — PROGRESS NOTES
[address]  Hyperbaric Oxygen Therapy   Utilization Review    Erika Luque  MEDICAL RECORD NUMBER:  [id number]  AGE: 78 y.o. GENDER: male  : 1945  EPISODE DATE:  10/5/2023      HBO Diagnosis:             Indications: Chronic Refractory Osteomyelitis to ___ (site) (Right Foot)    Erika Luque is a 78 y.o. male  Who is currently receiving hyperbaric oxygen therapy at [address] [place] [address] and HBOT. Mr. Cesar Kamara has currently received: Treatment Number: 20 out of Total Treatments: 40    In my clinical judgement, ongoing HBO therapy is necessary at this time. Mr. Cesar Kamara has shown improvement with hyperbaric oxygen therapy as evidenced by smaller wound. The patient/caregiver verbalized understanding of the Utilization Review for HBOT and has agreed to the plan.      Electronically signed by Saturnino Cordero DO on 10/5/2023 at 4:03 PM

## 2023-10-09 ENCOUNTER — HOSPITAL ENCOUNTER (OUTPATIENT)
Dept: WOUND CARE | Age: 78
Discharge: HOME | End: 2023-10-09
Payer: MEDICARE

## 2023-10-09 VITALS
HEART RATE: 61 BPM | DIASTOLIC BLOOD PRESSURE: 65 MMHG | OXYGEN SATURATION: 100 % | TEMPERATURE: 97.1 F | RESPIRATION RATE: 18 BRPM | SYSTOLIC BLOOD PRESSURE: 128 MMHG

## 2023-10-09 DIAGNOSIS — L97.414 DIABETIC ULCER OF RIGHT MIDFOOT ASSOCIATED WITH TYPE 2 DIABETES MELLITUS, WITH NECROSIS OF BONE (HCC): ICD-10-CM

## 2023-10-09 DIAGNOSIS — E11.621 DIABETIC ULCER OF RIGHT MIDFOOT ASSOCIATED WITH TYPE 2 DIABETES MELLITUS, WITH NECROSIS OF BONE (HCC): ICD-10-CM

## 2023-10-09 DIAGNOSIS — M86.671 CHRONIC REFRACTORY OSTEOMYELITIS OF RIGHT FOOT (HCC): Primary | ICD-10-CM

## 2023-10-09 LAB
GLUCOSE BLD STRIP.AUTO-MCNC: 143 MG/DL (ref 65–100)
GLUCOSE BLD STRIP.AUTO-MCNC: 205 MG/DL (ref 65–100)
SERVICE CMNT-IMP: ABNORMAL
SERVICE CMNT-IMP: ABNORMAL

## 2023-10-09 PROCEDURE — G0277 HBOT, FULL BODY CHAMBER, 30M: HCPCS

## 2023-10-09 PROCEDURE — 82962 GLUCOSE BLOOD TEST: CPT

## 2023-10-09 PROCEDURE — 99183 HYPERBARIC OXYGEN THERAPY: CPT | Performed by: SURGERY

## 2023-10-10 ENCOUNTER — HOSPITAL ENCOUNTER (OUTPATIENT)
Dept: WOUND CARE | Age: 78
Discharge: HOME | End: 2023-10-10
Payer: MEDICARE

## 2023-10-10 VITALS
HEART RATE: 56 BPM | OXYGEN SATURATION: 100 % | DIASTOLIC BLOOD PRESSURE: 76 MMHG | RESPIRATION RATE: 18 BRPM | SYSTOLIC BLOOD PRESSURE: 151 MMHG | TEMPERATURE: 97.2 F

## 2023-10-10 DIAGNOSIS — M86.671 CHRONIC REFRACTORY OSTEOMYELITIS OF RIGHT FOOT (HCC): Primary | ICD-10-CM

## 2023-10-10 DIAGNOSIS — L97.414 DIABETIC ULCER OF RIGHT MIDFOOT ASSOCIATED WITH TYPE 2 DIABETES MELLITUS, WITH NECROSIS OF BONE (HCC): ICD-10-CM

## 2023-10-10 DIAGNOSIS — E11.621 DIABETIC ULCER OF RIGHT MIDFOOT ASSOCIATED WITH TYPE 2 DIABETES MELLITUS, WITH NECROSIS OF BONE (HCC): ICD-10-CM

## 2023-10-10 LAB
GLUCOSE BLD STRIP.AUTO-MCNC: 114 MG/DL (ref 65–100)
GLUCOSE BLD STRIP.AUTO-MCNC: 177 MG/DL (ref 65–100)
SERVICE CMNT-IMP: ABNORMAL
SERVICE CMNT-IMP: ABNORMAL

## 2023-10-10 PROCEDURE — 82962 GLUCOSE BLOOD TEST: CPT

## 2023-10-10 PROCEDURE — G0277 HBOT, FULL BODY CHAMBER, 30M: HCPCS

## 2023-10-11 ENCOUNTER — HOSPITAL ENCOUNTER (OUTPATIENT)
Dept: WOUND CARE | Age: 78
Discharge: HOME | End: 2023-10-11
Payer: MEDICARE

## 2023-10-11 VITALS
SYSTOLIC BLOOD PRESSURE: 122 MMHG | HEART RATE: 57 BPM | OXYGEN SATURATION: 100 % | TEMPERATURE: 97.9 F | RESPIRATION RATE: 18 BRPM | DIASTOLIC BLOOD PRESSURE: 65 MMHG

## 2023-10-11 DIAGNOSIS — M86.671 CHRONIC REFRACTORY OSTEOMYELITIS OF RIGHT FOOT (HCC): Primary | ICD-10-CM

## 2023-10-11 DIAGNOSIS — E11.628 DIABETIC FOOT INFECTION (HCC): ICD-10-CM

## 2023-10-11 DIAGNOSIS — L97.514 DIABETIC ULCER OF RIGHT FOOT ASSOCIATED WITH TYPE 2 DIABETES MELLITUS, WITH NECROSIS OF BONE, UNSPECIFIED PART OF FOOT (HCC): ICD-10-CM

## 2023-10-11 DIAGNOSIS — L97.414 DIABETIC ULCER OF RIGHT MIDFOOT ASSOCIATED WITH TYPE 2 DIABETES MELLITUS, WITH NECROSIS OF BONE (HCC): ICD-10-CM

## 2023-10-11 DIAGNOSIS — E11.621 DIABETIC ULCER OF RIGHT FOOT ASSOCIATED WITH TYPE 2 DIABETES MELLITUS, WITH NECROSIS OF BONE, UNSPECIFIED PART OF FOOT (HCC): ICD-10-CM

## 2023-10-11 DIAGNOSIS — L08.9 DIABETIC FOOT INFECTION (HCC): ICD-10-CM

## 2023-10-11 DIAGNOSIS — E11.621 DIABETIC ULCER OF RIGHT MIDFOOT ASSOCIATED WITH TYPE 2 DIABETES MELLITUS, WITH NECROSIS OF BONE (HCC): ICD-10-CM

## 2023-10-11 LAB
GLUCOSE BLD STRIP.AUTO-MCNC: 109 MG/DL (ref 65–100)
GLUCOSE BLD STRIP.AUTO-MCNC: 147 MG/DL (ref 65–100)
SERVICE CMNT-IMP: ABNORMAL
SERVICE CMNT-IMP: ABNORMAL

## 2023-10-11 PROCEDURE — 11042 DBRDMT SUBQ TIS 1ST 20SQCM/<: CPT

## 2023-10-11 PROCEDURE — G0277 HBOT, FULL BODY CHAMBER, 30M: HCPCS

## 2023-10-11 PROCEDURE — 82962 GLUCOSE BLOOD TEST: CPT

## 2023-10-11 PROCEDURE — 11042 DBRDMT SUBQ TIS 1ST 20SQCM/<: CPT | Performed by: FAMILY MEDICINE

## 2023-10-11 RX ORDER — GENTAMICIN SULFATE 1 MG/G
OINTMENT TOPICAL ONCE
OUTPATIENT
Start: 2023-10-11 | End: 2023-10-11

## 2023-10-11 RX ORDER — BETAMETHASONE DIPROPIONATE 0.05 %
OINTMENT (GRAM) TOPICAL ONCE
OUTPATIENT
Start: 2023-10-11 | End: 2023-10-11

## 2023-10-11 RX ORDER — BACITRACIN ZINC AND POLYMYXIN B SULFATE 500; 1000 [USP'U]/G; [USP'U]/G
OINTMENT TOPICAL ONCE
OUTPATIENT
Start: 2023-10-11 | End: 2023-10-11

## 2023-10-11 RX ORDER — IBUPROFEN 200 MG
TABLET ORAL ONCE
OUTPATIENT
Start: 2023-10-11 | End: 2023-10-11

## 2023-10-11 RX ORDER — GINSENG 100 MG
CAPSULE ORAL ONCE
OUTPATIENT
Start: 2023-10-11 | End: 2023-10-11

## 2023-10-11 RX ORDER — LIDOCAINE 40 MG/G
CREAM TOPICAL ONCE
OUTPATIENT
Start: 2023-10-11 | End: 2023-10-11

## 2023-10-11 RX ORDER — TRIAMCINOLONE ACETONIDE 1 MG/G
OINTMENT TOPICAL ONCE
OUTPATIENT
Start: 2023-10-11 | End: 2023-10-11

## 2023-10-11 RX ORDER — LIDOCAINE HYDROCHLORIDE 20 MG/ML
JELLY TOPICAL ONCE
OUTPATIENT
Start: 2023-10-11 | End: 2023-10-11

## 2023-10-11 RX ORDER — LIDOCAINE HYDROCHLORIDE 40 MG/ML
SOLUTION TOPICAL ONCE
OUTPATIENT
Start: 2023-10-11 | End: 2023-10-11

## 2023-10-11 RX ORDER — LIDOCAINE 50 MG/G
OINTMENT TOPICAL ONCE
OUTPATIENT
Start: 2023-10-11 | End: 2023-10-11

## 2023-10-11 RX ORDER — CLOBETASOL PROPIONATE 0.5 MG/G
OINTMENT TOPICAL ONCE
OUTPATIENT
Start: 2023-10-11 | End: 2023-10-11

## 2023-10-11 NOTE — PROGRESS NOTES
Procedure Note  Indications: Based on my examination of this patient's wound(s)/ulcer(s) today, debridement is required to promote healing and evaluate the wound base. Patient's wound is the best is looked other than there are still a tunnel and able to probe to bone. Surgical evaluation pending. I am concerned patient needs surgical treatment. Await opinion. Continue offloading. Continue full treatments until surgical opinion. Debridement: Excisional Debridement    Using: curette the wound(s)/ulcer(s) was/were debrided down through and including the removal of epidermis, dermis, and subcutaneous tissue. Performed by:  Kayla Das DO  Consent obtained: Yes  Time out taken: Yes  Pain Control:         Devitalized Tissue Debrided: fibrin, biofilm, slough, necrotic/eschar, and callus    Pre Debridement Measurements:  Are located in the [place]  Documentation Flow Sheet    Diabetic/Pressure/Non Pressure Ulcers only:  Ulcer: Diabetic ulcer, fat layer exposed     Wound/Ulcer #: 1  Post Debridement Measurements:  Wound/Ulcer Descriptions are Pre Debridement except measurements:  Wound 04/14/23 Foot Right;Plantar #1 delayed healing surgical debridement 2/22/23 (Active)   Wound Image    10/04/23 1040   Wound Etiology Diabetic Meza 3 10/04/23 1040   Dressing Status Old drainage noted 10/04/23 1040   Wound Cleansed Vashe 10/04/23 1040   Dressing/Treatment Hydrofera blue 10/04/23 1040   Offloading for Diabetic Foot Ulcers Total contact cast 10/04/23 1040   Wound Length (cm) 0.4 cm 10/04/23 1040   Wound Width (cm) 0.4 cm 10/04/23 1040   Wound Depth (cm) 0.9 cm 10/04/23 1040   Wound Surface Area (cm^2) 0.16 cm^2 10/04/23 1040   Change in Wound Size % (l*w) 98.76 10/04/23 1040   Wound Volume (cm^3) 0.144 cm^3 10/04/23 1040   Wound Healing % 99 10/04/23 1040   Post-Procedure Length (cm) 0.4 cm 10/04/23 1040   Post-Procedure Width (cm) 0.5 cm 10/04/23 1040   Post-Procedure Depth (cm) 0.9 cm 10/04/23 1040

## 2023-10-11 NOTE — FLOWSHEET NOTE
10/11/23 1010   Wound 04/14/23 Foot Right;Plantar #1 delayed healing surgical debridement 2/22/23   Date First Assessed/Time First Assessed: 04/14/23 1053   Present on Hospital Admission: Yes  Wound Approximate Age at First Assessment (Weeks): 6 weeks  Primary Wound Type: Diabetic Ulcer  Location: Foot  Wound Location Orientation: Right;Plantar  Wou. ..    Wound Image     Wound Etiology Diabetic Meza 3   Dressing Status Old drainage noted   Wound Cleansed Vashe   Dressing/Treatment Hydrofera blue   Offloading for Diabetic Foot Ulcers Offloading ordered   Wound Length (cm) 0.3 cm   Wound Width (cm) 0.2 cm   Wound Depth (cm) 0.9 cm   Wound Surface Area (cm^2) 0.06 cm^2   Change in Wound Size % (l*w) 99.53   Wound Volume (cm^3) 0.054 cm^3   Wound Healing % 100   Post-Procedure Length (cm) 0.4 cm   Post-Procedure Width (cm) 0.4 cm   Post-Procedure Depth (cm) 0.9 cm   Post-Procedure Surface Area (cm^2) 0.16 cm^2   Post-Procedure Volume (cm^3) 0.144 cm^3   Wound Assessment Pink/red   Drainage Amount Moderate (25-50%)   Drainage Description Serosanguinous   Odor None   Charla-wound Assessment Intact   Wound Thickness Description not for Pressure Injury Full thickness

## 2023-10-11 NOTE — WOUND CARE
Discharge Instructions for  [address] [facility]  [address] [facility], [address] [place] [address]  Phone [phone]   Fax [phone]      NAME:  Demond Paris  YOB: 1945  MEDICAL RECORD NUMBER:  [id number]  DATE:  10/11/2023    Return Appointment:   1 week with Hiral Montalvo DO      Instructions:     Right Plantar Foot:  Cleanse intact skin with soap & water, wound with normal saline or wound cleanser. Vashe Wound Solution (hypochlorous acid) soak placed on wound bed for a minimum of 60 seconds prior to dressing application. Hydrofera Blue: Cut to wound size, moisten with saline, and apply to wound bed. Secure with ABD or foam.  Cover with rolled gauze and coban. Change 3 times week by home health. [address] [place] [address] for wound care 2 times a week, Starting 10/13/23  Offload with boot or Darco shoe and insert. Do not get wound wet in shower, pool or tub. May purchase cast cover at local pharmacy to keep dry in shower. Elevate legs when sitting to reduce swelling. Swelling interferes with wound healing. Eliminate salt intake as this promotes fluid retention and swelling     Please increase dietary protein to at least 60 grams per day to support cell rejuvenation. May use supplements such as Ensure Max, Naga, & Glucerna   Be sure to spread intake throughout the day for improved absorption. Continue antibiotics: Augmentin and Doxycycline as prescribed by ID. Insurance/ transportation availability initiated through current transportation company [employer] ([phone]). Continue Daily HBO Treatments! *POA REFERREAL PLACED TODAY WITH DR. Jackelin Sands.  AWAIT PHONE [address]      Should you experience increased redness, swelling, pain, foul odor, size of wound(s), or have a temperature over 101 degrees please contact the [address] at [phone] or if after hours contact your primary care physician or go to the hospital

## 2023-10-12 ENCOUNTER — HOSPITAL ENCOUNTER (OUTPATIENT)
Dept: WOUND CARE | Age: 78
Discharge: HOME | End: 2023-10-12
Payer: MEDICARE

## 2023-10-12 VITALS
DIASTOLIC BLOOD PRESSURE: 67 MMHG | SYSTOLIC BLOOD PRESSURE: 144 MMHG | TEMPERATURE: 97.4 F | RESPIRATION RATE: 18 BRPM | OXYGEN SATURATION: 100 % | HEART RATE: 54 BPM

## 2023-10-12 DIAGNOSIS — E11.621 DIABETIC ULCER OF RIGHT MIDFOOT ASSOCIATED WITH TYPE 2 DIABETES MELLITUS, WITH NECROSIS OF BONE (HCC): ICD-10-CM

## 2023-10-12 DIAGNOSIS — L97.414 DIABETIC ULCER OF RIGHT MIDFOOT ASSOCIATED WITH TYPE 2 DIABETES MELLITUS, WITH NECROSIS OF BONE (HCC): ICD-10-CM

## 2023-10-12 DIAGNOSIS — M86.671 CHRONIC REFRACTORY OSTEOMYELITIS OF RIGHT FOOT (HCC): Primary | ICD-10-CM

## 2023-10-12 LAB
GLUCOSE BLD STRIP.AUTO-MCNC: 143 MG/DL (ref 65–100)
GLUCOSE BLD STRIP.AUTO-MCNC: 256 MG/DL (ref 65–100)
SERVICE CMNT-IMP: ABNORMAL
SERVICE CMNT-IMP: ABNORMAL

## 2023-10-12 PROCEDURE — G0277 HBOT, FULL BODY CHAMBER, 30M: HCPCS

## 2023-10-12 PROCEDURE — 82962 GLUCOSE BLOOD TEST: CPT

## 2023-10-12 NOTE — PROGRESS NOTES
serial number         Treatment Start Time: 0900 Door Closed      Pressure Reached  AMY : 2 AMY  Number of Air Breaks: none        Decompression Time   Treatment End Time: 9600 Door Opened    Symptoms Noted During Treatment: None    Length of Treatment: 90 Minutes         Adverse Event:      I was present on these premises and immediately available to furnish assistance & direction throughout the HBO procedure today. MD spoke with patient regarding any problems or questions related to HBO therapy. Reviewed HBO treatment profile with HBO  and verified no adverse reactions noted. Plan      Patient placed in a full body Monoplace Chamber #: [id number]  Treatment Start Time: 0900     Pressure Reached Time: 0909  AMY : 2  Number of Air Breaks:        Decompression Time: 1040   Treatment End Time: 3279  Length of Treatment: 90 Minutes  Symptoms Noted During Treatment: None  Total Treatment Time (min): 109  Treatment Completion Status: Treatment completed without issue (D/C instructions reviewed no questions)      I was present on these premises and immediately available to furnish assistance & direction throughout the HBO Treatment. Eliza Hernandez is a 78 y.o. male  successfully completed today's hyperbaric oxygen treatment at [facility] and HBO therapy. In my clinical judgement, ongoing HBO therapy is necessary at this time to assist with wound healing, preservation of limb, life, or function. Supervision and attendance of Hyperbaric Oxygen Therapy provided. Continue HBO treatment as outlined in the treatment plan. Hyperbaric Oxygen: Mr. Renny Lopez tolerated: Treatment Number: 25 without issue.     Discharge Instructions were explained and given to Mr. Renny Lopez   Electronically signed by Dinah Sumner MD on 10/12/2023 at 11:18 AM

## 2023-10-13 ENCOUNTER — HOSPITAL ENCOUNTER (OUTPATIENT)
Dept: WOUND CARE | Age: 78
Discharge: HOME | End: 2023-10-13
Payer: MEDICARE

## 2023-10-13 VITALS
TEMPERATURE: 97.5 F | RESPIRATION RATE: 18 BRPM | DIASTOLIC BLOOD PRESSURE: 67 MMHG | OXYGEN SATURATION: 100 % | HEART RATE: 57 BPM | SYSTOLIC BLOOD PRESSURE: 133 MMHG

## 2023-10-13 DIAGNOSIS — E11.621 DIABETIC ULCER OF RIGHT MIDFOOT ASSOCIATED WITH TYPE 2 DIABETES MELLITUS, WITH NECROSIS OF BONE (HCC): ICD-10-CM

## 2023-10-13 DIAGNOSIS — L97.414 DIABETIC ULCER OF RIGHT MIDFOOT ASSOCIATED WITH TYPE 2 DIABETES MELLITUS, WITH NECROSIS OF BONE (HCC): ICD-10-CM

## 2023-10-13 DIAGNOSIS — M86.671 CHRONIC REFRACTORY OSTEOMYELITIS OF RIGHT FOOT (HCC): Primary | ICD-10-CM

## 2023-10-13 LAB
GLUCOSE BLD STRIP.AUTO-MCNC: 131 MG/DL (ref 65–100)
GLUCOSE BLD STRIP.AUTO-MCNC: 168 MG/DL (ref 65–100)
SERVICE CMNT-IMP: ABNORMAL
SERVICE CMNT-IMP: ABNORMAL

## 2023-10-13 PROCEDURE — 82962 GLUCOSE BLOOD TEST: CPT

## 2023-10-13 PROCEDURE — G0277 HBOT, FULL BODY CHAMBER, 30M: HCPCS

## 2023-10-18 ENCOUNTER — HOSPITAL ENCOUNTER (OUTPATIENT)
Dept: WOUND CARE | Age: 78
Discharge: HOME | End: 2023-10-18
Payer: MEDICARE

## 2023-10-18 VITALS
HEART RATE: 55 BPM | OXYGEN SATURATION: 100 % | TEMPERATURE: 97.6 F | SYSTOLIC BLOOD PRESSURE: 148 MMHG | RESPIRATION RATE: 18 BRPM | DIASTOLIC BLOOD PRESSURE: 84 MMHG

## 2023-10-18 VITALS
SYSTOLIC BLOOD PRESSURE: 148 MMHG | HEART RATE: 55 BPM | RESPIRATION RATE: 18 BRPM | TEMPERATURE: 97.6 F | OXYGEN SATURATION: 100 % | DIASTOLIC BLOOD PRESSURE: 84 MMHG

## 2023-10-18 DIAGNOSIS — M86.671 CHRONIC REFRACTORY OSTEOMYELITIS OF RIGHT FOOT (HCC): Primary | ICD-10-CM

## 2023-10-18 DIAGNOSIS — L97.514 DIABETIC ULCER OF RIGHT FOOT ASSOCIATED WITH TYPE 2 DIABETES MELLITUS, WITH NECROSIS OF BONE, UNSPECIFIED PART OF FOOT (HCC): ICD-10-CM

## 2023-10-18 DIAGNOSIS — E11.628 DIABETIC FOOT INFECTION (HCC): ICD-10-CM

## 2023-10-18 DIAGNOSIS — L97.414 DIABETIC ULCER OF RIGHT MIDFOOT ASSOCIATED WITH TYPE 2 DIABETES MELLITUS, WITH NECROSIS OF BONE (HCC): ICD-10-CM

## 2023-10-18 DIAGNOSIS — E11.621 DIABETIC ULCER OF RIGHT MIDFOOT ASSOCIATED WITH TYPE 2 DIABETES MELLITUS, WITH NECROSIS OF BONE (HCC): ICD-10-CM

## 2023-10-18 DIAGNOSIS — L08.9 DIABETIC FOOT INFECTION (HCC): ICD-10-CM

## 2023-10-18 DIAGNOSIS — E11.621 DIABETIC ULCER OF RIGHT FOOT ASSOCIATED WITH TYPE 2 DIABETES MELLITUS, WITH NECROSIS OF BONE, UNSPECIFIED PART OF FOOT (HCC): ICD-10-CM

## 2023-10-18 LAB
GLUCOSE BLD STRIP.AUTO-MCNC: 133 MG/DL (ref 65–100)
GLUCOSE BLD STRIP.AUTO-MCNC: 184 MG/DL (ref 65–100)
SERVICE CMNT-IMP: ABNORMAL
SERVICE CMNT-IMP: ABNORMAL

## 2023-10-18 PROCEDURE — G0277 HBOT, FULL BODY CHAMBER, 30M: HCPCS

## 2023-10-18 PROCEDURE — 82962 GLUCOSE BLOOD TEST: CPT

## 2023-10-18 PROCEDURE — 11042 DBRDMT SUBQ TIS 1ST 20SQCM/<: CPT

## 2023-10-18 RX ORDER — LIDOCAINE HYDROCHLORIDE 20 MG/ML
JELLY TOPICAL ONCE
OUTPATIENT
Start: 2023-10-18 | End: 2023-10-18

## 2023-10-18 RX ORDER — TRIAMCINOLONE ACETONIDE 1 MG/G
OINTMENT TOPICAL ONCE
OUTPATIENT
Start: 2023-10-18 | End: 2023-10-18

## 2023-10-18 RX ORDER — LIDOCAINE HYDROCHLORIDE 40 MG/ML
SOLUTION TOPICAL ONCE
OUTPATIENT
Start: 2023-10-18 | End: 2023-10-18

## 2023-10-18 RX ORDER — GINSENG 100 MG
CAPSULE ORAL ONCE
OUTPATIENT
Start: 2023-10-18 | End: 2023-10-18

## 2023-10-18 RX ORDER — CLOBETASOL PROPIONATE 0.5 MG/G
OINTMENT TOPICAL ONCE
OUTPATIENT
Start: 2023-10-18 | End: 2023-10-18

## 2023-10-18 RX ORDER — IBUPROFEN 200 MG
TABLET ORAL ONCE
OUTPATIENT
Start: 2023-10-18 | End: 2023-10-18

## 2023-10-18 RX ORDER — BACITRACIN ZINC AND POLYMYXIN B SULFATE 500; 1000 [USP'U]/G; [USP'U]/G
OINTMENT TOPICAL ONCE
OUTPATIENT
Start: 2023-10-18 | End: 2023-10-18

## 2023-10-18 RX ORDER — LIDOCAINE 50 MG/G
OINTMENT TOPICAL ONCE
OUTPATIENT
Start: 2023-10-18 | End: 2023-10-18

## 2023-10-18 RX ORDER — LIDOCAINE 40 MG/G
CREAM TOPICAL ONCE
OUTPATIENT
Start: 2023-10-18 | End: 2023-10-18

## 2023-10-18 RX ORDER — GENTAMICIN SULFATE 1 MG/G
OINTMENT TOPICAL ONCE
OUTPATIENT
Start: 2023-10-18 | End: 2023-10-18

## 2023-10-18 RX ORDER — BETAMETHASONE DIPROPIONATE 0.05 %
OINTMENT (GRAM) TOPICAL ONCE
OUTPATIENT
Start: 2023-10-18 | End: 2023-10-18

## 2023-10-18 NOTE — FLOWSHEET NOTE
10/18/23 1059   Wound 04/14/23 Foot Right;Plantar #1 delayed healing surgical debridement 2/22/23   Date First Assessed/Time First Assessed: 04/14/23 1053   Present on Hospital Admission: Yes  Wound Approximate Age at First Assessment (Weeks): 6 weeks  Primary Wound Type: Diabetic Ulcer  Location: Foot  Wound Location Orientation: Right;Plantar  Wou. ..    Wound Image     Wound Etiology Diabetic Meza 3   Dressing Status Old drainage noted   Wound Cleansed Vashe   Dressing/Treatment Hydrofera blue   Offloading for Diabetic Foot Ulcers Offloading ordered   Wound Length (cm) 0.2 cm   Wound Width (cm) 0.2 cm   Wound Depth (cm) 1 cm   Wound Surface Area (cm^2) 0.04 cm^2   Change in Wound Size % (l*w) 99.69   Wound Volume (cm^3) 0.04 cm^3   Wound Healing % 100   Post-Procedure Length (cm) 0.3 cm   Post-Procedure Width (cm) 0.3 cm   Post-Procedure Depth (cm) 1 cm   Post-Procedure Surface Area (cm^2) 0.09 cm^2   Post-Procedure Volume (cm^3) 0.09 cm^3   Wound Assessment Pink/red   Drainage Amount Moderate (25-50%)   Drainage Description Serosanguinous   Odor None   Charla-wound Assessment Intact   Wound Thickness Description not for Pressure Injury Full thickness

## 2023-10-19 ENCOUNTER — HOSPITAL ENCOUNTER (OUTPATIENT)
Dept: WOUND CARE | Age: 78
Discharge: HOME | End: 2023-10-19
Payer: MEDICARE

## 2023-10-19 VITALS
SYSTOLIC BLOOD PRESSURE: 137 MMHG | OXYGEN SATURATION: 100 % | DIASTOLIC BLOOD PRESSURE: 75 MMHG | TEMPERATURE: 97.8 F | RESPIRATION RATE: 18 BRPM | HEART RATE: 58 BPM

## 2023-10-19 DIAGNOSIS — E11.621 DIABETIC ULCER OF RIGHT MIDFOOT ASSOCIATED WITH TYPE 2 DIABETES MELLITUS, WITH NECROSIS OF BONE (HCC): ICD-10-CM

## 2023-10-19 DIAGNOSIS — M86.671 CHRONIC REFRACTORY OSTEOMYELITIS OF RIGHT FOOT (HCC): Primary | ICD-10-CM

## 2023-10-19 DIAGNOSIS — L97.414 DIABETIC ULCER OF RIGHT MIDFOOT ASSOCIATED WITH TYPE 2 DIABETES MELLITUS, WITH NECROSIS OF BONE (HCC): ICD-10-CM

## 2023-10-19 LAB
GLUCOSE BLD STRIP.AUTO-MCNC: 125 MG/DL (ref 65–100)
GLUCOSE BLD STRIP.AUTO-MCNC: 176 MG/DL (ref 65–100)
SERVICE CMNT-IMP: ABNORMAL
SERVICE CMNT-IMP: ABNORMAL

## 2023-10-19 PROCEDURE — G0277 HBOT, FULL BODY CHAMBER, 30M: HCPCS

## 2023-10-19 PROCEDURE — 99183 HYPERBARIC OXYGEN THERAPY: CPT | Performed by: SURGERY

## 2023-10-19 PROCEDURE — 82962 GLUCOSE BLOOD TEST: CPT

## 2023-10-20 ENCOUNTER — HOSPITAL ENCOUNTER (OUTPATIENT)
Dept: WOUND CARE | Age: 78
Discharge: HOME | End: 2023-10-20
Payer: MEDICARE

## 2023-10-20 VITALS
OXYGEN SATURATION: 100 % | TEMPERATURE: 98 F | SYSTOLIC BLOOD PRESSURE: 150 MMHG | DIASTOLIC BLOOD PRESSURE: 78 MMHG | RESPIRATION RATE: 18 BRPM | HEART RATE: 57 BPM

## 2023-10-20 DIAGNOSIS — E11.621 DIABETIC ULCER OF RIGHT MIDFOOT ASSOCIATED WITH TYPE 2 DIABETES MELLITUS, WITH NECROSIS OF BONE (HCC): ICD-10-CM

## 2023-10-20 DIAGNOSIS — M86.671 CHRONIC REFRACTORY OSTEOMYELITIS OF RIGHT FOOT (HCC): Primary | ICD-10-CM

## 2023-10-20 DIAGNOSIS — L97.414 DIABETIC ULCER OF RIGHT MIDFOOT ASSOCIATED WITH TYPE 2 DIABETES MELLITUS, WITH NECROSIS OF BONE (HCC): ICD-10-CM

## 2023-10-20 LAB
GLUCOSE BLD STRIP.AUTO-MCNC: 127 MG/DL (ref 65–100)
GLUCOSE BLD STRIP.AUTO-MCNC: 183 MG/DL (ref 65–100)
SERVICE CMNT-IMP: ABNORMAL
SERVICE CMNT-IMP: ABNORMAL

## 2023-10-20 PROCEDURE — G0277 HBOT, FULL BODY CHAMBER, 30M: HCPCS

## 2023-10-20 PROCEDURE — 82962 GLUCOSE BLOOD TEST: CPT

## 2023-10-23 ENCOUNTER — HOSPITAL ENCOUNTER (OUTPATIENT)
Dept: WOUND CARE | Age: 78
Discharge: HOME | End: 2023-10-23
Payer: MEDICAID

## 2023-10-23 VITALS
DIASTOLIC BLOOD PRESSURE: 75 MMHG | OXYGEN SATURATION: 99 % | SYSTOLIC BLOOD PRESSURE: 146 MMHG | RESPIRATION RATE: 18 BRPM | TEMPERATURE: 97.8 F | HEART RATE: 58 BPM

## 2023-10-23 VITALS
SYSTOLIC BLOOD PRESSURE: 149 MMHG | DIASTOLIC BLOOD PRESSURE: 72 MMHG | OXYGEN SATURATION: 99 % | HEART RATE: 57 BPM | RESPIRATION RATE: 18 BRPM | TEMPERATURE: 97.8 F

## 2023-10-23 DIAGNOSIS — E11.628 DIABETIC FOOT INFECTION (HCC): ICD-10-CM

## 2023-10-23 DIAGNOSIS — L97.514 DIABETIC ULCER OF RIGHT FOOT ASSOCIATED WITH TYPE 2 DIABETES MELLITUS, WITH NECROSIS OF BONE, UNSPECIFIED PART OF FOOT (HCC): ICD-10-CM

## 2023-10-23 DIAGNOSIS — M86.671 CHRONIC REFRACTORY OSTEOMYELITIS OF RIGHT FOOT (HCC): Primary | ICD-10-CM

## 2023-10-23 DIAGNOSIS — E11.621 DIABETIC ULCER OF RIGHT FOOT ASSOCIATED WITH TYPE 2 DIABETES MELLITUS, WITH NECROSIS OF BONE, UNSPECIFIED PART OF FOOT (HCC): ICD-10-CM

## 2023-10-23 DIAGNOSIS — L08.9 DIABETIC FOOT INFECTION (HCC): ICD-10-CM

## 2023-10-23 DIAGNOSIS — L97.414 DIABETIC ULCER OF RIGHT MIDFOOT ASSOCIATED WITH TYPE 2 DIABETES MELLITUS, WITH NECROSIS OF BONE (HCC): ICD-10-CM

## 2023-10-23 DIAGNOSIS — E11.621 DIABETIC ULCER OF RIGHT MIDFOOT ASSOCIATED WITH TYPE 2 DIABETES MELLITUS, WITH NECROSIS OF BONE (HCC): ICD-10-CM

## 2023-10-23 PROCEDURE — 11042 DBRDMT SUBQ TIS 1ST 20SQCM/<: CPT

## 2023-10-23 PROCEDURE — G0277 HBOT, FULL BODY CHAMBER, 30M: HCPCS

## 2023-10-23 PROCEDURE — 82962 GLUCOSE BLOOD TEST: CPT

## 2023-10-23 RX ORDER — GINSENG 100 MG
CAPSULE ORAL ONCE
OUTPATIENT
Start: 2023-10-23 | End: 2023-10-23

## 2023-10-23 RX ORDER — CLOBETASOL PROPIONATE 0.5 MG/G
OINTMENT TOPICAL ONCE
OUTPATIENT
Start: 2023-10-23 | End: 2023-10-23

## 2023-10-23 RX ORDER — LIDOCAINE HYDROCHLORIDE 20 MG/ML
JELLY TOPICAL ONCE
OUTPATIENT
Start: 2023-10-23 | End: 2023-10-23

## 2023-10-23 RX ORDER — TRIAMCINOLONE ACETONIDE 1 MG/G
OINTMENT TOPICAL ONCE
OUTPATIENT
Start: 2023-10-23 | End: 2023-10-23

## 2023-10-23 RX ORDER — GENTAMICIN SULFATE 1 MG/G
OINTMENT TOPICAL ONCE
OUTPATIENT
Start: 2023-10-23 | End: 2023-10-23

## 2023-10-23 RX ORDER — LIDOCAINE 50 MG/G
OINTMENT TOPICAL ONCE
OUTPATIENT
Start: 2023-10-23 | End: 2023-10-23

## 2023-10-23 RX ORDER — BETAMETHASONE DIPROPIONATE 0.05 %
OINTMENT (GRAM) TOPICAL ONCE
OUTPATIENT
Start: 2023-10-23 | End: 2023-10-23

## 2023-10-23 RX ORDER — LIDOCAINE HYDROCHLORIDE 40 MG/ML
SOLUTION TOPICAL ONCE
OUTPATIENT
Start: 2023-10-23 | End: 2023-10-23

## 2023-10-23 RX ORDER — BACITRACIN ZINC AND POLYMYXIN B SULFATE 500; 1000 [USP'U]/G; [USP'U]/G
OINTMENT TOPICAL ONCE
OUTPATIENT
Start: 2023-10-23 | End: 2023-10-23

## 2023-10-23 RX ORDER — IBUPROFEN 200 MG
TABLET ORAL ONCE
OUTPATIENT
Start: 2023-10-23 | End: 2023-10-23

## 2023-10-23 RX ORDER — LIDOCAINE 40 MG/G
CREAM TOPICAL ONCE
OUTPATIENT
Start: 2023-10-23 | End: 2023-10-23

## 2023-10-23 NOTE — WOUND CARE
Discharge Instructions for  [address] [facility]  [address] [facility], [address] [place] [address]  Phone [phone]   Fax [phone]      NAME:  Kevin Clement  YOB: 1945  MEDICAL RECORD NUMBER:  [id number]  DATE:  10/23/2023    Return Appointment:   1 week with Hallie Iyer DO      Instructions:   Right Plantar Foot:  Cleanse intact skin with soap & water, wound with normal saline or wound cleanser. Vashe Wound Solution (hypochlorous acid) soak placed on wound bed for a minimum of 60 seconds prior to dressing application. Pack wound with Mesalt Packing Strip, gently fill space  Secure with ABD or foam.  Cover with rolled gauze and coban. Change 3 times week. Offload with boot or Darco shoe and insert. Do not get wound wet in shower, pool or tub. May purchase cast cover at local pharmacy to keep dry in shower. Elevate legs when sitting to reduce swelling. Swelling interferes with wound healing. Eliminate salt intake as this promotes fluid retention and swelling     Please increase dietary protein to at least 60 grams per day to support cell rejuvenation. May use supplements such as Ensure Max, Naga, & Glucerna   Be sure to spread intake throughout the day for improved absorption. Continue antibiotics: Augmentin and Doxycycline as prescribed by ID. Insurance/ transportation availability initiated through current transportation company [employer] ([phone]). Continue Daily HBO Treatments! *POA REFERREAL PLACED WITH DR. VALADEZ. AWAIT PHONE [address] [facility]              Should you experience increased redness, swelling, pain, foul odor, size of wound(s), or have a temperature over 101 degrees please contact the [address] at [phone] or if after hours contact your primary care physician or go to the hospital emergency department.     PLEASE NOTE: IF YOU ARE UNABLE TO OBTAIN WOUND SUPPLIES, CONTINUE TO USE THE SUPPLIES YOU HAVE

## 2023-10-23 NOTE — FLOWSHEET NOTE
10/23/23 0847   Wound 04/14/23 Foot Right;Plantar #1 delayed healing surgical debridement 2/22/23   Date First Assessed/Time First Assessed: 04/14/23 1053   Present on Hospital Admission: Yes  Wound Approximate Age at First Assessment (Weeks): 6 weeks  Primary Wound Type: Diabetic Ulcer  Location: Foot  Wound Location Orientation: Right;Plantar  Wou. ..    Wound Etiology Diabetic Meza 3   Dressing Status Old drainage noted   Wound Cleansed Vashe   Dressing/Treatment Hydrofera blue   Offloading for Diabetic Foot Ulcers Offloading ordered   Wound Length (cm) 0.5 cm   Wound Width (cm) 0.4 cm   Wound Depth (cm) 1 cm   Wound Surface Area (cm^2) 0.2 cm^2   Change in Wound Size % (l*w) 98.45   Wound Volume (cm^3) 0.2 cm^3   Wound Healing % 99   Post-Procedure Length (cm) 0.5 cm   Post-Procedure Width (cm) 0.4 cm   Post-Procedure Depth (cm) 1 cm   Post-Procedure Surface Area (cm^2) 0.2 cm^2   Post-Procedure Volume (cm^3) 0.2 cm^3   Wound Assessment Pink/red   Drainage Amount Moderate (25-50%)   Drainage Description Serosanguinous   Odor None   Charla-wound Assessment Intact   Wound Thickness Description not for Pressure Injury Full thickness

## 2023-10-24 ENCOUNTER — HOSPITAL ENCOUNTER (OUTPATIENT)
Dept: WOUND CARE | Age: 78
Discharge: HOME | End: 2023-10-24
Payer: MEDICARE

## 2023-10-24 VITALS
RESPIRATION RATE: 18 BRPM | SYSTOLIC BLOOD PRESSURE: 138 MMHG | HEART RATE: 59 BPM | TEMPERATURE: 97.2 F | OXYGEN SATURATION: 100 % | DIASTOLIC BLOOD PRESSURE: 69 MMHG

## 2023-10-24 DIAGNOSIS — L97.414 DIABETIC ULCER OF RIGHT MIDFOOT ASSOCIATED WITH TYPE 2 DIABETES MELLITUS, WITH NECROSIS OF BONE (HCC): ICD-10-CM

## 2023-10-24 DIAGNOSIS — M86.671 CHRONIC REFRACTORY OSTEOMYELITIS OF RIGHT FOOT (HCC): Primary | ICD-10-CM

## 2023-10-24 DIAGNOSIS — E11.621 DIABETIC ULCER OF RIGHT MIDFOOT ASSOCIATED WITH TYPE 2 DIABETES MELLITUS, WITH NECROSIS OF BONE (HCC): ICD-10-CM

## 2023-10-24 LAB
GLUCOSE BLD STRIP.AUTO-MCNC: 145 MG/DL (ref 65–100)
GLUCOSE BLD STRIP.AUTO-MCNC: 200 MG/DL (ref 65–100)
SERVICE CMNT-IMP: ABNORMAL
SERVICE CMNT-IMP: ABNORMAL

## 2023-10-24 PROCEDURE — G0277 HBOT, FULL BODY CHAMBER, 30M: HCPCS

## 2023-10-24 PROCEDURE — 82962 GLUCOSE BLOOD TEST: CPT

## 2023-10-25 ENCOUNTER — HOSPITAL ENCOUNTER (OUTPATIENT)
Dept: WOUND CARE | Age: 78
Discharge: HOME | End: 2023-10-25
Payer: MEDICARE

## 2023-10-25 VITALS
DIASTOLIC BLOOD PRESSURE: 66 MMHG | OXYGEN SATURATION: 100 % | TEMPERATURE: 98.3 F | HEART RATE: 51 BPM | SYSTOLIC BLOOD PRESSURE: 137 MMHG | RESPIRATION RATE: 18 BRPM

## 2023-10-25 DIAGNOSIS — E11.621 DIABETIC ULCER OF RIGHT MIDFOOT ASSOCIATED WITH TYPE 2 DIABETES MELLITUS, WITH NECROSIS OF BONE (HCC): ICD-10-CM

## 2023-10-25 DIAGNOSIS — M86.671 CHRONIC REFRACTORY OSTEOMYELITIS OF RIGHT FOOT (HCC): Primary | ICD-10-CM

## 2023-10-25 DIAGNOSIS — L97.414 DIABETIC ULCER OF RIGHT MIDFOOT ASSOCIATED WITH TYPE 2 DIABETES MELLITUS, WITH NECROSIS OF BONE (HCC): ICD-10-CM

## 2023-10-25 LAB
GLUCOSE BLD STRIP.AUTO-MCNC: 131 MG/DL (ref 65–100)
GLUCOSE BLD STRIP.AUTO-MCNC: 196 MG/DL (ref 65–100)
SERVICE CMNT-IMP: ABNORMAL
SERVICE CMNT-IMP: ABNORMAL

## 2023-10-25 PROCEDURE — G0277 HBOT, FULL BODY CHAMBER, 30M: HCPCS

## 2023-10-25 PROCEDURE — 82962 GLUCOSE BLOOD TEST: CPT

## 2023-10-26 ENCOUNTER — HOSPITAL ENCOUNTER (OUTPATIENT)
Dept: WOUND CARE | Age: 78
Discharge: HOME | End: 2023-10-26
Payer: MEDICARE

## 2023-10-26 VITALS
SYSTOLIC BLOOD PRESSURE: 150 MMHG | TEMPERATURE: 98.3 F | RESPIRATION RATE: 18 BRPM | OXYGEN SATURATION: 100 % | HEART RATE: 67 BPM | DIASTOLIC BLOOD PRESSURE: 82 MMHG

## 2023-10-26 DIAGNOSIS — L97.414 DIABETIC ULCER OF RIGHT MIDFOOT ASSOCIATED WITH TYPE 2 DIABETES MELLITUS, WITH NECROSIS OF BONE (HCC): ICD-10-CM

## 2023-10-26 DIAGNOSIS — M86.671 CHRONIC REFRACTORY OSTEOMYELITIS OF RIGHT FOOT (HCC): Primary | ICD-10-CM

## 2023-10-26 DIAGNOSIS — E11.621 DIABETIC ULCER OF RIGHT MIDFOOT ASSOCIATED WITH TYPE 2 DIABETES MELLITUS, WITH NECROSIS OF BONE (HCC): ICD-10-CM

## 2023-10-26 LAB
GLUCOSE BLD STRIP.AUTO-MCNC: 131 MG/DL (ref 65–100)
GLUCOSE BLD STRIP.AUTO-MCNC: 186 MG/DL (ref 65–100)
SERVICE CMNT-IMP: ABNORMAL
SERVICE CMNT-IMP: ABNORMAL

## 2023-10-26 PROCEDURE — G0277 HBOT, FULL BODY CHAMBER, 30M: HCPCS

## 2023-10-26 PROCEDURE — 99183 HYPERBARIC OXYGEN THERAPY: CPT | Performed by: SURGERY

## 2023-10-26 PROCEDURE — 82962 GLUCOSE BLOOD TEST: CPT

## 2023-10-27 ENCOUNTER — HOSPITAL ENCOUNTER (OUTPATIENT)
Dept: WOUND CARE | Age: 78
Discharge: HOME | End: 2023-10-27
Payer: MEDICARE

## 2023-10-27 VITALS
SYSTOLIC BLOOD PRESSURE: 144 MMHG | OXYGEN SATURATION: 100 % | TEMPERATURE: 97.6 F | RESPIRATION RATE: 18 BRPM | DIASTOLIC BLOOD PRESSURE: 74 MMHG | HEART RATE: 50 BPM

## 2023-10-27 DIAGNOSIS — E11.621 DIABETIC ULCER OF RIGHT MIDFOOT ASSOCIATED WITH TYPE 2 DIABETES MELLITUS, WITH NECROSIS OF BONE (HCC): ICD-10-CM

## 2023-10-27 DIAGNOSIS — L97.414 DIABETIC ULCER OF RIGHT MIDFOOT ASSOCIATED WITH TYPE 2 DIABETES MELLITUS, WITH NECROSIS OF BONE (HCC): ICD-10-CM

## 2023-10-27 DIAGNOSIS — M86.671 CHRONIC REFRACTORY OSTEOMYELITIS OF RIGHT FOOT (HCC): Primary | ICD-10-CM

## 2023-10-27 LAB
GLUCOSE BLD STRIP.AUTO-MCNC: 129 MG/DL (ref 65–100)
GLUCOSE BLD STRIP.AUTO-MCNC: 183 MG/DL (ref 65–100)
SERVICE CMNT-IMP: ABNORMAL
SERVICE CMNT-IMP: ABNORMAL

## 2023-10-27 PROCEDURE — 82962 GLUCOSE BLOOD TEST: CPT

## 2023-10-27 PROCEDURE — 99183 HYPERBARIC OXYGEN THERAPY: CPT | Performed by: FAMILY MEDICINE

## 2023-10-27 PROCEDURE — G0277 HBOT, FULL BODY CHAMBER, 30M: HCPCS

## 2023-10-27 NOTE — PROGRESS NOTES
Normal  Right: Normal    Pulmonary/Chest:  clear to auscultation bilaterally- no wheezes, rales or rhonchi, normal air movement, no respiratory distress    Cardiovascular:  normal    Plan        Patient placed in a full body Monoplace Chamber #: [id number]  Treatment Start Time: 0902     Pressure Reached Time: 0911  AMY : 2  Number of Air Breaks:        Decompression Time: 1043   Treatment End Time: 7500  Length of Treatment: 90 Minutes  Symptoms Noted During Treatment: None  Total Treatment Time (min): 108    Treatment Completion Status: Treatment completed without issue (D/C instructions reviewed no questions)    I was present on these premises and immediately available to furnish assistance & direction throughout the HBO Treatment. Yimi Mcdonnell is a 78 y.o. male  did successfully complete today's hyperbaric oxygen treatment at [facility] and HBO therapy. In my clinical judgement, ongoing HBO therapy is  necessary at this time to assist with wound healing, preservation of limb, life, or function. Supervision and attendance of Hyperbaric Oxygen Therapy provided. Continue HBO treatment as outlined in the treatment plan. Hyperbaric Oxygen: Mr. Shereen Boone tolerated: Treatment Number: 34 without  issue.     Discharge Instructions were explained and given to Mr. Shereen Boone     Electronically signed by Bg Ashraf DO on 10/27/2023 at 11:55 AM

## 2023-10-30 ENCOUNTER — HOSPITAL ENCOUNTER (OUTPATIENT)
Dept: WOUND CARE | Age: 78
Discharge: HOME | End: 2023-10-30
Payer: MEDICAID

## 2023-10-30 VITALS
RESPIRATION RATE: 18 BRPM | HEART RATE: 51 BPM | OXYGEN SATURATION: 100 % | DIASTOLIC BLOOD PRESSURE: 59 MMHG | SYSTOLIC BLOOD PRESSURE: 130 MMHG | TEMPERATURE: 98 F

## 2023-10-30 VITALS
SYSTOLIC BLOOD PRESSURE: 130 MMHG | HEART RATE: 51 BPM | DIASTOLIC BLOOD PRESSURE: 59 MMHG | RESPIRATION RATE: 18 BRPM | TEMPERATURE: 98 F | OXYGEN SATURATION: 100 %

## 2023-10-30 DIAGNOSIS — M86.671 CHRONIC REFRACTORY OSTEOMYELITIS OF RIGHT FOOT (HCC): Primary | ICD-10-CM

## 2023-10-30 DIAGNOSIS — E11.621 DIABETIC ULCER OF RIGHT FOOT ASSOCIATED WITH TYPE 2 DIABETES MELLITUS, WITH NECROSIS OF BONE, UNSPECIFIED PART OF FOOT (HCC): ICD-10-CM

## 2023-10-30 DIAGNOSIS — L97.514 DIABETIC ULCER OF RIGHT FOOT ASSOCIATED WITH TYPE 2 DIABETES MELLITUS, WITH NECROSIS OF BONE, UNSPECIFIED PART OF FOOT (HCC): ICD-10-CM

## 2023-10-30 DIAGNOSIS — E11.628 DIABETIC FOOT INFECTION (HCC): ICD-10-CM

## 2023-10-30 DIAGNOSIS — L97.414 DIABETIC ULCER OF RIGHT MIDFOOT ASSOCIATED WITH TYPE 2 DIABETES MELLITUS, WITH NECROSIS OF BONE (HCC): ICD-10-CM

## 2023-10-30 DIAGNOSIS — E11.621 DIABETIC ULCER OF RIGHT MIDFOOT ASSOCIATED WITH TYPE 2 DIABETES MELLITUS, WITH NECROSIS OF BONE (HCC): ICD-10-CM

## 2023-10-30 DIAGNOSIS — L08.9 DIABETIC FOOT INFECTION (HCC): ICD-10-CM

## 2023-10-30 LAB
GLUCOSE BLD STRIP.AUTO-MCNC: 129 MG/DL (ref 65–100)
GLUCOSE BLD STRIP.AUTO-MCNC: 137 MG/DL (ref 65–100)
GLUCOSE BLD STRIP.AUTO-MCNC: 161 MG/DL (ref 65–100)
SERVICE CMNT-IMP: ABNORMAL

## 2023-10-30 PROCEDURE — 99183 HYPERBARIC OXYGEN THERAPY: CPT | Performed by: SURGERY

## 2023-10-30 PROCEDURE — G0277 HBOT, FULL BODY CHAMBER, 30M: HCPCS

## 2023-10-30 PROCEDURE — 99213 OFFICE O/P EST LOW 20 MIN: CPT

## 2023-10-30 PROCEDURE — 82962 GLUCOSE BLOOD TEST: CPT

## 2023-10-30 RX ORDER — LIDOCAINE HYDROCHLORIDE 20 MG/ML
JELLY TOPICAL ONCE
OUTPATIENT
Start: 2023-10-30 | End: 2023-10-30

## 2023-10-30 RX ORDER — TRIAMCINOLONE ACETONIDE 1 MG/G
OINTMENT TOPICAL ONCE
OUTPATIENT
Start: 2023-10-30 | End: 2023-10-30

## 2023-10-30 RX ORDER — LIDOCAINE 50 MG/G
OINTMENT TOPICAL ONCE
OUTPATIENT
Start: 2023-10-30 | End: 2023-10-30

## 2023-10-30 RX ORDER — IBUPROFEN 200 MG
TABLET ORAL ONCE
OUTPATIENT
Start: 2023-10-30 | End: 2023-10-30

## 2023-10-30 RX ORDER — GINSENG 100 MG
CAPSULE ORAL ONCE
OUTPATIENT
Start: 2023-10-30 | End: 2023-10-30

## 2023-10-30 RX ORDER — CLOBETASOL PROPIONATE 0.5 MG/G
OINTMENT TOPICAL ONCE
OUTPATIENT
Start: 2023-10-30 | End: 2023-10-30

## 2023-10-30 RX ORDER — LIDOCAINE HYDROCHLORIDE 40 MG/ML
SOLUTION TOPICAL ONCE
OUTPATIENT
Start: 2023-10-30 | End: 2023-10-30

## 2023-10-30 RX ORDER — GENTAMICIN SULFATE 1 MG/G
OINTMENT TOPICAL ONCE
OUTPATIENT
Start: 2023-10-30 | End: 2023-10-30

## 2023-10-30 RX ORDER — BETAMETHASONE DIPROPIONATE 0.05 %
OINTMENT (GRAM) TOPICAL ONCE
OUTPATIENT
Start: 2023-10-30 | End: 2023-10-30

## 2023-10-30 RX ORDER — LIDOCAINE 40 MG/G
CREAM TOPICAL ONCE
OUTPATIENT
Start: 2023-10-30 | End: 2023-10-30

## 2023-10-30 RX ORDER — BACITRACIN ZINC AND POLYMYXIN B SULFATE 500; 1000 [USP'U]/G; [USP'U]/G
OINTMENT TOPICAL ONCE
OUTPATIENT
Start: 2023-10-30 | End: 2023-10-30

## 2023-10-30 NOTE — FLOWSHEET NOTE
10/30/23 1150   Wound 04/14/23 Foot Right;Plantar #1 delayed healing surgical debridement 2/22/23   Date First Assessed/Time First Assessed: 04/14/23 1053   Present on Hospital Admission: Yes  Wound Approximate Age at First Assessment (Weeks): 6 weeks  Primary Wound Type: Diabetic Ulcer  Location: Foot  Wound Location Orientation: Right;Plantar  Wou. ..    Wound Image    Wound Etiology Diabetic Meza 3   Dressing Status Old drainage noted   Wound Cleansed Vashe   Dressing/Treatment Other (comment)  (mesalt packing)   Offloading for Diabetic Foot Ulcers Offloading ordered   Wound Length (cm) 0.1 cm   Wound Width (cm) 0.4 cm   Wound Depth (cm) 0.5 cm   Wound Surface Area (cm^2) 0.04 cm^2   Change in Wound Size % (l*w) 99.69   Wound Volume (cm^3) 0.02 cm^3   Wound Healing % 100   Wound Assessment Pink/red   Drainage Amount Moderate (25-50%)   Drainage Description Serosanguinous   Odor None   Charla-wound Assessment Intact   Wound Thickness Description not for Pressure Injury Full thickness

## 2023-10-30 NOTE — WOUND CARE
Discharge Instructions for  [address] [place] [address] [facility], [address] [facility]  Phone [phone]   Fax [phone]      NAME:  Brandt Hicks  YOB: 1945  MEDICAL RECORD NUMBER:  [id number]  DATE:  10/30/2023    Return Appointment:   1 week with Marta Gonzalez DO      Instructions:   Right Plantar Foot:  Cleanse intact skin with soap & water, wound with normal saline or wound cleanser. Vashe Wound Solution (hypochlorous acid) soak placed on wound bed for a minimum of 60 seconds prior to dressing application. Pack wound with Mesalt Packing Strip, gently fill space  Secure with ABD or foam.  Cover with rolled gauze and coban. Change daily if possible, definitely 3 times week. Offload with boot or Darco shoe and insert. Do not get wound wet in shower, pool or tub. May purchase cast cover at local pharmacy to keep dry in shower. Elevate legs when sitting to reduce swelling. Swelling interferes with wound healing. Eliminate salt intake as this promotes fluid retention and swelling     Please increase dietary protein to at least 60 grams per day to support cell rejuvenation. May use supplements such as Ensure Max, Naga, & Glucerna   Be sure to spread intake throughout the day for improved absorption. Continue antibiotics: Augmentin and Doxycycline as prescribed by ID. Insurance/ transportation availability initiated through current transportation company [employer] ([phone]). Continue Daily HBO Treatments & antibiotics! *POA REFERREAL PLACED- APPT. WITH DR. Silvina Clarke ON 11/7/23 ON [address]. Should you experience increased redness, swelling, pain, foul odor, size of wound(s), or have a temperature over 101 degrees please contact the [address] at [phone] or if after hours contact your primary care physician or go to the hospital emergency department.     PLEASE NOTE: IF YOU ARE UNABLE TO OBTAIN

## 2023-10-31 ENCOUNTER — HOSPITAL ENCOUNTER (OUTPATIENT)
Dept: WOUND CARE | Age: 78
Discharge: HOME | End: 2023-10-31
Payer: MEDICAID

## 2023-10-31 VITALS
TEMPERATURE: 97.6 F | RESPIRATION RATE: 18 BRPM | OXYGEN SATURATION: 100 % | HEART RATE: 55 BPM | SYSTOLIC BLOOD PRESSURE: 138 MMHG | DIASTOLIC BLOOD PRESSURE: 64 MMHG

## 2023-10-31 DIAGNOSIS — M86.671 CHRONIC REFRACTORY OSTEOMYELITIS OF RIGHT FOOT (HCC): Primary | ICD-10-CM

## 2023-10-31 DIAGNOSIS — L97.414 DIABETIC ULCER OF RIGHT MIDFOOT ASSOCIATED WITH TYPE 2 DIABETES MELLITUS, WITH NECROSIS OF BONE (HCC): ICD-10-CM

## 2023-10-31 DIAGNOSIS — E11.621 DIABETIC ULCER OF RIGHT MIDFOOT ASSOCIATED WITH TYPE 2 DIABETES MELLITUS, WITH NECROSIS OF BONE (HCC): ICD-10-CM

## 2023-10-31 LAB
GLUCOSE BLD STRIP.AUTO-MCNC: 126 MG/DL (ref 65–100)
GLUCOSE BLD STRIP.AUTO-MCNC: 194 MG/DL (ref 65–100)
SERVICE CMNT-IMP: ABNORMAL
SERVICE CMNT-IMP: ABNORMAL

## 2023-10-31 PROCEDURE — 82962 GLUCOSE BLOOD TEST: CPT

## 2023-10-31 PROCEDURE — G0277 HBOT, FULL BODY CHAMBER, 30M: HCPCS

## 2023-11-01 ENCOUNTER — HOSPITAL ENCOUNTER (OUTPATIENT)
Dept: WOUND CARE | Age: 78
Discharge: HOME | End: 2023-11-01
Payer: MEDICAID

## 2023-11-01 VITALS
HEART RATE: 53 BPM | SYSTOLIC BLOOD PRESSURE: 141 MMHG | RESPIRATION RATE: 18 BRPM | TEMPERATURE: 97.9 F | OXYGEN SATURATION: 100 % | DIASTOLIC BLOOD PRESSURE: 72 MMHG

## 2023-11-01 DIAGNOSIS — L97.414 DIABETIC ULCER OF RIGHT MIDFOOT ASSOCIATED WITH TYPE 2 DIABETES MELLITUS, WITH NECROSIS OF BONE (HCC): ICD-10-CM

## 2023-11-01 DIAGNOSIS — E11.621 DIABETIC ULCER OF RIGHT MIDFOOT ASSOCIATED WITH TYPE 2 DIABETES MELLITUS, WITH NECROSIS OF BONE (HCC): ICD-10-CM

## 2023-11-01 DIAGNOSIS — M86.671 CHRONIC REFRACTORY OSTEOMYELITIS OF RIGHT FOOT (HCC): Primary | ICD-10-CM

## 2023-11-01 LAB
GLUCOSE BLD STRIP.AUTO-MCNC: 137 MG/DL (ref 65–100)
GLUCOSE BLD STRIP.AUTO-MCNC: 180 MG/DL (ref 65–100)
SERVICE CMNT-IMP: ABNORMAL
SERVICE CMNT-IMP: ABNORMAL

## 2023-11-01 PROCEDURE — 82962 GLUCOSE BLOOD TEST: CPT

## 2023-11-01 PROCEDURE — G0277 HBOT, FULL BODY CHAMBER, 30M: HCPCS

## 2023-11-02 ENCOUNTER — HOSPITAL ENCOUNTER (OUTPATIENT)
Dept: WOUND CARE | Age: 78
Discharge: HOME | End: 2023-11-02
Payer: MEDICAID

## 2023-11-02 VITALS
TEMPERATURE: 98.1 F | OXYGEN SATURATION: 100 % | HEART RATE: 49 BPM | DIASTOLIC BLOOD PRESSURE: 74 MMHG | RESPIRATION RATE: 18 BRPM | SYSTOLIC BLOOD PRESSURE: 150 MMHG

## 2023-11-02 DIAGNOSIS — E11.621 DIABETIC ULCER OF RIGHT MIDFOOT ASSOCIATED WITH TYPE 2 DIABETES MELLITUS, WITH NECROSIS OF BONE (HCC): ICD-10-CM

## 2023-11-02 DIAGNOSIS — L97.414 DIABETIC ULCER OF RIGHT MIDFOOT ASSOCIATED WITH TYPE 2 DIABETES MELLITUS, WITH NECROSIS OF BONE (HCC): ICD-10-CM

## 2023-11-02 DIAGNOSIS — M86.671 CHRONIC REFRACTORY OSTEOMYELITIS OF RIGHT FOOT (HCC): Primary | ICD-10-CM

## 2023-11-02 LAB
GLUCOSE BLD STRIP.AUTO-MCNC: 127 MG/DL (ref 65–100)
GLUCOSE BLD STRIP.AUTO-MCNC: 140 MG/DL (ref 65–100)
SERVICE CMNT-IMP: ABNORMAL
SERVICE CMNT-IMP: ABNORMAL

## 2023-11-02 PROCEDURE — G0277 HBOT, FULL BODY CHAMBER, 30M: HCPCS

## 2023-11-02 PROCEDURE — 99183 HYPERBARIC OXYGEN THERAPY: CPT | Performed by: SURGERY

## 2023-11-02 PROCEDURE — 82962 GLUCOSE BLOOD TEST: CPT

## 2023-11-03 ENCOUNTER — HOSPITAL ENCOUNTER (OUTPATIENT)
Dept: WOUND CARE | Age: 78
Discharge: HOME | End: 2023-11-03
Payer: MEDICAID

## 2023-11-03 VITALS
OXYGEN SATURATION: 100 % | RESPIRATION RATE: 18 BRPM | TEMPERATURE: 97.9 F | HEART RATE: 49 BPM | DIASTOLIC BLOOD PRESSURE: 54 MMHG | SYSTOLIC BLOOD PRESSURE: 155 MMHG

## 2023-11-03 DIAGNOSIS — L97.414 DIABETIC ULCER OF RIGHT MIDFOOT ASSOCIATED WITH TYPE 2 DIABETES MELLITUS, WITH NECROSIS OF BONE (HCC): ICD-10-CM

## 2023-11-03 DIAGNOSIS — E11.621 DIABETIC ULCER OF RIGHT MIDFOOT ASSOCIATED WITH TYPE 2 DIABETES MELLITUS, WITH NECROSIS OF BONE (HCC): ICD-10-CM

## 2023-11-03 DIAGNOSIS — M86.671 CHRONIC REFRACTORY OSTEOMYELITIS OF RIGHT FOOT (HCC): Primary | ICD-10-CM

## 2023-11-03 LAB
GLUCOSE BLD STRIP.AUTO-MCNC: 128 MG/DL (ref 65–100)
GLUCOSE BLD STRIP.AUTO-MCNC: 143 MG/DL (ref 65–100)
SERVICE CMNT-IMP: ABNORMAL
SERVICE CMNT-IMP: ABNORMAL

## 2023-11-03 PROCEDURE — 82962 GLUCOSE BLOOD TEST: CPT

## 2023-11-03 PROCEDURE — G0277 HBOT, FULL BODY CHAMBER, 30M: HCPCS

## 2023-11-04 NOTE — PROGRESS NOTES
Procedure Note  Indications: Based on my examination of this patient's wound(s)/ulcer(s) today, debridement is required to promote healing and evaluate the wound base. Return Appointment:   1 week with Davis Engel DO        Instructions:   Right Plantar Foot:  Cleanse intact skin with soap & water, wound with normal saline or wound cleanser. Vashe Wound Solution (hypochlorous acid) soak placed on wound bed for a minimum of 60 seconds prior to dressing application. Pack wound with Mesalt Packing Strip, gently fill space  Secure with ABD or foam.  Cover with rolled gauze and coban. Change daily if possible, definitely 3 times week. Offload with boot or Darco shoe and insert. Do not get wound wet in shower, pool or tub. May purchase cast cover at local pharmacy to keep dry in shower. Elevate legs when sitting to reduce swelling. Swelling interferes with wound healing. Eliminate salt intake as this promotes fluid retention and swelling     Please increase dietary protein to at least 60 grams per day to support cell rejuvenation. May use supplements such as Ensure Max, Naga, & Glucerna   Be sure to spread intake throughout the day for improved absorption. Continue antibiotics: Augmentin and Doxycycline as prescribed by ID. Insurance/ transportation availability initiated through current transportation company [employer] ([phone]). Continue Daily HBO Treatments & antibiotics! Debridement: Excisional Debridement    Using: curette the wound(s)/ulcer(s) was/were debrided down through and including the removal of epidermis, dermis, and subcutaneous tissue. Performed by:  Gloria Bedoya DO  Consent obtained: Yes  Time out taken: Yes  Pain Control:         Devitalized Tissue Debrided: fibrin, biofilm, slough, and callus    Pre Debridement Measurements:  Are located in the [employer]  Documentation Flow Sheet    Diabetic/Pressure/Non Pressure Ulcers only:  Ulcer: Diabetic

## 2023-11-04 NOTE — PROGRESS NOTES
reviewed no questions)    I was present on these premises and immediately available to furnish assistance & direction throughout the HBO Treatment. Zach Nunez is a 78 y.o. male  did successfully complete today's hyperbaric oxygen treatment at [facility] and HBO therapy. In my clinical judgement, ongoing HBO therapy is  necessary at this time to assist with wound healing, preservation of limb, life, or function. Supervision and attendance of Hyperbaric Oxygen Therapy provided. Continue HBO treatment as outlined in the treatment plan. Hyperbaric Oxygen: Mr. Yaniv Muniz tolerated: Treatment Number: 36 without  issue.     Discharge Instructions were explained and given to Mr. Yaniv Muniz     Electronically signed by Kayla Das DO on 10/31/2023 at 4:45 PM

## 2023-11-06 ENCOUNTER — HOSPITAL ENCOUNTER (OUTPATIENT)
Dept: WOUND CARE | Age: 78
Discharge: HOME | End: 2023-11-06
Payer: MEDICAID

## 2023-11-06 VITALS
TEMPERATURE: 98.1 F | OXYGEN SATURATION: 100 % | DIASTOLIC BLOOD PRESSURE: 76 MMHG | RESPIRATION RATE: 18 BRPM | SYSTOLIC BLOOD PRESSURE: 144 MMHG | HEART RATE: 54 BPM

## 2023-11-06 VITALS
SYSTOLIC BLOOD PRESSURE: 144 MMHG | HEART RATE: 54 BPM | TEMPERATURE: 98.1 F | DIASTOLIC BLOOD PRESSURE: 76 MMHG | RESPIRATION RATE: 18 BRPM | OXYGEN SATURATION: 100 %

## 2023-11-06 DIAGNOSIS — E11.621 DIABETIC ULCER OF RIGHT MIDFOOT ASSOCIATED WITH TYPE 2 DIABETES MELLITUS, WITH NECROSIS OF BONE (HCC): ICD-10-CM

## 2023-11-06 DIAGNOSIS — L97.414 DIABETIC ULCER OF RIGHT MIDFOOT ASSOCIATED WITH TYPE 2 DIABETES MELLITUS, WITH NECROSIS OF BONE (HCC): ICD-10-CM

## 2023-11-06 DIAGNOSIS — M86.671 CHRONIC REFRACTORY OSTEOMYELITIS OF RIGHT FOOT (HCC): Primary | ICD-10-CM

## 2023-11-06 DIAGNOSIS — L08.9 DIABETIC FOOT INFECTION (HCC): ICD-10-CM

## 2023-11-06 DIAGNOSIS — E11.621 DIABETIC ULCER OF RIGHT FOOT ASSOCIATED WITH TYPE 2 DIABETES MELLITUS, WITH NECROSIS OF BONE, UNSPECIFIED PART OF FOOT (HCC): ICD-10-CM

## 2023-11-06 DIAGNOSIS — E11.628 DIABETIC FOOT INFECTION (HCC): ICD-10-CM

## 2023-11-06 DIAGNOSIS — L97.514 DIABETIC ULCER OF RIGHT FOOT ASSOCIATED WITH TYPE 2 DIABETES MELLITUS, WITH NECROSIS OF BONE, UNSPECIFIED PART OF FOOT (HCC): ICD-10-CM

## 2023-11-06 LAB
GLUCOSE BLD STRIP.AUTO-MCNC: 165 MG/DL (ref 65–100)
GLUCOSE BLD STRIP.AUTO-MCNC: 264 MG/DL (ref 65–100)
SERVICE CMNT-IMP: ABNORMAL
SERVICE CMNT-IMP: ABNORMAL

## 2023-11-06 PROCEDURE — G0277 HBOT, FULL BODY CHAMBER, 30M: HCPCS

## 2023-11-06 PROCEDURE — 99213 OFFICE O/P EST LOW 20 MIN: CPT

## 2023-11-06 PROCEDURE — 99183 HYPERBARIC OXYGEN THERAPY: CPT | Performed by: SURGERY

## 2023-11-06 PROCEDURE — 82962 GLUCOSE BLOOD TEST: CPT

## 2023-11-06 RX ORDER — LIDOCAINE HYDROCHLORIDE 20 MG/ML
JELLY TOPICAL ONCE
OUTPATIENT
Start: 2023-11-06 | End: 2023-11-06

## 2023-11-06 RX ORDER — LIDOCAINE 50 MG/G
OINTMENT TOPICAL ONCE
OUTPATIENT
Start: 2023-11-06 | End: 2023-11-06

## 2023-11-06 RX ORDER — BACITRACIN ZINC AND POLYMYXIN B SULFATE 500; 1000 [USP'U]/G; [USP'U]/G
OINTMENT TOPICAL ONCE
OUTPATIENT
Start: 2023-11-06 | End: 2023-11-06

## 2023-11-06 RX ORDER — GINSENG 100 MG
CAPSULE ORAL ONCE
OUTPATIENT
Start: 2023-11-06 | End: 2023-11-06

## 2023-11-06 RX ORDER — IBUPROFEN 200 MG
TABLET ORAL ONCE
OUTPATIENT
Start: 2023-11-06 | End: 2023-11-06

## 2023-11-06 RX ORDER — TRIAMCINOLONE ACETONIDE 1 MG/G
OINTMENT TOPICAL ONCE
OUTPATIENT
Start: 2023-11-06 | End: 2023-11-06

## 2023-11-06 RX ORDER — GENTAMICIN SULFATE 1 MG/G
OINTMENT TOPICAL ONCE
OUTPATIENT
Start: 2023-11-06 | End: 2023-11-06

## 2023-11-06 RX ORDER — BETAMETHASONE DIPROPIONATE 0.05 %
OINTMENT (GRAM) TOPICAL ONCE
OUTPATIENT
Start: 2023-11-06 | End: 2023-11-06

## 2023-11-06 RX ORDER — LIDOCAINE HYDROCHLORIDE 40 MG/ML
SOLUTION TOPICAL ONCE
OUTPATIENT
Start: 2023-11-06 | End: 2023-11-06

## 2023-11-06 RX ORDER — CLOBETASOL PROPIONATE 0.5 MG/G
OINTMENT TOPICAL ONCE
OUTPATIENT
Start: 2023-11-06 | End: 2023-11-06

## 2023-11-06 RX ORDER — LIDOCAINE 40 MG/G
CREAM TOPICAL ONCE
OUTPATIENT
Start: 2023-11-06 | End: 2023-11-06

## 2023-11-06 NOTE — DISCHARGE INSTRUCTIONS
Instructions:   Right Plantar Foot:  Cleanse intact skin with soap & water, wound with normal saline or wound cleanser. Vashe Wound Solution (hypochlorous acid) soak placed on wound bed for a minimum of 60 seconds prior to dressing application. Apply Xeroform to wound bed  Secure with ABD or foam.  Cover with rolled gauze and coban. Change daily if possible, definitely 3 times week. Offload with boot or Darco shoe and insert. Do not get wound wet in shower, pool or tub. May purchase cast cover at local pharmacy to keep dry in shower. Elevate legs when sitting to reduce swelling. Swelling interferes with wound healing. Eliminate salt intake as this promotes fluid retention and swelling     Please increase dietary protein to at least 60 grams per day to support cell rejuvenation. May use supplements such as Ensure Max, Naga, & Glucerna   Be sure to spread intake throughout the day for improved absorption. Continue antibiotics: Augmentin and Doxycycline as prescribed by ID. Insurance/ transportation availability initiated through current transportation company [employer] ([phone]). *POA APPT. WITH DR. Kam Sevilla ON 11/7/23 ON [address]. *Continue to wear Darco shoe-do not walk on foot.

## 2023-11-06 NOTE — PROGRESS NOTES
place in a fully body Monoplace serial number         Treatment Start Time: 7794 Door Closed      Pressure Reached  AMY : 2 AMY  Number of Air Breaks: none        Decompression Time   Treatment End Time: 6640 Door Opened    Symptoms Noted During Treatment: None    Length of Treatment: 90 Minutes         Adverse Event:      I was present on these premises and immediately available to furnish assistance & direction throughout the HBO procedure today. MD spoke with patient regarding any problems or questions related to HBO therapy. Reviewed HBO treatment profile with HBO  and verified no adverse reactions noted. Plan      Patient placed in a full body Monoplace Chamber #: [id number]  Treatment Start Time: 9747     Pressure Reached Time: 8313  AMY : 2  Number of Air Breaks:        Decompression Time: 2608   Treatment End Time: 4573  Length of Treatment: 90 Minutes  Symptoms Noted During Treatment: None  Total Treatment Time (min): 107  Treatment Completion Status: Treatment completed without issue (Treatment completed without issue)      I was present on these premises and immediately available to furnish assistance & direction throughout the HBO Treatment. Darcey Galeazzi is a 78 y.o. male  successfully completed today's hyperbaric oxygen treatment at [facility] and HBO therapy. In my clinical judgement, ongoing HBO therapy is necessary at this time to assist with wound healing, preservation of limb, life, or function. Supervision and attendance of Hyperbaric Oxygen Therapy provided. Continue HBO treatment as outlined in the treatment plan. Hyperbaric Oxygen: Mr. Neli Soriano tolerated: Treatment Number: 40 without issue.     Discharge Instructions were explained and given to Mr. Neli Soriano   Electronically signed by Mitesh Guillen MD on 11/6/2023 at 12:02 PM

## 2023-11-06 NOTE — FLOWSHEET NOTE
11/06/23 1059   Wound 04/14/23 Foot Right;Plantar #1 delayed healing surgical debridement 2/22/23   Date First Assessed/Time First Assessed: 04/14/23 1053   Present on Hospital Admission: Yes  Wound Approximate Age at First Assessment (Weeks): 6 weeks  Primary Wound Type: Diabetic Ulcer  Location: Foot  Wound Location Orientation: Right;Plantar  Wou. ..    Wound Image    Wound Etiology Diabetic Meza 3   Dressing Status Old drainage noted   Wound Cleansed Vashe   Dressing/Treatment Other (comment)   Offloading for Diabetic Foot Ulcers Offloading ordered   Wound Length (cm) 0.1 cm   Wound Width (cm) 0.2 cm   Wound Depth (cm) 0.4 cm   Wound Surface Area (cm^2) 0.02 cm^2   Change in Wound Size % (l*w) 99.84   Wound Volume (cm^3) 0.008 cm^3   Wound Healing % 100   Wound Assessment Pink/red   Drainage Amount Small (< 25%)   Drainage Description Serosanguinous   Odor None   Charla-wound Assessment Hyperkeratosis (callous)   Wound Thickness Description not for Pressure Injury Full thickness

## 2023-11-06 NOTE — WOUND CARE
SUPPLIES YOU HAVE AVAILABLE UNTIL YOU ARE ABLE TO REACH US. IT IS MOST IMPORTANT TO KEEP THE WOUND COVERED AT ALL TIMES.     Electronically signed Cristi Nash RN on 11/6/2023 at 10:28 AM

## 2023-11-07 ENCOUNTER — OFFICE VISIT (OUTPATIENT)
Dept: ORTHOPEDIC SURGERY | Age: 78
End: 2023-11-07

## 2023-11-07 DIAGNOSIS — T81.89XD DELAYED SURGICAL WOUND HEALING, SUBSEQUENT ENCOUNTER: Primary | ICD-10-CM

## 2023-11-07 NOTE — PROGRESS NOTES
Name: Vahid Bang  YOB: 1945  Gender: male  MRN: [id number]    Summary: Osteomyelitis of right foot with open wound    Patient has elected to proceed with I&D of right foot. Consent to read: Irrigation and debridement of right foot placement antibiotic cement    Circumferential ankle block     CC: right foot wound    HPI: Vahid Bang is a 78 y.o. male who presents due to a wound on their foot. Patient has been seeing Dr. Tu Carballo for wound care. Patient originally went into the hospital in February 2023 and underwent irrigation and debridement of his right foot wound by Dr. Valentina Hobson. He has been following up in the wound care clinic. Patient has significant diabetic neuropathy and does not note any pain. He has been taking Augmentin and doxycycline per infectious disease. He still continues to have a draining wound on the plantar aspect of his first great toe. He states although it is getting better, he continues to drain and is even draining today. He had an MRI recently which reveals MRI consistent osteomyelitis. History was obtained by patient    ROS/Meds/PSH/PMH/FH/SH:   Patient Denies fever/chills, headache, visual changes, chest pain, shortness of breath, and nausea/vomiting/diarrhea     Tobacco:  reports that he quit smoking about 34 years ago. His smoking use included cigarettes. He has a 15.00 pack-year smoking history. He has never used smokeless tobacco.  Diabetes: Diabetic - non insulin  Lab Results   Component Value Date    LABA1C 7.7 (H) 09/29/2023       Physical Examination:  Gen: AAOx3 NAD    right lower:   Gastroc Contracture noted on silverskoid exam: With the hindfoot in neutral and forefoot supinated ankle dorsiflexion is diminished with knee in extension when compared to the knee at 90deg  Cavovarus foot posture when standing. toe clawing noted  There is a wound: Medially under the 1st metatarsal head. 1-2mm in size. The dressing obviously has drainage on today.

## 2023-11-22 ENCOUNTER — HOSPITAL ENCOUNTER (OUTPATIENT)
Dept: WOUND CARE | Age: 78
Discharge: HOME | End: 2023-11-22
Payer: MEDICARE

## 2023-11-22 VITALS
BODY MASS INDEX: 29.76 KG/M2 | HEART RATE: 61 BPM | SYSTOLIC BLOOD PRESSURE: 141 MMHG | WEIGHT: 168 LBS | DIASTOLIC BLOOD PRESSURE: 71 MMHG | TEMPERATURE: 98.4 F

## 2023-11-22 DIAGNOSIS — E11.628 DIABETIC FOOT INFECTION (HCC): ICD-10-CM

## 2023-11-22 DIAGNOSIS — L08.9 DIABETIC FOOT INFECTION (HCC): ICD-10-CM

## 2023-11-22 DIAGNOSIS — M86.671 CHRONIC REFRACTORY OSTEOMYELITIS OF RIGHT FOOT (HCC): Primary | ICD-10-CM

## 2023-11-22 DIAGNOSIS — L97.514 DIABETIC ULCER OF RIGHT FOOT ASSOCIATED WITH TYPE 2 DIABETES MELLITUS, WITH NECROSIS OF BONE, UNSPECIFIED PART OF FOOT (HCC): ICD-10-CM

## 2023-11-22 DIAGNOSIS — E11.621 DIABETIC ULCER OF RIGHT FOOT ASSOCIATED WITH TYPE 2 DIABETES MELLITUS, WITH NECROSIS OF BONE, UNSPECIFIED PART OF FOOT (HCC): ICD-10-CM

## 2023-11-22 PROCEDURE — 99212 OFFICE O/P EST SF 10 MIN: CPT

## 2023-11-22 RX ORDER — GENTAMICIN SULFATE 1 MG/G
OINTMENT TOPICAL ONCE
OUTPATIENT
Start: 2023-11-22 | End: 2023-11-22

## 2023-11-22 RX ORDER — LIDOCAINE HYDROCHLORIDE 20 MG/ML
JELLY TOPICAL ONCE
OUTPATIENT
Start: 2023-11-22 | End: 2023-11-22

## 2023-11-22 RX ORDER — IBUPROFEN 200 MG
TABLET ORAL ONCE
OUTPATIENT
Start: 2023-11-22 | End: 2023-11-22

## 2023-11-22 RX ORDER — GINSENG 100 MG
CAPSULE ORAL ONCE
OUTPATIENT
Start: 2023-11-22 | End: 2023-11-22

## 2023-11-22 RX ORDER — BETAMETHASONE DIPROPIONATE 0.05 %
OINTMENT (GRAM) TOPICAL ONCE
OUTPATIENT
Start: 2023-11-22 | End: 2023-11-22

## 2023-11-22 RX ORDER — BACITRACIN ZINC AND POLYMYXIN B SULFATE 500; 1000 [USP'U]/G; [USP'U]/G
OINTMENT TOPICAL ONCE
OUTPATIENT
Start: 2023-11-22 | End: 2023-11-22

## 2023-11-22 RX ORDER — LIDOCAINE 40 MG/G
CREAM TOPICAL ONCE
OUTPATIENT
Start: 2023-11-22 | End: 2023-11-22

## 2023-11-22 RX ORDER — TRIAMCINOLONE ACETONIDE 1 MG/G
OINTMENT TOPICAL ONCE
OUTPATIENT
Start: 2023-11-22 | End: 2023-11-22

## 2023-11-22 RX ORDER — LIDOCAINE 50 MG/G
OINTMENT TOPICAL ONCE
OUTPATIENT
Start: 2023-11-22 | End: 2023-11-22

## 2023-11-22 RX ORDER — CLOBETASOL PROPIONATE 0.5 MG/G
OINTMENT TOPICAL ONCE
OUTPATIENT
Start: 2023-11-22 | End: 2023-11-22

## 2023-11-22 RX ORDER — LIDOCAINE HYDROCHLORIDE 40 MG/ML
SOLUTION TOPICAL ONCE
OUTPATIENT
Start: 2023-11-22 | End: 2023-11-22

## 2023-11-22 NOTE — FLOWSHEET NOTE
11/22/23 0919   Wound 04/14/23 Foot Right;Plantar #1 delayed healing surgical debridement 2/22/23   Date First Assessed/Time First Assessed: 04/14/23 1053   Present on Hospital Admission: Yes  Wound Approximate Age at First Assessment (Weeks): 6 weeks  Primary Wound Type: Diabetic Ulcer  Location: Foot  Wound Location Orientation: Right;Plantar  Wou. ..    Wound Image    Wound Etiology Diabetic Meza 3   Dressing Status Old drainage noted   Wound Cleansed Vashe   Offloading for Diabetic Foot Ulcers Post op shoe   Wound Length (cm) 0.1 cm   Wound Width (cm) 0.1 cm   Wound Depth (cm) 0.2 cm   Wound Surface Area (cm^2) 0.01 cm^2   Change in Wound Size % (l*w) 99.92   Wound Volume (cm^3) 0.002 cm^3   Wound Healing % 100   Wound Assessment Epithelialization   Drainage Amount Scant (moist but unmeasurable)   Drainage Description Serosanguinous   Odor None   Charla-wound Assessment Hyperkeratosis (callous)   Wound Thickness Description not for Pressure Injury Full thickness

## 2023-11-22 NOTE — WOUND CARE
Discharge Instructions for  [address] [facility]  [address] [facility], [address] [place] [address]  Phone [phone]   Fax [phone]      NAME:  Robert Krishna  YOB: 1945  MEDICAL RECORD NUMBER:  [id number]  DATE:  11/22/2023    Return Appointment:   2 weeks with Fartun Wild DO      Instructions:   Right Plantar Foot:  Cleanse intact skin with soap & water, wound with normal saline or wound cleanser. Vashe Wound Solution (hypochlorous acid) soak placed on wound bed for a minimum of 60 seconds prior to dressing application. Apply Xeroform to wound bed  Secure with gauze and covrsite. Change daily if possible, definitely 3 times week. Offload with boot or Darco shoe and insert. Do not get wound wet in shower, pool or tub. May purchase cast cover at local pharmacy to keep dry in shower. Elevate legs when sitting to reduce swelling. Swelling interferes with wound healing. Eliminate salt intake as this promotes fluid retention and swelling     Please increase dietary protein to at least 60 grams per day to support cell rejuvenation. May use supplements such as Ensure Max, Naga, & Glucerna   Be sure to spread intake throughout the day for improved absorption. Continue antibiotics: Augmentin and Doxycycline as prescribed by ID. Insurance/ transportation availability initiated through current transportation company [employer] ([phone]). *Continue to wear Darco shoe-do not walk on foot. Should you experience increased redness, swelling, pain, foul odor, size of wound(s), or have a temperature over 101 degrees please contact the [facility] [address] at [phone] or if after hours contact your primary care physician or go to the hospital emergency department. PLEASE NOTE: IF YOU ARE UNABLE TO OBTAIN WOUND SUPPLIES, CONTINUE TO USE THE SUPPLIES YOU HAVE AVAILABLE UNTIL YOU ARE ABLE TO REACH US.  IT IS MOST IMPORTANT TO KEEP

## 2023-12-18 RX ORDER — DOXYCYCLINE HYCLATE 100 MG
100 TABLET ORAL 2 TIMES DAILY
Qty: 60 TABLET | OUTPATIENT
Start: 2023-12-18

## 2023-12-19 ENCOUNTER — TELEPHONE (OUTPATIENT)
Dept: ORTHOPEDIC SURGERY | Age: 78
End: 2023-12-19

## 2023-12-20 PROBLEM — M79.671 RIGHT FOOT PAIN: Status: ACTIVE | Noted: 2023-12-20

## 2024-01-09 ENCOUNTER — TELEPHONE (OUTPATIENT)
Dept: ORTHOPEDIC SURGERY | Age: 79
End: 2024-01-09

## 2024-01-09 ENCOUNTER — HOSPITAL ENCOUNTER (OUTPATIENT)
Dept: WOUND CARE | Age: 79
Discharge: HOME | End: 2024-01-09
Payer: MEDICARE

## 2024-01-09 VITALS
RESPIRATION RATE: 12 BRPM | DIASTOLIC BLOOD PRESSURE: 58 MMHG | HEART RATE: 54 BPM | BODY MASS INDEX: 29.06 KG/M2 | WEIGHT: 164 LBS | OXYGEN SATURATION: 99 % | HEIGHT: 63 IN | TEMPERATURE: 97.9 F | SYSTOLIC BLOOD PRESSURE: 111 MMHG

## 2024-01-09 DIAGNOSIS — M86.671 CHRONIC REFRACTORY OSTEOMYELITIS OF RIGHT FOOT (HCC): ICD-10-CM

## 2024-01-09 DIAGNOSIS — L97.514 DIABETIC ULCER OF RIGHT FOOT ASSOCIATED WITH TYPE 2 DIABETES MELLITUS, WITH NECROSIS OF BONE, UNSPECIFIED PART OF FOOT (HCC): ICD-10-CM

## 2024-01-09 DIAGNOSIS — E11.621 DIABETIC ULCER OF RIGHT FOOT ASSOCIATED WITH TYPE 2 DIABETES MELLITUS, WITH NECROSIS OF BONE, UNSPECIFIED PART OF FOOT (HCC): ICD-10-CM

## 2024-01-09 DIAGNOSIS — E11.628 DIABETIC FOOT INFECTION (HCC): Primary | ICD-10-CM

## 2024-01-09 DIAGNOSIS — L08.9 DIABETIC FOOT INFECTION (HCC): Primary | ICD-10-CM

## 2024-01-09 PROCEDURE — 11042 DBRDMT SUBQ TIS 1ST 20SQCM/<: CPT

## 2024-01-09 RX ORDER — GINSENG 100 MG
CAPSULE ORAL ONCE
OUTPATIENT
Start: 2024-01-09 | End: 2024-01-09

## 2024-01-09 RX ORDER — IBUPROFEN 200 MG
TABLET ORAL ONCE
OUTPATIENT
Start: 2024-01-09 | End: 2024-01-09

## 2024-01-09 RX ORDER — LIDOCAINE HYDROCHLORIDE 20 MG/ML
JELLY TOPICAL ONCE
Status: COMPLETED | OUTPATIENT
Start: 2024-01-09 | End: 2024-01-09

## 2024-01-09 RX ORDER — GENTAMICIN SULFATE 1 MG/G
OINTMENT TOPICAL ONCE
OUTPATIENT
Start: 2024-01-09 | End: 2024-01-09

## 2024-01-09 RX ORDER — LIDOCAINE HYDROCHLORIDE 40 MG/ML
SOLUTION TOPICAL ONCE
OUTPATIENT
Start: 2024-01-09 | End: 2024-01-09

## 2024-01-09 RX ORDER — LIDOCAINE HYDROCHLORIDE 20 MG/ML
JELLY TOPICAL ONCE
OUTPATIENT
Start: 2024-01-09 | End: 2024-01-09

## 2024-01-09 RX ORDER — BACITRACIN ZINC AND POLYMYXIN B SULFATE 500; 1000 [USP'U]/G; [USP'U]/G
OINTMENT TOPICAL ONCE
OUTPATIENT
Start: 2024-01-09 | End: 2024-01-09

## 2024-01-09 RX ORDER — BETAMETHASONE DIPROPIONATE 0.05 %
OINTMENT (GRAM) TOPICAL ONCE
OUTPATIENT
Start: 2024-01-09 | End: 2024-01-09

## 2024-01-09 RX ORDER — CLOBETASOL PROPIONATE 0.5 MG/G
OINTMENT TOPICAL ONCE
OUTPATIENT
Start: 2024-01-09 | End: 2024-01-09

## 2024-01-09 RX ORDER — LIDOCAINE 50 MG/G
OINTMENT TOPICAL ONCE
OUTPATIENT
Start: 2024-01-09 | End: 2024-01-09

## 2024-01-09 RX ORDER — TRIAMCINOLONE ACETONIDE 1 MG/G
OINTMENT TOPICAL ONCE
OUTPATIENT
Start: 2024-01-09 | End: 2024-01-09

## 2024-01-09 RX ORDER — LIDOCAINE 40 MG/G
CREAM TOPICAL ONCE
OUTPATIENT
Start: 2024-01-09 | End: 2024-01-09

## 2024-01-09 RX ADMIN — LIDOCAINE HYDROCHLORIDE: 20 JELLY TOPICAL at 09:35

## 2024-01-09 NOTE — DISCHARGE INSTRUCTIONS
Right Plantar Foot:  Cleanse intact skin with soap & water, wound with normal saline or wound cleanser.  Vashe Wound Solution (hypochlorous acid) soak placed on wound bed for a minimum of 60 seconds prior to dressing application.   Apply Xeroform to wound bed  Secure with gauze and covrsite.  Change daily if possible, definitely 3 times week.   Offload with boot or Darco shoe and insert.     Do not get wound wet in shower, pool or tub. May purchase cast cover at local pharmacy to keep dry in shower.      Elevate legs when sitting to reduce swelling.   Swelling interferes with wound healing.  Eliminate salt intake as this promotes fluid retention and swelling     Please increase dietary protein to at least 60 grams per day to support cell rejuvenation.   May use supplements such as Ensure Max, Naga, & Glucerna   Be sure to spread intake throughout the day for improved absorption.      Continue antibiotics: Augmentin and Doxycycline as prescribed by ID.     Insurance/ transportation availability initiated through current transportation company [employer] ([phone]).      Continue to wear Darco shoe at all times. Do not walk on foot.   JANUARY 31, 2024 SURGERY SCHEDULED with Dr. Nichols, [phone]    CALL Primary Care Physician, Dr. Mandujano before your appointment to let him know of your surgery schedule.   JANUARY 30, 2024 CLEARANCE FOR SURGERY with Dr. Mandujano.

## 2024-01-09 NOTE — FLOWSHEET NOTE
Pre and Post Debridement Notes:   01/09/24 0938   Wound 04/14/23 Foot Right;Plantar #1 delayed healing surgical debridement 2/22/23   Date First Assessed/Time First Assessed: 04/14/23 1053   Present on Hospital Admission: Yes  Wound Approximate Age at First Assessment (Weeks): 6 weeks  Primary Wound Type: Diabetic Ulcer  Location: Foot  Wound Location Orientation: Right;Plantar  Wou...   Wound Image     Wound Etiology Diabetic Meza 3   Dressing Status New drainage noted   Wound Cleansed Cleansed with saline;Vashe   Dressing/Treatment Xeroform   Offloading for Diabetic Foot Ulcers Post op shoe   Wound Length (cm) 0.1 cm   Wound Width (cm) 0.1 cm   Wound Depth (cm) 0.3 cm   Wound Surface Area (cm^2) 0.01 cm^2   Change in Wound Size % (l*w) 99.92   Wound Volume (cm^3) 0.003 cm^3   Wound Healing % 100   Post-Procedure Length (cm) 0.8 cm   Post-Procedure Width (cm) 0.6 cm   Post-Procedure Depth (cm) 0.8 cm   Post-Procedure Surface Area (cm^2) 0.48 cm^2   Post-Procedure Volume (cm^3) 0.384 cm^3   Undermining Starts ___ O'Clock 12   Undermining Ends___ O'Clock 9   Undermining Maxium Distance (cm) 1.2   Wound Assessment Epithelialization   Drainage Amount Scant (moist but unmeasurable)   Drainage Description Serosanguinous   Odor None   Charla-wound Assessment Maceration;Hyperkeratosis (callous)   Wound Thickness Description not for Pressure Injury Full thickness   Pain Assessment   Pain Assessment None - Denies Pain

## 2024-01-09 NOTE — WOUND CARE
Discharge Instructions for  [facility]  [address]  [address]  [place] [address]  Phone [phone]   Fax [phone]      NAME:  Roldan Edmondson  YOB: 1945  MEDICAL RECORD NUMBER:  [id number]  DATE:  1/9/2024    Return Appointment:   2 weeks with Ramiro Bravo DO      Instructions:   Right Plantar Foot:  Cleanse intact skin with soap & water, wound with normal saline or wound cleanser.  Vashe Wound Solution (hypochlorous acid) soak placed on wound bed for a minimum of 60 seconds prior to dressing application.   Apply Xeroform to wound bed  Secure with gauze and covrsite.  Change daily if possible, definitely 3 times week.   Offload with boot or Darco shoe and insert.     Do not get wound wet in shower, pool or tub. May purchase cast cover at local pharmacy to keep dry in shower.      Elevate legs when sitting to reduce swelling.   Swelling interferes with wound healing.  Eliminate salt intake as this promotes fluid retention and swelling     Please increase dietary protein to at least 60 grams per day to support cell rejuvenation.   May use supplements such as Ensure Max, Naga, & Glucerna   Be sure to spread intake throughout the day for improved absorption.      Continue antibiotics: Augmentin and Doxycycline as prescribed by ID.     Insurance/ transportation availability initiated through current transportation company [employer] ([phone]).      Continue to wear Darco shoe at all times. Do not walk on foot.   JANUARY 31, 2024 SURGERY SCHEDULED with Dr. Nichols, [phone]    CALL Primary Care Physician, Dr. Mandujano before your appointment to let him know of your surgery schedule: [phone].  JANUARY 30, 2024 CLEARANCE FOR SURGERY with Dr. Mandujano.         Should you experience increased redness, swelling, pain, foul odor, size of wound(s), or have a temperature over 101 degrees please contact the [facility] at [phone] or if after

## 2024-01-12 ENCOUNTER — TELEPHONE (OUTPATIENT)
Dept: ORTHOPEDIC SURGERY | Age: 79
End: 2024-01-12

## 2024-01-12 DIAGNOSIS — N18.30 TYPE 2 DIABETES MELLITUS WITH STAGE 3 CHRONIC KIDNEY DISEASE, WITHOUT LONG-TERM CURRENT USE OF INSULIN, UNSPECIFIED WHETHER STAGE 3A OR 3B CKD (HCC): ICD-10-CM

## 2024-01-12 DIAGNOSIS — I10 ESSENTIAL HYPERTENSION: ICD-10-CM

## 2024-01-12 DIAGNOSIS — E11.22 TYPE 2 DIABETES MELLITUS WITH STAGE 3 CHRONIC KIDNEY DISEASE, WITHOUT LONG-TERM CURRENT USE OF INSULIN, UNSPECIFIED WHETHER STAGE 3A OR 3B CKD (HCC): ICD-10-CM

## 2024-01-12 NOTE — PROGRESS NOTES
Procedure Note  Indications: Based on my examination of this patient's wound(s)/ulcer(s) today, debridement is required to promote healing and evaluate the wound base.  Patient will continue antibiotics until surgery.  Will continue keeping an eye on this and keeping it cleaned up so it stable prior to surgery needs.  If clearance is stable he may have surgery on the 31st of this month.    Return Appointment:   2 weeks with Ramiro Bravo DO        Instructions:   Right Plantar Foot:  Cleanse intact skin with soap & water, wound with normal saline or wound cleanser.  Vashe Wound Solution (hypochlorous acid) soak placed on wound bed for a minimum of 60 seconds prior to dressing application.   Apply Xeroform to wound bed  Secure with gauze and covrsite.  Change daily if possible, definitely 3 times week.   Offload with boot or Darco shoe and insert.     Do not get wound wet in shower, pool or tub. May purchase cast cover at local pharmacy to keep dry in shower.      Elevate legs when sitting to reduce swelling.   Swelling interferes with wound healing.  Eliminate salt intake as this promotes fluid retention and swelling     Please increase dietary protein to at least 60 grams per day to support cell rejuvenation.   May use supplements such as Ensure Max, Naga, & Glucerna   Be sure to spread intake throughout the day for improved absorption.      Continue antibiotics: Augmentin and Doxycycline as prescribed by ID.      Insurance/ transportation availability initiated through current transportation company [employer] ([phone]).      Continue to wear Darco shoe at all times. Do not walk on foot.   JANUARY 31, 2024 SURGERY SCHEDULED with Dr. Nichols, [phone]     CALL Primary Care Physician, Dr. Mandujano before your appointment to let him know of your surgery schedule: [phone].  JANUARY 30, 2024 CLEARANCE FOR SURGERY with Dr. Mandujano.      Debridement: Excisional Debridement    Using: curette the

## 2024-01-15 RX ORDER — HYDROCHLOROTHIAZIDE 25 MG/1
TABLET ORAL
Qty: 90 TABLET | Refills: 3 | OUTPATIENT
Start: 2024-01-15

## 2024-01-15 RX ORDER — SITAGLIPTIN 100 MG/1
100 TABLET, FILM COATED ORAL DAILY
Qty: 90 TABLET | Refills: 3 | OUTPATIENT
Start: 2024-01-15

## 2024-01-15 RX ORDER — LOSARTAN POTASSIUM 100 MG/1
100 TABLET ORAL DAILY
Qty: 90 TABLET | Refills: 3 | OUTPATIENT
Start: 2024-01-15

## 2024-01-15 RX ORDER — AMLODIPINE BESYLATE 10 MG/1
10 TABLET ORAL DAILY
Qty: 90 TABLET | Refills: 3 | OUTPATIENT
Start: 2024-01-15

## 2024-01-15 RX ORDER — ATENOLOL 25 MG/1
25 TABLET ORAL DAILY
Qty: 90 TABLET | Refills: 3 | OUTPATIENT
Start: 2024-01-15

## 2024-01-17 ENCOUNTER — TELEPHONE (OUTPATIENT)
Dept: ORTHOPEDIC SURGERY | Age: 79
End: 2024-01-17

## 2024-01-22 ENCOUNTER — TELEPHONE (OUTPATIENT)
Dept: ORTHOPEDIC SURGERY | Age: 79
End: 2024-01-22

## 2024-01-22 NOTE — TELEPHONE ENCOUNTER
Pt called wants to know if he can come in early 1/25  around 11a due to another appt scheduled around 1p Brandy frank f/u

## 2024-01-25 ENCOUNTER — OFFICE VISIT (OUTPATIENT)
Dept: ORTHOPEDIC SURGERY | Age: 79
End: 2024-01-25
Payer: MEDICARE

## 2024-01-25 DIAGNOSIS — T81.89XD DELAYED SURGICAL WOUND HEALING, SUBSEQUENT ENCOUNTER: Primary | ICD-10-CM

## 2024-01-25 PROCEDURE — 1123F ACP DISCUSS/DSCN MKR DOCD: CPT | Performed by: ORTHOPAEDIC SURGERY

## 2024-01-25 PROCEDURE — 99213 OFFICE O/P EST LOW 20 MIN: CPT | Performed by: ORTHOPAEDIC SURGERY

## 2024-01-25 PROCEDURE — G8484 FLU IMMUNIZE NO ADMIN: HCPCS | Performed by: ORTHOPAEDIC SURGERY

## 2024-01-25 PROCEDURE — G8417 CALC BMI ABV UP PARAM F/U: HCPCS | Performed by: ORTHOPAEDIC SURGERY

## 2024-01-25 PROCEDURE — G8428 CUR MEDS NOT DOCUMENT: HCPCS | Performed by: ORTHOPAEDIC SURGERY

## 2024-01-25 PROCEDURE — 1036F TOBACCO NON-USER: CPT | Performed by: ORTHOPAEDIC SURGERY

## 2024-01-25 NOTE — H&P (VIEW-ONLY)
Name: Roldan Edmondson  YOB: 1945  Gender: male  MRN: [id number]    Summary: Osteomyelitis of right foot with open wound    Patient has elected to proceed with I&D of right foot.     Consent to read: Irrigation and debridement of right foot placement antibiotic cement, EHL lengthening, flexor tenotomy's of the second third and fourth toes.    Circumferential ankle block     CC: right foot wound    HPI: Roldan Edmondson is a 78 y.o. male who presents due to a wound on their foot.  Patient has been seeing Dr. Bravo for wound care.  Patient originally went into the hospital in February 2023 and underwent irrigation and debridement of his right foot wound by Dr. Boyd.  He has been following up in the wound care clinic.  Patient has significant diabetic neuropathy and does not note any pain.  He has been taking Augmentin and doxycycline per infectious disease.  He still continues to have a draining wound on the plantar aspect of his first great toe.  He states although it is getting better, he continues to drain and is even draining today.  He had an MRI recently which reveals MRI consistent osteomyelitis.    1/25/24-he is still taking Augmentin.  He states the wound to his stopped draining and it no longer expresses pus.  He is still wearing his shoes.  He wants to proceed with surgery and states that he knows that it may not fix his problem but he wants me to try to eradicate the infection.    ROS/Meds/PSH/PMH/FH/SH:   Patient Denies fever/chills, headache, visual changes, chest pain, shortness of breath, and nausea/vomiting/diarrhea     Tobacco:  reports that he quit smoking about 35 years ago. His smoking use included cigarettes. He started smoking about 50 years ago. He has a 15.0 pack-year smoking history. He has never used smokeless tobacco.  Diabetes: Diabetic - non insulin  Lab Results   Component Value Date    LABA1C 7.7 (H) 09/29/2023       Physical Examination:  Gen: AAOx3 NAD    right lower:

## 2024-01-25 NOTE — PROGRESS NOTES
Name: Roldan Edmondson  YOB: 1945  Gender: male  MRN: [id number]    Summary: Osteomyelitis of right foot with open wound    Patient has elected to proceed with I&D of right foot.     Consent to read: Irrigation and debridement of right foot placement antibiotic cement, EHL lengthening, flexor tenotomy's of the second third and fourth toes.    Circumferential ankle block     CC: right foot wound    HPI: Roldan Edmondson is a 78 y.o. male who presents due to a wound on their foot.  Patient has been seeing Dr. Bravo for wound care.  Patient originally went into the hospital in February 2023 and underwent irrigation and debridement of his right foot wound by Dr. Boyd.  He has been following up in the wound care clinic.  Patient has significant diabetic neuropathy and does not note any pain.  He has been taking Augmentin and doxycycline per infectious disease.  He still continues to have a draining wound on the plantar aspect of his first great toe.  He states although it is getting better, he continues to drain and is even draining today.  He had an MRI recently which reveals MRI consistent osteomyelitis.    1/25/24-he is still taking Augmentin.  He states the wound to his stopped draining and it no longer expresses pus.  He is still wearing his shoes.  He wants to proceed with surgery and states that he knows that it may not fix his problem but he wants me to try to eradicate the infection.    ROS/Meds/PSH/PMH/FH/SH:   Patient Denies fever/chills, headache, visual changes, chest pain, shortness of breath, and nausea/vomiting/diarrhea     Tobacco:  reports that he quit smoking about 35 years ago. His smoking use included cigarettes. He started smoking about 50 years ago. He has a 15.0 pack-year smoking history. He has never used smokeless tobacco.  Diabetes: Diabetic - non insulin  Lab Results   Component Value Date    LABA1C 7.7 (H) 09/29/2023       Physical Examination:  Gen: AAOx3 NAD    right lower:

## 2024-01-26 DIAGNOSIS — G89.18 POST-OP PAIN: Primary | ICD-10-CM

## 2024-01-26 RX ORDER — DOCUSATE SODIUM 100 MG/1
100 CAPSULE, LIQUID FILLED ORAL DAILY PRN
Qty: 30 CAPSULE | Refills: 0 | Status: SHIPPED | OUTPATIENT
Start: 2024-01-26

## 2024-01-26 RX ORDER — ASPIRIN 81 MG/1
81 TABLET ORAL 2 TIMES DAILY
Qty: 40 TABLET | Refills: 0 | Status: SHIPPED | OUTPATIENT
Start: 2024-01-26 | End: 2024-02-15

## 2024-01-26 RX ORDER — OXYCODONE HYDROCHLORIDE 5 MG/1
5 TABLET ORAL EVERY 6 HOURS PRN
Qty: 20 TABLET | Refills: 0 | Status: SHIPPED | OUTPATIENT
Start: 2024-01-26 | End: 2024-01-31

## 2024-01-26 RX ORDER — ONDANSETRON 4 MG/1
4 TABLET, FILM COATED ORAL DAILY PRN
Qty: 30 TABLET | Refills: 0 | Status: SHIPPED | OUTPATIENT
Start: 2024-01-26

## 2024-01-29 ENCOUNTER — TELEPHONE (OUTPATIENT)
Dept: INTERNAL MEDICINE CLINIC | Facility: CLINIC | Age: 79
End: 2024-01-29

## 2024-01-29 NOTE — TELEPHONE ENCOUNTER
----- Message from Teahiren Demetri sent at 1/29/2024  9:18 AM EST -----  Subject: Message to Provider    QUESTIONS  Information for Provider? Pt calling in to cancel his appt on 01/30/2024   b/c he says he is going to have surgery on his right toe this day. Pt says   he just wanted to let PCP know and will call back to reschedule at a later   date.  ---------------------------------------------------------------------------  --------------  CALL BACK INFO  [phone]; OK to leave message on voicemail  ---------------------------------------------------------------------------  --------------  SCRIPT ANSWERS  Relationship to Patient? Self

## 2024-01-30 ENCOUNTER — HOSPITAL ENCOUNTER (OUTPATIENT)
Dept: WOUND CARE | Age: 79
Discharge: HOME | End: 2024-01-30
Payer: MEDICARE

## 2024-01-30 ENCOUNTER — ANESTHESIA EVENT (OUTPATIENT)
Dept: SURGERY | Age: 79
End: 2024-01-30
Payer: MEDICARE

## 2024-01-30 VITALS
HEART RATE: 55 BPM | TEMPERATURE: 97.7 F | BODY MASS INDEX: 26.06 KG/M2 | DIASTOLIC BLOOD PRESSURE: 61 MMHG | SYSTOLIC BLOOD PRESSURE: 130 MMHG | WEIGHT: 166 LBS | HEIGHT: 67 IN

## 2024-01-30 DIAGNOSIS — E11.628 DIABETIC FOOT INFECTION (HCC): Primary | ICD-10-CM

## 2024-01-30 DIAGNOSIS — L08.9 DIABETIC FOOT INFECTION (HCC): Primary | ICD-10-CM

## 2024-01-30 DIAGNOSIS — E11.621 DIABETIC ULCER OF RIGHT FOOT ASSOCIATED WITH TYPE 2 DIABETES MELLITUS, WITH NECROSIS OF BONE, UNSPECIFIED PART OF FOOT (HCC): ICD-10-CM

## 2024-01-30 DIAGNOSIS — L97.514 DIABETIC ULCER OF RIGHT FOOT ASSOCIATED WITH TYPE 2 DIABETES MELLITUS, WITH NECROSIS OF BONE, UNSPECIFIED PART OF FOOT (HCC): ICD-10-CM

## 2024-01-30 DIAGNOSIS — M86.671 CHRONIC REFRACTORY OSTEOMYELITIS OF RIGHT FOOT (HCC): ICD-10-CM

## 2024-01-30 PROCEDURE — 99212 OFFICE O/P EST SF 10 MIN: CPT

## 2024-01-30 RX ORDER — IBUPROFEN 200 MG
TABLET ORAL ONCE
OUTPATIENT
Start: 2024-01-30 | End: 2024-01-30

## 2024-01-30 RX ORDER — LIDOCAINE 40 MG/G
CREAM TOPICAL ONCE
OUTPATIENT
Start: 2024-01-30 | End: 2024-01-30

## 2024-01-30 RX ORDER — CLOBETASOL PROPIONATE 0.5 MG/G
OINTMENT TOPICAL ONCE
OUTPATIENT
Start: 2024-01-30 | End: 2024-01-30

## 2024-01-30 RX ORDER — BACITRACIN ZINC AND POLYMYXIN B SULFATE 500; 1000 [USP'U]/G; [USP'U]/G
OINTMENT TOPICAL ONCE
OUTPATIENT
Start: 2024-01-30 | End: 2024-01-30

## 2024-01-30 RX ORDER — LIDOCAINE HYDROCHLORIDE 20 MG/ML
JELLY TOPICAL ONCE
Status: COMPLETED | OUTPATIENT
Start: 2024-01-30 | End: 2024-01-30

## 2024-01-30 RX ORDER — LIDOCAINE HYDROCHLORIDE 20 MG/ML
JELLY TOPICAL ONCE
OUTPATIENT
Start: 2024-01-30 | End: 2024-01-30

## 2024-01-30 RX ORDER — LIDOCAINE 50 MG/G
OINTMENT TOPICAL ONCE
OUTPATIENT
Start: 2024-01-30 | End: 2024-01-30

## 2024-01-30 RX ORDER — LIDOCAINE HYDROCHLORIDE 40 MG/ML
SOLUTION TOPICAL ONCE
OUTPATIENT
Start: 2024-01-30 | End: 2024-01-30

## 2024-01-30 RX ORDER — BETAMETHASONE DIPROPIONATE 0.05 %
OINTMENT (GRAM) TOPICAL ONCE
OUTPATIENT
Start: 2024-01-30 | End: 2024-01-30

## 2024-01-30 RX ORDER — GENTAMICIN SULFATE 1 MG/G
OINTMENT TOPICAL ONCE
OUTPATIENT
Start: 2024-01-30 | End: 2024-01-30

## 2024-01-30 RX ORDER — TRIAMCINOLONE ACETONIDE 1 MG/G
OINTMENT TOPICAL ONCE
OUTPATIENT
Start: 2024-01-30 | End: 2024-01-30

## 2024-01-30 RX ORDER — GINSENG 100 MG
CAPSULE ORAL ONCE
OUTPATIENT
Start: 2024-01-30 | End: 2024-01-30

## 2024-01-30 RX ADMIN — LIDOCAINE HYDROCHLORIDE: 20 JELLY TOPICAL at 09:43

## 2024-01-30 NOTE — PERIOP NOTE
Patient verified name and .  Order for consent  found in EHR does not match case posting  Consent:    Irrigation and debridement of right foot placement antibiotic cement, EHL lengthening, flexor tenotomy's of the second third and fourth toes.   Case posting:  right foot debridement incision and drainage  Case message sent Brandy Bhatti and surgery schedulers    Patient stated \"surgery on my right foot\"    Type 1B surgery, phone assessment complete.  Orders  received.  Labs per surgeon: none ordered  Labs per anesthesia protocol: SQBS, K+ s/h for DOS    Patient answered medical/surgical history questions at their best of ability. All prior to admission medications documented in EPIC.    Patient instructed to take the following medications the day of surgery according to anesthesia guidelines with a small sip of water: oxycodone if needed, atenolol, amlodipine, Augmentin, doxycycline.    On the day before surgery please take 2 Tylenol in the morning and then again before bed. You may use either regular or extra strength.   Hold all vitamins, supplements and NSAIDS now for surgery. Prescription meds to hold:  none    Patient instructed on the following:    > Arrive at [facility] Entrance, time of arrival to be called the day before by 1700  > NPO after midnight, unless otherwise indicated, including gum, mints, and ice chips  > Responsible adult must drive patient to the hospital, stay during surgery, and patient will need supervision 24 hours after anesthesia  > Use non moisturizing soap in shower the night before surgery and on the morning of surgery  > All piercings must be removed prior to arrival.    > Leave all valuables (money and jewelry) at home but bring insurance card and ID on DOS.   > You may be required to pay a deductible or co-pay on the day of your procedure. You can pre-pay by calling [phone] if your surgery is at the [facility] or [phone] if your surgery is at the [facility].  > Do not wear

## 2024-01-30 NOTE — PERIOP NOTE
Preop department called to notify patient of arrival time for scheduled procedure. Instructions given to   - Arrive at OPC Entrance [address].  - Remain NPO after midnight, unless otherwise indicated, including gum, mints, and ice chips.   - Have a responsible adult to drive patient to the hospital, stay during surgery, and patient will need supervision 24 hours after anesthesia.   - Use antibacterial soap in shower the night before surgery and on the morning of surgery.       Was patient contacted: pt  Voicemail left:   Numbers contacted: [phone]   Arrival time: 1200

## 2024-01-30 NOTE — FLOWSHEET NOTE
01/30/24 0933   Wound 04/14/23 Foot Right;Plantar #1 delayed healing surgical debridement 2/22/23   Date First Assessed/Time First Assessed: 04/14/23 1053   Present on Hospital Admission: Yes  Wound Approximate Age at First Assessment (Weeks): 6 weeks  Primary Wound Type: Diabetic Ulcer  Location: Foot  Wound Location Orientation: Right;Plantar  Wou...   Wound Image    Wound Etiology Diabetic Meza 3   Dressing Status Clean;Dry;Intact   Wound Cleansed Cleansed with saline;Vashe   Dressing/Treatment Xeroform   Offloading for Diabetic Foot Ulcers Post op shoe   Wound Length (cm) 0.1 cm   Wound Width (cm) 0.1 cm   Wound Depth (cm) 0.3 cm   Wound Surface Area (cm^2) 0.01 cm^2   Change in Wound Size % (l*w) 99.92   Wound Volume (cm^3) 0.003 cm^3   Wound Healing % 100   Wound Assessment Epithelialization   Drainage Amount Scant (moist but unmeasurable)   Drainage Description Serosanguinous   Odor None   Charla-wound Assessment Hyperkeratosis (callous)   Wound Thickness Description not for Pressure Injury Full thickness

## 2024-01-30 NOTE — DISCHARGE INSTRUCTIONS
Discharge Instructions for  [facility]  [address]  [address]  [place] [address]  Phone [phone]   Fax [phone]      NAME:  Roldan Edmondson  YOB: 1945  MEDICAL RECORD NUMBER:  [id number]  DATE:  @ED@    Return Appointment:   As needed with Ramiro Bravo DO      Instructions: Right Plantar Foot:  Cleanse intact skin with soap & water, wound with normal saline or wound cleanser.  Vashe Wound Solution (hypochlorous acid) soak placed on wound bed for a minimum of 60 seconds prior to dressing application.   Apply Xeroform to wound bed  Secure with gauze and covrsite.  Change daily if possible, definitely 3 times week.   Offload with boot or Darco shoe and insert.     Do not get wound wet in shower, pool or tub. May purchase cast cover at local pharmacy to keep dry in shower.      Elevate legs when sitting to reduce swelling.   Swelling interferes with wound healing.  Eliminate salt intake as this promotes fluid retention and swelling     Please increase dietary protein to at least 60 grams per day to support cell rejuvenation.   May use supplements such as Ensure Max, Naga, & Glucerna   Be sure to spread intake throughout the day for improved absorption.      Continue antibiotics: Augmentin and Doxycycline as prescribed by ID.      Surgery with Dr. Nichols tomorrow, follow up if Dr. Nichols needs you to after surgery           Should you experience increased redness, swelling, pain, foul odor, size of wound(s), or have a temperature over 101 degrees please contact the [facility] at [phone] or if after hours contact your primary care physician or go to the hospital emergency department.    PLEASE NOTE: IF YOU ARE UNABLE TO OBTAIN WOUND SUPPLIES, CONTINUE TO USE THE SUPPLIES YOU HAVE AVAILABLE UNTIL YOU ARE ABLE TO REACH US. IT IS MOST IMPORTANT TO KEEP THE WOUND COVERED AT ALL TIMES.    Electronically signed Rossy Elizabeth RN on 1/30/2024 at 9:51 AM

## 2024-01-30 NOTE — WOUND CARE
Discharge Instructions for  [facility]  [address] [place] [address]  [address]  [place] [address]  Phone [phone]   Fax [phone]      NAME:  Roldan Edmondson  YOB: 1945  MEDICAL RECORD NUMBER:  [id number]  DATE:  1/30/2024    Return Appointment:    after surgery with Dr. Nichols if needed  with Ramiro Bravo DO      Instructions: Right Plantar Foot:  Cleanse intact skin with soap & water, wound with normal saline or wound cleanser.  Vashe Wound Solution (hypochlorous acid) soak placed on wound bed for a minimum of 60 seconds prior to dressing application.   Apply Xeroform to wound bed  Secure with gauze and covrsite.  Change daily if possible, definitely 3 times week.   Offload with boot or Darco shoe and insert.     Do not get wound wet in shower, pool or tub. May purchase cast cover at local pharmacy to keep dry in shower.      Elevate legs when sitting to reduce swelling.   Swelling interferes with wound healing.  Eliminate salt intake as this promotes fluid retention and swelling     Please increase dietary protein to at least 60 grams per day to support cell rejuvenation.   May use supplements such as Ensure Max, Naga, & Glucerna   Be sure to spread intake throughout the day for improved absorption.      Continue antibiotics: Augmentin and Doxycycline as prescribed by ID.     Surgery with Dr. Nichols tomorrow, follow up if Dr. Nichols needs you to after surgery        Should you experience increased redness, swelling, pain, foul odor, size of wound(s), or have a temperature over 101 degrees please contact the [facility] at [phone] or if after hours contact your primary care physician or go to the hospital emergency department.    PLEASE NOTE: IF YOU ARE UNABLE TO OBTAIN WOUND SUPPLIES, CONTINUE TO USE THE SUPPLIES YOU HAVE AVAILABLE UNTIL YOU ARE ABLE TO REACH US. IT IS MOST IMPORTANT TO KEEP THE WOUND COVERED AT ALL TIMES.    Electronically signed Rossy ROSADO

## 2024-01-31 ENCOUNTER — HOSPITAL ENCOUNTER (OUTPATIENT)
Age: 79
Setting detail: OUTPATIENT SURGERY
Discharge: HOME | End: 2024-01-31
Attending: ORTHOPAEDIC SURGERY | Admitting: ORTHOPAEDIC SURGERY
Payer: MEDICARE

## 2024-01-31 ENCOUNTER — APPOINTMENT (OUTPATIENT)
Dept: GENERAL RADIOLOGY | Age: 79
End: 2024-01-31
Attending: ORTHOPAEDIC SURGERY
Payer: MEDICARE

## 2024-01-31 ENCOUNTER — ANESTHESIA (OUTPATIENT)
Dept: SURGERY | Age: 79
End: 2024-01-31
Payer: MEDICARE

## 2024-01-31 VITALS
HEART RATE: 71 BPM | HEIGHT: 67 IN | BODY MASS INDEX: 25.58 KG/M2 | WEIGHT: 163 LBS | RESPIRATION RATE: 15 BRPM | SYSTOLIC BLOOD PRESSURE: 129 MMHG | TEMPERATURE: 98 F | DIASTOLIC BLOOD PRESSURE: 55 MMHG | OXYGEN SATURATION: 97 %

## 2024-01-31 DIAGNOSIS — N40.1 BENIGN PROSTATIC HYPERPLASIA WITH LOWER URINARY TRACT SYMPTOMS, SYMPTOM DETAILS UNSPECIFIED: ICD-10-CM

## 2024-01-31 DIAGNOSIS — I10 ESSENTIAL HYPERTENSION: ICD-10-CM

## 2024-01-31 DIAGNOSIS — N18.30 TYPE 2 DIABETES MELLITUS WITH STAGE 3 CHRONIC KIDNEY DISEASE, WITHOUT LONG-TERM CURRENT USE OF INSULIN, UNSPECIFIED WHETHER STAGE 3A OR 3B CKD (HCC): ICD-10-CM

## 2024-01-31 DIAGNOSIS — E11.22 TYPE 2 DIABETES MELLITUS WITH STAGE 3 CHRONIC KIDNEY DISEASE, WITHOUT LONG-TERM CURRENT USE OF INSULIN, UNSPECIFIED WHETHER STAGE 3A OR 3B CKD (HCC): ICD-10-CM

## 2024-01-31 LAB
GLUCOSE BLD STRIP.AUTO-MCNC: 158 MG/DL (ref 65–100)
POTASSIUM BLD-SCNC: 4 MMOL/L (ref 3.5–5.1)
SERVICE CMNT-IMP: ABNORMAL

## 2024-01-31 PROCEDURE — 2500000003 HC RX 250 WO HCPCS: Performed by: REGISTERED NURSE

## 2024-01-31 PROCEDURE — 28022 EXPLORATION OF FOOT JOINT: CPT | Performed by: ORTHOPAEDIC SURGERY

## 2024-01-31 PROCEDURE — 7100000011 HC PHASE II RECOVERY - ADDTL 15 MIN: Performed by: ORTHOPAEDIC SURGERY

## 2024-01-31 PROCEDURE — 84132 ASSAY OF SERUM POTASSIUM: CPT

## 2024-01-31 PROCEDURE — 7100000000 HC PACU RECOVERY - FIRST 15 MIN: Performed by: ORTHOPAEDIC SURGERY

## 2024-01-31 PROCEDURE — 2580000003 HC RX 258: Performed by: REGISTERED NURSE

## 2024-01-31 PROCEDURE — 6360000002 HC RX W HCPCS: Performed by: REGISTERED NURSE

## 2024-01-31 PROCEDURE — 3600000004 HC SURGERY LEVEL 4 BASE: Performed by: ORTHOPAEDIC SURGERY

## 2024-01-31 PROCEDURE — 6370000000 HC RX 637 (ALT 250 FOR IP): Performed by: ANESTHESIOLOGY

## 2024-01-31 PROCEDURE — 11042 DBRDMT SUBQ TIS 1ST 20SQCM/<: CPT | Performed by: ORTHOPAEDIC SURGERY

## 2024-01-31 PROCEDURE — 2709999900 HC NON-CHARGEABLE SUPPLY: Performed by: ORTHOPAEDIC SURGERY

## 2024-01-31 PROCEDURE — 6360000002 HC RX W HCPCS: Performed by: PHYSICIAN ASSISTANT

## 2024-01-31 PROCEDURE — 6360000002 HC RX W HCPCS: Performed by: ORTHOPAEDIC SURGERY

## 2024-01-31 PROCEDURE — 3700000000 HC ANESTHESIA ATTENDED CARE: Performed by: ORTHOPAEDIC SURGERY

## 2024-01-31 PROCEDURE — 3600000014 HC SURGERY LEVEL 4 ADDTL 15MIN: Performed by: ORTHOPAEDIC SURGERY

## 2024-01-31 PROCEDURE — 7100000001 HC PACU RECOVERY - ADDTL 15 MIN: Performed by: ORTHOPAEDIC SURGERY

## 2024-01-31 PROCEDURE — 28232 INCISION OF TOE TENDON: CPT | Performed by: ORTHOPAEDIC SURGERY

## 2024-01-31 PROCEDURE — 82962 GLUCOSE BLOOD TEST: CPT

## 2024-01-31 PROCEDURE — 28225 RELEASE OF FOOT TENDON: CPT | Performed by: ORTHOPAEDIC SURGERY

## 2024-01-31 PROCEDURE — 3700000001 HC ADD 15 MINUTES (ANESTHESIA): Performed by: ORTHOPAEDIC SURGERY

## 2024-01-31 PROCEDURE — 2580000003 HC RX 258: Performed by: ANESTHESIOLOGY

## 2024-01-31 PROCEDURE — 7100000010 HC PHASE II RECOVERY - FIRST 15 MIN: Performed by: ORTHOPAEDIC SURGERY

## 2024-01-31 RX ORDER — TAMSULOSIN HYDROCHLORIDE 0.4 MG/1
CAPSULE ORAL DAILY
Qty: 90 CAPSULE | Refills: 1 | OUTPATIENT
Start: 2024-01-31

## 2024-01-31 RX ORDER — VANCOMYCIN HYDROCHLORIDE 1 G/20ML
INJECTION, POWDER, LYOPHILIZED, FOR SOLUTION INTRAVENOUS PRN
Status: DISCONTINUED | OUTPATIENT
Start: 2024-01-31 | End: 2024-01-31 | Stop reason: ALTCHOICE

## 2024-01-31 RX ORDER — MIDAZOLAM HYDROCHLORIDE 2 MG/2ML
2 INJECTION, SOLUTION INTRAMUSCULAR; INTRAVENOUS
Status: DISCONTINUED | OUTPATIENT
Start: 2024-01-31 | End: 2024-01-31 | Stop reason: HOSPADM

## 2024-01-31 RX ORDER — SODIUM CHLORIDE 0.9 % (FLUSH) 0.9 %
5-40 SYRINGE (ML) INJECTION EVERY 12 HOURS SCHEDULED
Status: DISCONTINUED | OUTPATIENT
Start: 2024-01-31 | End: 2024-01-31 | Stop reason: HOSPADM

## 2024-01-31 RX ORDER — SCOLOPAMINE TRANSDERMAL SYSTEM 1 MG/1
1 PATCH, EXTENDED RELEASE TRANSDERMAL
Status: DISCONTINUED | OUTPATIENT
Start: 2024-01-31 | End: 2024-01-31 | Stop reason: HOSPADM

## 2024-01-31 RX ORDER — METOCLOPRAMIDE HYDROCHLORIDE 5 MG/ML
10 INJECTION INTRAMUSCULAR; INTRAVENOUS
Status: DISCONTINUED | OUTPATIENT
Start: 2024-01-31 | End: 2024-01-31 | Stop reason: HOSPADM

## 2024-01-31 RX ORDER — SODIUM CHLORIDE, SODIUM LACTATE, POTASSIUM CHLORIDE, CALCIUM CHLORIDE 600; 310; 30; 20 MG/100ML; MG/100ML; MG/100ML; MG/100ML
INJECTION, SOLUTION INTRAVENOUS CONTINUOUS
Status: DISCONTINUED | OUTPATIENT
Start: 2024-01-31 | End: 2024-01-31 | Stop reason: HOSPADM

## 2024-01-31 RX ORDER — HYDROMORPHONE HYDROCHLORIDE 2 MG/ML
0.5 INJECTION, SOLUTION INTRAMUSCULAR; INTRAVENOUS; SUBCUTANEOUS EVERY 10 MIN PRN
Status: DISCONTINUED | OUTPATIENT
Start: 2024-01-31 | End: 2024-01-31 | Stop reason: HOSPADM

## 2024-01-31 RX ORDER — FENTANYL CITRATE 50 UG/ML
INJECTION, SOLUTION INTRAMUSCULAR; INTRAVENOUS PRN
Status: DISCONTINUED | OUTPATIENT
Start: 2024-01-31 | End: 2024-01-31 | Stop reason: SDUPTHER

## 2024-01-31 RX ORDER — FENTANYL CITRATE 50 UG/ML
100 INJECTION, SOLUTION INTRAMUSCULAR; INTRAVENOUS
Status: DISCONTINUED | OUTPATIENT
Start: 2024-01-31 | End: 2024-01-31 | Stop reason: HOSPADM

## 2024-01-31 RX ORDER — LIDOCAINE HYDROCHLORIDE 20 MG/ML
INJECTION, SOLUTION EPIDURAL; INFILTRATION; INTRACAUDAL; PERINEURAL PRN
Status: DISCONTINUED | OUTPATIENT
Start: 2024-01-31 | End: 2024-01-31 | Stop reason: SDUPTHER

## 2024-01-31 RX ORDER — AMLODIPINE BESYLATE 10 MG/1
10 TABLET ORAL DAILY
Qty: 90 TABLET | Refills: 1 | OUTPATIENT
Start: 2024-01-31

## 2024-01-31 RX ORDER — ATENOLOL 25 MG/1
25 TABLET ORAL DAILY
Qty: 90 TABLET | Refills: 1 | OUTPATIENT
Start: 2024-01-31

## 2024-01-31 RX ORDER — PROPOFOL 10 MG/ML
INJECTION, EMULSION INTRAVENOUS PRN
Status: DISCONTINUED | OUTPATIENT
Start: 2024-01-31 | End: 2024-01-31 | Stop reason: SDUPTHER

## 2024-01-31 RX ORDER — DIPHENHYDRAMINE HYDROCHLORIDE 50 MG/ML
12.5 INJECTION INTRAMUSCULAR; INTRAVENOUS
Status: DISCONTINUED | OUTPATIENT
Start: 2024-01-31 | End: 2024-01-31 | Stop reason: HOSPADM

## 2024-01-31 RX ORDER — HYDROCHLOROTHIAZIDE 25 MG/1
TABLET ORAL
Qty: 90 TABLET | Refills: 1 | OUTPATIENT
Start: 2024-01-31

## 2024-01-31 RX ORDER — ONDANSETRON 2 MG/ML
4 INJECTION INTRAMUSCULAR; INTRAVENOUS
Status: DISCONTINUED | OUTPATIENT
Start: 2024-01-31 | End: 2024-01-31 | Stop reason: HOSPADM

## 2024-01-31 RX ORDER — EPHEDRINE SULFATE/0.9% NACL/PF 50 MG/5 ML
SYRINGE (ML) INTRAVENOUS PRN
Status: DISCONTINUED | OUTPATIENT
Start: 2024-01-31 | End: 2024-01-31 | Stop reason: SDUPTHER

## 2024-01-31 RX ORDER — FENTANYL CITRATE 50 UG/ML
25 INJECTION, SOLUTION INTRAMUSCULAR; INTRAVENOUS EVERY 5 MIN PRN
Status: DISCONTINUED | OUTPATIENT
Start: 2024-01-31 | End: 2024-01-31 | Stop reason: HOSPADM

## 2024-01-31 RX ADMIN — FENTANYL CITRATE 50 MCG: 50 INJECTION, SOLUTION INTRAMUSCULAR; INTRAVENOUS at 13:01

## 2024-01-31 RX ADMIN — PHENYLEPHRINE HYDROCHLORIDE 100 MCG: 10 INJECTION INTRAVENOUS at 13:17

## 2024-01-31 RX ADMIN — Medication 5 MG: at 13:22

## 2024-01-31 RX ADMIN — Medication 2000 MG: at 12:52

## 2024-01-31 RX ADMIN — PROPOFOL 140 MCG/KG/MIN: 10 INJECTION, EMULSION INTRAVENOUS at 12:56

## 2024-01-31 RX ADMIN — PHENYLEPHRINE HYDROCHLORIDE 100 MCG: 10 INJECTION INTRAVENOUS at 13:28

## 2024-01-31 RX ADMIN — LIDOCAINE HYDROCHLORIDE 40 MG: 20 INJECTION, SOLUTION EPIDURAL; INFILTRATION; INTRACAUDAL; PERINEURAL at 12:55

## 2024-01-31 RX ADMIN — Medication 10 MG: at 13:10

## 2024-01-31 RX ADMIN — PROPOFOL 50 MG: 10 INJECTION, EMULSION INTRAVENOUS at 12:55

## 2024-01-31 RX ADMIN — SODIUM CHLORIDE, POTASSIUM CHLORIDE, SODIUM LACTATE AND CALCIUM CHLORIDE: 600; 310; 30; 20 INJECTION, SOLUTION INTRAVENOUS at 12:07

## 2024-01-31 ASSESSMENT — PAIN SCALES - GENERAL
PAINLEVEL_OUTOF10: 0
PAINLEVEL_OUTOF10: 0

## 2024-01-31 ASSESSMENT — PAIN - FUNCTIONAL ASSESSMENT: PAIN_FUNCTIONAL_ASSESSMENT: 0-10

## 2024-01-31 NOTE — PROGRESS NOTES
attempted to provide pre-op visit as requested, however patient was already taken to surgery when  arrived.   provided prayer and is available to follow up as needed.    Peace be with you,  Signed by  STEVEN WhitakerDiv.   [phone]

## 2024-01-31 NOTE — ANESTHESIA POSTPROCEDURE EVALUATION
Department of Anesthesiology  Postprocedure Note    Patient: Roldan Edmondson  MRN: [id number]  YOB: 1945  Date of evaluation: 1/31/2024    Procedure Summary     Date: 01/31/24 Room / Location: [facility] OP OR 01 / [facility]    Anesthesia Start: 1250 Anesthesia Stop: 1352    Procedures:       right FOOT DEBRIDEMENT INCISION AND DRAINAGE (Right: Foot)      EXTENSOR HALLUCIS LONGUS LENGTHENING FLEXOR TENOTOMY'S OF THE SECOND, THIRD, FORTH TOES (Foot) Diagnosis:       Right foot pain      (Right foot pain [M79.671])    Surgeons: Raudel Nichols MD Responsible Provider: Martell Harper MD    Anesthesia Type: TIVA ASA Status: 3          Anesthesia Type: No value filed.    Karen Phase I: Karen Score: 8    Karen Phase II:      Anesthesia Post Evaluation    Patient location during evaluation: PACU  Patient participation: complete - patient participated  Level of consciousness: awake and awake and alert  Airway patency: patent  Nausea & Vomiting: no nausea  Cardiovascular status: hemodynamically stable  Respiratory status: acceptable  Hydration status: euvolemic  Multimodal analgesia pain management approach  Pain management: adequate        No notable events documented.

## 2024-01-31 NOTE — OP NOTE
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  There is too much bone loss and too much plantar soft tissue loss.  Due to this no antibiotic spacer was placed as I did not think it provide much stability.  Mildly flexible deformities of the second third and fourth toes  Contracted EHL tendon    Pre Operative Course:  Roldan Edmondson is a 78 y.o. 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  He underwent right first MTP excision debridement in February 2023.  However he developed recurrent draining wound.  I discussion with him decision was made to watch to put out 1 time and attempt to get this wound to heal and perform soft tissue releases to help decrease the load on the plantar foot    Operation In Detail:  Patient was evaluated in the preoperative area.  The right lower extremity was marked by me.  We had a long discussion about the procedure and postoperative protocols.  The patient was then brought back to the operating room suite and placed in the operating room table.  A timeout was taken to identify the patient, procedure being performed, and laterality.  After this the patient was prepped and draped in the normal sterile fashion using a Betadine

## 2024-01-31 NOTE — INTERVAL H&P NOTE
Update History & Physical    The Patient's History and Physical was reviewed   I discussed the surgery and patients medical condition with the patient.  The chart was reviewed with the patient and I examined the patient.    There was no change.  The surgical site was confirmed by the patient and me.    CV: RRR  RESP: CTAB    Plan:  The risk, benefits, expected outcome, and alternative to the recommended procedure have been discussed with the patient.  Patient understands and wants to proceed with the procedure.    Electronically signed by Raudel Nichols MD on 01/31/24 11:48 AM

## 2024-01-31 NOTE — DISCHARGE INSTRUCTIONS
INSTRUCTIONS FOLLOWING FOOT SURGERY    ACTIVITY  Elevate foot. No Ice  Protected partial weight bearing on the heel only as tolerated in post op shoe after full sensation returns.     Blood clot prevention:  As instructed by Dr Nichols: Take 81mg aspirin twice daily if ok with your medical doctor and you have no GI Ulcer.  Get up and out of bed frequently. While in bed move the legs as much as possible.    DRESSING CARE Keep dry and in place until follow up appointment. Cover with cast bag or plastic bag when showering.  Let the office know if dressing gets saturated with water.   Don't put anything into the splint to relieve itching etc.     CALL YOUR DOCTOR IF YOU HAVE  Excessive bleeding that does not stop after holding mild pressure over the area.  Temperature of 101 degrees or above.  Redness, excessive swelling or bruising, and/or green or yellow, smelly discharge from incision.  Loss of sensation - cold, white or blue toes.    DIET  Day of Surgery: Clear liquids until no nausea or vomiting; then light, bland diet (Baked chicken, plain rice, grits, scrambled egg, toast).  Nothing Greasy, fried or spicy today  Advance to regular diet on second day, unless your doctor orders otherwise.    PAIN  Take pain medications as directed by your doctor.  Call your doctor if pain is NOT relieved by medication.    MEDICATION INTERACTION:  During your procedure you potentially received a medication or medications which may reduce the effectiveness of oral contraceptives. Please consider other forms of contraception for 1 month following your procedure if you are currently using oral contraceptives as your primary form of birth control. In addition to this, we recommend continuing your oral contraceptive as prescribed, unless otherwise instructed by your physician, during this time    After general anesthesia or intravenous sedation, for 24 hours or while taking prescription Narcotics:  Limit your activities  A responsible adult  needs to be with you for the next 24 hours  Do not drive and operate hazardous machinery  Do not make important personal or business decisions  Do not drink alcoholic beverages  If you have not urinated within 8 hours after discharge, and you are experiencing discomfort from urinary retention, please go to the nearest ED.  If you have sleep apnea and have a CPAP machine, please use it for all naps and sleeping.  Please use caution when taking narcotics and any of your home medications that may cause drowsiness.  *  Please give a list of your current medications to your Primary Care Provider.  *  Please update this list whenever your medications are discontinued, doses are      changed, or new medications (including over-the-counter products) are added.  *  Please carry medication information at all times in case of emergency situations.    These are general instructions for a healthy lifestyle:  No smoking/ No tobacco products/ Avoid exposure to second hand smoke  Surgeon General's Warning:  Quitting smoking now greatly reduces serious risk to your health.  Obesity, smoking, and sedentary lifestyle greatly increases your risk for illness  A healthy diet, regular physical exercise & weight monitoring are important for maintaining a healthy lifestyle    You may be retaining fluid if you have a history of heart failure or if you experience any of the following symptoms:  Weight gain of 3 pounds or more overnight or 5 pounds in a week, increased swelling in our hands or feet or shortness of breath while lying flat in bed.  Please call your doctor as soon as you notice any of these symptoms; do not wait until your next office visit.    Learning About How to Use Crutches  Your Care Instructions  Crutches can help you walk when you have an injured hip, leg, knee, ankle, or foot. Your doctor will tell you how much weight--if any--you can put on your leg.  Be sure your crutches fit you. When you stand up in your normal

## 2024-01-31 NOTE — ANESTHESIA PRE PROCEDURE
Department of Anesthesiology  Preprocedure Note       Name:  Roldan Edmondson   Age:  78 y.o.  :  1945                                          MRN:  [id number]         Date:  2024      Surgeon: Surgeon(s):  Raudel Nichols MD    Procedure: Procedure(s):  right FOOT DEBRIDEMENT INCISION AND DRAINAGE  TOE HAMMER REPAIR    Medications prior to admission:   Prior to Admission medications    Medication Sig Start Date End Date Taking? Authorizing Provider   oxyCODONE (ROXICODONE) 5 MG immediate release tablet Take 1 tablet by mouth every 6 hours as needed for Pain for up to 5 days. Intended supply: 5 days. Take lowest dose possible to manage pain Max Daily Amount: 20 mg 24  File, RADHA Temple   ondansetron (ZOFRAN) 4 MG tablet Take 1 tablet by mouth daily as needed for Nausea or Vomiting  Patient not taking: Reported on 2024   Bailey, RADHA Temple   docusate sodium (COLACE) 100 MG capsule Take 1 capsule by mouth daily as needed for Constipation  Patient not taking: Reported on 2024   File, RADHA Temple   aspirin (ASPIRIN 81) 81 MG EC tablet Take 1 tablet by mouth 2 times daily for 20 days  Patient not taking: Reported on 2024 1/26/24 2/15/24  File, RADHA Temple   doxycycline hyclate (VIBRA-TABS) 100 MG tablet Take 1 tablet by mouth 2 times daily 23  Ramiro Bravo,    amoxicillin-clavulanate (AUGMENTIN) 875-125 MG per tablet Take 1 tablet by mouth 2 times daily 23  Ramiro Bravo,    atenolol (TENORMIN) 25 MG tablet Take 1 tablet by mouth daily 23   David Mandujano MD   SITagliptin (JANUVIA) 100 MG tablet Take 1 tablet by mouth daily 23   David Mandujano MD   losartan (COZAAR) 100 MG tablet Take 1 tablet by mouth daily 23   David Mandujano MD   amLODIPine (NORVASC) 10 MG tablet Take 1 tablet by mouth daily 23   David Mandujano MD   tamsulosin (FLOMAX) 0.4 MG capsule Take 1

## 2024-02-01 RX ORDER — SITAGLIPTIN 100 MG/1
100 TABLET, FILM COATED ORAL DAILY
Qty: 90 TABLET | Refills: 1 | OUTPATIENT
Start: 2024-02-01

## 2024-02-01 RX ORDER — LOSARTAN POTASSIUM 100 MG/1
100 TABLET ORAL DAILY
Qty: 90 TABLET | Refills: 1 | OUTPATIENT
Start: 2024-02-01

## 2024-02-02 ENCOUNTER — TELEPHONE (OUTPATIENT)
Dept: ORTHOPEDIC SURGERY | Age: 79
End: 2024-02-02

## 2024-02-02 ENCOUNTER — CLINICAL DOCUMENTATION (OUTPATIENT)
Dept: ORTHOPEDIC SURGERY | Age: 79
End: 2024-02-02

## 2024-02-02 RX ORDER — OXYCODONE HYDROCHLORIDE 5 MG/1
5 TABLET ORAL EVERY 6 HOURS PRN
Qty: 20 TABLET | Refills: 0 | OUTPATIENT
Start: 2024-02-02 | End: 2024-02-07

## 2024-02-02 NOTE — PROGRESS NOTES
Patient called and stated he smokes marijuana daily and is requesting pain medications we can not prescribe any pain medications when he uses marijuana   Patient voiced understandings

## 2024-02-05 DIAGNOSIS — N18.30 TYPE 2 DIABETES MELLITUS WITH STAGE 3 CHRONIC KIDNEY DISEASE, WITHOUT LONG-TERM CURRENT USE OF INSULIN, UNSPECIFIED WHETHER STAGE 3A OR 3B CKD (HCC): ICD-10-CM

## 2024-02-05 DIAGNOSIS — I10 ESSENTIAL HYPERTENSION: ICD-10-CM

## 2024-02-05 DIAGNOSIS — E11.22 TYPE 2 DIABETES MELLITUS WITH STAGE 3 CHRONIC KIDNEY DISEASE, WITHOUT LONG-TERM CURRENT USE OF INSULIN, UNSPECIFIED WHETHER STAGE 3A OR 3B CKD (HCC): ICD-10-CM

## 2024-02-05 RX ORDER — HYDROCHLOROTHIAZIDE 25 MG/1
TABLET ORAL
Qty: 90 TABLET | Refills: 1 | OUTPATIENT
Start: 2024-02-05

## 2024-02-05 RX ORDER — AMLODIPINE BESYLATE 10 MG/1
10 TABLET ORAL DAILY
Qty: 90 TABLET | Refills: 1 | OUTPATIENT
Start: 2024-02-05

## 2024-02-05 RX ORDER — SITAGLIPTIN 100 MG/1
100 TABLET, FILM COATED ORAL DAILY
Qty: 90 TABLET | Refills: 1 | OUTPATIENT
Start: 2024-02-05

## 2024-02-05 RX ORDER — LOSARTAN POTASSIUM 100 MG/1
100 TABLET ORAL DAILY
Qty: 90 TABLET | Refills: 1 | OUTPATIENT
Start: 2024-02-05

## 2024-02-15 ENCOUNTER — OFFICE VISIT (OUTPATIENT)
Dept: ORTHOPEDIC SURGERY | Age: 79
End: 2024-02-15

## 2024-02-15 DIAGNOSIS — G89.18 POST-OP PAIN: Primary | ICD-10-CM

## 2024-02-15 PROCEDURE — 99024 POSTOP FOLLOW-UP VISIT: CPT | Performed by: ORTHOPAEDIC SURGERY

## 2024-02-15 NOTE — PROGRESS NOTES
Name: Roldan Edmondson  YOB: 1945  Gender: male  MRN: [id number]    Plan:    Full-length inserts ordered.  Follow-up in 3 to 4 weeks for recheck    Regarding his antibiotics I do not want to keep him on long-term antibiotics.  Therefore he can stop taking them.  There is a high risk that he does not get this wound to close due to his underlying osteomyelitis and his unwillingness to proceed with an amputation.  This point the only further surgery would be an amputation         Procedure: right FOOT DEBRIDEMENT INCISION AND DRAINAGE - Right and Extensor Hallucis Longus Lengthening Flexor Tenotomy's Of The Second, Third, Forth Toes    Surgery Date: 1/31/2024      Subjective: Denies fevers chills or infection.  Denies any signs of spreading erythema.  He has been taking Augmentin twice daily.  He is also been taking doxycycline he states he has been controlling his pain by smoking marijuana.  Postoperatively there were multiple conversations regarding pain medication and his marijuana use.  We will not provide pain medication    ROS: Patient Denies fever/chills, headache, visual changes, chest pain, shortness of breath, and nausea/vomiting/diarrhea     Exam:     Wounds: appears to be healing well with good reapproximation, healing well , sutures in place and were removed without difficulty        Imaging:   No imaging reviewed

## 2024-02-21 ENCOUNTER — TELEPHONE (OUTPATIENT)
Dept: ORTHOPEDIC SURGERY | Age: 79
End: 2024-02-21

## 2024-02-21 RX ORDER — DOXYCYCLINE HYCLATE 100 MG
100 TABLET ORAL 2 TIMES DAILY
Qty: 60 TABLET | Refills: 1 | OUTPATIENT
Start: 2024-02-21

## 2024-02-21 NOTE — TELEPHONE ENCOUNTER
[facility] called was wanting to know what he needed, patient called them yesterday?/  Theresa  [phone]

## 2024-02-28 ENCOUNTER — OFFICE VISIT (OUTPATIENT)
Dept: INTERNAL MEDICINE CLINIC | Facility: CLINIC | Age: 79
End: 2024-02-28
Payer: MEDICARE

## 2024-02-28 VITALS
DIASTOLIC BLOOD PRESSURE: 75 MMHG | SYSTOLIC BLOOD PRESSURE: 126 MMHG | BODY MASS INDEX: 26.06 KG/M2 | OXYGEN SATURATION: 98 % | WEIGHT: 166 LBS | TEMPERATURE: 97.5 F | HEIGHT: 67 IN | HEART RATE: 61 BPM

## 2024-02-28 DIAGNOSIS — N18.30 STAGE 3 CHRONIC KIDNEY DISEASE, UNSPECIFIED WHETHER STAGE 3A OR 3B CKD (HCC): ICD-10-CM

## 2024-02-28 DIAGNOSIS — I10 ESSENTIAL HYPERTENSION: ICD-10-CM

## 2024-02-28 DIAGNOSIS — N18.30 TYPE 2 DIABETES MELLITUS WITH STAGE 3 CHRONIC KIDNEY DISEASE, WITHOUT LONG-TERM CURRENT USE OF INSULIN, UNSPECIFIED WHETHER STAGE 3A OR 3B CKD (HCC): Primary | ICD-10-CM

## 2024-02-28 DIAGNOSIS — E11.22 TYPE 2 DIABETES MELLITUS WITH STAGE 3 CHRONIC KIDNEY DISEASE, WITHOUT LONG-TERM CURRENT USE OF INSULIN, UNSPECIFIED WHETHER STAGE 3A OR 3B CKD (HCC): Primary | ICD-10-CM

## 2024-02-28 DIAGNOSIS — F10.20 ALCOHOL DEPENDENCE, UNCOMPLICATED (HCC): ICD-10-CM

## 2024-02-28 DIAGNOSIS — E11.22 TYPE 2 DIABETES MELLITUS WITH STAGE 3 CHRONIC KIDNEY DISEASE, WITHOUT LONG-TERM CURRENT USE OF INSULIN, UNSPECIFIED WHETHER STAGE 3A OR 3B CKD (HCC): ICD-10-CM

## 2024-02-28 DIAGNOSIS — N18.30 TYPE 2 DIABETES MELLITUS WITH STAGE 3 CHRONIC KIDNEY DISEASE, WITHOUT LONG-TERM CURRENT USE OF INSULIN, UNSPECIFIED WHETHER STAGE 3A OR 3B CKD (HCC): ICD-10-CM

## 2024-02-28 LAB
ALBUMIN SERPL-MCNC: 3.5 G/DL (ref 3.2–4.6)
ALBUMIN/GLOB SERPL: 0.9 (ref 0.4–1.6)
ALP SERPL-CCNC: 68 U/L (ref 50–136)
ALT SERPL-CCNC: 28 U/L (ref 12–65)
ANION GAP SERPL CALC-SCNC: 4 MMOL/L (ref 2–11)
AST SERPL-CCNC: 22 U/L (ref 15–37)
BASOPHILS # BLD: 0 K/UL (ref 0–0.2)
BASOPHILS NFR BLD: 0 % (ref 0–2)
BILIRUB DIRECT SERPL-MCNC: <0.1 MG/DL
BILIRUB SERPL-MCNC: 0.3 MG/DL (ref 0.2–1.1)
BUN SERPL-MCNC: 27 MG/DL (ref 8–23)
CALCIUM SERPL-MCNC: 10.1 MG/DL (ref 8.3–10.4)
CHLORIDE SERPL-SCNC: 105 MMOL/L (ref 103–113)
CO2 SERPL-SCNC: 28 MMOL/L (ref 21–32)
CREAT SERPL-MCNC: 2 MG/DL (ref 0.8–1.5)
DIFFERENTIAL METHOD BLD: ABNORMAL
EOSINOPHIL # BLD: 0.1 K/UL (ref 0–0.8)
EOSINOPHIL NFR BLD: 1 % (ref 0.5–7.8)
ERYTHROCYTE [DISTWIDTH] IN BLOOD BY AUTOMATED COUNT: 13 % (ref 11.9–14.6)
GLOBULIN SER CALC-MCNC: 4.1 G/DL (ref 2.8–4.5)
GLUCOSE SERPL-MCNC: 163 MG/DL (ref 65–100)
HCT VFR BLD AUTO: 41.8 % (ref 41.1–50.3)
HGB BLD-MCNC: 13.2 G/DL (ref 13.6–17.2)
IMM GRANULOCYTES # BLD AUTO: 0 K/UL (ref 0–0.5)
IMM GRANULOCYTES NFR BLD AUTO: 0 % (ref 0–5)
LYMPHOCYTES # BLD: 1.8 K/UL (ref 0.5–4.6)
LYMPHOCYTES NFR BLD: 18 % (ref 13–44)
MCH RBC QN AUTO: 27.9 PG (ref 26.1–32.9)
MCHC RBC AUTO-ENTMCNC: 31.6 G/DL (ref 31.4–35)
MCV RBC AUTO: 88.4 FL (ref 82–102)
MONOCYTES # BLD: 0.8 K/UL (ref 0.1–1.3)
MONOCYTES NFR BLD: 8 % (ref 4–12)
NEUTS SEG # BLD: 7.2 K/UL (ref 1.7–8.2)
NEUTS SEG NFR BLD: 73 % (ref 43–78)
NRBC # BLD: 0 K/UL (ref 0–0.2)
PLATELET # BLD AUTO: 340 K/UL (ref 150–450)
PMV BLD AUTO: 9.8 FL (ref 9.4–12.3)
POTASSIUM SERPL-SCNC: 4.1 MMOL/L (ref 3.5–5.1)
PROT SERPL-MCNC: 7.6 G/DL (ref 6.3–8.2)
RBC # BLD AUTO: 4.73 M/UL (ref 4.23–5.6)
SODIUM SERPL-SCNC: 137 MMOL/L (ref 136–146)
WBC # BLD AUTO: 9.9 K/UL (ref 4.3–11.1)

## 2024-02-28 PROCEDURE — G8417 CALC BMI ABV UP PARAM F/U: HCPCS | Performed by: INTERNAL MEDICINE

## 2024-02-28 PROCEDURE — 3078F DIAST BP <80 MM HG: CPT | Performed by: INTERNAL MEDICINE

## 2024-02-28 PROCEDURE — 1123F ACP DISCUSS/DSCN MKR DOCD: CPT | Performed by: INTERNAL MEDICINE

## 2024-02-28 PROCEDURE — G8484 FLU IMMUNIZE NO ADMIN: HCPCS | Performed by: INTERNAL MEDICINE

## 2024-02-28 PROCEDURE — 1036F TOBACCO NON-USER: CPT | Performed by: INTERNAL MEDICINE

## 2024-02-28 PROCEDURE — G8427 DOCREV CUR MEDS BY ELIG CLIN: HCPCS | Performed by: INTERNAL MEDICINE

## 2024-02-28 PROCEDURE — 3074F SYST BP LT 130 MM HG: CPT | Performed by: INTERNAL MEDICINE

## 2024-02-28 PROCEDURE — 99214 OFFICE O/P EST MOD 30 MIN: CPT | Performed by: INTERNAL MEDICINE

## 2024-02-28 RX ORDER — LOSARTAN POTASSIUM 100 MG/1
100 TABLET ORAL DAILY
Qty: 90 TABLET | Refills: 1 | Status: SHIPPED | OUTPATIENT
Start: 2024-02-28

## 2024-02-28 RX ORDER — HYDROCHLOROTHIAZIDE 25 MG/1
TABLET ORAL
Qty: 90 TABLET | Refills: 1 | Status: SHIPPED | OUTPATIENT
Start: 2024-02-28

## 2024-02-28 RX ORDER — AMLODIPINE BESYLATE 10 MG/1
10 TABLET ORAL DAILY
Qty: 90 TABLET | Refills: 1 | Status: SHIPPED | OUTPATIENT
Start: 2024-02-28

## 2024-02-28 RX ORDER — ATENOLOL 25 MG/1
25 TABLET ORAL DAILY
Qty: 90 TABLET | Refills: 1 | Status: SHIPPED | OUTPATIENT
Start: 2024-02-28

## 2024-02-28 ASSESSMENT — ENCOUNTER SYMPTOMS: SHORTNESS OF BREATH: 0

## 2024-02-28 NOTE — PROGRESS NOTES
[facility]  David Mandujano M.D.  Internal Medicine  [address] [place] [id number]  Office : [phone]  Fax : [phone]    Chief Complaint   Patient presents with    Diabetes       History of Present Illness:  Roldan Edmondson is a 78 y.o. male.  HPI    Diabetes Mellitus  Patient presents  for follow up on diabetes.  Onset of symptoms was several years ago. Patient describes symptoms as none. Course to date has been waxing and waning.Diet and Lifestyle: exercises sporadically  smoker  Home glucose monitoring:  Results average n/a. Patient denies polyuria and polydipsia. No wounds in lower limbs.  Most recent HbA1c was   Hemoglobin A1C   Date Value Ref Range Status   09/29/2023 7.7 (H) 4.8 - 5.6 % Final       Hypertension  Patient is in for Hypertension which is stable.    Diet and Lifestyle: generally follows a low sodium diet  Home BP Monitoring: is not measured at home.  Patient's recent blood pressures were   BP Readings from Last 3 Encounters:   02/28/24 126/75   01/31/24 (!) 129/55   01/30/24 130/61   .   taking medications as instructed, no medication side effects noted, no TIA's, no chest pain on exertion, no dyspnea on exertion, no swelling of ankles. Cardiac risk factors consist of advanced age (older than 55 for men, 65 for women), diabetes mellitus, hypertension, male gender, and smoking/ tobacco exposure.  Lab Results   Component Value Date/Time     09/29/2023 10:44 AM    K 4.7 09/29/2023 10:44 AM     09/29/2023 10:44 AM    CO2 27 09/29/2023 10:44 AM    BUN 47 09/29/2023 10:44 AM    CREATININE 3.30 09/29/2023 10:44 AM    GLUCOSE 146 09/29/2023 10:44 AM    CALCIUM 10.7 09/29/2023 10:44 AM    LABGLOM 19 09/29/2023 10:44 AM          CKD Stage 3  Being followed by Nephrology.   Lab Results   Component Value Date/Time     09/29/2023 10:44 AM    K 4.7 09/29/2023 10:44 AM     09/29/2023 10:44 AM    CO2 27 09/29/2023 10:44 AM

## 2024-02-29 LAB
EST. AVERAGE GLUCOSE BLD GHB EST-MCNC: 209 MG/DL
HBA1C MFR BLD: 8.9 % (ref 4.8–5.6)

## 2024-03-01 NOTE — TELEPHONE ENCOUNTER
----- Message from David Mandujano MD sent at 2/29/2024  7:26 AM EST -----  I have reviewed all lab results which are normal or stable except diabetes is uncontrolled so add pioglitazone 15 mg daily. Please inform the patient.

## 2024-03-04 RX ORDER — PIOGLITAZONEHYDROCHLORIDE 15 MG/1
15 TABLET ORAL DAILY
Qty: 90 TABLET | Refills: 1 | Status: SHIPPED | OUTPATIENT
Start: 2024-03-04

## 2024-03-07 ENCOUNTER — OFFICE VISIT (OUTPATIENT)
Dept: ORTHOPEDIC SURGERY | Age: 79
End: 2024-03-07

## 2024-03-07 DIAGNOSIS — M86.171 ACUTE OSTEOMYELITIS OF RIGHT FOOT (HCC): ICD-10-CM

## 2024-03-07 DIAGNOSIS — G89.18 POST-OP PAIN: Primary | ICD-10-CM

## 2024-03-07 PROCEDURE — 99024 POSTOP FOLLOW-UP VISIT: CPT | Performed by: ORTHOPAEDIC SURGERY

## 2024-03-07 NOTE — PROGRESS NOTES
Name: Roldan Edmondson  YOB: 1945  Gender: male  MRN: [id number]    Plan:    It was stressed to him that these orthotics could help prevent future wounds but he states that he does not have the money.  There is a high risk that he does not get this wound to close due to his underlying osteomyelitis and his unwillingness to proceed with an amputation.  This point the only further surgery would be an amputation    Due to his unwillingness to get inserts and his unwillingness for an amputation I do not have much more to offer him.  He can follow-up on a as needed basis.         Procedure: right FOOT DEBRIDEMENT INCISION AND DRAINAGE - Right and Extensor Hallucis Longus Lengthening Flexor Tenotomy's Of The Second, Third, Forth Toes    Surgery Date: 1/31/2024      Subjective: Denies fevers chills or infection.  Denies any signs of spreading erythema.  He has been taking Augmentin twice daily.  He is also been taking doxycycline he states he has been controlling his pain by smoking marijuana.  Postoperatively there were multiple conversations regarding pain medication and his marijuana use.  We will not provide pain medication    3/7/2024-patient states that he is doing well.  He denies any pain.  He states that he did not get the custom orthotics that we ordered for him at his last visit due to them causing $145 and he is unable to pay that.  He is continue to walk in the postop sandal and has worn a shoe at times.    ROS: Patient Denies fever/chills, headache, visual changes, chest pain, shortness of breath, and nausea/vomiting/diarrhea     Exam:     Wounds: appears to be healing well with good reapproximation, healing well , significant callus present on the plantar aspect of the first MTP joint  No open wounds      Imaging:   No imaging reviewed    I spent a significant amount of time with him today discussing inserts.  I explained to him in great detail that the inserts, although around 150 bucks, are a

## 2024-05-17 DIAGNOSIS — N18.30 TYPE 2 DIABETES MELLITUS WITH STAGE 3 CHRONIC KIDNEY DISEASE, WITHOUT LONG-TERM CURRENT USE OF INSULIN, UNSPECIFIED WHETHER STAGE 3A OR 3B CKD (HCC): ICD-10-CM

## 2024-05-17 DIAGNOSIS — I10 ESSENTIAL HYPERTENSION: ICD-10-CM

## 2024-05-17 DIAGNOSIS — E11.22 TYPE 2 DIABETES MELLITUS WITH STAGE 3 CHRONIC KIDNEY DISEASE, WITHOUT LONG-TERM CURRENT USE OF INSULIN, UNSPECIFIED WHETHER STAGE 3A OR 3B CKD (HCC): ICD-10-CM

## 2024-05-17 NOTE — TELEPHONE ENCOUNTER
A fax was received from [facility] requesting refills for Mr. Edmondson. I contacted the patient to ask if he requested this. He says he did change insurance companies ([employer]) and he does want to use this mail order pharmacy.    His next appt is not until 7/1/2024.  Last visit 2/28/24.

## 2024-05-20 RX ORDER — AMLODIPINE BESYLATE 10 MG/1
10 TABLET ORAL DAILY
Qty: 90 TABLET | Refills: 0 | Status: SHIPPED | OUTPATIENT
Start: 2024-05-20

## 2024-05-20 RX ORDER — LOSARTAN POTASSIUM 100 MG/1
100 TABLET ORAL DAILY
Qty: 90 TABLET | Refills: 0 | Status: SHIPPED | OUTPATIENT
Start: 2024-05-20

## 2024-05-20 RX ORDER — ATENOLOL 25 MG/1
25 TABLET ORAL DAILY
Qty: 90 TABLET | Refills: 0 | Status: SHIPPED | OUTPATIENT
Start: 2024-05-20

## 2024-05-20 RX ORDER — PIOGLITAZONEHYDROCHLORIDE 15 MG/1
15 TABLET ORAL DAILY
Qty: 90 TABLET | Refills: 0 | Status: SHIPPED | OUTPATIENT
Start: 2024-05-20

## 2024-05-20 RX ORDER — HYDROCHLOROTHIAZIDE 25 MG/1
TABLET ORAL
Qty: 90 TABLET | Refills: 0 | Status: SHIPPED | OUTPATIENT
Start: 2024-05-20

## 2024-07-01 ENCOUNTER — APPOINTMENT (OUTPATIENT)
Dept: GENERAL RADIOLOGY | Age: 79
DRG: 623 | End: 2024-07-01
Payer: MEDICARE

## 2024-07-01 ENCOUNTER — HOSPITAL ENCOUNTER (INPATIENT)
Age: 79
LOS: 5 days | Discharge: HOME | DRG: 623 | End: 2024-07-06
Attending: EMERGENCY MEDICINE | Admitting: STUDENT IN AN ORGANIZED HEALTH CARE EDUCATION/TRAINING PROGRAM
Payer: MEDICARE

## 2024-07-01 ENCOUNTER — OFFICE VISIT (OUTPATIENT)
Dept: INTERNAL MEDICINE CLINIC | Facility: CLINIC | Age: 79
End: 2024-07-01
Payer: MEDICARE

## 2024-07-01 VITALS
OXYGEN SATURATION: 98 % | WEIGHT: 157 LBS | DIASTOLIC BLOOD PRESSURE: 74 MMHG | BODY MASS INDEX: 24.64 KG/M2 | HEART RATE: 62 BPM | SYSTOLIC BLOOD PRESSURE: 122 MMHG | HEIGHT: 67 IN

## 2024-07-01 DIAGNOSIS — I10 PRIMARY HYPERTENSION: ICD-10-CM

## 2024-07-01 DIAGNOSIS — N18.9 ACUTE KIDNEY INJURY SUPERIMPOSED ON CKD (HCC): ICD-10-CM

## 2024-07-01 DIAGNOSIS — R79.89 ELEVATED LACTIC ACID LEVEL: ICD-10-CM

## 2024-07-01 DIAGNOSIS — E11.628 DIABETIC INFECTION OF LEFT FOOT (HCC): Primary | ICD-10-CM

## 2024-07-01 DIAGNOSIS — E11.628 DIABETIC FOOT INFECTION (HCC): ICD-10-CM

## 2024-07-01 DIAGNOSIS — E11.43 TYPE 2 DIABETES MELLITUS WITH DIABETIC AUTONOMIC NEUROPATHY, WITHOUT LONG-TERM CURRENT USE OF INSULIN (HCC): ICD-10-CM

## 2024-07-01 DIAGNOSIS — L08.9 DIABETIC FOOT INFECTION (HCC): ICD-10-CM

## 2024-07-01 DIAGNOSIS — N17.9 ACUTE KIDNEY INJURY SUPERIMPOSED ON CKD (HCC): ICD-10-CM

## 2024-07-01 DIAGNOSIS — D72.829 LEUKOCYTOSIS, UNSPECIFIED TYPE: Primary | ICD-10-CM

## 2024-07-01 DIAGNOSIS — I10 ESSENTIAL HYPERTENSION: ICD-10-CM

## 2024-07-01 DIAGNOSIS — L08.9 DIABETIC INFECTION OF LEFT FOOT (HCC): Primary | ICD-10-CM

## 2024-07-01 DIAGNOSIS — E11.22 TYPE 2 DIABETES MELLITUS WITH STAGE 3 CHRONIC KIDNEY DISEASE, WITHOUT LONG-TERM CURRENT USE OF INSULIN, UNSPECIFIED WHETHER STAGE 3A OR 3B CKD (HCC): ICD-10-CM

## 2024-07-01 DIAGNOSIS — F10.20 UNCOMPLICATED ALCOHOL DEPENDENCE (HCC): ICD-10-CM

## 2024-07-01 DIAGNOSIS — N18.31 STAGE 3A CHRONIC KIDNEY DISEASE (HCC): ICD-10-CM

## 2024-07-01 DIAGNOSIS — E11.8 DIABETIC FOOT (HCC): ICD-10-CM

## 2024-07-01 DIAGNOSIS — K21.9 GASTROESOPHAGEAL REFLUX DISEASE, UNSPECIFIED WHETHER ESOPHAGITIS PRESENT: ICD-10-CM

## 2024-07-01 DIAGNOSIS — Z98.890 STATUS POST EXCISIONAL DEBRIDEMENT: ICD-10-CM

## 2024-07-01 DIAGNOSIS — N18.30 TYPE 2 DIABETES MELLITUS WITH STAGE 3 CHRONIC KIDNEY DISEASE, WITHOUT LONG-TERM CURRENT USE OF INSULIN, UNSPECIFIED WHETHER STAGE 3A OR 3B CKD (HCC): ICD-10-CM

## 2024-07-01 LAB
ALBUMIN SERPL-MCNC: 3.3 G/DL (ref 3.2–4.6)
ALBUMIN/GLOB SERPL: 0.6 (ref 1–1.9)
ALP SERPL-CCNC: 72 U/L (ref 40–129)
ALT SERPL-CCNC: 23 U/L (ref 12–65)
AMPHET UR QL SCN: NEGATIVE
ANION GAP SERPL CALC-SCNC: 16 MMOL/L (ref 9–18)
APPEARANCE UR: CLEAR
AST SERPL-CCNC: 28 U/L (ref 15–37)
BACTERIA URNS QL MICRO: NEGATIVE /HPF
BARBITURATES UR QL SCN: NEGATIVE
BASOPHILS # BLD: 0 K/UL (ref 0–0.2)
BASOPHILS NFR BLD: 0 % (ref 0–2)
BENZODIAZ UR QL: NEGATIVE
BILIRUB SERPL-MCNC: 0.4 MG/DL (ref 0–1.2)
BILIRUB UR QL: NEGATIVE
BUN SERPL-MCNC: 55 MG/DL (ref 8–23)
CALCIUM SERPL-MCNC: 10.2 MG/DL (ref 8.8–10.2)
CANNABINOIDS UR QL SCN: POSITIVE
CASTS URNS QL MICRO: 0 /LPF
CHLORIDE SERPL-SCNC: 95 MMOL/L (ref 98–107)
CO2 SERPL-SCNC: 21 MMOL/L (ref 20–28)
COCAINE UR QL SCN: NEGATIVE
COLOR UR: NORMAL
CREAT SERPL-MCNC: 2.83 MG/DL (ref 0.8–1.3)
CRP SERPL HS-MCNC: 186 MG/L (ref 0–3)
CRYSTALS URNS QL MICRO: 0 /LPF
DIFFERENTIAL METHOD BLD: ABNORMAL
EOSINOPHIL # BLD: 0.1 K/UL (ref 0–0.8)
EOSINOPHIL NFR BLD: 1 % (ref 0.5–7.8)
EPI CELLS #/AREA URNS HPF: NORMAL /HPF
ERYTHROCYTE [DISTWIDTH] IN BLOOD BY AUTOMATED COUNT: 13.2 % (ref 11.9–14.6)
ERYTHROCYTE [SEDIMENTATION RATE] IN BLOOD: 33 MM/HR
EST. AVERAGE GLUCOSE BLD GHB EST-MCNC: 160 MG/DL
ETHANOL SERPL-MCNC: <11 MG/DL (ref 0–0.08)
GLOBULIN SER CALC-MCNC: 5.6 G/DL (ref 2.3–3.5)
GLUCOSE BLD STRIP.AUTO-MCNC: 154 MG/DL (ref 65–100)
GLUCOSE BLD STRIP.AUTO-MCNC: 192 MG/DL (ref 65–100)
GLUCOSE SERPL-MCNC: 294 MG/DL (ref 70–99)
GLUCOSE UR STRIP.AUTO-MCNC: >1000 MG/DL
HBA1C MFR BLD: 7.2 % (ref 0–5.6)
HCT VFR BLD AUTO: 41 % (ref 41.1–50.3)
HGB BLD-MCNC: 13.2 G/DL (ref 13.6–17.2)
HGB UR QL STRIP: NEGATIVE
HYALINE CASTS URNS QL MICRO: NORMAL /LPF
IMM GRANULOCYTES # BLD AUTO: 0.1 K/UL (ref 0–0.5)
IMM GRANULOCYTES NFR BLD AUTO: 1 % (ref 0–5)
KETONES UR QL STRIP.AUTO: NEGATIVE MG/DL
LACTATE SERPL-SCNC: 1.7 MMOL/L (ref 0.5–2)
LACTATE SERPL-SCNC: 2.3 MMOL/L (ref 0.5–2)
LEUKOCYTE ESTERASE UR QL STRIP.AUTO: NEGATIVE
LYMPHOCYTES # BLD: 1.5 K/UL (ref 0.5–4.6)
LYMPHOCYTES NFR BLD: 10 % (ref 13–44)
MCH RBC QN AUTO: 28.2 PG (ref 26.1–32.9)
MCHC RBC AUTO-ENTMCNC: 32.2 G/DL (ref 31.4–35)
MCV RBC AUTO: 87.6 FL (ref 82–102)
METHADONE UR QL: NEGATIVE
MONOCYTES # BLD: 1.5 K/UL (ref 0.1–1.3)
MONOCYTES NFR BLD: 10 % (ref 4–12)
MUCOUS THREADS URNS QL MICRO: 0 /LPF
NEUTS SEG # BLD: 11.4 K/UL (ref 1.7–8.2)
NEUTS SEG NFR BLD: 78 % (ref 43–78)
NITRITE UR QL STRIP.AUTO: NEGATIVE
NRBC # BLD: 0 K/UL (ref 0–0.2)
OPIATES UR QL: NEGATIVE
PCP UR QL: NEGATIVE
PH UR STRIP: 5 (ref 5–9)
PLATELET # BLD AUTO: 441 K/UL (ref 150–450)
PLATELET COMMENT: ABNORMAL
PMV BLD AUTO: 9.4 FL (ref 9.4–12.3)
POTASSIUM SERPL-SCNC: 4.6 MMOL/L (ref 3.5–5.1)
PROCALCITONIN SERPL-MCNC: 0.23 NG/ML (ref 0–0.1)
PROT SERPL-MCNC: 8.9 G/DL (ref 6.3–8.2)
PROT UR STRIP-MCNC: NEGATIVE MG/DL
RBC # BLD AUTO: 4.68 M/UL (ref 4.23–5.6)
RBC #/AREA URNS HPF: NORMAL /HPF
RBC MORPH BLD: ABNORMAL
SERVICE CMNT-IMP: ABNORMAL
SERVICE CMNT-IMP: ABNORMAL
SODIUM SERPL-SCNC: 132 MMOL/L (ref 136–145)
SP GR UR REFRACTOMETRY: 1.01 (ref 1–1.02)
URINE CULTURE IF INDICATED: NORMAL
UROBILINOGEN UR QL STRIP.AUTO: 0.2 EU/DL (ref 0.2–1)
WBC # BLD AUTO: 14.6 K/UL (ref 4.3–11.1)
WBC MORPH BLD: ABNORMAL
WBC URNS QL MICRO: NORMAL /HPF

## 2024-07-01 PROCEDURE — 2580000003 HC RX 258: Performed by: STUDENT IN AN ORGANIZED HEALTH CARE EDUCATION/TRAINING PROGRAM

## 2024-07-01 PROCEDURE — 80307 DRUG TEST PRSMV CHEM ANLYZR: CPT

## 2024-07-01 PROCEDURE — 87040 BLOOD CULTURE FOR BACTERIA: CPT

## 2024-07-01 PROCEDURE — 81001 URINALYSIS AUTO W/SCOPE: CPT

## 2024-07-01 PROCEDURE — 82077 ASSAY SPEC XCP UR&BREATH IA: CPT

## 2024-07-01 PROCEDURE — 96365 THER/PROPH/DIAG IV INF INIT: CPT

## 2024-07-01 PROCEDURE — G2211 COMPLEX E/M VISIT ADD ON: HCPCS | Performed by: INTERNAL MEDICINE

## 2024-07-01 PROCEDURE — 1123F ACP DISCUSS/DSCN MKR DOCD: CPT | Performed by: INTERNAL MEDICINE

## 2024-07-01 PROCEDURE — 2580000003 HC RX 258: Performed by: EMERGENCY MEDICINE

## 2024-07-01 PROCEDURE — 85025 COMPLETE CBC W/AUTO DIFF WBC: CPT

## 2024-07-01 PROCEDURE — 82962 GLUCOSE BLOOD TEST: CPT

## 2024-07-01 PROCEDURE — 83605 ASSAY OF LACTIC ACID: CPT

## 2024-07-01 PROCEDURE — 3074F SYST BP LT 130 MM HG: CPT | Performed by: INTERNAL MEDICINE

## 2024-07-01 PROCEDURE — 99215 OFFICE O/P EST HI 40 MIN: CPT | Performed by: INTERNAL MEDICINE

## 2024-07-01 PROCEDURE — 6360000002 HC RX W HCPCS: Performed by: EMERGENCY MEDICINE

## 2024-07-01 PROCEDURE — 73630 X-RAY EXAM OF FOOT: CPT

## 2024-07-01 PROCEDURE — 84145 PROCALCITONIN (PCT): CPT

## 2024-07-01 PROCEDURE — 83036 HEMOGLOBIN GLYCOSYLATED A1C: CPT

## 2024-07-01 PROCEDURE — 86141 C-REACTIVE PROTEIN HS: CPT

## 2024-07-01 PROCEDURE — 3052F HG A1C>EQUAL 8.0%<EQUAL 9.0%: CPT | Performed by: INTERNAL MEDICINE

## 2024-07-01 PROCEDURE — 6370000000 HC RX 637 (ALT 250 FOR IP): Performed by: STUDENT IN AN ORGANIZED HEALTH CARE EDUCATION/TRAINING PROGRAM

## 2024-07-01 PROCEDURE — 1100000000 HC RM PRIVATE

## 2024-07-01 PROCEDURE — 3078F DIAST BP <80 MM HG: CPT | Performed by: INTERNAL MEDICINE

## 2024-07-01 PROCEDURE — 99285 EMERGENCY DEPT VISIT HI MDM: CPT

## 2024-07-01 PROCEDURE — 80053 COMPREHEN METABOLIC PANEL: CPT

## 2024-07-01 PROCEDURE — 6360000002 HC RX W HCPCS: Performed by: STUDENT IN AN ORGANIZED HEALTH CARE EDUCATION/TRAINING PROGRAM

## 2024-07-01 PROCEDURE — 85652 RBC SED RATE AUTOMATED: CPT

## 2024-07-01 RX ORDER — AMLODIPINE BESYLATE 10 MG/1
10 TABLET ORAL DAILY
Status: DISCONTINUED | OUTPATIENT
Start: 2024-07-01 | End: 2024-07-06 | Stop reason: HOSPADM

## 2024-07-01 RX ORDER — SODIUM CHLORIDE 0.9 % (FLUSH) 0.9 %
5-40 SYRINGE (ML) INJECTION EVERY 12 HOURS SCHEDULED
Status: DISCONTINUED | OUTPATIENT
Start: 2024-07-01 | End: 2024-07-06 | Stop reason: HOSPADM

## 2024-07-01 RX ORDER — PIOGLITAZONEHYDROCHLORIDE 15 MG/1
15 TABLET ORAL DAILY
Qty: 90 TABLET | Refills: 0 | Status: ON HOLD | OUTPATIENT
Start: 2024-07-01 | End: 2024-07-06 | Stop reason: HOSPADM

## 2024-07-01 RX ORDER — LORAZEPAM 1 MG/1
1 TABLET ORAL
Status: DISCONTINUED | OUTPATIENT
Start: 2024-07-01 | End: 2024-07-06 | Stop reason: HOSPADM

## 2024-07-01 RX ORDER — LOSARTAN POTASSIUM 50 MG/1
100 TABLET ORAL DAILY
Status: DISCONTINUED | OUTPATIENT
Start: 2024-07-01 | End: 2024-07-06 | Stop reason: HOSPADM

## 2024-07-01 RX ORDER — ACETAMINOPHEN 325 MG/1
650 TABLET ORAL EVERY 6 HOURS PRN
Status: DISCONTINUED | OUTPATIENT
Start: 2024-07-01 | End: 2024-07-06 | Stop reason: HOSPADM

## 2024-07-01 RX ORDER — HYDRALAZINE HYDROCHLORIDE 20 MG/ML
10 INJECTION INTRAMUSCULAR; INTRAVENOUS EVERY 6 HOURS PRN
Status: DISCONTINUED | OUTPATIENT
Start: 2024-07-01 | End: 2024-07-06 | Stop reason: HOSPADM

## 2024-07-01 RX ORDER — INSULIN LISPRO 100 [IU]/ML
0-4 INJECTION, SOLUTION INTRAVENOUS; SUBCUTANEOUS NIGHTLY
Status: DISCONTINUED | OUTPATIENT
Start: 2024-07-01 | End: 2024-07-06 | Stop reason: HOSPADM

## 2024-07-01 RX ORDER — HYDROCHLOROTHIAZIDE 25 MG/1
TABLET ORAL
Qty: 90 TABLET | Refills: 0 | Status: ON HOLD | OUTPATIENT
Start: 2024-07-01 | End: 2024-07-06

## 2024-07-01 RX ORDER — LORAZEPAM 1 MG/1
4 TABLET ORAL
Status: DISCONTINUED | OUTPATIENT
Start: 2024-07-01 | End: 2024-07-06 | Stop reason: HOSPADM

## 2024-07-01 RX ORDER — ENOXAPARIN SODIUM 100 MG/ML
30 INJECTION SUBCUTANEOUS EVERY 24 HOURS
Status: DISCONTINUED | OUTPATIENT
Start: 2024-07-01 | End: 2024-07-06 | Stop reason: HOSPADM

## 2024-07-01 RX ORDER — ONDANSETRON 2 MG/ML
4 INJECTION INTRAMUSCULAR; INTRAVENOUS EVERY 6 HOURS PRN
Status: DISCONTINUED | OUTPATIENT
Start: 2024-07-01 | End: 2024-07-06 | Stop reason: HOSPADM

## 2024-07-01 RX ORDER — LANOLIN ALCOHOL/MO/W.PET/CERES
100 CREAM (GRAM) TOPICAL DAILY
Status: DISCONTINUED | OUTPATIENT
Start: 2024-07-01 | End: 2024-07-06 | Stop reason: HOSPADM

## 2024-07-01 RX ORDER — LANOLIN ALCOHOL/MO/W.PET/CERES
3 CREAM (GRAM) TOPICAL NIGHTLY PRN
Status: DISCONTINUED | OUTPATIENT
Start: 2024-07-01 | End: 2024-07-06 | Stop reason: HOSPADM

## 2024-07-01 RX ORDER — HYDROCHLOROTHIAZIDE 25 MG/1
25 TABLET ORAL DAILY
Status: DISCONTINUED | OUTPATIENT
Start: 2024-07-01 | End: 2024-07-06 | Stop reason: HOSPADM

## 2024-07-01 RX ORDER — LORAZEPAM 1 MG/1
3 TABLET ORAL
Status: DISCONTINUED | OUTPATIENT
Start: 2024-07-01 | End: 2024-07-06 | Stop reason: HOSPADM

## 2024-07-01 RX ORDER — ONDANSETRON 4 MG/1
4 TABLET, ORALLY DISINTEGRATING ORAL EVERY 8 HOURS PRN
Status: DISCONTINUED | OUTPATIENT
Start: 2024-07-01 | End: 2024-07-06 | Stop reason: HOSPADM

## 2024-07-01 RX ORDER — SODIUM CHLORIDE 9 MG/ML
INJECTION, SOLUTION INTRAVENOUS PRN
Status: DISCONTINUED | OUTPATIENT
Start: 2024-07-01 | End: 2024-07-06 | Stop reason: HOSPADM

## 2024-07-01 RX ORDER — CLINDAMYCIN PHOSPHATE 600 MG/50ML
600 INJECTION, SOLUTION INTRAVENOUS EVERY 8 HOURS
Status: COMPLETED | OUTPATIENT
Start: 2024-07-01 | End: 2024-07-03

## 2024-07-01 RX ORDER — ACETAMINOPHEN 650 MG/1
650 SUPPOSITORY RECTAL EVERY 6 HOURS PRN
Status: DISCONTINUED | OUTPATIENT
Start: 2024-07-01 | End: 2024-07-06 | Stop reason: HOSPADM

## 2024-07-01 RX ORDER — SODIUM CHLORIDE 0.9 % (FLUSH) 0.9 %
5-40 SYRINGE (ML) INJECTION PRN
Status: DISCONTINUED | OUTPATIENT
Start: 2024-07-01 | End: 2024-07-06 | Stop reason: HOSPADM

## 2024-07-01 RX ORDER — FOLIC ACID 1 MG/1
1 TABLET ORAL DAILY
Status: DISCONTINUED | OUTPATIENT
Start: 2024-07-01 | End: 2024-07-06 | Stop reason: HOSPADM

## 2024-07-01 RX ORDER — LOSARTAN POTASSIUM 100 MG/1
100 TABLET ORAL DAILY
Qty: 90 TABLET | Refills: 0 | Status: SHIPPED | OUTPATIENT
Start: 2024-07-01

## 2024-07-01 RX ORDER — INSULIN LISPRO 100 [IU]/ML
0-8 INJECTION, SOLUTION INTRAVENOUS; SUBCUTANEOUS
Status: DISCONTINUED | OUTPATIENT
Start: 2024-07-01 | End: 2024-07-06 | Stop reason: HOSPADM

## 2024-07-01 RX ORDER — SODIUM CHLORIDE 9 MG/ML
INJECTION, SOLUTION INTRAVENOUS CONTINUOUS
Status: DISCONTINUED | OUTPATIENT
Start: 2024-07-01 | End: 2024-07-02

## 2024-07-01 RX ORDER — ATENOLOL 25 MG/1
25 TABLET ORAL DAILY
Qty: 90 TABLET | Refills: 0 | Status: ON HOLD | OUTPATIENT
Start: 2024-07-01 | End: 2024-07-06

## 2024-07-01 RX ORDER — LORAZEPAM 1 MG/1
2 TABLET ORAL
Status: DISCONTINUED | OUTPATIENT
Start: 2024-07-01 | End: 2024-07-06 | Stop reason: HOSPADM

## 2024-07-01 RX ORDER — POLYETHYLENE GLYCOL 3350 17 G/17G
17 POWDER, FOR SOLUTION ORAL DAILY PRN
Status: DISCONTINUED | OUTPATIENT
Start: 2024-07-01 | End: 2024-07-06 | Stop reason: HOSPADM

## 2024-07-01 RX ORDER — AMLODIPINE BESYLATE 10 MG/1
10 TABLET ORAL DAILY
Qty: 90 TABLET | Refills: 0 | Status: SHIPPED | OUTPATIENT
Start: 2024-07-01

## 2024-07-01 RX ORDER — 0.9 % SODIUM CHLORIDE 0.9 %
30 INTRAVENOUS SOLUTION INTRAVENOUS ONCE
Status: COMPLETED | OUTPATIENT
Start: 2024-07-01 | End: 2024-07-01

## 2024-07-01 RX ADMIN — PIPERACILLIN AND TAZOBACTAM 4500 MG: 4; .5 INJECTION, POWDER, LYOPHILIZED, FOR SOLUTION INTRAVENOUS at 13:36

## 2024-07-01 RX ADMIN — FOLIC ACID 1 MG: 1 TABLET ORAL at 17:41

## 2024-07-01 RX ADMIN — SODIUM CHLORIDE: 9 INJECTION, SOLUTION INTRAVENOUS at 17:46

## 2024-07-01 RX ADMIN — CLINDAMYCIN PHOSPHATE 600 MG: 600 INJECTION, SOLUTION INTRAVENOUS at 17:48

## 2024-07-01 RX ADMIN — SODIUM CHLORIDE 1983 ML: 9 INJECTION, SOLUTION INTRAVENOUS at 13:37

## 2024-07-01 RX ADMIN — SODIUM CHLORIDE, PRESERVATIVE FREE 5 ML: 5 INJECTION INTRAVENOUS at 22:42

## 2024-07-01 RX ADMIN — ENOXAPARIN SODIUM 30 MG: 100 INJECTION SUBCUTANEOUS at 17:41

## 2024-07-01 RX ADMIN — CEFEPIME 2000 MG: 2 INJECTION, POWDER, FOR SOLUTION INTRAVENOUS at 17:48

## 2024-07-01 RX ADMIN — CLINDAMYCIN PHOSPHATE 600 MG: 600 INJECTION, SOLUTION INTRAVENOUS at 22:42

## 2024-07-01 RX ADMIN — VANCOMYCIN HYDROCHLORIDE 1750 MG: 10 INJECTION, POWDER, LYOPHILIZED, FOR SOLUTION INTRAVENOUS at 15:31

## 2024-07-01 SDOH — ECONOMIC STABILITY: INCOME INSECURITY: HOW HARD IS IT FOR YOU TO PAY FOR THE VERY BASICS LIKE FOOD, HOUSING, MEDICAL CARE, AND HEATING?: NOT HARD AT ALL

## 2024-07-01 SDOH — ECONOMIC STABILITY: FOOD INSECURITY: WITHIN THE PAST 12 MONTHS, THE FOOD YOU BOUGHT JUST DIDN'T LAST AND YOU DIDN'T HAVE MONEY TO GET MORE.: NEVER TRUE

## 2024-07-01 ASSESSMENT — PAIN SCALES - GENERAL
PAINLEVEL_OUTOF10: 10
PAINLEVEL_OUTOF10: 0

## 2024-07-01 ASSESSMENT — PATIENT HEALTH QUESTIONNAIRE - PHQ9
SUM OF ALL RESPONSES TO PHQ QUESTIONS 1-9: 0
1. LITTLE INTEREST OR PLEASURE IN DOING THINGS: NOT AT ALL
SUM OF ALL RESPONSES TO PHQ QUESTIONS 1-9: 0
SUM OF ALL RESPONSES TO PHQ9 QUESTIONS 1 & 2: 0
2. FEELING DOWN, DEPRESSED OR HOPELESS: NOT AT ALL
SUM OF ALL RESPONSES TO PHQ QUESTIONS 1-9: 0
SUM OF ALL RESPONSES TO PHQ QUESTIONS 1-9: 0

## 2024-07-01 ASSESSMENT — ENCOUNTER SYMPTOMS: SHORTNESS OF BREATH: 0

## 2024-07-01 ASSESSMENT — LIFESTYLE VARIABLES
HOW MANY STANDARD DRINKS CONTAINING ALCOHOL DO YOU HAVE ON A TYPICAL DAY: 1 OR 2
HOW OFTEN DO YOU HAVE A DRINK CONTAINING ALCOHOL: 4 OR MORE TIMES A WEEK

## 2024-07-01 ASSESSMENT — PAIN DESCRIPTION - LOCATION: LOCATION: FOOT

## 2024-07-01 ASSESSMENT — PAIN - FUNCTIONAL ASSESSMENT: PAIN_FUNCTIONAL_ASSESSMENT: 0-10

## 2024-07-01 ASSESSMENT — PAIN DESCRIPTION - ORIENTATION: ORIENTATION: LEFT

## 2024-07-01 NOTE — ED TRIAGE NOTES
Pt reports left foot pain and swelling that began 4 days ago.  Pt has hx of DM2 but denies any ulcer on foot.

## 2024-07-01 NOTE — H&P
Hospitalist History and Physical   Admit Date:  2024 10:04 AM   Name:  Roldan Edmondson   Age:  78 y.o.  Sex:  male  :  1945   MRN:  [id number]   Room:  ER30/30    Presenting/Chief Complaint: Foot Pain     Reason(s) for Admission: Diabetic foot (HCC) [E11.8]     History of Present Illness:   Roldan Edmondson is a 78 y.o. male with medical history of HTN, T2DM, Etoh use, CKD and prior osteomyelitis of right foot presenting with left foot pain and swelling worsening over 3-4 days. Denies any trauma to the area.  States his right foot started with similar injury before and underwent debridement of right foot.  Went to PCP today who sent him to ER. History is severely limited and mostly from chart review.     In the ER /60, remaining vitals WNL.   , scr 2.83, lactic acid 2.3, , WBC 14.6, left foot xray with infectious changes within soft tissue over first metatarsal with soft tissue swelling and gas noted.     Sister to make decisions on his behalf if he cannot. And he is full code.       Assessment & Plan:     Active Problems:  Diabetic foot wound  Lactic acidosis   Xray findings with infectious changes in left first metarsal with swelling and gas  Vancomycin + cefepime + clindamycin D1  Ortho consulted, appreciate recs - pt currently hemodynamically and clinically stable. No need for urgent consult   IV fluids  Follow up cultures    T2DM  Home regimen:  januvia, actos  Follow up a1c  Will hold home meds and start Sliding scale  Add lantus as needed     Hypertension  Home regimen: hctz, losartan, amlodipine , atenolol   Will continue amlodipine and atenolol and hold losartan and hctz  Prn hydralazine     MEG on CKD 3 b  Baseline creatinine : 1.7-2  Currently at 2.8  Avoid nephrotoxic agents - hold pt's hctz and losartan   Renally dose medications    Alcohol use disorder  CIWA protocol  Thiamine/folic acid supplementation  Pt interested in cessation  CM to provide resources regarding rehab at

## 2024-07-01 NOTE — ED PROVIDER NOTES
poor wound care and hygiene.                Orders Placed This Encounter   Procedures    Blood Culture 1    Blood Culture 2    XR FOOT LEFT (MIN 3 VIEWS)    Comprehensive Metabolic Panel    CBC with Auto Differential    Lactate, Sepsis    Procalcitonin    Sedimentation Rate    High sensitivity CRP    Vital Signs Per Unit Routine    Saline lock IV        Medications   vancomycin (VANCOCIN) 1750 mg in sodium chloride 0.9 % 500 mL IVPB (has no administration in time range)   piperacillin-tazobactam (ZOSYN) 4,500 mg in sodium chloride 0.9 % 100 mL IVPB (mini-bag) (has no administration in time range)   sodium chloride 0.9 % bolus 1,983 mL (has no administration in time range)       New Prescriptions    No medications on file        Roldan Edmondson is a 78 y.o. male who presents to the Emergency Department with chief complaint of    Chief Complaint   Patient presents with    Foot Pain      The patient presents with left foot pain and swelling. He reports pain for 3-4 days. He notes the foot is foul smelling. He is diabetic. Pain is worse with ambulation. No fever or chills.     The history is provided by the patient. No  was used.         Review of Systems    Past Medical History:   Diagnosis Date    Chronic abdominal pain     EGD, Colonoscopy, CT Abdomen/pelvis-colon polyps, scattered diverticula    Colon polyps     EtOH dependence (HCC)     states drinks 2-3 beers per day    GERD (gastroesophageal reflux disease)     Helicobacter pylori gastritis     Hypertension     medication    Marijuana smoker     patient states smokes 3-4 times per week, more if available    Osteomyelitis (HCC)     right foot    Poor historian     Stage III chronic kidney disease (HCC)     seen by nephrology - CKD 4    Type 2 diabetes mellitus without complication (HCC) 1/20/2016    oral agents, does not check BS, denies hypoglycemia        Past Surgical History:   Procedure Laterality Date    COLONOSCOPY N/A 5/31/2019

## 2024-07-01 NOTE — PROGRESS NOTES
VANCO DAILY FOLLOW UP RENAL INSUFFICIENCY PATIENT   [facility]   Pharmacy Pharmacokinetic Monitoring Service - Vancomycin    Consulting Provider: Dr. Briceno   Indication: SSTI  Target Concentration: Random level <= 20 mg/L  Day of Therapy: 1 of 7  Additional Antimicrobials: cefepime, clindamycin    Pertinent Laboratory Values:   Wt Readings from Last 1 Encounters:   07/01/24 71.2 kg (157 lb)     Temp Readings from Last 1 Encounters:   07/01/24 97.7 °F (36.5 °C) (Oral)     Recent Labs     07/01/24  1116 07/01/24  1616   BUN 55*  --    CREATININE 2.83*  --    WBC 14.6*  --    PROCAL 0.23*  --    LACTA  --  1.7   LACTSEPSIS 2.3*  --        Lab Results   Component Value Date/Time    VANCORANDOM 19.0 02/25/2023 05:24 AM     MRSA Nasal Swab: N/A. Non-respiratory infection    Assessment:  Date:  Dose/Freq Admin Times Level/Time:   7/1 1750 mg x 1 1531    7/2   Rd @ (04)                        Plan:  Concentration-guided dosing due to renal impairment  Start vancomycin 1750 mg x 1  Vancomycin concentration ordered for 7/2 @ 0400  Pharmacy will continue to monitor patient and adjust therapy as indicated    Thank you for the consult,  Rossy Duarte RP

## 2024-07-01 NOTE — PROGRESS NOTES
COLONOSCOPY/ 26 performed by Dudley Guevara MD at  ENDOSCOPY    COLONOSCOPY  4/2016    polyps    FOOT DEBRIDEMENT Right 2/22/2023    Excisional debridment right foot wound performed by Ignacio Boyd MD at  MAIN OR    FOOT DEBRIDEMENT Right 1/31/2024    right FOOT DEBRIDEMENT INCISION AND DRAINAGE performed by Raudel Nichols MD at  [facility]    HAMMER TOE SURGERY N/A 1/31/2024    EXTENSOR HALLUCIS LONGUS LENGTHENING FLEXOR TENOTOMY'S OF THE SECOND, THIRD, FORTH TOES performed by Raudel Nichols MD at  [facility]    UPPER GASTROINTESTINAL ENDOSCOPY  4/2016    unremarkable     Allergies:   No Known Allergies  Medications:   Current Outpatient Medications   Medication Sig Dispense Refill    amLODIPine (NORVASC) 10 MG tablet Take 1 tablet by mouth daily 90 tablet 0    atenolol (TENORMIN) 25 MG tablet Take 1 tablet by mouth daily 90 tablet 0    hydroCHLOROthiazide (HYDRODIURIL) 25 MG tablet TAKE 1 TABLET BY MOUTH EVERY DAY 90 tablet 0    losartan (COZAAR) 100 MG tablet Take 1 tablet by mouth daily 90 tablet 0    pioglitazone (ACTOS) 15 MG tablet Take 1 tablet by mouth daily 90 tablet 0    SITagliptin (JANUVIA) 100 MG tablet Take 1 tablet by mouth daily 90 tablet 0    ondansetron (ZOFRAN) 4 MG tablet Take 1 tablet by mouth daily as needed for Nausea or Vomiting (Patient not taking: Reported on 1/30/2024) 30 tablet 0    docusate sodium (COLACE) 100 MG capsule Take 1 capsule by mouth daily as needed for Constipation (Patient not taking: Reported on 1/30/2024) 30 capsule 0    aspirin (ASPIRIN 81) 81 MG EC tablet Take 1 tablet by mouth 2 times daily for 20 days (Patient not taking: Reported on 1/30/2024) 40 tablet 0    tamsulosin (FLOMAX) 0.4 MG capsule Take 1 capsule by mouth daily (Patient not taking: Reported on 1/30/2024) 90 capsule 1    ACCU-CHEK JACQUELYN PLUS strip        No current facility-administered medications for this visit.     Social History:  Social History     Tobacco Use    Smoking status: Former

## 2024-07-02 LAB
ALBUMIN SERPL-MCNC: 2.5 G/DL (ref 3.2–4.6)
ALBUMIN/GLOB SERPL: 0.6 (ref 1–1.9)
ALP SERPL-CCNC: 45 U/L (ref 40–129)
ALT SERPL-CCNC: 22 U/L (ref 12–65)
ANION GAP SERPL CALC-SCNC: 11 MMOL/L (ref 9–18)
AST SERPL-CCNC: 30 U/L (ref 15–37)
BASOPHILS # BLD: 0.1 K/UL (ref 0–0.2)
BASOPHILS NFR BLD: 1 % (ref 0–2)
BILIRUB SERPL-MCNC: 0.4 MG/DL (ref 0–1.2)
BUN SERPL-MCNC: 39 MG/DL (ref 8–23)
CALCIUM SERPL-MCNC: 8.9 MG/DL (ref 8.8–10.2)
CHLORIDE SERPL-SCNC: 105 MMOL/L (ref 98–107)
CO2 SERPL-SCNC: 21 MMOL/L (ref 20–28)
CREAT SERPL-MCNC: 2.19 MG/DL (ref 0.8–1.3)
DIFFERENTIAL METHOD BLD: ABNORMAL
EOSINOPHIL # BLD: 0.2 K/UL (ref 0–0.8)
EOSINOPHIL NFR BLD: 2 % (ref 0.5–7.8)
ERYTHROCYTE [DISTWIDTH] IN BLOOD BY AUTOMATED COUNT: 13.2 % (ref 11.9–14.6)
GLOBULIN SER CALC-MCNC: 4 G/DL (ref 2.3–3.5)
GLUCOSE BLD STRIP.AUTO-MCNC: 130 MG/DL (ref 65–100)
GLUCOSE BLD STRIP.AUTO-MCNC: 138 MG/DL (ref 65–100)
GLUCOSE BLD STRIP.AUTO-MCNC: 140 MG/DL (ref 65–100)
GLUCOSE BLD STRIP.AUTO-MCNC: 234 MG/DL (ref 65–100)
GLUCOSE SERPL-MCNC: 124 MG/DL (ref 70–99)
HCT VFR BLD AUTO: 36.4 % (ref 41.1–50.3)
HGB BLD-MCNC: 11.9 G/DL (ref 13.6–17.2)
IMM GRANULOCYTES # BLD AUTO: 0.1 K/UL (ref 0–0.5)
IMM GRANULOCYTES NFR BLD AUTO: 1 % (ref 0–5)
LYMPHOCYTES # BLD: 2 K/UL (ref 0.5–4.6)
LYMPHOCYTES NFR BLD: 17 % (ref 13–44)
MCH RBC QN AUTO: 27.9 PG (ref 26.1–32.9)
MCHC RBC AUTO-ENTMCNC: 32.7 G/DL (ref 31.4–35)
MCV RBC AUTO: 85.4 FL (ref 82–102)
MONOCYTES # BLD: 1.5 K/UL (ref 0.1–1.3)
MONOCYTES NFR BLD: 13 % (ref 4–12)
NEUTS SEG # BLD: 8 K/UL (ref 1.7–8.2)
NEUTS SEG NFR BLD: 66 % (ref 43–78)
NRBC # BLD: 0 K/UL (ref 0–0.2)
PLATELET # BLD AUTO: 395 K/UL (ref 150–450)
PLATELET COMMENT: ADEQUATE
PMV BLD AUTO: 8.7 FL (ref 9.4–12.3)
POTASSIUM SERPL-SCNC: 4.4 MMOL/L (ref 3.5–5.1)
PROT SERPL-MCNC: 6.5 G/DL (ref 6.3–8.2)
RBC # BLD AUTO: 4.26 M/UL (ref 4.23–5.6)
RBC MORPH BLD: ABNORMAL
SERVICE CMNT-IMP: ABNORMAL
SODIUM SERPL-SCNC: 137 MMOL/L (ref 136–145)
VANCOMYCIN SERPL-MCNC: 12.6 UG/ML
WBC # BLD AUTO: 11.9 K/UL (ref 4.3–11.1)
WBC MORPH BLD: ABNORMAL

## 2024-07-02 PROCEDURE — 82962 GLUCOSE BLOOD TEST: CPT

## 2024-07-02 PROCEDURE — 97161 PT EVAL LOW COMPLEX 20 MIN: CPT

## 2024-07-02 PROCEDURE — 80202 ASSAY OF VANCOMYCIN: CPT

## 2024-07-02 PROCEDURE — 6360000002 HC RX W HCPCS: Performed by: STUDENT IN AN ORGANIZED HEALTH CARE EDUCATION/TRAINING PROGRAM

## 2024-07-02 PROCEDURE — 6370000000 HC RX 637 (ALT 250 FOR IP): Performed by: STUDENT IN AN ORGANIZED HEALTH CARE EDUCATION/TRAINING PROGRAM

## 2024-07-02 PROCEDURE — 2580000003 HC RX 258: Performed by: STUDENT IN AN ORGANIZED HEALTH CARE EDUCATION/TRAINING PROGRAM

## 2024-07-02 PROCEDURE — 36415 COLL VENOUS BLD VENIPUNCTURE: CPT

## 2024-07-02 PROCEDURE — 97165 OT EVAL LOW COMPLEX 30 MIN: CPT

## 2024-07-02 PROCEDURE — 80053 COMPREHEN METABOLIC PANEL: CPT

## 2024-07-02 PROCEDURE — 97535 SELF CARE MNGMENT TRAINING: CPT

## 2024-07-02 PROCEDURE — 85025 COMPLETE CBC W/AUTO DIFF WBC: CPT

## 2024-07-02 PROCEDURE — 97530 THERAPEUTIC ACTIVITIES: CPT

## 2024-07-02 PROCEDURE — 1100000000 HC RM PRIVATE

## 2024-07-02 RX ADMIN — SODIUM CHLORIDE: 9 INJECTION, SOLUTION INTRAVENOUS at 06:30

## 2024-07-02 RX ADMIN — Medication 100 MG: at 08:37

## 2024-07-02 RX ADMIN — INSULIN LISPRO 2 UNITS: 100 INJECTION, SOLUTION INTRAVENOUS; SUBCUTANEOUS at 17:39

## 2024-07-02 RX ADMIN — CEFEPIME 1000 MG: 1 INJECTION, POWDER, FOR SOLUTION INTRAMUSCULAR; INTRAVENOUS at 17:39

## 2024-07-02 RX ADMIN — VANCOMYCIN HYDROCHLORIDE 1000 MG: 1 INJECTION, POWDER, LYOPHILIZED, FOR SOLUTION INTRAVENOUS at 18:09

## 2024-07-02 RX ADMIN — CLINDAMYCIN PHOSPHATE 600 MG: 600 INJECTION, SOLUTION INTRAVENOUS at 06:31

## 2024-07-02 RX ADMIN — CLINDAMYCIN PHOSPHATE 600 MG: 600 INJECTION, SOLUTION INTRAVENOUS at 23:14

## 2024-07-02 RX ADMIN — FOLIC ACID 1 MG: 1 TABLET ORAL at 08:37

## 2024-07-02 RX ADMIN — CLINDAMYCIN PHOSPHATE 600 MG: 600 INJECTION, SOLUTION INTRAVENOUS at 16:05

## 2024-07-02 NOTE — PLAN OF CARE
Problem: Discharge Planning  Goal: Discharge to home or other facility with appropriate resources  7/2/2024 0109 by Cait Munson RN  Outcome: Progressing  Flowsheets (Taken 7/1/2024 1930)  Discharge to home or other facility with appropriate resources:   Identify barriers to discharge with patient and caregiver   Identify discharge learning needs (meds, wound care, etc)  7/1/2024 1730 by Alyssa Frost RN  Outcome: Progressing     Problem: Pain  Goal: Verbalizes/displays adequate comfort level or baseline comfort level  7/2/2024 0109 by Cait Munson RN  Outcome: Progressing  Flowsheets (Taken 7/1/2024 1930)  Verbalizes/displays adequate comfort level or baseline comfort level:   Encourage patient to monitor pain and request assistance   Assess pain using appropriate pain scale  7/1/2024 1730 by Alyssa Frost RN  Outcome: Progressing     Problem: Safety - Adult  Goal: Free from fall injury  7/2/2024 0109 by Cait Munson RN  Outcome: Progressing  Flowsheets (Taken 7/1/2024 1930)  Free From Fall Injury: Instruct family/caregiver on patient safety  7/1/2024 1730 by Alyssa Frost RN  Outcome: Progressing     Problem: Skin/Tissue Integrity  Goal: Absence of new skin breakdown  Description: 1.  Monitor for areas of redness and/or skin breakdown  2.  Assess vascular access sites hourly  3.  Every 4-6 hours minimum:  Change oxygen saturation probe site  4.  Every 4-6 hours:  If on nasal continuous positive airway pressure, respiratory therapy assess nares and determine need for appliance change or resting period.  7/2/2024 0109 by Cait Munson RN  Outcome: Progressing  7/1/2024 1730 by Alyssa Frost RN  Outcome: Progressing     Problem: ABCDS Injury Assessment  Goal: Absence of physical injury  7/2/2024 0109 by Cait Munson RN  Outcome: Progressing  Flowsheets (Taken 7/1/2024 1930)  Absence of Physical Injury: Implement safety measures based on patient assessment  7/1/2024 1730 by Alyssa Frost RN  Outcome:  Progressing

## 2024-07-02 NOTE — PROGRESS NOTES
flush 0.9 % injection 5-40 mL  5-40 mL IntraVENous PRN    0.9 % sodium chloride infusion   IntraVENous PRN    [Held by provider] enoxaparin Sodium (LOVENOX) injection 30 mg  30 mg SubCUTAneous Q24H    ondansetron (ZOFRAN-ODT) disintegrating tablet 4 mg  4 mg Oral Q8H PRN    Or    ondansetron (ZOFRAN) injection 4 mg  4 mg IntraVENous Q6H PRN    polyethylene glycol (GLYCOLAX) packet 17 g  17 g Oral Daily PRN    acetaminophen (TYLENOL) tablet 650 mg  650 mg Oral Q6H PRN    Or    acetaminophen (TYLENOL) suppository 650 mg  650 mg Rectal Q6H PRN    [Held by provider] hydroCHLOROthiazide (HYDRODIURIL) tablet 25 mg  25 mg Oral Daily    [Held by provider] losartan (COZAAR) tablet 100 mg  100 mg Oral Daily    cefepime (MAXIPIME) 1,000 mg in sodium chloride 0.9 % 50 mL IVPB (mini-bag)  1,000 mg IntraVENous Q24H    melatonin tablet 3 mg  3 mg Oral Nightly PRN    hydrALAZINE (APRESOLINE) injection 10 mg  10 mg IntraVENous Q6H PRN    insulin lispro (HUMALOG,ADMELOG) injection vial 0-8 Units  0-8 Units SubCUTAneous TID WC    insulin lispro (HUMALOG,ADMELOG) injection vial 0-4 Units  0-4 Units SubCUTAneous Nightly    sodium chloride flush 0.9 % injection 5-40 mL  5-40 mL IntraVENous 2 times per day    sodium chloride flush 0.9 % injection 5-40 mL  5-40 mL IntraVENous PRN    0.9 % sodium chloride infusion   IntraVENous PRN    thiamine tablet 100 mg  100 mg Oral Daily    folic acid (FOLVITE) tablet 1 mg  1 mg Oral Daily    LORazepam (ATIVAN) tablet 1 mg  1 mg Oral Q1H PRN    Or    LORazepam (ATIVAN) 1 mg in sodium chloride (PF) 0.9 % 10 mL injection  1 mg IntraVENous Q1H PRN    Or    LORazepam (ATIVAN) tablet 2 mg  2 mg Oral Q1H PRN    Or    LORazepam (ATIVAN) 2 mg in sodium chloride (PF) 0.9 % 10 mL injection  2 mg IntraVENous Q1H PRN    Or    LORazepam (ATIVAN) tablet 3 mg  3 mg Oral Q1H PRN    Or    LORazepam (ATIVAN) 3 mg in sodium chloride (PF) 0.9 % 10 mL injection  3 mg IntraVENous Q1H PRN    Or    LORazepam (ATIVAN) tablet 4  mg  4 mg Oral Q1H PRN    Or    LORazepam (ATIVAN) 4 mg in sodium chloride (PF) 0.9 % 10 mL injection  4 mg IntraVENous Q1H PRN    vancomycin (VANCOCIN) intermittent dosing (placeholder)   Other RX Placeholder       Signed:  Radha Briceno MD    Part of this note may have been written by using a voice dictation software.  The note has been proof read but may still contain some grammatical/other typographical errors.

## 2024-07-02 NOTE — PLAN OF CARE
Problem: Discharge Planning  Goal: Discharge to home or other facility with appropriate resources  7/2/2024 0841 by Josiane Conti RN  Outcome: Progressing  7/2/2024 0109 by Cait Munson RN  Outcome: Progressing  Flowsheets (Taken 7/1/2024 1930)  Discharge to home or other facility with appropriate resources:   Identify barriers to discharge with patient and caregiver   Identify discharge learning needs (meds, wound care, etc)     Problem: Pain  Goal: Verbalizes/displays adequate comfort level or baseline comfort level  7/2/2024 0841 by Josiane Conti RN  Outcome: Progressing  7/2/2024 0109 by Cait Munson RN  Outcome: Progressing  Flowsheets (Taken 7/1/2024 1930)  Verbalizes/displays adequate comfort level or baseline comfort level:   Encourage patient to monitor pain and request assistance   Assess pain using appropriate pain scale     Problem: Safety - Adult  Goal: Free from fall injury  7/2/2024 0841 by Josiane Conti RN  Outcome: Progressing  7/2/2024 0109 by Cait Munson RN  Outcome: Progressing  Flowsheets (Taken 7/1/2024 1930)  Free From Fall Injury: Instruct family/caregiver on patient safety     Problem: Skin/Tissue Integrity  Goal: Absence of new skin breakdown  Description: 1.  Monitor for areas of redness and/or skin breakdown  2.  Assess vascular access sites hourly  3.  Every 4-6 hours minimum:  Change oxygen saturation probe site  4.  Every 4-6 hours:  If on nasal continuous positive airway pressure, respiratory therapy assess nares and determine need for appliance change or resting period.  7/2/2024 0841 by Josiane Conti RN  Outcome: Progressing  7/2/2024 0109 by Cait Munson RN  Outcome: Progressing     Problem: ABCDS Injury Assessment  Goal: Absence of physical injury  7/2/2024 0841 by Josiane Conti RN  Outcome: Progressing  7/2/2024 0109 by Cait Munson RN  Outcome: Progressing  Flowsheets (Taken 7/1/2024 1930)  Absence of Physical Injury: Implement safety measures based on patient assessment

## 2024-07-02 NOTE — THERAPY EVALUATION
ACUTE OCCUPATIONAL THERAPY GOALS:   (Developed with and agreed upon by patient and/or caregiver.)  Pt will perform functional mobility, toileting, standing grooming task with SBA.     Timeframe: 1 visit, met at [facility]    OCCUPATIONAL THERAPY Initial Assessment, Daily Note, Discharge, and AM       OT Visit Days: 1  Acknowledge Orders  Time  OT Charge Capture  Rehab Caseload Tracker      Roldan Edmondson is a 78 y.o. male   PRIMARY DIAGNOSIS: Diabetic foot (HCC)  Diabetic foot (HCC) [E11.8]  Elevated lactic acid level [R79.89]  Diabetic foot infection (HCC) [E11.628, L08.9]  Acute kidney injury superimposed on CKD (HCC) [N17.9, N18.9]  Leukocytosis, unspecified type [D72.829]       Reason for Referral: Difficulty in walking, Not elsewhere classified (R26.2)  Other abnormalities of gait and mobility (R26.89)  Inpatient: Payor: [facility] MEDICARE / Plan: [employer] MEDICARE ADVANTAGE HMO / Product Type: Medicare /     ASSESSMENT:     REHAB RECOMMENDATIONS:   Recommendation to date pending progress:  Setting:  Home Health Therapy    Equipment:    To Be Determined     ASSESSMENT:  Mr. Edmondson is a 77 y/o male presents with L foot infection. At baseline pt is independent in ADLs and functional mobility. Today pt overall supervision/SBA for functional transfers and mobility of household distances. Pt able to complete toileting tasks in bathroom, grooming tasks standing at sink with supervision. Pt appears to be functioning at his baseline and does not need further OT services during acute care stay or at d/c. Will d/c from caseload.  .     [facility] AM-PAC™ “6 Clicks” Daily Activity Inpatient Short Form:    AM-PAC Daily Activity - Inpatient   How much help is needed for putting on and taking off regular lower body clothing?: None  How much help is needed for bathing (which includes washing, rinsing, drying)?: None  How much help is needed for toileting (which includes using toilet, bedpan, or urinal)?: None  How much help is

## 2024-07-02 NOTE — PROGRESS NOTES
VANCO DAILY FOLLOW UP RENAL INSUFFICIENCY PATIENT   [facility]   Pharmacy Pharmacokinetic Monitoring Service - Vancomycin    Consulting Provider: Dr. Briceno   Indication: SSTI  Target Concentration: Random level <= 20 mg/L  Day of Therapy: 2 of 7  Additional Antimicrobials: cefepime, clindamycin    Pertinent Laboratory Values:   Wt Readings from Last 1 Encounters:   07/01/24 71.2 kg (157 lb)     Temp Readings from Last 1 Encounters:   07/02/24 99 °F (37.2 °C) (Oral)     Recent Labs     07/01/24  1116 07/01/24  1616 07/02/24  0532   BUN 55*  --  39*   CREATININE 2.83*  --  2.19*   WBC 14.6*  --  11.9*   PROCAL 0.23*  --   --    LACTA  --  1.7  --    LACTSEPSIS 2.3*  --   --        Lab Results   Component Value Date/Time    VANCORANDOM 12.6 07/02/2024 05:32 AM     MRSA Nasal Swab: N/A. Non-respiratory infection    Assessment:  Date:  Dose/Freq Admin Times Level/Time:   7/1 1750 mg x 1 1531    7/2 1000 mg x 1 (18) Rd @ 0532 = 12.6   7/3   Rd @ 0400                 Plan:  Concentration-guided dosing due to renal impairment  Re-dose with 1000 mg this evening.    Vancomycin concentration ordered for 7/3 @ 0400  Pharmacy will continue to monitor patient and adjust therapy as indicated    Thank you for the consult,  Rodrigo Sahu, PharmD, BCOP  Clinical Pharmacist  Contact [employer]

## 2024-07-02 NOTE — PROGRESS NOTES
ACUTE PHYSICAL THERAPY GOALS:   (Developed with and agreed upon by patient and/or caregiver.)  Pt will perform bed mobility Mod (I) c inc time, cueing and use of rails as needed in 7 therapy sessions.  Pt will perform sit-to-stand/ stand-to-sit transfers Mod (I) c use of LRAD/external supports as needed and no LOB or miss-steps in 7 therapy sessions.  Pt will ambulate 250 ft Mod (I) with use of LRAD, no LOB or miss-steps and breaks as needed in 7 therapy sessions.  Pt will perform standing dynamic balance activities with minimal postural sway in 7 therapy sessions.  Pt will tolerate multiple sets and reps of BLE exercises in 7 therapy sessions.      PHYSICAL THERAPY Initial Assessment, Daily Note, and AM  (Link to Caseload Tracking: PT Visit Days : 1  Acknowledge Orders  Time In/Out  PT Charge Capture  Rehab Caseload Tracker    Roldan Edmondson is a 78 y.o. male   PRIMARY DIAGNOSIS: Diabetic foot (HCC)  Diabetic foot (HCC) [E11.8]  Elevated lactic acid level [R79.89]  Diabetic foot infection (HCC) [E11.628, L08.9]  Acute kidney injury superimposed on CKD (HCC) [N17.9, N18.9]  Leukocytosis, unspecified type [D72.829]       Reason for Referral: Generalized Muscle Weakness (M62.81)  Difficulty in walking, Not elsewhere classified (R26.2)  Other abnormalities of gait and mobility (R26.89)  Inpatient: Payor: Count includes the [facility] MEDICARE / Plan: [employer] MEDICARE ADVANTAGE HMO / Product Type: Medicare /     ASSESSMENT:     REHAB RECOMMENDATIONS:   Recommendation to date pending progress:  Setting:  Home Health Therapy vs No Needs    Equipment:    Rolling Walker for improved standing dynamic balance and fall risk mitigation     ASSESSMENT:  Mr. Edmondson Is a 78 y.o. male presenting to PT after being admitted on 7/1/24 for increased pain associated with diabetic L foot wound. At time of initial evaluation, pt presents just below baseline LOF as he remains able to mobilize without hands-on (A) required but requires inc time and is heavily

## 2024-07-03 ENCOUNTER — ANESTHESIA EVENT (OUTPATIENT)
Dept: SURGERY | Age: 79
End: 2024-07-03
Payer: MEDICARE

## 2024-07-03 ENCOUNTER — ANESTHESIA (OUTPATIENT)
Dept: SURGERY | Age: 79
End: 2024-07-03
Payer: MEDICARE

## 2024-07-03 PROBLEM — E11.621 DIABETIC ULCER OF RIGHT FOOT (HCC): Status: ACTIVE | Noted: 2024-07-01

## 2024-07-03 PROBLEM — L97.519 DIABETIC ULCER OF RIGHT FOOT (HCC): Status: ACTIVE | Noted: 2024-07-01

## 2024-07-03 LAB
GLUCOSE BLD STRIP.AUTO-MCNC: 140 MG/DL (ref 65–100)
GLUCOSE BLD STRIP.AUTO-MCNC: 148 MG/DL (ref 65–100)
GLUCOSE BLD STRIP.AUTO-MCNC: 178 MG/DL (ref 65–100)
GLUCOSE BLD STRIP.AUTO-MCNC: 179 MG/DL (ref 65–100)
GLUCOSE BLD STRIP.AUTO-MCNC: 184 MG/DL (ref 65–100)
SERVICE CMNT-IMP: ABNORMAL
VANCOMYCIN SERPL-MCNC: 15.2 UG/ML

## 2024-07-03 PROCEDURE — 7100000000 HC PACU RECOVERY - FIRST 15 MIN: Performed by: ORTHOPAEDIC SURGERY

## 2024-07-03 PROCEDURE — 87102 FUNGUS ISOLATION CULTURE: CPT

## 2024-07-03 PROCEDURE — 7100000001 HC PACU RECOVERY - ADDTL 15 MIN: Performed by: ORTHOPAEDIC SURGERY

## 2024-07-03 PROCEDURE — 2709999900 HC NON-CHARGEABLE SUPPLY: Performed by: ORTHOPAEDIC SURGERY

## 2024-07-03 PROCEDURE — 87205 SMEAR GRAM STAIN: CPT

## 2024-07-03 PROCEDURE — 6360000002 HC RX W HCPCS: Performed by: ORTHOPAEDIC SURGERY

## 2024-07-03 PROCEDURE — 3700000001 HC ADD 15 MINUTES (ANESTHESIA): Performed by: ORTHOPAEDIC SURGERY

## 2024-07-03 PROCEDURE — 6360000002 HC RX W HCPCS: Performed by: STUDENT IN AN ORGANIZED HEALTH CARE EDUCATION/TRAINING PROGRAM

## 2024-07-03 PROCEDURE — 1100000000 HC RM PRIVATE

## 2024-07-03 PROCEDURE — 87252 VIRUS INOCULATION TISSUE: CPT

## 2024-07-03 PROCEDURE — 87077 CULTURE AEROBIC IDENTIFY: CPT

## 2024-07-03 PROCEDURE — 87070 CULTURE OTHR SPECIMN AEROBIC: CPT

## 2024-07-03 PROCEDURE — 6360000002 HC RX W HCPCS: Performed by: NURSE ANESTHETIST, CERTIFIED REGISTERED

## 2024-07-03 PROCEDURE — 82962 GLUCOSE BLOOD TEST: CPT

## 2024-07-03 PROCEDURE — 80202 ASSAY OF VANCOMYCIN: CPT

## 2024-07-03 PROCEDURE — 3600000012 HC SURGERY LEVEL 2 ADDTL 15MIN: Performed by: ORTHOPAEDIC SURGERY

## 2024-07-03 PROCEDURE — 2580000003 HC RX 258: Performed by: STUDENT IN AN ORGANIZED HEALTH CARE EDUCATION/TRAINING PROGRAM

## 2024-07-03 PROCEDURE — 87206 SMEAR FLUORESCENT/ACID STAI: CPT

## 2024-07-03 PROCEDURE — 87186 SC STD MICRODIL/AGAR DIL: CPT

## 2024-07-03 PROCEDURE — 2500000003 HC RX 250 WO HCPCS: Performed by: NURSE ANESTHETIST, CERTIFIED REGISTERED

## 2024-07-03 PROCEDURE — 87176 TISSUE HOMOGENIZATION CULTR: CPT

## 2024-07-03 PROCEDURE — 87075 CULTR BACTERIA EXCEPT BLOOD: CPT

## 2024-07-03 PROCEDURE — 3600000002 HC SURGERY LEVEL 2 BASE: Performed by: ORTHOPAEDIC SURGERY

## 2024-07-03 PROCEDURE — 3700000000 HC ANESTHESIA ATTENDED CARE: Performed by: ORTHOPAEDIC SURGERY

## 2024-07-03 PROCEDURE — 6370000000 HC RX 637 (ALT 250 FOR IP): Performed by: STUDENT IN AN ORGANIZED HEALTH CARE EDUCATION/TRAINING PROGRAM

## 2024-07-03 PROCEDURE — 36415 COLL VENOUS BLD VENIPUNCTURE: CPT

## 2024-07-03 PROCEDURE — 99223 1ST HOSP IP/OBS HIGH 75: CPT | Performed by: ORTHOPAEDIC SURGERY

## 2024-07-03 RX ORDER — SODIUM CHLORIDE, SODIUM LACTATE, POTASSIUM CHLORIDE, CALCIUM CHLORIDE 600; 310; 30; 20 MG/100ML; MG/100ML; MG/100ML; MG/100ML
INJECTION, SOLUTION INTRAVENOUS CONTINUOUS
Status: DISCONTINUED | OUTPATIENT
Start: 2024-07-03 | End: 2024-07-03 | Stop reason: HOSPADM

## 2024-07-03 RX ORDER — PROPOFOL 10 MG/ML
INJECTION, EMULSION INTRAVENOUS CONTINUOUS PRN
Status: DISCONTINUED | OUTPATIENT
Start: 2024-07-03 | End: 2024-07-03 | Stop reason: SDUPTHER

## 2024-07-03 RX ORDER — VANCOMYCIN HYDROCHLORIDE 1 G/20ML
INJECTION, POWDER, LYOPHILIZED, FOR SOLUTION INTRAVENOUS PRN
Status: DISCONTINUED | OUTPATIENT
Start: 2024-07-03 | End: 2024-07-03 | Stop reason: ALTCHOICE

## 2024-07-03 RX ORDER — EPHEDRINE SULFATE 5 MG/ML
INJECTION INTRAVENOUS PRN
Status: DISCONTINUED | OUTPATIENT
Start: 2024-07-03 | End: 2024-07-03 | Stop reason: SDUPTHER

## 2024-07-03 RX ORDER — GENTAMICIN SULFATE 80 MG/100ML
INJECTION, SOLUTION INTRAVENOUS CONTINUOUS PRN
Status: COMPLETED | OUTPATIENT
Start: 2024-07-03 | End: 2024-07-03

## 2024-07-03 RX ORDER — BUPIVACAINE HYDROCHLORIDE 2.5 MG/ML
INJECTION, SOLUTION EPIDURAL; INFILTRATION; INTRACAUDAL PRN
Status: DISCONTINUED | OUTPATIENT
Start: 2024-07-03 | End: 2024-07-03 | Stop reason: ALTCHOICE

## 2024-07-03 RX ORDER — PROPOFOL 10 MG/ML
INJECTION, EMULSION INTRAVENOUS CONTINUOUS PRN
Status: DISCONTINUED | OUTPATIENT
Start: 2024-07-03 | End: 2024-07-03

## 2024-07-03 RX ORDER — LINEZOLID 2 MG/ML
600 INJECTION, SOLUTION INTRAVENOUS EVERY 12 HOURS
Status: DISCONTINUED | OUTPATIENT
Start: 2024-07-03 | End: 2024-07-05

## 2024-07-03 RX ADMIN — Medication 100 MG: at 08:48

## 2024-07-03 RX ADMIN — EPHEDRINE SULFATE 5 MG: 5 INJECTION INTRAVENOUS at 17:31

## 2024-07-03 RX ADMIN — PROPOFOL 120 MCG/KG/MIN: 10 INJECTION, EMULSION INTRAVENOUS at 16:47

## 2024-07-03 RX ADMIN — PROPOFOL 50 MG: 10 INJECTION, EMULSION INTRAVENOUS at 16:48

## 2024-07-03 RX ADMIN — CLINDAMYCIN PHOSPHATE 600 MG: 600 INJECTION, SOLUTION INTRAVENOUS at 06:25

## 2024-07-03 RX ADMIN — FOLIC ACID 1 MG: 1 TABLET ORAL at 08:48

## 2024-07-03 RX ADMIN — PROPOFOL 20 MG: 10 INJECTION, EMULSION INTRAVENOUS at 16:54

## 2024-07-03 RX ADMIN — PROPOFOL 30 MG: 10 INJECTION, EMULSION INTRAVENOUS at 16:46

## 2024-07-03 RX ADMIN — PIPERACILLIN AND TAZOBACTAM 3375 MG: 3; .375 INJECTION, POWDER, LYOPHILIZED, FOR SOLUTION INTRAVENOUS at 21:04

## 2024-07-03 RX ADMIN — LINEZOLID 600 MG: 600 INJECTION, SOLUTION INTRAVENOUS at 13:10

## 2024-07-03 RX ADMIN — PROPOFOL 50 MG: 10 INJECTION, EMULSION INTRAVENOUS at 16:51

## 2024-07-03 RX ADMIN — SODIUM CHLORIDE: 9 INJECTION, SOLUTION INTRAVENOUS at 21:02

## 2024-07-03 RX ADMIN — PIPERACILLIN AND TAZOBACTAM 4500 MG: 4; .5 INJECTION, POWDER, LYOPHILIZED, FOR SOLUTION INTRAVENOUS at 15:22

## 2024-07-03 RX ADMIN — SODIUM CHLORIDE, POTASSIUM CHLORIDE, SODIUM LACTATE AND CALCIUM CHLORIDE: 600; 310; 30; 20 INJECTION, SOLUTION INTRAVENOUS at 16:10

## 2024-07-03 ASSESSMENT — LIFESTYLE VARIABLES: SMOKING_STATUS: 1

## 2024-07-03 ASSESSMENT — PAIN SCALES - GENERAL: PAINLEVEL_OUTOF10: 0

## 2024-07-03 ASSESSMENT — PAIN - FUNCTIONAL ASSESSMENT: PAIN_FUNCTIONAL_ASSESSMENT: 0-10

## 2024-07-03 NOTE — H&P (VIEW-ONLY)
Orthopaedic Trauma Service  Consultation Note    Patient ID:  Roldan Edmondson  78 y.o.  male  1945   [id number]    Presenting/Chief Complaint: Foot Pain    Date of Admission: 7/1/2024 10:04 AM   Reason(s) for Admission:   Diabetic foot (HCC) [E11.8]  Elevated lactic acid level [R79.89]  Diabetic foot infection (HCC) [E11.628, L08.9]  Acute kidney injury superimposed on CKD (HCC) [N17.9, N18.9]  Leukocytosis, unspecified type [D72.829]     Date of Consultation: July 3, 2024  Referring Physician: Radha Briceno MD    Hospital Problems:  Principal Problem:    Diabetic foot (HCC)  Active Problems:    Type 2 diabetes mellitus with diabetic autonomic neuropathy, without long-term current use of insulin (HCC)    MEG (acute kidney injury) (HCC)    Lactic acidosis    EtOH dependence (HCC)    Hypertension    Stage 3a chronic kidney disease (HCC)    Diabetic foot infection (HCC)  Resolved Problems:    * No resolved hospital problems. *    Assessment & Plan   78M w/ Left foot diabetic infection and full-thickness plantar ulcer underlying 1st MT head  -Sig PMH: HTN, T2DM, Etoh use, CKD and prior osteomyelitis of right foot   -Sig PSH: Right foot debridement ([facility])    ESR 33,    Reviewed imaging w/ pt   Will require local incision and debridement to treat local infection  At risk of progression which would warrant amputation  - Discussed risks/benefits of surgery. See op note.    Will proceed w/ surgery on 7/3/24     Albumin 2.5  - Recommend protein supplementation for wound healing    Multimodal pain control  WB status: OK for WBAT BLE  PT/OT for mobility   Diet:  Diet NPO Exceptions are: Ice Chips, Sips of Water with Meds  DVT prophylaxis:  per primary    History of Present Illness   Roldan Edmondson is a 78 y.o. male who presented with Left diabetic foot ulcer  -Sig PMH: HTN, T2DM, Etoh use, CKD and prior osteomyelitis of right foot   -Sig PSH: Right foot debridement ([facility])    Referred by PCP to ER  In the ER /60,  Daily  folic acid, 1 mg, Daily      Labs:  I personally reviewed and interpreted:    Review of most recent CBC  Lab Results   Component Value Date    WBC 11.9 (H) 07/02/2024    HGB 11.9 (L) 07/02/2024    HCT 36.4 (L) 07/02/2024    MCV 85.4 07/02/2024     07/02/2024     Review of most recent BMP  Lab Results   Component Value Date/Time     07/02/2024 05:32 AM    K 4.4 07/02/2024 05:32 AM     07/02/2024 05:32 AM    CO2 21 07/02/2024 05:32 AM    BUN 39 07/02/2024 05:32 AM    CREATININE 2.19 07/02/2024 05:32 AM    GLUCOSE 124 07/02/2024 05:32 AM    CALCIUM 8.9 07/02/2024 05:32 AM    LABGLOM 30 07/02/2024 05:32 AM    LABGLOM 34 02/28/2024 09:56 AM      No results found for: \"INR\", \"APTT\", \"LCAD\"    Review of most recent HgbA1c  Hemoglobin A1C   Date Value Ref Range Status   07/01/2024 7.2 (H) 0 - 5.6 % Final     Comment:     Reference Range  Normal       <5.7%  Prediabetes  5.7-6.4%  Diabetes     >6.4%         David Fragoso MD  Orthopaedic Trauma Surgeon  [address] [place] [address]  [facility]: [phone]  [facility]: [phone]    Amount and/or Complexity of Data Reviewed and Analyzed:  I reviewed and analyzed all of the unique labs and radiologic studies that are documented in this note. I also reviewed prior inpatient and outpatient notes, imaging, and relevant lab results as documented.

## 2024-07-03 NOTE — CARE COORDINATION
Patient's inpatient plan of care and transitions of care planning reviewed in IDT rounds.  Patient admitted with diabetic foot and awaiting orthopedic surgery consult.  CM will plan to meet with patient to review possible discharge/ZION needs.

## 2024-07-03 NOTE — PROGRESS NOTES
Hospitalist Progress Note   Admit Date:  2024 10:04 AM   Name:  Roldan Edmondson   Age:  78 y.o.  Sex:  male  :  1945   MRN:  [id number]   Room:  [facility]/    Presenting/Chief Complaint: Foot Pain     Reason(s) for Admission: Diabetic foot (HCC) [E11.8]  Elevated lactic acid level [R79.89]  Diabetic foot infection (HCC) [E11.628, L08.9]  Acute kidney injury superimposed on CKD (HCC) [N17.9, N18.9]  Leukocytosis, unspecified type [D72.829]     Hospital Course:   Roldan Edmondson is a 78 y.o. male with medical history of HTN, T2DM, Etoh use, CKD and prior osteomyelitis of right foot presenting with left foot pain and swelling worsening over 3-4 days. In the ER /60, remaining vitals WNL. , scr 2.83, lactic acid 2.3, , WBC 14.6, left foot xray with infectious changes within soft tissue over first metatarsal with soft tissue swelling and gas noted.  Admitted for diabetic foot wound and started on empiric vanc, cefepime  and clindamycin. Pt with MEG and started on fluids. Ortho consulted.       Subjective & 24hr Events:   Per pt and RN, plan for surgical debridement today. Pt ate this morning as no npo order was placed so intervention timed for the end of the day. Pharmacy changing antibiotic regimen to linezolid and zosyn today.       Assessment & Plan:     Principal Problem:  Diabetic foot wound  Lactic acidosis   Xray findings with infectious changes in left first metarsal with swelling and gas  Vancomycin + cefepime + clindamycin D2 ---> switched to linezolid + zosyn  Ortho consulted, appreciate recs - plan for intervention today?  Will request ID Consult for antibiotic plan depending on findings in surgery   Follow up cultures ngtd2     T2DM  Home regimen:  januvia, actos  A1c 7.2   Will hold home meds and start Sliding scale  Add lantus as needed      Hypertension  Home regimen: hctz, losartan, amlodipine , atenolol   Holding all bp meds at this time as bp well controlled   Prn hydralazine     Calcium 8.9 8.8 - 10.2 MG/DL    Total Bilirubin 0.4 0.0 - 1.2 MG/DL    ALT 22 12 - 65 U/L    AST 30 15 - 37 U/L    Alk Phosphatase 45 40 - 129 U/L    Total Protein 6.5 6.3 - 8.2 g/dL    Albumin 2.5 (L) 3.2 - 4.6 g/dL    Globulin 4.0 (H) 2.3 - 3.5 g/dL    Albumin/Globulin Ratio 0.6 (L) 1.0 - 1.9     Vancomycin Level, Random    Collection Time: 07/02/24  5:32 AM   Result Value Ref Range    Vancomycin Rm 12.6 UG/ML   POCT Glucose    Collection Time: 07/02/24  5:55 AM   Result Value Ref Range    POC Glucose 138 (H) 65 - 100 mg/dL    Performed by: Wong    POCT Glucose    Collection Time: 07/02/24 11:15 AM   Result Value Ref Range    POC Glucose 130 (H) 65 - 100 mg/dL    Performed by: Socorro    POCT Glucose    Collection Time: 07/02/24  4:40 PM   Result Value Ref Range    POC Glucose 234 (H) 65 - 100 mg/dL    Performed by: Anjum    POCT Glucose    Collection Time: 07/02/24  8:15 PM   Result Value Ref Range    POC Glucose 140 (H) 65 - 100 mg/dL    Performed by: Manuel    Vancomycin Level, Random    Collection Time: 07/03/24  4:58 AM   Result Value Ref Range    Vancomycin Rm 15.2 UG/ML   POCT Glucose    Collection Time: 07/03/24  6:23 AM   Result Value Ref Range    POC Glucose 140 (H) 65 - 100 mg/dL    Performed by: Manuel    POCT Glucose    Collection Time: 07/03/24 11:28 AM   Result Value Ref Range    POC Glucose 184 (H) 65 - 100 mg/dL    Performed by: Alexander        Current Meds:  Current Facility-Administered Medications   Medication Dose Route Frequency    linezolid (ZYVOX) IVPB 600 mg  600 mg IntraVENous Q12H    piperacillin-tazobactam (ZOSYN) 4,500 mg in sodium chloride 0.9 % 100 mL IVPB (mini-bag)  4,500 mg IntraVENous Once    Followed by    piperacillin-tazobactam (ZOSYN) 3,375 mg in sodium chloride 0.9 % 50 mL IVPB (mini-bag)  3,375 mg IntraVENous Q8H    [Held by provider] amLODIPine (NORVASC) tablet 10 mg  10 mg Oral Daily

## 2024-07-03 NOTE — ANESTHESIA PRE PROCEDURE
Department of Anesthesiology  Preprocedure Note       Name:  Roldan Edmondson   Age:  78 y.o.  :  1945                                          MRN:  [id number]         Date:  7/3/2024      Surgeon: Surgeon(s):  David Fragoso MD    Procedure: Procedure(s):  Left foot debridement    Medications prior to admission:   Prior to Admission medications    Medication Sig Start Date End Date Taking? Authorizing Provider   amLODIPine (NORVASC) 10 MG tablet Take 1 tablet by mouth daily 24   David Mandujano MD   atenolol (TENORMIN) 25 MG tablet Take 1 tablet by mouth daily 24   David Mandujano MD   hydroCHLOROthiazide (HYDRODIURIL) 25 MG tablet TAKE 1 TABLET BY MOUTH EVERY DAY 24   David Mandujano MD   losartan (COZAAR) 100 MG tablet Take 1 tablet by mouth daily 24   David Mandujano MD   pioglitazone (ACTOS) 15 MG tablet Take 1 tablet by mouth daily  Patient not taking: Reported on 2024   David Mandujano MD   SITagliptin (JANUVIA) 100 MG tablet Take 1 tablet by mouth daily 24   David Mandujano MD   ondansetron (ZOFRAN) 4 MG tablet Take 1 tablet by mouth daily as needed for Nausea or Vomiting  Patient not taking: Reported on 2024   Angelic Avalos PA   docusate sodium (COLACE) 100 MG capsule Take 1 capsule by mouth daily as needed for Constipation  Patient not taking: Reported on 2024   Bailey, RADHA Temple   aspirin (ASPIRIN 81) 81 MG EC tablet Take 1 tablet by mouth 2 times daily for 20 days  Patient not taking: Reported on 2024 1/26/24 2/15/24  Bailey, RADHA Temple   tamsulosin (FLOMAX) 0.4 MG capsule Take 1 capsule by mouth daily  Patient not taking: Reported on 2024   David Mandujano MD   ACCU-CHEK JACQUELYN PLUS strip  3/9/23   ProviderTiffany MD       Current medications:    Current Facility-Administered Medications   Medication Dose Route Frequency Provider Last Rate Last Admin    linezolid (ZYVOX) IVPB 600 mg

## 2024-07-03 NOTE — ANESTHESIA POSTPROCEDURE EVALUATION
Department of Anesthesiology  Postprocedure Note    Patient: Roldan Edmondson  MRN: [id number]  YOB: 1945  Date of evaluation: 7/3/2024    Procedure Summary       Date: 07/03/24 Room / Location: [facility] MAIN OR 09 / [facility] MAIN OR    Anesthesia Start: 1642 Anesthesia Stop: 1811    Procedure: Left foot debridement (Left: Foot) Diagnosis:       Diabetic ulcer of right foot (HCC)      (Diabetic ulcer of right foot (HCC) [E11.621, L97.519])    Providers: David Fragoso MD Responsible Provider: Lakhwinder Nielson MD    Anesthesia Type: General ASA Status: 3            Anesthesia Type: General    Karen Phase I: Karen Score: 10    Karen Phase II:      Anesthesia Post Evaluation    Patient location during evaluation: bedside  Patient participation: complete - patient participated  Level of consciousness: awake and alert  Airway patency: patent  Nausea & Vomiting: no vomiting  Cardiovascular status: hemodynamically stable  Respiratory status: acceptable  Hydration status: euvolemic  Pain management: adequate    No notable events documented.

## 2024-07-03 NOTE — INTERVAL H&P NOTE
Update History & Physical    The patient's History and Physical of was reviewed with the patient and I examined the patient. There was no change. The surgical site was confirmed by the patient and me.     Procedure: Left foot debridement for diabetic foot infection    Plan: The risks, benefits, expected outcome, and alternative to the recommended procedure have been discussed with the patient. Patient understands and wants to proceed with the procedure.

## 2024-07-03 NOTE — PLAN OF CARE
Problem: Discharge Planning  Goal: Discharge to home or other facility with appropriate resources  7/2/2024 2226 by Misty Ozuna RN  Outcome: Progressing  Flowsheets (Taken 7/2/2024 1930)  Discharge to home or other facility with appropriate resources: Identify barriers to discharge with patient and caregiver  7/2/2024 0841 by Josiane Conti RN  Outcome: Progressing     Problem: Pain  Goal: Verbalizes/displays adequate comfort level or baseline comfort level  7/2/2024 2226 by Misty Ozuna RN  Outcome: Progressing  7/2/2024 0841 by Josiane Conti RN  Outcome: Progressing     Problem: Safety - Adult  Goal: Free from fall injury  7/2/2024 2226 by Misty Ozuna RN  Outcome: Progressing  Flowsheets (Taken 7/2/2024 1930)  Free From Fall Injury: Instruct family/caregiver on patient safety  7/2/2024 0841 by Josiane Conti RN  Outcome: Progressing     Problem: Skin/Tissue Integrity  Goal: Absence of new skin breakdown  Description: 1.  Monitor for areas of redness and/or skin breakdown  2.  Assess vascular access sites hourly  3.  Every 4-6 hours minimum:  Change oxygen saturation probe site  4.  Every 4-6 hours:  If on nasal continuous positive airway pressure, respiratory therapy assess nares and determine need for appliance change or resting period.  7/2/2024 2226 by Misty Ozuna RN  Outcome: Progressing  7/2/2024 0841 by Josiane Conti RN  Outcome: Progressing     Problem: ABCDS Injury Assessment  Goal: Absence of physical injury  7/2/2024 2226 by Misty Ozuna RN  Outcome: Progressing  Flowsheets (Taken 7/2/2024 1930)  Absence of Physical Injury: Implement safety measures based on patient assessment  7/2/2024 0841 by Josiane Conti RN  Outcome: Progressing

## 2024-07-03 NOTE — CONSULTS
Orthopaedic Trauma Service  Consultation Note    Patient ID:  Roldan Edmondson  78 y.o.  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 *    Assessment & Plan   78M w/ Left foot diabetic infection and full-thickness plantar ulcer underlying 1st MT head  -Sig PMH: HTN, T2DM, Etoh use, CKD and prior osteomyelitis of right foot   -Sig PSH: Right foot debridement ([facility])    ESR 33,    Reviewed imaging w/ pt   Will require local incision and debridement to treat local infection  At risk of progression which would warrant amputation  - Discussed risks/benefits of surgery. See op note.    Will proceed w/ surgery on 7/3/24     Albumin 2.5  - Recommend protein supplementation for wound healing    Multimodal pain control  WB status: OK for WBAT BLE  PT/OT for mobility   Diet:  Diet NPO Exceptions are: Ice Chips, Sips of Water with Meds  DVT prophylaxis:  per primary    History of Present Illness   Roldan Edmondson is a 78 y.o. male who presented with Left diabetic foot ulcer  -Sig PMH: HTN, T2DM, Etoh use, CKD and prior osteomyelitis of right foot   -Sig PSH: Right foot debridement ([facility])    Referred by PCP to ER  In the ER /60,  packs/day: 1 pack/day for 15.0 years (15.0 ttl pk-yrs)     Types: Cigarettes     Start date: 1974     Quit date: 1989     Years since quittin.5    Smokeless tobacco: Never   Vaping Use    Vaping Use: Never used   Substance and Sexual Activity    Alcohol use: Yes     Alcohol/week: 14.0 - 21.0 standard drinks of alcohol     Types: 14 - 21 Cans of beer per week    Drug use: Yes     Frequency: 4.0 times per week     Types: Marijuana (Weed)     Comment: 3-4 times per week, more if available   Social History Narrative    Lives with his sister.     Social Determinants of Health     Financial Resource Strain: Low Risk  (2024)    Overall Financial Resource Strain (CARDIA)     Difficulty of Paying Living Expenses: Not hard at all   Food Insecurity: No Food Insecurity (2024)    Hunger Vital Sign     Worried About Running Out of Food in the Last Year: Never true     Ran Out of Food in the Last Year: Never true   Transportation Needs: No Transportation Needs (2024)    PRAPARE - Transportation     Lack of Transportation (Medical): No     Lack of Transportation (Non-Medical): No   Physical Activity: Inactive (2023)    Exercise Vital Sign     Days of Exercise per Week: 0 days     Minutes of Exercise per Session: 0 min   Housing Stability: Low Risk  (2024)    Housing Stability Vital Sign     Unable to Pay for Housing in the Last Year: No     Number of Places Lived in the Last Year: 1     Unstable Housing in the Last Year: No      Family History   Problem Relation Age of Onset    Cancer Father         bone    Arrhythmia Sister     Hypertension Mother     Kidney Disease Mother       No Known Allergies   ROS: Comprehensive review of systems was otherwise unremarkable except as noted above.    Physical Exam     BMI: Estimated body mass index is 24.59 kg/m² as calculated from the following:    Height as of this encounter: 1.702 m (5' 7\").    Weight as of this encounter: 71.2 kg (157 lb).    General:

## 2024-07-03 NOTE — PERIOP NOTE
TRANSFER - OUT REPORT:    Verbal report given to NAA Joshua on Roldan Edmondson  being transferred to [facility] for routine post-op       Report consisted of patient’s Situation, Background, Assessment and   Recommendations(SBAR).     Information from the following report(s) Nurse Handoff Report, Surgery Report, MAR, Cardiac Rhythm SR, and Neuro Assessment was reviewed with the receiving nurse.    Lines:   Peripheral IV Posterior;Left Forearm (Active)        Opportunity for questions and clarification was provided.      Patient transported with:   Tech    VTE prophylaxis orders have been written for Roldan Edmondson.    Patient and family given floor number and nurses name.  Family updated re: pt status after security code verified.

## 2024-07-03 NOTE — CARE COORDINATION
CM continuing to follow for anticipated transitions of care needs.  Patient is to have surgery with Dr. Fragoso today for diabetic foot wound.  Patient ate breakfast this AM, therefore, surgery was postponed until later this evening.  CM in to see patient at bedside who was working with nursing staff at this time.  Chart was reviewed to assist with possible transitions of care needs.  Patient was residing at home alone and legal next of kin is his sister.  Patient had admission approximately one year ago and was discharged home with [facility] and a wound vac after insurance denied STR at [facility].  Patient is current with PCP, has been followed by  OP wound clinic and is insured with managed Medicare plan.  Currently PT/OT recommending HH at discharge, however, this could possibly change post-operatively.  Therapy will plan to see patient tomorrow, 7/4 and update recommendations.  If patient requires and is agreeable to STR, he will need insurance authorization prior to discharge.  Message was sent to [facility] liaison for possible need for home infusion OPAT at discharge if patient is appropriate for HH and can afford any OOP cost for home infusions.  Once surgery completed and culture results posted, updated clinicals will need to be faxed to [place].      CM will continue to follow for ongoing transitions of care needs.       07/03/24 1100   Service Assessment   Patient Orientation Alert and Oriented   Cognition Alert   History Provided By Medical Record;Patient   Primary Caregiver Self   Accompanied By/Relationship No one at bedside   Support Systems Family Members   Patient's Healthcare Decision Maker is: Legal Next of Kin   PCP Verified by CM Yes   Last Visit to PCP Within last 6 months   Prior Functional Level Independent in ADLs/IADLs   Current Functional Level Assistance with the following:;Bathing;Dressing;Cooking;Housework;Shopping   Can patient return to prior living arrangement Unknown at

## 2024-07-03 NOTE — PLAN OF CARE
Problem: Discharge Planning  Goal: Discharge to home or other facility with appropriate resources  7/3/2024 1152 by Josiane Conti RN  Outcome: Progressing  7/2/2024 2226 by Misty Ozuna RN  Outcome: Progressing  Flowsheets (Taken 7/2/2024 1930)  Discharge to home or other facility with appropriate resources: Identify barriers to discharge with patient and caregiver     Problem: Pain  Goal: Verbalizes/displays adequate comfort level or baseline comfort level  7/3/2024 1152 by Josiane Conti RN  Outcome: Progressing  7/2/2024 2226 by Misty Ozuna RN  Outcome: Progressing     Problem: Safety - Adult  Goal: Free from fall injury  7/3/2024 1152 by Josiane Conti RN  Outcome: Progressing  7/2/2024 2226 by Misty Ozuna RN  Outcome: Progressing  Flowsheets (Taken 7/2/2024 1930)  Free From Fall Injury: Instruct family/caregiver on patient safety     Problem: Skin/Tissue Integrity  Goal: Absence of new skin breakdown  Description: 1.  Monitor for areas of redness and/or skin breakdown  2.  Assess vascular access sites hourly  3.  Every 4-6 hours minimum:  Change oxygen saturation probe site  4.  Every 4-6 hours:  If on nasal continuous positive airway pressure, respiratory therapy assess nares and determine need for appliance change or resting period.  7/3/2024 1152 by Josiane Conti RN  Outcome: Progressing  7/2/2024 2226 by Misty Ozuna RN  Outcome: Progressing     Problem: ABCDS Injury Assessment  Goal: Absence of physical injury  7/3/2024 1152 by Josiane Conti RN  Outcome: Progressing  7/2/2024 2226 by Misty Ozuna RN  Outcome: Progressing  Flowsheets (Taken 7/2/2024 1930)  Absence of Physical Injury: Implement safety measures based on patient assessment

## 2024-07-04 LAB
ALBUMIN SERPL-MCNC: 2.4 G/DL (ref 3.2–4.6)
ANION GAP SERPL CALC-SCNC: 11 MMOL/L (ref 9–18)
BASOPHILS # BLD: 0.1 K/UL (ref 0–0.2)
BASOPHILS NFR BLD: 1 % (ref 0–2)
BUN SERPL-MCNC: 17 MG/DL (ref 8–23)
CALCIUM SERPL-MCNC: 9.1 MG/DL (ref 8.8–10.2)
CHLORIDE SERPL-SCNC: 102 MMOL/L (ref 98–107)
CO2 SERPL-SCNC: 25 MMOL/L (ref 20–28)
CREAT SERPL-MCNC: 1.77 MG/DL (ref 0.8–1.3)
DIFFERENTIAL METHOD BLD: ABNORMAL
EOSINOPHIL # BLD: 0.2 K/UL (ref 0–0.8)
EOSINOPHIL NFR BLD: 2 % (ref 0.5–7.8)
ERYTHROCYTE [DISTWIDTH] IN BLOOD BY AUTOMATED COUNT: 13 % (ref 11.9–14.6)
GLUCOSE BLD STRIP.AUTO-MCNC: 135 MG/DL (ref 65–100)
GLUCOSE BLD STRIP.AUTO-MCNC: 167 MG/DL (ref 65–100)
GLUCOSE BLD STRIP.AUTO-MCNC: 180 MG/DL (ref 65–100)
GLUCOSE BLD STRIP.AUTO-MCNC: 254 MG/DL (ref 65–100)
GLUCOSE SERPL-MCNC: 153 MG/DL (ref 70–99)
HCT VFR BLD AUTO: 35.9 % (ref 41.1–50.3)
HGB BLD-MCNC: 12 G/DL (ref 13.6–17.2)
IMM GRANULOCYTES # BLD AUTO: 0.1 K/UL (ref 0–0.5)
IMM GRANULOCYTES NFR BLD AUTO: 1 % (ref 0–5)
LYMPHOCYTES # BLD: 2 K/UL (ref 0.5–4.6)
LYMPHOCYTES NFR BLD: 16 % (ref 13–44)
MCH RBC QN AUTO: 28.7 PG (ref 26.1–32.9)
MCHC RBC AUTO-ENTMCNC: 33.4 G/DL (ref 31.4–35)
MCV RBC AUTO: 85.9 FL (ref 82–102)
MONOCYTES # BLD: 1.5 K/UL (ref 0.1–1.3)
MONOCYTES NFR BLD: 12 % (ref 4–12)
NEUTS SEG # BLD: 8.8 K/UL (ref 1.7–8.2)
NEUTS SEG NFR BLD: 68 % (ref 43–78)
NRBC # BLD: 0 K/UL (ref 0–0.2)
PHOSPHATE SERPL-MCNC: 2.9 MG/DL (ref 2.5–4.5)
PLATELET # BLD AUTO: 424 K/UL (ref 150–450)
PMV BLD AUTO: 8.5 FL (ref 9.4–12.3)
POTASSIUM SERPL-SCNC: 4.1 MMOL/L (ref 3.5–5.1)
RBC # BLD AUTO: 4.18 M/UL (ref 4.23–5.6)
SERVICE CMNT-IMP: ABNORMAL
SODIUM SERPL-SCNC: 137 MMOL/L (ref 136–145)
WBC # BLD AUTO: 12.8 K/UL (ref 4.3–11.1)

## 2024-07-04 PROCEDURE — 6370000000 HC RX 637 (ALT 250 FOR IP): Performed by: STUDENT IN AN ORGANIZED HEALTH CARE EDUCATION/TRAINING PROGRAM

## 2024-07-04 PROCEDURE — 6360000002 HC RX W HCPCS: Performed by: STUDENT IN AN ORGANIZED HEALTH CARE EDUCATION/TRAINING PROGRAM

## 2024-07-04 PROCEDURE — 82962 GLUCOSE BLOOD TEST: CPT

## 2024-07-04 PROCEDURE — 80069 RENAL FUNCTION PANEL: CPT

## 2024-07-04 PROCEDURE — 2580000003 HC RX 258: Performed by: STUDENT IN AN ORGANIZED HEALTH CARE EDUCATION/TRAINING PROGRAM

## 2024-07-04 PROCEDURE — 85025 COMPLETE CBC W/AUTO DIFF WBC: CPT

## 2024-07-04 PROCEDURE — 1100000000 HC RM PRIVATE

## 2024-07-04 PROCEDURE — 2500000003 HC RX 250 WO HCPCS: Performed by: STUDENT IN AN ORGANIZED HEALTH CARE EDUCATION/TRAINING PROGRAM

## 2024-07-04 PROCEDURE — 36415 COLL VENOUS BLD VENIPUNCTURE: CPT

## 2024-07-04 RX ORDER — OXYCODONE HYDROCHLORIDE 5 MG/1
5 TABLET ORAL EVERY 4 HOURS PRN
Status: DISCONTINUED | OUTPATIENT
Start: 2024-07-04 | End: 2024-07-06 | Stop reason: HOSPADM

## 2024-07-04 RX ORDER — HYDROMORPHONE HYDROCHLORIDE 1 MG/ML
1 INJECTION, SOLUTION INTRAMUSCULAR; INTRAVENOUS; SUBCUTANEOUS EVERY 4 HOURS PRN
Status: DISCONTINUED | OUTPATIENT
Start: 2024-07-04 | End: 2024-07-06 | Stop reason: HOSPADM

## 2024-07-04 RX ADMIN — PIPERACILLIN AND TAZOBACTAM 3375 MG: 3; .375 INJECTION, POWDER, LYOPHILIZED, FOR SOLUTION INTRAVENOUS at 06:07

## 2024-07-04 RX ADMIN — SODIUM CHLORIDE, PRESERVATIVE FREE 10 ML: 5 INJECTION INTRAVENOUS at 22:32

## 2024-07-04 RX ADMIN — LINEZOLID 600 MG: 600 INJECTION, SOLUTION INTRAVENOUS at 01:19

## 2024-07-04 RX ADMIN — PIPERACILLIN AND TAZOBACTAM 3375 MG: 3; .375 INJECTION, POWDER, LYOPHILIZED, FOR SOLUTION INTRAVENOUS at 22:31

## 2024-07-04 RX ADMIN — PIPERACILLIN AND TAZOBACTAM 3375 MG: 3; .375 INJECTION, POWDER, LYOPHILIZED, FOR SOLUTION INTRAVENOUS at 15:13

## 2024-07-04 RX ADMIN — SODIUM CHLORIDE, PRESERVATIVE FREE 10 ML: 5 INJECTION INTRAVENOUS at 22:31

## 2024-07-04 RX ADMIN — FOLIC ACID 1 MG: 1 TABLET ORAL at 07:56

## 2024-07-04 RX ADMIN — HYDROMORPHONE HYDROCHLORIDE 1 MG: 1 INJECTION, SOLUTION INTRAMUSCULAR; INTRAVENOUS; SUBCUTANEOUS at 13:25

## 2024-07-04 RX ADMIN — OXYCODONE 5 MG: 5 TABLET ORAL at 19:35

## 2024-07-04 RX ADMIN — Medication 100 MG: at 07:56

## 2024-07-04 RX ADMIN — LINEZOLID 600 MG: 600 INJECTION, SOLUTION INTRAVENOUS at 13:25

## 2024-07-04 RX ADMIN — OXYCODONE 5 MG: 5 TABLET ORAL at 07:56

## 2024-07-04 ASSESSMENT — PAIN DESCRIPTION - DESCRIPTORS
DESCRIPTORS: DISCOMFORT
DESCRIPTORS: DISCOMFORT

## 2024-07-04 ASSESSMENT — PAIN DESCRIPTION - ONSET: ONSET: GRADUAL

## 2024-07-04 ASSESSMENT — PAIN DESCRIPTION - PAIN TYPE: TYPE: CHRONIC PAIN

## 2024-07-04 ASSESSMENT — PAIN DESCRIPTION - LOCATION
LOCATION: FOOT

## 2024-07-04 ASSESSMENT — PAIN DESCRIPTION - ORIENTATION
ORIENTATION: LEFT
ORIENTATION: LEFT

## 2024-07-04 ASSESSMENT — PAIN DESCRIPTION - FREQUENCY: FREQUENCY: INTERMITTENT

## 2024-07-04 ASSESSMENT — PAIN SCALES - GENERAL
PAINLEVEL_OUTOF10: 10
PAINLEVEL_OUTOF10: 2
PAINLEVEL_OUTOF10: 10
PAINLEVEL_OUTOF10: 6

## 2024-07-04 ASSESSMENT — PAIN - FUNCTIONAL ASSESSMENT
PAIN_FUNCTIONAL_ASSESSMENT: PREVENTS OR INTERFERES SOME ACTIVE ACTIVITIES AND ADLS
PAIN_FUNCTIONAL_ASSESSMENT: PREVENTS OR INTERFERES SOME ACTIVE ACTIVITIES AND ADLS

## 2024-07-04 NOTE — PLAN OF CARE
Problem: Discharge Planning  Goal: Discharge to home or other facility with appropriate resources  7/3/2024 2304 by Misty Ozuna RN  Outcome: Progressing  Flowsheets (Taken 7/3/2024 1940)  Discharge to home or other facility with appropriate resources: Identify barriers to discharge with patient and caregiver  7/3/2024 1152 by Josiane Conti RN  Outcome: Progressing     Problem: Pain  Goal: Verbalizes/displays adequate comfort level or baseline comfort level  7/3/2024 2304 by Misty Ozuna RN  Outcome: Progressing  7/3/2024 1152 by Josiane Conti RN  Outcome: Progressing     Problem: Safety - Adult  Goal: Free from fall injury  7/3/2024 2304 by Misty Ozuna RN  Outcome: Progressing  Flowsheets (Taken 7/3/2024 1940)  Free From Fall Injury: Instruct family/caregiver on patient safety  7/3/2024 1152 by Josiane Conti RN  Outcome: Progressing     Problem: Skin/Tissue Integrity  Goal: Absence of new skin breakdown  Description: 1.  Monitor for areas of redness and/or skin breakdown  2.  Assess vascular access sites hourly  3.  Every 4-6 hours minimum:  Change oxygen saturation probe site  4.  Every 4-6 hours:  If on nasal continuous positive airway pressure, respiratory therapy assess nares and determine need for appliance change or resting period.  7/3/2024 2304 by Misty Ozuna RN  Outcome: Progressing  7/3/2024 1152 by Josiane Conti RN  Outcome: Progressing     Problem: ABCDS Injury Assessment  Goal: Absence of physical injury  7/3/2024 2304 by Misty Ozuna RN  Outcome: Progressing  7/3/2024 1152 by Josiane Conti RN  Outcome: Progressing     Problem: Chronic Conditions and Co-morbidities  Goal: Patient's chronic conditions and co-morbidity symptoms are monitored and maintained or improved  Outcome: Progressing  Flowsheets (Taken 7/3/2024 1940)  Care Plan - Patient's Chronic Conditions and Co-Morbidity Symptoms are Monitored and Maintained or Improved: Monitor and assess patient's chronic conditions and

## 2024-07-04 NOTE — PLAN OF CARE
Problem: Discharge Planning  Goal: Discharge to home or other facility with appropriate resources  7/4/2024 0758 by Josiane Conti RN  Outcome: Progressing  7/3/2024 2304 by Misty Ozuna RN  Outcome: Progressing  Flowsheets (Taken 7/3/2024 1940)  Discharge to home or other facility with appropriate resources: Identify barriers to discharge with patient and caregiver     Problem: Pain  Goal: Verbalizes/displays adequate comfort level or baseline comfort level  7/4/2024 0758 by Josiane Conti RN  Outcome: Progressing  7/3/2024 2304 by Misty Ozuna RN  Outcome: Progressing     Problem: Safety - Adult  Goal: Free from fall injury  7/4/2024 0758 by Josiane Conti RN  Outcome: Progressing  7/3/2024 2304 by Misty Ozuna RN  Outcome: Progressing  Flowsheets (Taken 7/3/2024 1940)  Free From Fall Injury: Instruct family/caregiver on patient safety     Problem: Skin/Tissue Integrity  Goal: Absence of new skin breakdown  Description: 1.  Monitor for areas of redness and/or skin breakdown  2.  Assess vascular access sites hourly  3.  Every 4-6 hours minimum:  Change oxygen saturation probe site  4.  Every 4-6 hours:  If on nasal continuous positive airway pressure, respiratory therapy assess nares and determine need for appliance change or resting period.  7/4/2024 0758 by Josiane Conti RN  Outcome: Progressing  7/3/2024 2304 by Misty Ozuna RN  Outcome: Progressing     Problem: ABCDS Injury Assessment  Goal: Absence of physical injury  7/4/2024 0758 by Josiane Conti RN  Outcome: Progressing  7/3/2024 2304 by Misty Ozuna RN  Outcome: Progressing     Problem: Chronic Conditions and Co-morbidities  Goal: Patient's chronic conditions and co-morbidity symptoms are monitored and maintained or improved  7/4/2024 0758 by Josiane Conti RN  Outcome: Progressing  7/3/2024 2304 by Misty Ozuna RN  Outcome: Progressing  Flowsheets (Taken 7/3/2024 1940)  Care Plan - Patient's Chronic Conditions and Co-Morbidity Symptoms are  Monitored and Maintained or Improved: Monitor and assess patient's chronic conditions and comorbid symptoms for stability, deterioration, or improvement

## 2024-07-04 NOTE — PROGRESS NOTES
Hospitalist Progress Note   Admit Date:  2024 10:04 AM   Name:  Roldan Edmondson   Age:  78 y.o.  Sex:  male  :  1945   MRN:  [id number]   Room:  [facility]/    Presenting/Chief Complaint: Foot Pain     Reason(s) for Admission: Diabetic foot (HCC) [E11.8]  Elevated lactic acid level [R79.89]  Diabetic foot infection (HCC) [E11.628, L08.9]  Acute kidney injury superimposed on CKD (HCC) [N17.9, N18.9]  Leukocytosis, unspecified type [D72.829]     Hospital Course:   Roldan Edmondson is a 78 y.o. male with medical history of HTN, T2DM, Etoh use, CKD and prior osteomyelitis of right foot presenting with left foot pain and swelling worsening over 3-4 days. In the ER /60, remaining vitals WNL. , scr 2.83, lactic acid 2.3, , WBC 14.6, left foot xray with infectious changes within soft tissue over first metatarsal with soft tissue swelling and gas noted.  Admitted for diabetic foot wound and started on empiric vanc, cefepime  and clindamycin. Pt with MEG and started on fluids. Ortho consulted.       Subjective & 24hr Events:   One time fever last night. I&D yesterday.  Pain responded to oxy today. Cultures are all in process.       Assessment & Plan:     Principal Problem:  Diabetic foot wound  Lactic acidosis   Xray findings with infectious changes in left first metarsal with swelling and gas  Vancomycin + cefepime + clindamycin D2 ---> switched to linezolid + zosynD2  Ortho consulted, appreciate recs - plan for intervention today?  Will request ID Consult for antibiotic plan depending on findings in surgery   Follow up cultures ngtd2     T2DM  Home regimen:  januvia, actos  A1c 7.2   Will hold home meds and start Sliding scale  Add lantus as needed      Hypertension  Home regimen: hctz, losartan, amlodipine , atenolol   Holding all bp meds at this time as bp well controlled   Prn hydralazine      MEG on CKD 3 b  Baseline creatinine : 1.7-2  Improved 2.8 to 2.19  Avoid nephrotoxic agents - hold pt's  PRN    Or    ondansetron (ZOFRAN) injection 4 mg  4 mg IntraVENous Q6H PRN    polyethylene glycol (GLYCOLAX) packet 17 g  17 g Oral Daily PRN    acetaminophen (TYLENOL) tablet 650 mg  650 mg Oral Q6H PRN    Or    acetaminophen (TYLENOL) suppository 650 mg  650 mg Rectal Q6H PRN    [Held by provider] hydroCHLOROthiazide (HYDRODIURIL) tablet 25 mg  25 mg Oral Daily    [Held by provider] losartan (COZAAR) tablet 100 mg  100 mg Oral Daily    melatonin tablet 3 mg  3 mg Oral Nightly PRN    hydrALAZINE (APRESOLINE) injection 10 mg  10 mg IntraVENous Q6H PRN    insulin lispro (HUMALOG,ADMELOG) injection vial 0-8 Units  0-8 Units SubCUTAneous TID WC    insulin lispro (HUMALOG,ADMELOG) injection vial 0-4 Units  0-4 Units SubCUTAneous Nightly    sodium chloride flush 0.9 % injection 5-40 mL  5-40 mL IntraVENous 2 times per day    sodium chloride flush 0.9 % injection 5-40 mL  5-40 mL IntraVENous PRN    0.9 % sodium chloride infusion   IntraVENous PRN    thiamine tablet 100 mg  100 mg Oral Daily    folic acid (FOLVITE) tablet 1 mg  1 mg Oral Daily    LORazepam (ATIVAN) tablet 1 mg  1 mg Oral Q1H PRN    Or    LORazepam (ATIVAN) 1 mg in sodium chloride (PF) 0.9 % 10 mL injection  1 mg IntraVENous Q1H PRN    Or    LORazepam (ATIVAN) tablet 2 mg  2 mg Oral Q1H PRN    Or    LORazepam (ATIVAN) 2 mg in sodium chloride (PF) 0.9 % 10 mL injection  2 mg IntraVENous Q1H PRN    Or    LORazepam (ATIVAN) tablet 3 mg  3 mg Oral Q1H PRN    Or    LORazepam (ATIVAN) 3 mg in sodium chloride (PF) 0.9 % 10 mL injection  3 mg IntraVENous Q1H PRN    Or    LORazepam (ATIVAN) tablet 4 mg  4 mg Oral Q1H PRN    Or    LORazepam (ATIVAN) 4 mg in sodium chloride (PF) 0.9 % 10 mL injection  4 mg IntraVENous Q1H PRN       Signed:  Radha Briceno MD    Part of this note may have been written by using a voice dictation software.  The note has been proof read but may still contain some grammatical/other typographical errors.

## 2024-07-05 LAB
BACTERIA SPEC CULT: ABNORMAL
BACTERIA SPEC CULT: NORMAL
FUNGAL CULT/SMEAR: NORMAL
FUNGAL CULT/SMEAR: NORMAL
GLUCOSE BLD STRIP.AUTO-MCNC: 143 MG/DL (ref 65–100)
GLUCOSE BLD STRIP.AUTO-MCNC: 145 MG/DL (ref 65–100)
GLUCOSE BLD STRIP.AUTO-MCNC: 150 MG/DL (ref 65–100)
GLUCOSE BLD STRIP.AUTO-MCNC: 164 MG/DL (ref 65–100)
GRAM STN SPEC: ABNORMAL
SERVICE CMNT-IMP: ABNORMAL
SERVICE CMNT-IMP: NORMAL
SPECIMEN PROCESSING: NORMAL
SPECIMEN PROCESSING: NORMAL
SPECIMEN SOURCE: NORMAL
SPECIMEN SOURCE: NORMAL

## 2024-07-05 PROCEDURE — 6370000000 HC RX 637 (ALT 250 FOR IP): Performed by: NURSE PRACTITIONER

## 2024-07-05 PROCEDURE — 2580000003 HC RX 258: Performed by: STUDENT IN AN ORGANIZED HEALTH CARE EDUCATION/TRAINING PROGRAM

## 2024-07-05 PROCEDURE — 1100000000 HC RM PRIVATE

## 2024-07-05 PROCEDURE — 82962 GLUCOSE BLOOD TEST: CPT

## 2024-07-05 PROCEDURE — 6360000002 HC RX W HCPCS: Performed by: STUDENT IN AN ORGANIZED HEALTH CARE EDUCATION/TRAINING PROGRAM

## 2024-07-05 PROCEDURE — 6370000000 HC RX 637 (ALT 250 FOR IP): Performed by: STUDENT IN AN ORGANIZED HEALTH CARE EDUCATION/TRAINING PROGRAM

## 2024-07-05 RX ORDER — CIPROFLOXACIN 500 MG/1
500 TABLET, FILM COATED ORAL EVERY 12 HOURS SCHEDULED
Status: DISCONTINUED | OUTPATIENT
Start: 2024-07-05 | End: 2024-07-06 | Stop reason: HOSPADM

## 2024-07-05 RX ADMIN — OXYCODONE 5 MG: 5 TABLET ORAL at 04:34

## 2024-07-05 RX ADMIN — SODIUM CHLORIDE, PRESERVATIVE FREE 10 ML: 5 INJECTION INTRAVENOUS at 07:28

## 2024-07-05 RX ADMIN — PIPERACILLIN AND TAZOBACTAM 3375 MG: 3; .375 INJECTION, POWDER, LYOPHILIZED, FOR SOLUTION INTRAVENOUS at 06:34

## 2024-07-05 RX ADMIN — ACETAMINOPHEN 650 MG: 325 TABLET ORAL at 07:28

## 2024-07-05 RX ADMIN — AMLODIPINE BESYLATE 10 MG: 10 TABLET ORAL at 07:28

## 2024-07-05 RX ADMIN — LINEZOLID 600 MG: 600 INJECTION, SOLUTION INTRAVENOUS at 11:31

## 2024-07-05 RX ADMIN — Medication 100 MG: at 07:28

## 2024-07-05 RX ADMIN — SODIUM CHLORIDE, PRESERVATIVE FREE 5 ML: 5 INJECTION INTRAVENOUS at 20:04

## 2024-07-05 RX ADMIN — FOLIC ACID 1 MG: 1 TABLET ORAL at 07:28

## 2024-07-05 RX ADMIN — SODIUM CHLORIDE, PRESERVATIVE FREE 5 ML: 5 INJECTION INTRAVENOUS at 07:29

## 2024-07-05 RX ADMIN — CIPROFLOXACIN 500 MG: 500 TABLET, FILM COATED ORAL at 20:48

## 2024-07-05 RX ADMIN — SODIUM CHLORIDE, PRESERVATIVE FREE 5 ML: 5 INJECTION INTRAVENOUS at 20:05

## 2024-07-05 RX ADMIN — LINEZOLID 600 MG: 600 INJECTION, SOLUTION INTRAVENOUS at 00:33

## 2024-07-05 ASSESSMENT — PAIN SCALES - GENERAL
PAINLEVEL_OUTOF10: 0
PAINLEVEL_OUTOF10: 6
PAINLEVEL_OUTOF10: 0
PAINLEVEL_OUTOF10: 2
PAINLEVEL_OUTOF10: 2

## 2024-07-05 ASSESSMENT — PAIN DESCRIPTION - DESCRIPTORS
DESCRIPTORS: DISCOMFORT
DESCRIPTORS: ACHING

## 2024-07-05 ASSESSMENT — PAIN DESCRIPTION - PAIN TYPE
TYPE: CHRONIC PAIN
TYPE: CHRONIC PAIN

## 2024-07-05 ASSESSMENT — PAIN - FUNCTIONAL ASSESSMENT
PAIN_FUNCTIONAL_ASSESSMENT: PREVENTS OR INTERFERES SOME ACTIVE ACTIVITIES AND ADLS
PAIN_FUNCTIONAL_ASSESSMENT: ACTIVITIES ARE NOT PREVENTED

## 2024-07-05 ASSESSMENT — PAIN DESCRIPTION - ORIENTATION
ORIENTATION: LEFT
ORIENTATION: MID

## 2024-07-05 ASSESSMENT — PAIN DESCRIPTION - LOCATION
LOCATION: ABDOMEN
LOCATION: FOOT

## 2024-07-05 ASSESSMENT — PAIN DESCRIPTION - ONSET
ONSET: GRADUAL
ONSET: GRADUAL

## 2024-07-05 ASSESSMENT — PAIN DESCRIPTION - FREQUENCY
FREQUENCY: INTERMITTENT
FREQUENCY: INTERMITTENT

## 2024-07-05 NOTE — PROGRESS NOTES
Orthopaedic Trauma Service  Progress Note    Roldan Debo  3 Days  Procedure(s):  Left foot debridement  POD#: 1 Day Post-Op  LOS#: 3 Days    Hospital Problems:  Principal Problem:    Diabetic foot (HCC)  Active Problems:    Type 2 diabetes mellitus with diabetic autonomic neuropathy, without long-term current use of insulin (HCC)    MEG (acute kidney injury) (HCC)    Lactic acidosis    EtOH dependence (HCC)    Hypertension    Stage 3a chronic kidney disease (HCC)    Diabetic foot infection (HCC)  Resolved Problems:    * No resolved hospital problems. *    Assessment & Plan   78M w/ Left foot diabetic infection and full-thickness plantar ulcer underlying 1st MT head  -Sig PMH: HTN, T2DM, Etoh use, CKD and prior osteomyelitis of right foot   -Sig PSH: Right foot debridement ([facility])    7/3: Left forefoot debridement  -OR Cx: NGTD, Pending    Significant Left medial/plantar forefoot infection  Extensive debridement  ? Source control  Recommend extended course PO abx per ID recs to attempt limb salvage    WB status: WBAT LLE w/ post-op shoe  Diet:  ADULT DIET; Regular; 4 carb choices (60 gm/meal)  DVT prophylaxis: Defer to primary team;   Wound care: Nursing can change dry dressings prn    Dispo: f/up OR Cx x 3-4 days; planning home w/ final abx plan    Subjective   Comfortable  Doing well.   Pain controlled.   Denies altered motor/sensation to extremities.  Denies headache, fevers/chills, nausea/vomiting, chest pain, shortness of breath.    Physical Exam     BMI: Estimated body mass index is 24.59 kg/m² as calculated from the following:    Height as of this encounter: 1.702 m (5' 7\").    Weight as of this encounter: 71.2 kg (157 lb).    General: alert, appears stated age, and cooperative  Neurologic: A&O x 3  Cardiovascular: + palpable pulses b/l upper extremity  Pulmonary: Chest excursion equal bilaterally, breathing non-labored  Abdominal: Soft, non-tender, non-distened, no guarding or rebound  Pelvis: Stable    Left  Platelets 424 150 - 450 K/uL    MPV 8.5 (L) 9.4 - 12.3 FL    nRBC 0.00 0.0 - 0.2 K/uL    Differential Type AUTOMATED      Neutrophils % 68 43 - 78 %    Lymphocytes % 16 13 - 44 %    Monocytes % 12 4.0 - 12.0 %    Eosinophils % 2 0.5 - 7.8 %    Basophils % 1 0.0 - 2.0 %    Immature Granulocytes % 1 0.0 - 5.0 %    Neutrophils Absolute 8.8 (H) 1.7 - 8.2 K/UL    Lymphocytes Absolute 2.0 0.5 - 4.6 K/UL    Monocytes Absolute 1.5 (H) 0.1 - 1.3 K/UL    Eosinophils Absolute 0.2 0.0 - 0.8 K/UL    Basophils Absolute 0.1 0.0 - 0.2 K/UL    Immature Granulocytes Absolute 0.1 0.0 - 0.5 K/UL   Renal Function Panel    Collection Time: 07/04/24  8:28 AM   Result Value Ref Range    Sodium 137 136 - 145 mmol/L    Potassium 4.1 3.5 - 5.1 mmol/L    Chloride 102 98 - 107 mmol/L    CO2 25 20 - 28 mmol/L    Anion Gap 11 9 - 18 mmol/L    Glucose 153 (H) 70 - 99 mg/dL    BUN 17 8 - 23 MG/DL    Creatinine 1.77 (H) 0.80 - 1.30 MG/DL    Est, Glom Filt Rate 39 (L) >60 ml/min/1.73m2    Calcium 9.1 8.8 - 10.2 MG/DL    Phosphorus 2.9 2.5 - 4.5 MG/DL    Albumin 2.4 (L) 3.2 - 4.6 g/dL   POCT Glucose    Collection Time: 07/04/24 11:20 AM   Result Value Ref Range    POC Glucose 135 (H) 65 - 100 mg/dL    Performed by: [email or web address]    POCT Glucose    Collection Time: 07/04/24  3:43 PM   Result Value Ref Range    POC Glucose 167 (H) 65 - 100 mg/dL    Performed by: [email or web address]    POCT Glucose    Collection Time: 07/04/24  8:52 PM   Result Value Ref Range    POC Glucose 254 (H) 65 - 100 mg/dL    Performed by: Baron Fragoso MD  Orthopaedic Trauma Surgeon  [address]  [address] [place] [address]  [facility]: [phone]  [facility]: [phone]

## 2024-07-05 NOTE — PROGRESS NOTES
Orthopaedic Trauma Service  Progress Note    Roldan Debo  4 Days  Procedure(s):  Left foot debridement  POD#: 2 Days Post-Op  LOS#: 4 Days    Hospital Problems:  Principal Problem:    Diabetic foot (HCC)  Active Problems:    Type 2 diabetes mellitus with diabetic autonomic neuropathy, without long-term current use of insulin (HCC)    MEG (acute kidney injury) (HCC)    Lactic acidosis    EtOH dependence (HCC)    Hypertension    Stage 3a chronic kidney disease (HCC)    Diabetic foot infection (HCC)  Resolved Problems:    * No resolved hospital problems. *    Assessment & Plan   78M w/ Left foot diabetic infection and full-thickness plantar ulcer underlying 1st MT head  -Sig PMH: HTN, T2DM, Etoh use, CKD and prior osteomyelitis of right foot   -Sig PSH: Right foot debridement ([facility])    7/3: Left forefoot debridement  -OR Cx: NGTD, Pending    Significant Left medial/plantar forefoot infection  Extensive debridement  ? Source control  Recommend extended course PO abx per ID recs to attempt limb salvage    Warrants Nutrition consultation for wound healing    WB status: WBAT LLE w/ post-op shoe  Diet:  ADULT DIET; Regular; 4 carb choices (60 gm/meal)  DVT prophylaxis: Defer to primary team;   Wound care: Nursing can change dry dressings prn    Dispo: f/up OR Cx x 3-4 days; planning home w/ final abx plan    F/up w/ Dr. Fragoso in [facility]uma Clinic on 7/22  Will sign off for now. Please call w/ new questions/concerns.     Subjective   Comfortable  Doing well.   Pain controlled.   Denies altered motor/sensation to extremities.  Denies headache, fevers/chills, nausea/vomiting, chest pain, shortness of breath.    Physical Exam     BMI: Estimated body mass index is 24.59 kg/m² as calculated from the following:    Height as of this encounter: 1.702 m (5' 7\").    Weight as of this encounter: 71.2 kg (157 lb).    General: alert, appears stated age, and cooperative  Neurologic: A&O x 3  Cardiovascular: + palpable pulses b/l upper  - 100 mg/dL    Performed by: Dawn    POCT Glucose    Collection Time: 07/05/24  3:50 PM   Result Value Ref Range    POC Glucose 164 (H) 65 - 100 mg/dL    Performed by: Dawn Fragoso MD  Orthopaedic Trauma Surgeon  [address] [place], [place] [id number] [place] [address] Trauma Clinic: [phone]  [facility]: [phone]

## 2024-07-05 NOTE — PROGRESS NOTES
Hospitalist Progress Note   Admit Date:  2024 10:04 AM   Name:  Roldan Edmondson   Age:  78 y.o.  Sex:  male  :  1945   MRN:  [id number]   Room:  [facility]/    Presenting/Chief Complaint: Foot Pain     Reason(s) for Admission: Diabetic foot (HCC) [E11.8]  Elevated lactic acid level [R79.89]  Diabetic foot infection (HCC) [E11.628, L08.9]  Acute kidney injury superimposed on CKD (HCC) [N17.9, N18.9]  Leukocytosis, unspecified type [D72.829]     Hospital Course:   Roldan Edmondson is a 78 y.o. male with medical history of HTN, T2DM, Etoh use, CKD and prior osteomyelitis of right foot presenting with left foot pain and swelling worsening over 3-4 days. In the ER /60, remaining vitals WNL. , scr 2.83, lactic acid 2.3, , WBC 14.6, left foot xray with infectious changes within soft tissue over first metatarsal with soft tissue swelling and gas noted.  Admitted for diabetic foot wound and started on empiric vanc, cefepime  and clindamycin. Pt with MEG and started on fluids. Ortho consulted.  S/p debridement on 7/3.      Subjective & 24hr Events:   Afebrile.  Some drainage noted on the bed, do see some dried crusting on right foot however odor is coming from left side.  Unclear if this is residual or has not processed up.  Will discuss with wound nurse.  Cultures positive for Proteus however also seeing gram-positive organisms.  Will request ID consult for antibiotic regimen given patient's history of recurrent wounds.        Assessment & Plan:     Principal Problem:  Diabetic foot wound  Lactic acidosis   Xray findings with infectious changes in left first metarsal with swelling and gas  Vancomycin + cefepime + clindamycin D2 ---> switched to linezolid + zosynD3  Consulted ID, appreciate recs  Ortho consulted, appreciate recs -debridement on 7/3  Wound cultures growing moderate Proteus mirabilis, and many gram-positive cocci  Follow up cultures ngtd4  Will request second opinion from wound care  96 %         Oxygen Therapy  SpO2: 98 %  Pulse via Oximetry: 62 beats per minute  Pulse Oximeter Device Mode: Continuous  Pulse Oximeter Device Location: Finger  O2 Device: None (Room air)  O2 Flow Rate (L/min): 3 L/min  Oxygen Therapy: None (Room air)    Estimated body mass index is 24.59 kg/m² as calculated from the following:    Height as of this encounter: 1.702 m (5' 7\").    Weight as of this encounter: 71.2 kg (157 lb).    Intake/Output Summary (Last 24 hours) at 7/5/2024 0813  Last data filed at 7/4/2024 1938  Gross per 24 hour   Intake --   Output 775 ml   Net -775 ml           Physical Exam:     General:          Chronically ill appearing, nontoxic, NAD     Head:               Normocephalic, atraumatic  Eyes:               Sclerae appear normal.  Pupils equally round.  ENT:                Nares appear normal.  Moist oral mucosa  Neck:               No restricted ROM.  Trachea midline   CV:                  RRR.  No m/r/g.  No jugular venous distension.  Lungs:             CTAB.  No wheezing, rhonchi, or rales.  Symmetric expansion.  Abdomen:        Soft, nontender, nondistended.  Extremities:     No cyanosis or clubbing.  Odor resolved, left foot in clean, dry dressing  Skin:                No rashes and normal coloration.   Warm and dry.    Neuro:             CN II-XII grossly intact.  Sensation intact.   Psych:             Normal mood and affect.      I have personally reviewed labs and tests:  Recent Labs:  Recent Results (from the past 48 hour(s))   POCT Glucose    Collection Time: 07/03/24 11:28 AM   Result Value Ref Range    POC Glucose 184 (H) 65 - 100 mg/dL    Performed by: Alexander    POCT Glucose    Collection Time: 07/03/24  3:51 PM   Result Value Ref Range    POC Glucose 178 (H) 65 - 100 mg/dL    Performed by: Alexander    Culture, Wound    Collection Time: 07/03/24  5:11 PM    Specimen: Foot, Left   Result Value Ref Range    Special Requests NO SPECIAL REQUESTS      Gram

## 2024-07-05 NOTE — CARE COORDINATION
CM continuing to follow for ongoing transitions of care needs.  CM spoke with wound care nurse who is placing update wound care orders for home health or STR needs.  I.D. is recommending PO antibiotics at discharge.  Therapy evaluations were re-ordered this morning and they should see patient tomorrow.  Attending will plan for patient to return home tomorrow with home health if therapy recommendation remains for home health.  Home health order placed and referral sent to [facility].  Admissions liaison notified.  Patient would be agreeable to this discharge plan.  2nd IMM completed.

## 2024-07-05 NOTE — WOUND CARE
Noted consult for dressing recommendations post debridement.  Post op wounds are managed by the surgeon, unless the surgeon asks for CWON input. Surgeons notes 7/5/24 states dry dressing as needed (for saturation/ soiling).  Today removed, left foot sutures well approximated, slight edema and slight redness Pink fragile scar surrounding incisions small amount serosanguinous drainage. Applied acitcoat and dry dressing today. Starting Monday recommend every there day dry 4x4's abd and kerlex 3 times per week.        Right 3rd toe ulcer 1x0.8x0.2cm with slough, drainage has mild odor, likley from build of colonized germs from no bandage. Recommend opticel ag and dry dressing 3 times per week.

## 2024-07-05 NOTE — CONSULTS
Infectious Disease Consult      Today's Date: 7/5/2024   Admit Date: 7/1/2024  YOB: 1945    Impression:   DFI of left foot with plantar ulcer underlying 1st MT head s/p I&D on 7/3 op cx grew proteus mirabilis, FQ-S  Elevated CRP   DM type 2, A1c of 7.2%  MEG on CKD3b  Hx of osteomyelitis of right great toe metatarsal, sesamoids, and proximal phalanx at least.  S/p I&D on 2/22/23 op cx with CoNS (S. Simulans); s/p I&D of right first MTP joint and lengthening flexor, tenotomys' of 2nd, 3rd and 4th toes on 1/31/24  Alcohol use disorder    Plan:   Recommend to de-escalate to Cipro 500mg po BID renally dosed for total of 6 weeks with EOT 8/17/24.   Pt was provided with education about chelating agent precautions and avoid taking abx with dairy products or calcium fortified food etc.  Will arrange for ID follow up at [facility] on 8/15/24 at 10:30am with ALYSON Cisneros.   ID will sign off at this time.  Please reach out with any questions or concerns.     Anti-infectives:   Vancomycin 7/1-7/3  Cefepime 71/1-7/2  Clindamycin 7/1-7/3  Linezolid 7/3-7/5  Zosyn 7/3-7/5  Cipro 7/5-    Subjective:   Date of Consultation:  July 5, 2024  Date of Admission: 7/1/2024   Referring Provider: Radha Briceno  Reason for consult: \"diabetic foot infection, antibiotic recs\"    Patient is a 78 y.o. male with PMH of DM type 2, hx of osteomyelitis of right foot s/p I&D with previous cx grew CoNS, HTN, CKD, alcohol dependence who was admitted for worsening left foot pain and swelling. Work up revealed WBC of 14.6, CRP of 186; ESR 33, left foot X-ray with infectious changes within soft tissue over first metatarsal with soft tissue swelling and gas noted. Blood cultures negative to date. Tmax of 100.8 this admission. WBC is improving to 12.8k. pt underwent OR debridement of 1st MT head of left foot with op cx grew proteus mirabilis. Of note, previously pt was treated with doxy/augmentin x6 weeks in 2023 for right foot osteomyelitis. Pt  Range    POC Glucose 254 (H) 65 - 100 mg/dL    Performed by: Baron    POCT Glucose    Collection Time: 07/05/24  6:22 AM   Result Value Ref Range    POC Glucose 145 (H) 65 - 100 mg/dL    Performed by: Baron         Microbiology:  Reviewed    Studies:  Reviewed    Signed By: SANDRINE Richey - CNP     July 5, 2024

## 2024-07-06 VITALS
HEART RATE: 61 BPM | DIASTOLIC BLOOD PRESSURE: 72 MMHG | SYSTOLIC BLOOD PRESSURE: 141 MMHG | BODY MASS INDEX: 24.64 KG/M2 | RESPIRATION RATE: 19 BRPM | OXYGEN SATURATION: 98 % | WEIGHT: 157 LBS | TEMPERATURE: 98.6 F | HEIGHT: 67 IN

## 2024-07-06 LAB
ALBUMIN SERPL-MCNC: 2.3 G/DL (ref 3.2–4.6)
ANION GAP SERPL CALC-SCNC: 13 MMOL/L (ref 9–18)
BACTERIA SPEC CULT: NORMAL
BACTERIA SPEC CULT: NORMAL
BASOPHILS # BLD: 0.1 K/UL (ref 0–0.2)
BASOPHILS NFR BLD: 1 % (ref 0–2)
BUN SERPL-MCNC: 17 MG/DL (ref 8–23)
CALCIUM SERPL-MCNC: 9.5 MG/DL (ref 8.8–10.2)
CHLORIDE SERPL-SCNC: 100 MMOL/L (ref 98–107)
CO2 SERPL-SCNC: 22 MMOL/L (ref 20–28)
CREAT SERPL-MCNC: 1.66 MG/DL (ref 0.8–1.3)
DIFFERENTIAL METHOD BLD: ABNORMAL
EKG ATRIAL RATE: 81 BPM
EKG DIAGNOSIS: NORMAL
EKG P AXIS: 66 DEGREES
EKG P-R INTERVAL: 152 MS
EKG Q-T INTERVAL: 412 MS
EKG QRS DURATION: 80 MS
EKG QTC CALCULATION (BAZETT): 478 MS
EKG R AXIS: 15 DEGREES
EKG T AXIS: 54 DEGREES
EKG VENTRICULAR RATE: 81 BPM
EOSINOPHIL # BLD: 0.3 K/UL (ref 0–0.8)
EOSINOPHIL NFR BLD: 3 % (ref 0.5–7.8)
ERYTHROCYTE [DISTWIDTH] IN BLOOD BY AUTOMATED COUNT: 13.2 % (ref 11.9–14.6)
GLUCOSE BLD STRIP.AUTO-MCNC: 136 MG/DL (ref 65–100)
GLUCOSE BLD STRIP.AUTO-MCNC: 142 MG/DL (ref 65–100)
GLUCOSE SERPL-MCNC: 117 MG/DL (ref 70–99)
HCT VFR BLD AUTO: 36.6 % (ref 41.1–50.3)
HGB BLD-MCNC: 11.9 G/DL (ref 13.6–17.2)
IMM GRANULOCYTES # BLD AUTO: 0.1 K/UL (ref 0–0.5)
IMM GRANULOCYTES NFR BLD AUTO: 1 % (ref 0–5)
LYMPHOCYTES # BLD: 1.7 K/UL (ref 0.5–4.6)
LYMPHOCYTES NFR BLD: 14 % (ref 13–44)
MCH RBC QN AUTO: 27.7 PG (ref 26.1–32.9)
MCHC RBC AUTO-ENTMCNC: 32.5 G/DL (ref 31.4–35)
MCV RBC AUTO: 85.1 FL (ref 82–102)
MONOCYTES # BLD: 1 K/UL (ref 0.1–1.3)
MONOCYTES NFR BLD: 8 % (ref 4–12)
NEUTS SEG # BLD: 8.8 K/UL (ref 1.7–8.2)
NEUTS SEG NFR BLD: 73 % (ref 43–78)
NRBC # BLD: 0 K/UL (ref 0–0.2)
PHOSPHATE SERPL-MCNC: 2.6 MG/DL (ref 2.5–4.5)
PLATELET # BLD AUTO: 460 K/UL (ref 150–450)
PMV BLD AUTO: 8.5 FL (ref 9.4–12.3)
POTASSIUM SERPL-SCNC: 3.7 MMOL/L (ref 3.5–5.1)
RBC # BLD AUTO: 4.3 M/UL (ref 4.23–5.6)
SERVICE CMNT-IMP: ABNORMAL
SERVICE CMNT-IMP: ABNORMAL
SERVICE CMNT-IMP: NORMAL
SERVICE CMNT-IMP: NORMAL
SODIUM SERPL-SCNC: 135 MMOL/L (ref 136–145)
SPECIMEN SOURCE: NORMAL
VIRUS SPEC CULT: NORMAL
WBC # BLD AUTO: 12 K/UL (ref 4.3–11.1)

## 2024-07-06 PROCEDURE — 85025 COMPLETE CBC W/AUTO DIFF WBC: CPT

## 2024-07-06 PROCEDURE — 80069 RENAL FUNCTION PANEL: CPT

## 2024-07-06 PROCEDURE — 0LBW0ZZ EXCISION OF LEFT FOOT TENDON, OPEN APPROACH: ICD-10-PCS | Performed by: ORTHOPAEDIC SURGERY

## 2024-07-06 PROCEDURE — 6370000000 HC RX 637 (ALT 250 FOR IP): Performed by: NURSE PRACTITIONER

## 2024-07-06 PROCEDURE — 93005 ELECTROCARDIOGRAM TRACING: CPT | Performed by: STUDENT IN AN ORGANIZED HEALTH CARE EDUCATION/TRAINING PROGRAM

## 2024-07-06 PROCEDURE — 28002 TREATMENT OF FOOT INFECTION: CPT | Performed by: ORTHOPAEDIC SURGERY

## 2024-07-06 PROCEDURE — 36415 COLL VENOUS BLD VENIPUNCTURE: CPT

## 2024-07-06 PROCEDURE — 6370000000 HC RX 637 (ALT 250 FOR IP): Performed by: STUDENT IN AN ORGANIZED HEALTH CARE EDUCATION/TRAINING PROGRAM

## 2024-07-06 PROCEDURE — 93010 ELECTROCARDIOGRAM REPORT: CPT | Performed by: INTERNAL MEDICINE

## 2024-07-06 PROCEDURE — 82962 GLUCOSE BLOOD TEST: CPT

## 2024-07-06 PROCEDURE — 11046 DBRDMT MUSC&/FSCA EA ADDL: CPT | Performed by: ORTHOPAEDIC SURGERY

## 2024-07-06 PROCEDURE — 11043 DBRDMT MUSC&/FSCA 1ST 20/<: CPT | Performed by: ORTHOPAEDIC SURGERY

## 2024-07-06 RX ORDER — FOLIC ACID 1 MG/1
1 TABLET ORAL DAILY
Qty: 30 TABLET | Refills: 3 | Status: SHIPPED | OUTPATIENT
Start: 2024-07-07

## 2024-07-06 RX ORDER — LANOLIN ALCOHOL/MO/W.PET/CERES
100 CREAM (GRAM) TOPICAL DAILY
Qty: 30 TABLET | Refills: 3 | Status: SHIPPED | OUTPATIENT
Start: 2024-07-07

## 2024-07-06 RX ORDER — NALOXONE HYDROCHLORIDE 4 MG/.1ML
1 SPRAY NASAL PRN
Qty: 1 EACH | Refills: 0 | Status: SHIPPED | OUTPATIENT
Start: 2024-07-06

## 2024-07-06 RX ORDER — OXYCODONE HYDROCHLORIDE 5 MG/1
5 TABLET ORAL EVERY 4 HOURS PRN
Qty: 9 TABLET | Refills: 0 | Status: SHIPPED | OUTPATIENT
Start: 2024-07-06 | End: 2024-07-09

## 2024-07-06 RX ORDER — ATENOLOL 25 MG/1
25 TABLET ORAL DAILY
Qty: 90 TABLET | Refills: 0 | Status: SHIPPED | OUTPATIENT
Start: 2024-07-06

## 2024-07-06 RX ORDER — HYDROCHLOROTHIAZIDE 25 MG/1
TABLET ORAL
Qty: 90 TABLET | Refills: 0 | Status: SHIPPED | OUTPATIENT
Start: 2024-07-06

## 2024-07-06 RX ORDER — CIPROFLOXACIN 500 MG/1
500 TABLET, FILM COATED ORAL EVERY 12 HOURS SCHEDULED
Qty: 84 TABLET | Refills: 0 | Status: SHIPPED | OUTPATIENT
Start: 2024-07-06 | End: 2024-08-17

## 2024-07-06 RX ADMIN — LOSARTAN POTASSIUM 100 MG: 50 TABLET, FILM COATED ORAL at 08:18

## 2024-07-06 RX ADMIN — CIPROFLOXACIN 500 MG: 500 TABLET, FILM COATED ORAL at 08:19

## 2024-07-06 RX ADMIN — Medication 100 MG: at 08:18

## 2024-07-06 RX ADMIN — AMLODIPINE BESYLATE 10 MG: 10 TABLET ORAL at 08:19

## 2024-07-06 RX ADMIN — FOLIC ACID 1 MG: 1 TABLET ORAL at 08:18

## 2024-07-06 RX ADMIN — OXYCODONE 5 MG: 5 TABLET ORAL at 08:18

## 2024-07-06 ASSESSMENT — PAIN SCALES - GENERAL: PAINLEVEL_OUTOF10: 6

## 2024-07-06 ASSESSMENT — PAIN DESCRIPTION - LOCATION: LOCATION: FOOT

## 2024-07-06 ASSESSMENT — PAIN DESCRIPTION - ORIENTATION: ORIENTATION: LEFT

## 2024-07-06 ASSESSMENT — PAIN DESCRIPTION - DESCRIPTORS: DESCRIPTORS: ACHING

## 2024-07-06 ASSESSMENT — PAIN - FUNCTIONAL ASSESSMENT: PAIN_FUNCTIONAL_ASSESSMENT: ACTIVITIES ARE NOT PREVENTED

## 2024-07-06 NOTE — DISCHARGE INSTRUCTIONS
Hold your hydrochlorothiazide and atenolol until your follow up with primary care. Continue taking antibiotics for 6 weeks. Please follow up with your primary care doctor and orthopedic surgery and infectious disease.

## 2024-07-06 NOTE — DISCHARGE SUMMARY
PM    BILIRUBINUR Negative 07/01/2024 03:34 PM    BLOODU Negative 07/01/2024 03:34 PM    UROBILINOGEN 0.2 07/01/2024 03:34 PM    NITRU Negative 07/01/2024 03:34 PM    LEUKOCYTESUR Negative 07/01/2024 03:34 PM    WBCUA 0-4 07/01/2024 03:34 PM    RBCUA 0-5 07/01/2024 03:34 PM    BACTERIA Negative 07/01/2024 03:34 PM    LABCAST 0 07/01/2024 03:34 PM    MUCUS 0 07/01/2024 03:34 PM        Microbiology:  Results       Procedure Component Value Units Date/Time    Culture, Tissue [[id number]]  (Abnormal)  (Susceptibility) Collected: 07/03/24 1720    Order Status: Completed Specimen: Foot, Left Updated: 07/05/24 0811     Special Requests NO SPECIAL REQUESTS        Gram Stain       OCCASIONAL WBCS SEEN PER LPF            MANY Gram positive cocci               OCCASIONAL Gram negative rods                  OCCASIONAL GRAM POSITIVE RODS           Culture       MODERATE Proteus mirabilis                  SCANT MIXED SKIN TRELL ISOLATED          Susceptibility        Proteus mirabilis      BACTERIAL SUSCEPTIBILITY PANEL MICK      cefepime <=0.12 ug/mL Sensitive      cefTRIAXone <=0.25 ug/mL Sensitive      ciprofloxacin <=0.06 ug/mL Sensitive      gentamicin <=1 ug/mL Sensitive      levofloxacin <=0.12 ug/mL Sensitive      meropenem <=0.25 ug/mL Sensitive      piperacillin-tazobactam <=4 ug/mL Sensitive      trimethoprim-sulfamethoxazole <=20 ug/mL Sensitive                           Culture, Anaerobic [[id number]] Collected: 07/03/24 1720    Order Status: Completed Specimen: Foot, Left Updated: 07/05/24 0858     Special Requests NO SPECIAL REQUESTS        Culture       SUBCULTURE IS NECESSARY TO DETERMINE PRESENCE OR ABSENCE OF ANAEROBIC BACTERIA IN THIS CULTURE.  FURTHER REPORT TO FOLLOW AFTER INCUBATION OF SUBCULTURE.          Culture, Fungus [[id number]] Collected: 07/03/24 1720    Order Status: Completed Specimen: Miscellaneous sample Updated: 07/05/24 1336     Sample Site LEFT FOOT        Specimen Processing Tissue Grinding  Labs from Last 24 Hrs:  Recent Results (from the past 24 hour(s))   POCT Glucose    Collection Time: 07/05/24  8:02 PM   Result Value Ref Range    POC Glucose 143 (H) 65 - 100 mg/dL    Performed by: [email or web address]    POCT Glucose    Collection Time: 07/06/24  6:25 AM   Result Value Ref Range    POC Glucose 142 (H) 65 - 100 mg/dL    Performed by: [email or web address]    Renal Function Panel    Collection Time: 07/06/24  6:49 AM   Result Value Ref Range    Sodium 135 (L) 136 - 145 mmol/L    Potassium 3.7 3.5 - 5.1 mmol/L    Chloride 100 98 - 107 mmol/L    CO2 22 20 - 28 mmol/L    Anion Gap 13 9 - 18 mmol/L    Glucose 117 (H) 70 - 99 mg/dL    BUN 17 8 - 23 MG/DL    Creatinine 1.66 (H) 0.80 - 1.30 MG/DL    Est, Glom Filt Rate 42 (L) >60 ml/min/1.73m2    Calcium 9.5 8.8 - 10.2 MG/DL    Phosphorus 2.6 2.5 - 4.5 MG/DL    Albumin 2.3 (L) 3.2 - 4.6 g/dL   CBC with Auto Differential    Collection Time: 07/06/24  6:49 AM   Result Value Ref Range    WBC 12.0 (H) 4.3 - 11.1 K/uL    RBC 4.30 4.23 - 5.6 M/uL    Hemoglobin 11.9 (L) 13.6 - 17.2 g/dL    Hematocrit 36.6 (L) 41.1 - 50.3 %    MCV 85.1 82 - 102 FL    MCH 27.7 26.1 - 32.9 PG    MCHC 32.5 31.4 - 35.0 g/dL    RDW 13.2 11.9 - 14.6 %    Platelets 460 (H) 150 - 450 K/uL    MPV 8.5 (L) 9.4 - 12.3 FL    nRBC 0.00 0.0 - 0.2 K/uL    Differential Type AUTOMATED      Neutrophils % 73 43 - 78 %    Lymphocytes % 14 13 - 44 %    Monocytes % 8 4.0 - 12.0 %    Eosinophils % 3 0.5 - 7.8 %    Basophils % 1 0.0 - 2.0 %    Immature Granulocytes % 1 0.0 - 5.0 %    Neutrophils Absolute 8.8 (H) 1.7 - 8.2 K/UL    Lymphocytes Absolute 1.7 0.5 - 4.6 K/UL    Monocytes Absolute 1.0 0.1 - 1.3 K/UL    Eosinophils Absolute 0.3 0.0 - 0.8 K/UL    Basophils Absolute 0.1 0.0 - 0.2 K/UL    Immature Granulocytes Absolute 0.1 0.0 - 0.5 K/UL   EKG 12 Lead    Collection Time: 07/06/24 10:32 AM   Result Value Ref Range    Ventricular Rate 81 BPM    Atrial Rate 81 BPM    P-R Interval 152 ms    QRS Duration 80 ms

## 2024-07-06 NOTE — CARE COORDINATION
Pt is for discharge home today with family.  Referral has been called/sent to [facility]  for follow up home care as ordered.  No additional CM orders received or supportive care needs expressed at this time.     07/06/24 1120   Service Assessment   Patient's Healthcare Decision Maker is: Legal Next of Kin   Social/Functional History   Lives With Alone   Services At/After Discharge   Transition of Care Consult (CM Consult) Home Health;Discharge Planning   Internal Home Health No   Reason Outside Agency Chosen Script used patient chose alternate agency  ([facility] )   Services At/After Discharge Home Health    Resource Information Provided? No   Mode of Transport at Discharge Other (see comment)  (family)   Confirm Follow Up Transport Family   Condition of Participation: Discharge Planning   The Plan for Transition of Care is related to the following treatment goals: Pt will return home with family and  rehab services.   The Patient and/or Patient Representative was provided with a Choice of Provider? Patient   The Patient and/Or Patient Representative agree with the Discharge Plan? Yes   Freedom of Choice list was provided with basic dialogue that supports the patient's individualized plan of care/goals, treatment preferences, and shares the quality data associated with the providers?  Yes

## 2024-07-08 ENCOUNTER — CLINICAL DOCUMENTATION (OUTPATIENT)
Dept: ORTHOPEDIC SURGERY | Age: 79
End: 2024-07-08

## 2024-07-08 ENCOUNTER — CARE COORDINATION (OUTPATIENT)
Dept: CARE COORDINATION | Facility: CLINIC | Age: 79
End: 2024-07-08

## 2024-07-08 ENCOUNTER — TELEPHONE (OUTPATIENT)
Dept: ORTHOPEDIC SURGERY | Age: 79
End: 2024-07-08

## 2024-07-08 DIAGNOSIS — T81.89XD DELAYED SURGICAL WOUND HEALING, SUBSEQUENT ENCOUNTER: Primary | ICD-10-CM

## 2024-07-08 DIAGNOSIS — M86.171 ACUTE OSTEOMYELITIS OF RIGHT FOOT (HCC): ICD-10-CM

## 2024-07-08 LAB
FUNGAL CULT/SMEAR: NORMAL
FUNGAL CULT/SMEAR: NORMAL
SPECIMEN PROCESSING: NORMAL
SPECIMEN PROCESSING: NORMAL
SPECIMEN SOURCE: NORMAL
SPECIMEN SOURCE: NORMAL

## 2024-07-08 NOTE — TELEPHONE ENCOUNTER
Confirmed with [facility] (Adriana [phone]) in regards to Dry Dressing PRN. Verbal Orders given.   Mom requesting  screening record    Baby born at [facility]       Mom  [phone]

## 2024-07-08 NOTE — CARE COORDINATION
Care Transitions Note    Initial Call - Call within 2 business days of discharge: Yes    Attempted to reach patient for transitions of care follow up. CTN contacted the patient but patient requested a return call as he is heading out. CTN will try again later.    Outreach Attempts:   CTN made contact with patient, but he requested a return call.     Patient: Roldan Edmondson    Patient : 1945   MRN: [id number]    Reason for Admission: Diabetic foot ulcer  Discharge Date: 24  RURS: Readmission Risk Score: 11.9    Last Discharge Facility       Date Complaint Diagnosis Description Type Department Provider    24 Foot Pain Leukocytosis, unspecified type ... ED to Hosp-Admission (Discharged) (ADMITTED) S[facility] Radha Briceno MD; Arslan Pederson...            Was this an external facility discharge? No    Follow Up Appointment:   Patient has hospital follow up appointment scheduled within 14 days of discharge.    Future Appointments         Provider Specialty Dept Phone    2024 2:00 PM David Fragoso MD Orthopedic Surgery [phone]    2024 9:15 AM (Arrive by 9:00 AM) David Mandujano MD Internal Medicine [phone]            Plan for follow-up on next business day.      Peggy Sy RN

## 2024-07-08 NOTE — TELEPHONE ENCOUNTER
Pt called to confirm address, Stated he will be coming today to  Post op Shoe as instructed by Dr. Fragoso at discharge. Pt given address and Hours.

## 2024-07-08 NOTE — OP NOTE
Operative Note      Patient: Roldan Edmondson  YOB: 1945  MRN: [id number]    Date of Procedure: 7/3/2024    Pre-Op Diagnosis Codes:     * Diabetic ulcer of right foot (HCC) [E11.621, L97.519]    Post-Op Diagnosis: Same       Procedure(s):  - Left foot incision and drainage of abscess below fascia (CPT 51824)  - Left foot excisional debridement, muscle and/or fascia (includes epidermis, dermis, and subcutaneous tissue) of wound measuring 10 cm x 4 cm; first 20 sq cm or less (CPT 06243)  - Left foot exicional debridement of muscle and/or fascia (includes epidermis, dermis, and subcutaneous tissue); each additional 20 sq cm, or part thereof (CPT 09855)    Surgeon(s):  David Fragoso MD    Anesthesia: MAC    Estimated Blood Loss (mL): less than 100     Complications: None    Findings:  Infection Present At Time Of Surgery (PATOS) (choose all levels that have infection present):  - Deep Infection (muscle/fascia) present as evidenced by pus  Extensive medial forefoot abscess with wound measuring 10 cm x 4 cm².    Performed excisional debridement of skin, subcutaneous tissue, muscle, tendons, as well as of the sesamoids.  Extensive mechanical debridement using scrub brushes with chlorhexidine, Betadine.  Followed by 4 L normal saline  2g vanc paste to deep aspect of wound prior to closure.    Indications:  Surgical and non-surgical treatment options were discussed with the patient and their family, as well as the risk and benefits of each option. Together, we decided to proceed with surgical management of their infection. We discussed surgical risks in detail to include intra-operative complications (anesthetic risks, neurovascular injury, blood loss) as well as post-operative complications (persistent pain, infection, weakness, stiffness) and/or need for further surgery to include amputation. Following discussion of the risks and benefits, the patient and his sister consented to proceed.      Technique:  The

## 2024-07-08 NOTE — PROGRESS NOTES
Pt came in today to  DME Post op Shoe in office. Pt arrived with only one shoe on and was walking on ace wrap that was applied in hospital. I had removed original ace wrap and checked Guaze wrap. The wrap looked in place and had not look like it had been redone since placed in sterile fashion. I then placed a new ace wrap and fitted patient with Post Op Shoe. Advised Pt on keeping foot wrapped until post op appt. Pt voiced understanding and left with Appointment information.

## 2024-07-09 ENCOUNTER — CARE COORDINATION (OUTPATIENT)
Dept: CARE COORDINATION | Facility: CLINIC | Age: 79
End: 2024-07-09

## 2024-07-09 NOTE — CARE COORDINATION
Care Transitions Note    Initial Call - Call within 2 business days of discharge: Yes    Attempted to reach patient for transitions of care follow up. Unable to reach patient.    Outreach Attempts:   Multiple attempts to contact patient at phone numbers on file.     Patient: Roldan Edmondson    Patient : 1945   MRN: [id number]    Reason for Admission: Leukocytosis  Discharge Date: 24  RURS: Readmission Risk Score: 11.9    Last Discharge Facility       Date Complaint Diagnosis Description Type Department Provider    24 Foot Pain Leukocytosis, unspecified type ... ED to Hosp-Admission (Discharged) (ADMITTED) S[facility] Radha Briceno MD; Arslan Pederson...        Was this an external facility discharge? No    Follow Up Appointment:   Patient has hospital follow up appointment scheduled within 7 days of discharge.    Future Appointments         Provider Specialty Dept Phone    2024 2:00 PM David Fragoso MD Orthopedic Surgery [phone]    2024 9:15 AM (Arrive by 9:00 AM) David Mandujano MD Internal Medicine [phone]            Plan for follow-up on next business day.      Peggy Sy RN

## 2024-07-10 ENCOUNTER — CARE COORDINATION (OUTPATIENT)
Dept: CARE COORDINATION | Facility: CLINIC | Age: 79
End: 2024-07-10

## 2024-07-10 LAB
BACTERIA SPEC CULT: NORMAL
SERVICE CMNT-IMP: NORMAL

## 2024-07-10 NOTE — CARE COORDINATION
Care Transitions Note    Initial Call - Call within 2 business days of discharge: Yes    Attempted to reach patient for transitions of care follow up. Unable to reach patient.    Outreach Attempts:   Multiple attempts to contact patient at phone numbers on file.     Patient: Roldan Edmondson    Patient : 1945   MRN: [id number]    Reason for Admission: Leukocytosis  Discharge Date: 24  RURS: Readmission Risk Score: 11.9    Last Discharge Facility       Date Complaint Diagnosis Description Type Department Provider    24 Foot Pain Leukocytosis, unspecified type ... ED to Hosp-Admission (Discharged) (ADMITTED) S[facility] Radha Briceno MD; Arslan Pederson...        Was this an external facility discharge? No    Follow Up Appointment:   Patient has hospital follow up appointment scheduled within 7 days of discharge.    Future Appointments         Provider Specialty Dept Phone    2024 2:00 PM David Fragoso MD Orthopedic Surgery [phone]    2024 9:15 AM (Arrive by 9:00 AM) David Mandujano MD Internal Medicine [phone]          No further outreach panned.  Peggy Sy RN

## 2024-07-11 LAB
FUNGUS SMEAR: NORMAL
FUNGUS SMEAR: NORMAL
SPECIMEN SOURCE: NORMAL
SPECIMEN SOURCE: NORMAL

## 2024-07-12 LAB
SPECIMEN SOURCE: NORMAL
VIRUS SPEC CULT: NORMAL

## 2024-07-15 ENCOUNTER — TELEPHONE (OUTPATIENT)
Dept: ORTHOPEDIC SURGERY | Age: 79
End: 2024-07-15

## 2024-07-15 NOTE — TELEPHONE ENCOUNTER
Pt requesting RF of Oxycodone 5MG to be sent to [facility] in chart. Message sent to Dr. Fragoso for approval. LH

## 2024-07-17 NOTE — TELEPHONE ENCOUNTER
7/17/2024 @ 10:31am Pt  returned call to office Re: his pain med Rx. Pt was advised per Dr. Fragoso's note that we were NOT able to refill pain meds at this time and that he could take OTC Tylenol or Ibuprofen if needed. Pt voiced complete understanding. LH    Returned call to Pt to inform him Per Dr. Fragoso we are NOT able to Refill Oxycodone at this time but VM box was FULL. NOT able to LM.

## 2024-07-22 ENCOUNTER — OFFICE VISIT (OUTPATIENT)
Dept: ORTHOPEDIC SURGERY | Age: 79
End: 2024-07-22

## 2024-07-22 ENCOUNTER — TELEPHONE (OUTPATIENT)
Dept: ORTHOPEDIC SURGERY | Age: 79
End: 2024-07-22

## 2024-07-22 DIAGNOSIS — L08.9 DIABETIC INFECTION OF LEFT FOOT (HCC): ICD-10-CM

## 2024-07-22 DIAGNOSIS — M86.171 ACUTE OSTEOMYELITIS OF RIGHT FOOT (HCC): Primary | ICD-10-CM

## 2024-07-22 DIAGNOSIS — L08.9 DIABETIC FOOT INFECTION (HCC): ICD-10-CM

## 2024-07-22 DIAGNOSIS — E11.628 DIABETIC FOOT INFECTION (HCC): ICD-10-CM

## 2024-07-22 DIAGNOSIS — E11.628 DIABETIC INFECTION OF RIGHT FOOT (HCC): ICD-10-CM

## 2024-07-22 DIAGNOSIS — L08.9 DIABETIC INFECTION OF RIGHT FOOT (HCC): ICD-10-CM

## 2024-07-22 DIAGNOSIS — E11.628 DIABETIC INFECTION OF LEFT FOOT (HCC): ICD-10-CM

## 2024-07-22 PROCEDURE — 99024 POSTOP FOLLOW-UP VISIT: CPT | Performed by: ORTHOPAEDIC SURGERY

## 2024-07-23 ENCOUNTER — ANESTHESIA EVENT (OUTPATIENT)
Dept: SURGERY | Age: 79
DRG: 617 | End: 2024-07-23
Payer: MEDICARE

## 2024-07-23 ENCOUNTER — PREP FOR PROCEDURE (OUTPATIENT)
Dept: ORTHOPEDIC SURGERY | Age: 79
End: 2024-07-23

## 2024-07-23 DIAGNOSIS — E11.22 TYPE 2 DIABETES MELLITUS WITH STAGE 3 CHRONIC KIDNEY DISEASE, WITHOUT LONG-TERM CURRENT USE OF INSULIN, UNSPECIFIED WHETHER STAGE 3A OR 3B CKD (HCC): ICD-10-CM

## 2024-07-23 DIAGNOSIS — N18.30 TYPE 2 DIABETES MELLITUS WITH STAGE 3 CHRONIC KIDNEY DISEASE, WITHOUT LONG-TERM CURRENT USE OF INSULIN, UNSPECIFIED WHETHER STAGE 3A OR 3B CKD (HCC): ICD-10-CM

## 2024-07-23 PROBLEM — E11.628 DIABETIC INFECTION OF LEFT FOOT (HCC): Status: ACTIVE | Noted: 2024-07-22

## 2024-07-23 PROBLEM — L08.9 DIABETIC INFECTION OF LEFT FOOT (HCC): Status: ACTIVE | Noted: 2024-07-22

## 2024-07-23 RX ORDER — SITAGLIPTIN 100 MG/1
100 TABLET, FILM COATED ORAL DAILY
Qty: 90 TABLET | Refills: 11 | OUTPATIENT
Start: 2024-07-23

## 2024-07-23 NOTE — H&P (VIEW-ONLY)
History and physical    Patient: Roldan Edmondson MRN: [id number]  SSN: [id number]    YOB: 1945  Age: 78 y.o.  Sex: male      Subjective:      Roldan Edmondson is a 78 y.o. male who is now about 3 weeks out from debridement of the left diabetic foot wound by Dr. Fragoso.  He is having continued breakdown of his skin and continued drainage from his left foot so he returns today for repeat debridement of his left foot wound and possible first ray amputation..    Past Medical History:   Diagnosis Date    Chronic abdominal pain     EGD, Colonoscopy, CT Abdomen/pelvis-colon polyps, scattered diverticula    Colon polyps     EtOH dependence (HCC)     states drinks 2-3 beers per day    GERD (gastroesophageal reflux disease)     Helicobacter pylori gastritis     Hypertension     medication    Marijuana smoker     patient states smokes 3-4 times per week, more if available    Osteomyelitis (HCC)     right foot    Poor historian     Stage III chronic kidney disease (HCC)     seen by nephrology - CKD 4    Type 2 diabetes mellitus without complication (HCC) 1/20/2016    oral agents, does not check BS, denies hypoglycemia     Past Surgical History:   Procedure Laterality Date    COLONOSCOPY N/A 5/31/2019    COLONOSCOPY/ 26 performed by Dudley Guevara MD at [facility] ENDOSCOPY    COLONOSCOPY  4/2016    polyps    FOOT DEBRIDEMENT Right 2/22/2023    Excisional debridment right foot wound performed by Ignacio Boyd MD at [facility] MAIN OR    FOOT DEBRIDEMENT Right 1/31/2024    right FOOT DEBRIDEMENT INCISION AND DRAINAGE performed by Raudel Nichols MD at [facility] OPC    FOOT DEBRIDEMENT Left 7/3/2024    Left foot debridement performed by David Fragoso MD at [facility] MAIN OR    HAMMER TOE SURGERY N/A 1/31/2024    EXTENSOR HALLUCIS LONGUS LENGTHENING FLEXOR TENOTOMY'S OF THE SECOND, THIRD, FORTH TOES performed by Raudel Nichols MD at [facility] OPC    UPPER GASTROINTESTINAL ENDOSCOPY  4/2016    unremarkable      FAMHX -No history of inflammatory

## 2024-07-24 ENCOUNTER — HOSPITAL ENCOUNTER (INPATIENT)
Age: 79
LOS: 1 days | Discharge: HOME | DRG: 617 | End: 2024-07-25
Attending: ORTHOPAEDIC SURGERY | Admitting: ORTHOPAEDIC SURGERY
Payer: MEDICARE

## 2024-07-24 ENCOUNTER — ANESTHESIA (OUTPATIENT)
Dept: SURGERY | Age: 79
DRG: 617 | End: 2024-07-24
Payer: MEDICARE

## 2024-07-24 DIAGNOSIS — E11.628 DIABETIC INFECTION OF LEFT FOOT (HCC): ICD-10-CM

## 2024-07-24 DIAGNOSIS — L08.9 DIABETIC INFECTION OF RIGHT FOOT (HCC): Primary | ICD-10-CM

## 2024-07-24 DIAGNOSIS — M86.9 OSTEOMYELITIS OF GREAT TOE OF LEFT FOOT (HCC): Primary | ICD-10-CM

## 2024-07-24 DIAGNOSIS — E11.628 DIABETIC INFECTION OF RIGHT FOOT (HCC): Primary | ICD-10-CM

## 2024-07-24 DIAGNOSIS — L08.9 DIABETIC INFECTION OF LEFT FOOT (HCC): ICD-10-CM

## 2024-07-24 LAB
GLUCOSE BLD STRIP.AUTO-MCNC: 142 MG/DL (ref 65–100)
GLUCOSE BLD STRIP.AUTO-MCNC: 144 MG/DL (ref 65–100)
GLUCOSE BLD STRIP.AUTO-MCNC: 198 MG/DL (ref 65–100)
POTASSIUM BLD-SCNC: 4 MMOL/L (ref 3.5–5.1)
SERVICE CMNT-IMP: ABNORMAL

## 2024-07-24 PROCEDURE — 0Y6N0Z9 DETACHMENT AT LEFT FOOT, PARTIAL 1ST RAY, OPEN APPROACH: ICD-10-PCS | Performed by: ORTHOPAEDIC SURGERY

## 2024-07-24 PROCEDURE — 3700000000 HC ANESTHESIA ATTENDED CARE: Performed by: ORTHOPAEDIC SURGERY

## 2024-07-24 PROCEDURE — 3600000003 HC SURGERY LEVEL 3 BASE: Performed by: ORTHOPAEDIC SURGERY

## 2024-07-24 PROCEDURE — 2580000003 HC RX 258: Performed by: ORTHOPAEDIC SURGERY

## 2024-07-24 PROCEDURE — 6360000002 HC RX W HCPCS: Performed by: ANESTHESIOLOGY

## 2024-07-24 PROCEDURE — 64445 NJX AA&/STRD SCIATIC NRV IMG: CPT | Performed by: ANESTHESIOLOGY

## 2024-07-24 PROCEDURE — 82962 GLUCOSE BLOOD TEST: CPT

## 2024-07-24 PROCEDURE — 1100000000 HC RM PRIVATE

## 2024-07-24 PROCEDURE — 84132 ASSAY OF SERUM POTASSIUM: CPT

## 2024-07-24 PROCEDURE — 88305 TISSUE EXAM BY PATHOLOGIST: CPT

## 2024-07-24 PROCEDURE — 3600000013 HC SURGERY LEVEL 3 ADDTL 15MIN: Performed by: ORTHOPAEDIC SURGERY

## 2024-07-24 PROCEDURE — 6370000000 HC RX 637 (ALT 250 FOR IP): Performed by: ORTHOPAEDIC SURGERY

## 2024-07-24 PROCEDURE — 2500000003 HC RX 250 WO HCPCS: Performed by: REGISTERED NURSE

## 2024-07-24 PROCEDURE — 6370000000 HC RX 637 (ALT 250 FOR IP): Performed by: ANESTHESIOLOGY

## 2024-07-24 PROCEDURE — 2709999900 HC NON-CHARGEABLE SUPPLY: Performed by: ORTHOPAEDIC SURGERY

## 2024-07-24 PROCEDURE — 6360000002 HC RX W HCPCS: Performed by: REGISTERED NURSE

## 2024-07-24 PROCEDURE — 2580000003 HC RX 258: Performed by: ANESTHESIOLOGY

## 2024-07-24 PROCEDURE — 3700000001 HC ADD 15 MINUTES (ANESTHESIA): Performed by: ORTHOPAEDIC SURGERY

## 2024-07-24 PROCEDURE — 64447 NJX AA&/STRD FEMORAL NRV IMG: CPT | Performed by: ANESTHESIOLOGY

## 2024-07-24 PROCEDURE — 6360000002 HC RX W HCPCS: Performed by: ORTHOPAEDIC SURGERY

## 2024-07-24 PROCEDURE — 7100000001 HC PACU RECOVERY - ADDTL 15 MIN: Performed by: ORTHOPAEDIC SURGERY

## 2024-07-24 PROCEDURE — 7100000000 HC PACU RECOVERY - FIRST 15 MIN: Performed by: ORTHOPAEDIC SURGERY

## 2024-07-24 PROCEDURE — 88311 DECALCIFY TISSUE: CPT

## 2024-07-24 RX ORDER — SODIUM CHLORIDE 0.9 % (FLUSH) 0.9 %
5-40 SYRINGE (ML) INJECTION EVERY 12 HOURS SCHEDULED
Status: DISCONTINUED | OUTPATIENT
Start: 2024-07-24 | End: 2024-07-25 | Stop reason: HOSPADM

## 2024-07-24 RX ORDER — NALOXONE HYDROCHLORIDE 4 MG/.1ML
1 SPRAY NASAL PRN
Status: DISCONTINUED | OUTPATIENT
Start: 2024-07-24 | End: 2024-07-24 | Stop reason: SDUPTHER

## 2024-07-24 RX ORDER — PROCHLORPERAZINE EDISYLATE 5 MG/ML
5 INJECTION INTRAMUSCULAR; INTRAVENOUS
Status: DISCONTINUED | OUTPATIENT
Start: 2024-07-24 | End: 2024-07-24 | Stop reason: HOSPADM

## 2024-07-24 RX ORDER — SODIUM CHLORIDE, SODIUM LACTATE, POTASSIUM CHLORIDE, CALCIUM CHLORIDE 600; 310; 30; 20 MG/100ML; MG/100ML; MG/100ML; MG/100ML
INJECTION, SOLUTION INTRAVENOUS CONTINUOUS
Status: DISCONTINUED | OUTPATIENT
Start: 2024-07-24 | End: 2024-07-24 | Stop reason: HOSPADM

## 2024-07-24 RX ORDER — HYDROMORPHONE HYDROCHLORIDE 2 MG/ML
0.5 INJECTION, SOLUTION INTRAMUSCULAR; INTRAVENOUS; SUBCUTANEOUS EVERY 5 MIN PRN
Status: DISCONTINUED | OUTPATIENT
Start: 2024-07-24 | End: 2024-07-24 | Stop reason: HOSPADM

## 2024-07-24 RX ORDER — OXYCODONE HYDROCHLORIDE 5 MG/1
5 TABLET ORAL
Status: DISCONTINUED | OUTPATIENT
Start: 2024-07-24 | End: 2024-07-24 | Stop reason: HOSPADM

## 2024-07-24 RX ORDER — LIDOCAINE HYDROCHLORIDE 20 MG/ML
INJECTION, SOLUTION EPIDURAL; INFILTRATION; INTRACAUDAL; PERINEURAL PRN
Status: DISCONTINUED | OUTPATIENT
Start: 2024-07-24 | End: 2024-07-24 | Stop reason: SDUPTHER

## 2024-07-24 RX ORDER — LANOLIN ALCOHOL/MO/W.PET/CERES
100 CREAM (GRAM) TOPICAL DAILY
Status: DISCONTINUED | OUTPATIENT
Start: 2024-07-24 | End: 2024-07-25 | Stop reason: HOSPADM

## 2024-07-24 RX ORDER — POLYETHYLENE GLYCOL 3350 17 G/17G
17 POWDER, FOR SOLUTION ORAL DAILY PRN
Status: DISCONTINUED | OUTPATIENT
Start: 2024-07-24 | End: 2024-07-25 | Stop reason: HOSPADM

## 2024-07-24 RX ORDER — ACETAMINOPHEN 500 MG
1000 TABLET ORAL ONCE
Status: COMPLETED | OUTPATIENT
Start: 2024-07-24 | End: 2024-07-24

## 2024-07-24 RX ORDER — HYDROMORPHONE HYDROCHLORIDE 2 MG/ML
0.25 INJECTION, SOLUTION INTRAMUSCULAR; INTRAVENOUS; SUBCUTANEOUS EVERY 5 MIN PRN
Status: DISCONTINUED | OUTPATIENT
Start: 2024-07-24 | End: 2024-07-24 | Stop reason: HOSPADM

## 2024-07-24 RX ORDER — INSULIN LISPRO 100 [IU]/ML
0-8 INJECTION, SOLUTION INTRAVENOUS; SUBCUTANEOUS
Status: DISCONTINUED | OUTPATIENT
Start: 2024-07-24 | End: 2024-07-25 | Stop reason: HOSPADM

## 2024-07-24 RX ORDER — PROPOFOL 10 MG/ML
INJECTION, EMULSION INTRAVENOUS PRN
Status: DISCONTINUED | OUTPATIENT
Start: 2024-07-24 | End: 2024-07-24 | Stop reason: SDUPTHER

## 2024-07-24 RX ORDER — SODIUM CHLORIDE 0.9 % (FLUSH) 0.9 %
5-40 SYRINGE (ML) INJECTION PRN
Status: DISCONTINUED | OUTPATIENT
Start: 2024-07-24 | End: 2024-07-25 | Stop reason: HOSPADM

## 2024-07-24 RX ORDER — FOLIC ACID 1 MG/1
1 TABLET ORAL DAILY
Status: DISCONTINUED | OUTPATIENT
Start: 2024-07-24 | End: 2024-07-25 | Stop reason: HOSPADM

## 2024-07-24 RX ORDER — GLYCOPYRROLATE 0.2 MG/ML
INJECTION INTRAMUSCULAR; INTRAVENOUS PRN
Status: DISCONTINUED | OUTPATIENT
Start: 2024-07-24 | End: 2024-07-24 | Stop reason: SDUPTHER

## 2024-07-24 RX ORDER — LIDOCAINE HYDROCHLORIDE 10 MG/ML
1 INJECTION, SOLUTION INFILTRATION; PERINEURAL
Status: DISCONTINUED | OUTPATIENT
Start: 2024-07-24 | End: 2024-07-24 | Stop reason: HOSPADM

## 2024-07-24 RX ORDER — DEXAMETHASONE SODIUM PHOSPHATE 4 MG/ML
INJECTION, SOLUTION INTRA-ARTICULAR; INTRALESIONAL; INTRAMUSCULAR; INTRAVENOUS; SOFT TISSUE
Status: COMPLETED | OUTPATIENT
Start: 2024-07-24 | End: 2024-07-24

## 2024-07-24 RX ORDER — DEXTROSE MONOHYDRATE 100 MG/ML
INJECTION, SOLUTION INTRAVENOUS CONTINUOUS PRN
Status: DISCONTINUED | OUTPATIENT
Start: 2024-07-24 | End: 2024-07-25 | Stop reason: HOSPADM

## 2024-07-24 RX ORDER — SODIUM CHLORIDE 9 MG/ML
INJECTION, SOLUTION INTRAVENOUS PRN
Status: DISCONTINUED | OUTPATIENT
Start: 2024-07-24 | End: 2024-07-24 | Stop reason: HOSPADM

## 2024-07-24 RX ORDER — ONDANSETRON 2 MG/ML
4 INJECTION INTRAMUSCULAR; INTRAVENOUS EVERY 6 HOURS PRN
Status: DISCONTINUED | OUTPATIENT
Start: 2024-07-24 | End: 2024-07-25 | Stop reason: HOSPADM

## 2024-07-24 RX ORDER — IBUPROFEN 600 MG/1
1 TABLET ORAL PRN
Status: DISCONTINUED | OUTPATIENT
Start: 2024-07-24 | End: 2024-07-25 | Stop reason: HOSPADM

## 2024-07-24 RX ORDER — MIDAZOLAM HYDROCHLORIDE 2 MG/2ML
2 INJECTION, SOLUTION INTRAMUSCULAR; INTRAVENOUS
Status: COMPLETED | OUTPATIENT
Start: 2024-07-24 | End: 2024-07-24

## 2024-07-24 RX ORDER — FENTANYL CITRATE 50 UG/ML
100 INJECTION, SOLUTION INTRAMUSCULAR; INTRAVENOUS
Status: COMPLETED | OUTPATIENT
Start: 2024-07-24 | End: 2024-07-24

## 2024-07-24 RX ORDER — NALOXONE HYDROCHLORIDE 0.4 MG/ML
INJECTION, SOLUTION INTRAMUSCULAR; INTRAVENOUS; SUBCUTANEOUS PRN
Status: DISCONTINUED | OUTPATIENT
Start: 2024-07-24 | End: 2024-07-24 | Stop reason: HOSPADM

## 2024-07-24 RX ORDER — OXYCODONE HYDROCHLORIDE 5 MG/1
10 TABLET ORAL EVERY 4 HOURS PRN
Status: DISCONTINUED | OUTPATIENT
Start: 2024-07-24 | End: 2024-07-25 | Stop reason: HOSPADM

## 2024-07-24 RX ORDER — HYDROCHLOROTHIAZIDE 25 MG/1
25 TABLET ORAL DAILY
Status: DISCONTINUED | OUTPATIENT
Start: 2024-07-25 | End: 2024-07-25 | Stop reason: HOSPADM

## 2024-07-24 RX ORDER — LABETALOL HYDROCHLORIDE 5 MG/ML
10 INJECTION, SOLUTION INTRAVENOUS
Status: DISCONTINUED | OUTPATIENT
Start: 2024-07-24 | End: 2024-07-24 | Stop reason: HOSPADM

## 2024-07-24 RX ORDER — ONDANSETRON 4 MG/1
4 TABLET, FILM COATED ORAL DAILY PRN
Status: DISCONTINUED | OUTPATIENT
Start: 2024-07-24 | End: 2024-07-24 | Stop reason: SDUPTHER

## 2024-07-24 RX ORDER — HYDRALAZINE HYDROCHLORIDE 20 MG/ML
10 INJECTION INTRAMUSCULAR; INTRAVENOUS
Status: DISCONTINUED | OUTPATIENT
Start: 2024-07-24 | End: 2024-07-24 | Stop reason: HOSPADM

## 2024-07-24 RX ORDER — LOSARTAN POTASSIUM 50 MG/1
100 TABLET ORAL DAILY
Status: DISCONTINUED | OUTPATIENT
Start: 2024-07-25 | End: 2024-07-25 | Stop reason: HOSPADM

## 2024-07-24 RX ORDER — MAGNESIUM SULFATE IN WATER 40 MG/ML
2000 INJECTION, SOLUTION INTRAVENOUS PRN
Status: DISCONTINUED | OUTPATIENT
Start: 2024-07-24 | End: 2024-07-25 | Stop reason: HOSPADM

## 2024-07-24 RX ORDER — POTASSIUM CHLORIDE 20 MEQ/1
40 TABLET, EXTENDED RELEASE ORAL PRN
Status: DISCONTINUED | OUTPATIENT
Start: 2024-07-24 | End: 2024-07-25 | Stop reason: HOSPADM

## 2024-07-24 RX ORDER — SODIUM CHLORIDE 0.9 % (FLUSH) 0.9 %
5-40 SYRINGE (ML) INJECTION PRN
Status: DISCONTINUED | OUTPATIENT
Start: 2024-07-24 | End: 2024-07-24 | Stop reason: HOSPADM

## 2024-07-24 RX ORDER — INSULIN LISPRO 100 [IU]/ML
0-4 INJECTION, SOLUTION INTRAVENOUS; SUBCUTANEOUS NIGHTLY
Status: DISCONTINUED | OUTPATIENT
Start: 2024-07-24 | End: 2024-07-25 | Stop reason: HOSPADM

## 2024-07-24 RX ORDER — ATENOLOL 25 MG/1
25 TABLET ORAL DAILY
Status: DISCONTINUED | OUTPATIENT
Start: 2024-07-25 | End: 2024-07-25 | Stop reason: HOSPADM

## 2024-07-24 RX ORDER — POTASSIUM CHLORIDE 7.45 MG/ML
10 INJECTION INTRAVENOUS PRN
Status: DISCONTINUED | OUTPATIENT
Start: 2024-07-24 | End: 2024-07-25 | Stop reason: HOSPADM

## 2024-07-24 RX ORDER — ROPIVACAINE HYDROCHLORIDE 5 MG/ML
INJECTION, SOLUTION EPIDURAL; INFILTRATION; PERINEURAL
Status: COMPLETED | OUTPATIENT
Start: 2024-07-24 | End: 2024-07-24

## 2024-07-24 RX ORDER — SODIUM CHLORIDE 9 MG/ML
INJECTION, SOLUTION INTRAVENOUS PRN
Status: DISCONTINUED | OUTPATIENT
Start: 2024-07-24 | End: 2024-07-25 | Stop reason: HOSPADM

## 2024-07-24 RX ORDER — SODIUM CHLORIDE 0.9 % (FLUSH) 0.9 %
5-40 SYRINGE (ML) INJECTION EVERY 12 HOURS SCHEDULED
Status: DISCONTINUED | OUTPATIENT
Start: 2024-07-24 | End: 2024-07-24 | Stop reason: HOSPADM

## 2024-07-24 RX ORDER — ONDANSETRON 4 MG/1
4 TABLET, ORALLY DISINTEGRATING ORAL EVERY 8 HOURS PRN
Status: DISCONTINUED | OUTPATIENT
Start: 2024-07-24 | End: 2024-07-25 | Stop reason: HOSPADM

## 2024-07-24 RX ORDER — AMLODIPINE BESYLATE 10 MG/1
10 TABLET ORAL DAILY
Status: DISCONTINUED | OUTPATIENT
Start: 2024-07-25 | End: 2024-07-25 | Stop reason: HOSPADM

## 2024-07-24 RX ORDER — ONDANSETRON 2 MG/ML
4 INJECTION INTRAMUSCULAR; INTRAVENOUS
Status: DISCONTINUED | OUTPATIENT
Start: 2024-07-24 | End: 2024-07-24 | Stop reason: HOSPADM

## 2024-07-24 RX ORDER — HYDROMORPHONE HYDROCHLORIDE 1 MG/ML
0.5 INJECTION, SOLUTION INTRAMUSCULAR; INTRAVENOUS; SUBCUTANEOUS
Status: DISCONTINUED | OUTPATIENT
Start: 2024-07-24 | End: 2024-07-25 | Stop reason: HOSPADM

## 2024-07-24 RX ADMIN — LIDOCAINE HYDROCHLORIDE 40 MG: 20 INJECTION, SOLUTION EPIDURAL; INFILTRATION; INTRACAUDAL; PERINEURAL at 07:08

## 2024-07-24 RX ADMIN — SODIUM CHLORIDE, PRESERVATIVE FREE 5 ML: 5 INJECTION INTRAVENOUS at 10:50

## 2024-07-24 RX ADMIN — PROPOFOL 100 MCG/KG/MIN: 10 INJECTION, EMULSION INTRAVENOUS at 07:09

## 2024-07-24 RX ADMIN — ROPIVACAINE HYDROCHLORIDE 13 ML: 5 INJECTION, SOLUTION EPIDURAL; INFILTRATION; PERINEURAL at 06:53

## 2024-07-24 RX ADMIN — GLYCOPYRROLATE 0.1 MG: 0.2 INJECTION INTRAMUSCULAR; INTRAVENOUS at 07:01

## 2024-07-24 RX ADMIN — SODIUM CHLORIDE, POTASSIUM CHLORIDE, SODIUM LACTATE AND CALCIUM CHLORIDE: 600; 310; 30; 20 INJECTION, SOLUTION INTRAVENOUS at 05:58

## 2024-07-24 RX ADMIN — SODIUM CHLORIDE 3000 MG: 900 INJECTION INTRAVENOUS at 18:07

## 2024-07-24 RX ADMIN — Medication 2000 MG: at 07:12

## 2024-07-24 RX ADMIN — MIDAZOLAM 1 MG: 1 INJECTION INTRAMUSCULAR; INTRAVENOUS at 06:49

## 2024-07-24 RX ADMIN — Medication 100 MG: at 13:04

## 2024-07-24 RX ADMIN — ROPIVACAINE HYDROCHLORIDE 20 ML: 5 INJECTION, SOLUTION EPIDURAL; INFILTRATION; PERINEURAL at 06:48

## 2024-07-24 RX ADMIN — SODIUM CHLORIDE 3000 MG: 900 INJECTION INTRAVENOUS at 13:07

## 2024-07-24 RX ADMIN — DEXAMETHASONE SODIUM PHOSPHATE 5 MG: 4 INJECTION, SOLUTION INTRAMUSCULAR; INTRAVENOUS at 06:48

## 2024-07-24 RX ADMIN — FOLIC ACID 1 MG: 1 TABLET ORAL at 13:04

## 2024-07-24 RX ADMIN — ACETAMINOPHEN 1000 MG: 500 TABLET, FILM COATED ORAL at 06:02

## 2024-07-24 RX ADMIN — DEXAMETHASONE SODIUM PHOSPHATE 5 MG: 4 INJECTION, SOLUTION INTRA-ARTICULAR; INTRALESIONAL; INTRAMUSCULAR; INTRAVENOUS; SOFT TISSUE at 06:53

## 2024-07-24 RX ADMIN — PROPOFOL 30 MG: 10 INJECTION, EMULSION INTRAVENOUS at 07:08

## 2024-07-24 RX ADMIN — FENTANYL CITRATE 50 MCG: 50 INJECTION INTRAMUSCULAR; INTRAVENOUS at 06:49

## 2024-07-24 RX ADMIN — SODIUM CHLORIDE, PRESERVATIVE FREE 5 ML: 5 INJECTION INTRAVENOUS at 20:46

## 2024-07-24 RX ADMIN — MEPIVACAINE HYDROCHLORIDE 10 ML: 15 INJECTION, SOLUTION EPIDURAL; INFILTRATION at 06:48

## 2024-07-24 ASSESSMENT — LIFESTYLE VARIABLES: SMOKING_STATUS: 1

## 2024-07-24 NOTE — ANESTHESIA POSTPROCEDURE EVALUATION
Department of Anesthesiology  Postprocedure Note    Patient: Roldan Edmondson  MRN: [id number]  YOB: 1945  Date of evaluation: 7/24/2024    Procedure Summary       Date: 07/24/24 Room / Location: [facility] MAIN OR 08 / [facility] MAIN OR    Anesthesia Start: 0659 Anesthesia Stop: 0754    Procedure: left  PARTIAL FOOT AMPUTATION (Left: Foot) Diagnosis:       Diabetic infection of left foot (HCC)      (Diabetic infection of left foot (HCC) [E11.628, L08.9])    Providers: Ignacio Boyd MD Responsible Provider: Ramiro Rajput DO    Anesthesia Type: TIVA ASA Status: 3            Anesthesia Type: No value filed.    Karen Phase I: Karen Score: 9    Karen Phase II:      Anesthesia Post Evaluation    Patient location during evaluation: PACU  Level of consciousness: awake and alert  Airway patency: patent  Nausea & Vomiting: no nausea  Cardiovascular status: hemodynamically stable  Respiratory status: acceptable  Hydration status: euvolemic  Pain management: satisfactory to patient    No notable events documented.

## 2024-07-24 NOTE — INTERVAL H&P NOTE
Update History & Physical    The Patient's History and Physical of 7/23/2024 was reviewed with the patient and I examined the patient.  There was no change.  The surgical site was confirmed by the patient and me.    Plan:  The risk, benefits, expected outcome, and alternative to the recommended procedure have been discussed with the patient.  Patient understands and wants to proceed with repeat debridement of left foot wound and left foot first ray amputation.    Electronically signed by Ignacio Boyd MD on 7/24/2024 at 6:29 AM

## 2024-07-24 NOTE — ANESTHESIA PROCEDURE NOTES
Peripheral Block    Patient location during procedure: pre-op  Reason for block: post-op pain management and at surgeon's request  Start time: 7/24/2024 6:48 AM  End time: 7/24/2024 6:52 AM  Staffing  Performed: anesthesiologist   Anesthesiologist: Ramiro Rajput DO  Performed by: Ramiro Rajput DO  Authorized by: Ramiro Rajput DO    Preanesthetic Checklist  Completed: patient identified, IV checked, site marked, risks and benefits discussed, surgical/procedural consents, equipment checked, pre-op evaluation, timeout performed, anesthesia consent given, oxygen available and monitors applied/VS acknowledged  Peripheral Block   Patient position: right lateral decubitus  Prep: ChloraPrep  Provider prep: mask and sterile gloves  Patient monitoring: cardiac monitor, continuous pulse ox, frequent blood pressure checks, responsive to questions and oxygen  Block type: Sciatic  Popliteal  Laterality: left  Injection technique: single-shot  Guidance: ultrasound guided  Local infiltration: lidocaine  Infiltration strength: 1 %  Local infiltration: lidocaine  Dose: 3 mL    Needle   Needle type: insulated echogenic nerve stimulator needle   Needle gauge: 20 G  Needle localization: ultrasound guidance  Needle length: 10 cm  Assessment   Injection assessment: negative aspiration for heme, no paresthesia on injection, local visualized surrounding nerve on ultrasound and no intravascular symptoms  Paresthesia pain: none  Slow fractionated injection: yes  Hemodynamics: stable  Outcomes: uncomplicated and patient tolerated procedure well    Additional Notes  R/B/I discussed at length with pt including damage to nerve or muscle.    Needle inserted under real time Ultrasound guidance and placed in close proximity to nerve being blocked.  Ultrasound was used in real time to visualize spread of local anesthetic around nerve being blocked.  Nerve and surrounding structures appeared grossly normal.  A permanent

## 2024-07-24 NOTE — ANESTHESIA PROCEDURE NOTES
Peripheral Block    Patient location during procedure: pre-op  Reason for block: post-op pain management and at surgeon's request  Start time: 7/24/2024 6:53 AM  End time: 7/24/2024 6:55 AM  Staffing  Performed: anesthesiologist   Anesthesiologist: Ramiro Rajput DO  Performed by: Ramiro Rajput DO  Authorized by: Ramiro Rajput DO    Preanesthetic Checklist  Completed: patient identified, IV checked, site marked, risks and benefits discussed, surgical/procedural consents, equipment checked, pre-op evaluation, timeout performed, anesthesia consent given, oxygen available and monitors applied/VS acknowledged  Peripheral Block   Patient position: supine  Prep: ChloraPrep  Provider prep: mask and sterile gloves  Patient monitoring: cardiac monitor, continuous pulse ox, frequent blood pressure checks, responsive to questions and oxygen  Block type: Femoral  Adductor canal  Laterality: left  Injection technique: single-shot  Guidance: ultrasound guided  Infiltration strength: 1 %  Dose: 3 mL    Needle   Needle type: insulated echogenic nerve stimulator needle   Needle gauge: 20 G  Needle localization: ultrasound guidance  Needle length: 10 cm  Assessment   Injection assessment: negative aspiration for heme, no paresthesia on injection, local visualized surrounding nerve on ultrasound and no intravascular symptoms  Paresthesia pain: none  Slow fractionated injection: yes  Hemodynamics: stable  Outcomes: uncomplicated and patient tolerated procedure well    Additional Notes  R/B/I discussed at length with pt including damage to nerve or muscle.    Needle inserted under real time Ultrasound guidance and placed in close proximity to nerve being blocked.  Ultrasound was used in real time to visualize spread of local anesthetic around nerve being blocked.  Nerve and surrounding structures appeared grossly normal.  A permanent Ultrasound image was stored in the chart  Medications

## 2024-07-24 NOTE — PERIOP NOTE
TRANSFER - OUT REPORT:    Verbal report given to Jaqueline JACOME on Roldan Edmondson  being transferred to [facility] for routine post-op       Report consisted of patient’s Situation, Background, Assessment and   Recommendations(SBAR).     Information from the following report(s) Nurse Handoff Report, Surgery Report, Cardiac Rhythm SR, and Neuro Assessment was reviewed with the receiving nurse.    Lines:   Peripheral IV 07/24/24 Posterior;Right Wrist (Active)   Site Assessment Clean, dry & intact 07/24/24 0819   Line Status Infusing 07/24/24 0819   Line Care Connections checked and tightened 07/24/24 0819   Phlebitis Assessment No symptoms 07/24/24 0819   Infiltration Assessment 0 07/24/24 0819   Dressing Status Clean, dry & intact 07/24/24 0819   Dressing Type Transparent 07/24/24 0819        Opportunity for questions and clarification was provided.

## 2024-07-24 NOTE — PERIOP NOTE
Pt resting in stretcher. Updated we are awaiting ready admit bed. Understanding verbalized. No distress noted.

## 2024-07-24 NOTE — PROGRESS NOTES
Orthopaedic Trauma Clinic Note    Name: Roldan Edmondson  YOB: 1945  Gender: male  MRN: [id number]  PCP:    HPI:   78 y.o. male who is now about 3 weeks out from debridement of the left diabetic foot wound by Dr. Fragoso.  He is having continued breakdown of his skin and continued drainage from his left foot so he returns today for repeat debridement of his left foot wound and possible first ray amputation..     ROS/Meds/PSH/PMH/FH/SH:  Pertinent details discussed in HPI    Physical Examination:  General:  Awake and conversive, no distress, well nourished, age-appropriate.  Neurological: A&O x 3, normal coordination and tone.  Psych: Normal mood, affect and behavior. Normal thought content and judgment. Cooperative w/ exam.    Musculoskeletal Exam:  Left Lower Extremity  - Skin: Left medial forefoot wound significant for ongoing purulent discharge. He is exposed first metatarsal  - Digits warm, well-perfused, brisk cap refill    Assessment:     ICD-10-CM    1. Acute osteomyelitis of right foot ([facility])  M86.171 Case Request     Case Request      2. Diabetic foot infection ([facility])  E11.628 Case Request    L08.9 Case Request      3. Diabetic infection of right foot ([facility])  E11.628     L08.9       4. Diabetic infection of left foot ([facility])  E11.628     L08.9           Plan:   Unfortunately, with recurrence of his foot infection should proceed with revision operative debridement and partial first ray amputation    Have discussed case with my colleague Dr. Boyd who has kindly agreed to perform the procedure on Wednesday.    David Fragoso MD  Orthopaedic Surgery  07/23/24    I reviewed the patient's medical record to include notes from previous encounter's to obtain an accurate understanding of the patient's health. I personally performed the HPI, exam, and assessment/plan, verified the documentation, approve that it is updated, accurate, and complete. Patient demonstrated understanding of information reviewed and

## 2024-07-24 NOTE — DIABETES MGMT
Patient admitted with Diabetic infection of right foot. Admit blood glucose 144.  HgbA1C 7.2 from 7/1/2024.  Reviewed patient current regimen: Humalog correctional insulin.    Patient seen for assessment regarding diabetes management. Patient has a past medical history of DM, GERD, HTN, CKD, ETOH dependence, neuropathy. Patient states they have been living with diabetes for more than 5 years and voices positive family history of diabetes. Patient states they do not have a working glucometer with supplies at home. Patient states they are currently taking Januvia 100 mg daily at home for management of diabetes. Patient voices that they have not experienced hypoglycemia in the past. Educated regarding hypoglycemia signs, symptoms, and treatment. Patient has not attended formal diabetes education in the past. Patient reports no difficulty with affording their diabetic supplies.      Patient given educational material, \"Diabetes Self-Management: A Patient Teaching Guide\", which was reviewed with patient. Explained basic physiology of diabetes, as well as causes, signs and symptoms, and treatments for hypoglycemia and hyperglycemia. Described the effects of poor glycemic control and the development of long-term complications such as renal, eye, nerve, and cardiovascular disease. Patient admits to smoking marijuana. Cessation encouraged.  Patient denies numbness and tingling in feet. Described proper diabetic foot care and the importance of checking feet daily. Per patient they typically drink juice, chocolate milk, milk. Reviewed effects of sweetened beverages on glycemic control and discussed alternative beverages to help improve glycemic control. Per patient they typically eat one meal per day.  Patient could not states what he eats, but states does like nellie greens and cookies. Educated re: effects of carbohydrates on blood glucose, the \"plate method\" of healthy meal planning, basics of healthy meal plan, Consistent

## 2024-07-24 NOTE — OP NOTE
Operative Report    Patient: Roldan Edmondson MRN: [id number]  SSN: [id number]    YOB: 1945  Age: 78 y.o.  Sex: male       Date of Surgery: July 24, 2024     History:  Roldan Edmondson is a 78 y.o. male who has a history of having a diabetic foot wound.  This has been previously debrided but he continues to have a lot of basically just failure to heal over the prominent left metatarsal head of his first metatarsal.  He is not really granulating this wound well so he presents today for revision of this and likely first ray amputation.      I talked to the patient and/or their representative and explained the exact nature the procedure.  I also went through a detailed list of the material risks associated with  the procedure which included risk of bleeding, infection, injury to nearby structures, worsening the situation, as well as the risks associate with anesthesia and finally death.  Also talked with him regarding the benefits and alternatives to the procedure.    Preoperative Diagnosis: Left diabetic foot wound    Postoperative Diagnosis:   Left foot diabetic foot wound      Surgeon(s) and Role:     * Ignacio Boyd MD - Primary    Anesthesia: Choice     Procedure: Amputation of left great toe and first ray resection    Procedure in Detail: After the successful duction of general anesthetic the left lower extremities prepped draped in usual sterile fashion.  I started with an attempt just to see if I could debride some of the metatarsal heads I made a small incision sort of keeping in mind I may have to just go ahead and amputate this through the first ray and then I began the process with a rongeur just debriding the metatarsal head itself.  As I did this with the amount of soft tissue wound as well as with the very unhealthy appearance of the first metatarsal I did not think that this was really a viable option.  I did not made a cut with a bone  the shaft of the first metatarsal and resected

## 2024-07-24 NOTE — ANESTHESIA PRE PROCEDURE
Department of Anesthesiology  Preprocedure Note       Name:  Roldan Edmondson   Age:  78 y.o.  :  1945                                          MRN:  [id number]         Date:  2024      Surgeon: Surgeon(s):  Ignacio Boyd MD    Procedure: Procedure(s):  left  PARTIAL FOOT AMPUTATION    Medications prior to admission:   Prior to Admission medications    Medication Sig Start Date End Date Taking? Authorizing Provider   thiamine 100 MG tablet Take 1 tablet by mouth daily 24   Radha Briceno MD   folic acid (FOLVITE) 1 MG tablet Take 1 tablet by mouth daily 24   Radha Briceno MD   ciprofloxacin (CIPRO) 500 MG tablet Take 1 tablet by mouth every 12 hours 24  Radha Briceno MD   hydroCHLOROthiazide (HYDRODIURIL) 25 MG tablet HOLD UNTIL FOLLOW UP WITH PRIMARY CARE 24   Radha Briceno MD   atenolol (TENORMIN) 25 MG tablet Take 1 tablet by mouth daily HOLD UNTIL FOLLOW UP WITH PRIMARY CARE 24   Radha Briceno MD   naloxone (NARCAN) 4 MG/0.1ML LIQD nasal spray 1 spray by Nasal route as needed for Opioid Reversal 24   Radha Briceno MD   amLODIPine (NORVASC) 10 MG tablet Take 1 tablet by mouth daily 24   David Mandujano MD   losartan (COZAAR) 100 MG tablet Take 1 tablet by mouth daily 24   David Mandujano MD   SITagliptin (JANUVIA) 100 MG tablet Take 1 tablet by mouth daily 24   David Mandujano MD   ondansetron (ZOFRAN) 4 MG tablet Take 1 tablet by mouth daily as needed for Nausea or Vomiting 24   File, RADHA Temple   ACCU-CHEK JACQUELYN PLUS strip  3/9/23   Provider, MD Tiffany       Current medications:    Current Facility-Administered Medications   Medication Dose Route Frequency Provider Last Rate Last Admin    lidocaine 1 % injection 1 mL  1 mL IntraDERmal Once PRN Ramiro Rajput DO        fentaNYL (SUBLIMAZE) injection 100 mcg  100 mcg IntraVENous Once PRN Ramiro Rajput DO        lactated ringers IV soln infusion

## 2024-07-25 ENCOUNTER — TELEPHONE (OUTPATIENT)
Dept: ORTHOPEDIC SURGERY | Age: 79
End: 2024-07-25

## 2024-07-25 VITALS
DIASTOLIC BLOOD PRESSURE: 67 MMHG | WEIGHT: 152.6 LBS | OXYGEN SATURATION: 100 % | SYSTOLIC BLOOD PRESSURE: 132 MMHG | BODY MASS INDEX: 24.53 KG/M2 | HEIGHT: 66 IN | HEART RATE: 62 BPM | RESPIRATION RATE: 18 BRPM | TEMPERATURE: 97.5 F

## 2024-07-25 LAB
GLUCOSE BLD STRIP.AUTO-MCNC: 150 MG/DL (ref 65–100)
SERVICE CMNT-IMP: ABNORMAL

## 2024-07-25 PROCEDURE — 97161 PT EVAL LOW COMPLEX 20 MIN: CPT

## 2024-07-25 PROCEDURE — 82962 GLUCOSE BLOOD TEST: CPT

## 2024-07-25 PROCEDURE — 6360000002 HC RX W HCPCS: Performed by: ORTHOPAEDIC SURGERY

## 2024-07-25 PROCEDURE — 2580000003 HC RX 258: Performed by: ORTHOPAEDIC SURGERY

## 2024-07-25 PROCEDURE — 6370000000 HC RX 637 (ALT 250 FOR IP): Performed by: ORTHOPAEDIC SURGERY

## 2024-07-25 PROCEDURE — 97530 THERAPEUTIC ACTIVITIES: CPT

## 2024-07-25 RX ORDER — HYDRALAZINE HYDROCHLORIDE 20 MG/ML
20 INJECTION INTRAMUSCULAR; INTRAVENOUS EVERY 4 HOURS PRN
Status: DISCONTINUED | OUTPATIENT
Start: 2024-07-25 | End: 2024-07-25 | Stop reason: HOSPADM

## 2024-07-25 RX ORDER — OXYCODONE HYDROCHLORIDE 5 MG/1
5 TABLET ORAL EVERY 4 HOURS PRN
Qty: 18 TABLET | Refills: 0 | Status: SHIPPED | OUTPATIENT
Start: 2024-07-25 | End: 2024-07-28

## 2024-07-25 RX ORDER — AMOXICILLIN AND CLAVULANATE POTASSIUM 875; 125 MG/1; MG/1
1 TABLET, FILM COATED ORAL 2 TIMES DAILY
Qty: 28 TABLET | Refills: 0 | Status: SHIPPED | OUTPATIENT
Start: 2024-07-25 | End: 2024-08-08

## 2024-07-25 RX ADMIN — OXYCODONE HYDROCHLORIDE 10 MG: 5 TABLET ORAL at 09:59

## 2024-07-25 RX ADMIN — FOLIC ACID 1 MG: 1 TABLET ORAL at 07:47

## 2024-07-25 RX ADMIN — HYDROCHLOROTHIAZIDE 25 MG: 25 TABLET ORAL at 07:47

## 2024-07-25 RX ADMIN — SODIUM CHLORIDE 3000 MG: 900 INJECTION INTRAVENOUS at 00:54

## 2024-07-25 RX ADMIN — ATENOLOL 25 MG: 25 TABLET ORAL at 07:47

## 2024-07-25 RX ADMIN — Medication 100 MG: at 07:47

## 2024-07-25 RX ADMIN — SODIUM CHLORIDE, PRESERVATIVE FREE 5 ML: 5 INJECTION INTRAVENOUS at 07:47

## 2024-07-25 RX ADMIN — SODIUM CHLORIDE 3000 MG: 900 INJECTION INTRAVENOUS at 06:03

## 2024-07-25 NOTE — PROGRESS NOTES
Discharge instructions, medication side effects sheet, follow up appointment and prescriptions reviewed and explained to the patient. Patient verbalizes understanding of instructions. A copy of discharge instructions and prescriptions  have been given to patient.  Opportunity for questions provided. IV removed

## 2024-07-25 NOTE — DISCHARGE INSTRUCTIONS
[facility]        Patient Discharge Instructions    Roldan Edmondson / [id number] : 1945    Admitted 2024 Discharged: 2024     IF YOU HAVE ANY PROBLEMS ONCE YOU ARE AT HOME CALL THE FOLLOWING NUMBERS:   Main office number: [phone] ask for Shayy (our medical assistant)  Office Address: [address] [place] [address] Dr. Hamlin [address] [place] [address]          Activity  As tolerated and as directed by your doctor.   Keep dressing dry and clean      Diet  Resume regular diet       Medications    The medications you are to continue on are listed on the medication reconciliation sheet.   Narcotic pain medications as well as supplemental iron can cause constipation. If this occurs try stopping the narcotic pain medication and/or the iron.   It is important that you take the medication exactly as they are prescribed.  Keep your medication in the bottles provided by the pharmacist and keep a list of the medication names, dosages, and times to be taken in your wallet.   Do not take other medications without consulting your doctor.       Other Important Information    Do NOT smoke as this will greatly increase your risk of infection!    Do not drive and operate hazardous machinery until being cleared to do so by your doctor      You are at a risk for falls. Use the rolling walker when walking.     Patients should not drive if they are still taking narcotic pain mediation during the daytime hours. Most patients wean themselves off of pain medication within 2-5 weeks after surgery.     Please give a list of your current medications to your Primary Care Provider and update this list whenever your medications are discontinued, doses are changed, or new medications (including over-the-counter products) are added. Please carry medication information at all times in case of emergency situations.    If you have sleep apnea and have a CPAP machine, please use it for all naps and sleeping.          When to Call the

## 2024-07-25 NOTE — TELEPHONE ENCOUNTER
He had surgery yesterday. His follow up with Dr. Fragoso is in two weeks. Are they to resume home health? They did not get orders. Please let her know.

## 2024-07-25 NOTE — PROGRESS NOTES
ACUTE PHYSICAL THERAPY GOALS:   (Developed with and agreed upon by patient and/or caregiver.)  LTG:  (1.)Mr. Edmondson will move from supine to sit and sit to supine , scoot up and down, and roll side to side in bed with INDEPENDENT within 1 treatment day(s). GOAL MET 7/25/2024  (2.)Mr. Edmondson will transfer from bed to chair and chair to bed with MODIFIED INDEPENDENCE using the least restrictive device within 1 treatment day(s).  GOAL MET 7/25/2024  (3.)Mr. Edmondson will ambulate with STAND BY ASSIST for 200 feet with the least restrictive device within 1 treatment day(s). GOAL MET 7/25/2024  (4.)Mr. Edmondson will be independent in donning/doffing post op shoe within treatment day. GOAL MET 7/25/2024'  ________________________________________________________________________________________________       PHYSICAL THERAPY Initial Assessment, Discharge, and AM  (Link to Caseload Tracking: PT Visit Days : 1  Acknowledge Orders  Time In/Out  PT Charge Capture  Rehab Caseload Tracker    WBAT L LE with post op shoe    Roldan Edmondson is a 78 y.o. male   PRIMARY DIAGNOSIS: Diabetic infection of right foot (HCC)  Diabetic infection of left foot (HCC) [E11.628, L08.9]  Osteomyelitis of great toe of left foot (HCC) [M86.9]  Procedure(s) (LRB):  left  PARTIAL FOOT AMPUTATION (Left)  1 Day Post-Op  Reason for Referral: Difficulty in walking, Not elsewhere classified (R26.2)  Inpatient: Payor: MARGY MEDICARE / Plan: AET MEDICARE ADVANTAGE HMO / Product Type: Medicare /     ASSESSMENT:     REHAB RECOMMENDATIONS:   Recommendation to date pending progress:  Setting:  No further skilled physical therapy after discharge from hospital    Equipment:    None-has RW, shower chair      ASSESSMENT:  Mr. Edmondson presents to hospital with above surgery, now WBAT L LE with post op shoe. Pt is A & O x 4 this date, reports had same surgery last year on R foot. Pt lives with family, MOD I with RW, wears post op shoes B feet. Pt independent with bed

## 2024-07-25 NOTE — DIABETES MGMT
Patient admitted with Diabetic infection of right foot. Blood glucose ranged 142-198 yesterday with patient receiving Decadron 10 mg. Blood glucose this morning was 150.  Reviewed patient current regimen: Humalog correctional insulin.  Glycemic control overall in hospital blood glucose target goal.  Will follow along.

## 2024-07-25 NOTE — DISCHARGE SUMMARY
[id number] - [place], [place] - [address] - P [phone] - F [phone]  [address] [facility] [address]      Phone: [phone]   amoxicillin-clavulanate 875-125 MG per tablet  oxyCODONE 5 MG immediate release tablet           Discharge instructions:  - Refer to the Medication Reconciliation sheet for all discharge medications   -Rx pain medication given   -Rx antibiotic given   -Resume pre hospital diet              -Ambulate with walker and fall precautions   -Follow up in office as scheduled       Signed:  RADHA De La Fuente  7/25/2024  9:48 AM

## 2024-07-26 ENCOUNTER — CARE COORDINATION (OUTPATIENT)
Dept: CARE COORDINATION | Facility: CLINIC | Age: 79
End: 2024-07-26

## 2024-07-26 NOTE — CARE COORDINATION
Care Transitions Note    Initial Call - Call within 2 business days of discharge: Yes    Patient Current Location:  Home: [address] [place] [id number]    Care Transition Nurse contacted the patient by telephone to perform post hospital discharge assessment, verified name and  as identifiers. Provided introduction to self, and explanation of the Care Transition Nurse role.     Patient: Roldan Edmondson    Patient : 1945   MRN: [id number]    Reason for Admission: OBS/Diabetic infection of left foot. Procedure:PARTIAL FOOT AMPUTATION   Discharge Date: 24  RURS: Readmission Risk Score: 13.2      Last Discharge Facility       Date Complaint Diagnosis Description Type Department Provider    24  Osteomyelitis of great toe of left foot (HCC) Admission (Discharged) [id number] Ignacio Boyd MD            Was this an external facility discharge? No    Additional needs identified to be addressed with provider   No needs identified             Method of communication with provider: none.    Patients top risk factors for readmission: medical condition-S/P partial foot amputation, DM,    Interventions to address risk factors:   Home Health: Spoke with Odalys at [facility]. She confirmed orders received this am to resume care. Office to contact patient to schedule visit.  Reviewed discharge instructions and offered opportunity to ask any questions regarding discharge instructions.  Confirmed medications obtained and taking as ordered  Reviewed upcoming follow up appointments  [facility] DT-2024 at 9:30 am  [facility]-8/15/2024 at 10:30 am    Care Transition Nurse reviewed discharge instructions, medical action plan, and red flags with patient. The patient was given an opportunity to ask questions; all questions answered at this time.. The patient verbalized understanding.   Were discharge instructions available to patient? Yes.   Reviewed appropriate site of care based on symptoms

## 2024-08-01 LAB
FUNGAL CULT/SMEAR: NORMAL
FUNGAL CULT/SMEAR: NORMAL
FUNGUS (MYCOLOGY) CULTURE: NORMAL
FUNGUS (MYCOLOGY) CULTURE: NORMAL
FUNGUS SMEAR: NORMAL
FUNGUS SMEAR: NORMAL
REFLEX TO ID: NORMAL
REFLEX TO ID: NORMAL
SPECIMEN PROCESSING: NORMAL
SPECIMEN PROCESSING: NORMAL
SPECIMEN SOURCE: NORMAL

## 2024-08-02 ENCOUNTER — CARE COORDINATION (OUTPATIENT)
Dept: CARE COORDINATION | Facility: CLINIC | Age: 79
End: 2024-08-02

## 2024-08-02 NOTE — CARE COORDINATION
Care Transitions Note    Follow Up Call     Attempted to reach patient for transitions of care follow up.  Unable to reach patient.      Outreach Attempts:   HIPAA compliant voicemail left for patient.       Follow Up Appointment:   Future Appointments         Provider Specialty Dept Phone    8/7/2024 9:30 AM Raudel Harley PA Orthopedic Surgery [phone]    11/4/2024 9:15 AM (Arrive by 9:00 AM) David Mandujano MD Internal Medicine [phone]            Plan for follow-up call in 6-10 days based on severity of symptoms and risk factors. Plan for next call: symptom management    Love Chin LPN

## 2024-08-07 ENCOUNTER — OFFICE VISIT (OUTPATIENT)
Dept: ORTHOPEDIC SURGERY | Age: 79
End: 2024-08-07
Payer: MEDICARE

## 2024-08-07 DIAGNOSIS — M86.9 OSTEOMYELITIS OF GREAT TOE OF LEFT FOOT (HCC): Primary | ICD-10-CM

## 2024-08-07 PROCEDURE — 99212 OFFICE O/P EST SF 10 MIN: CPT | Performed by: PHYSICIAN ASSISTANT

## 2024-08-07 PROCEDURE — 1123F ACP DISCUSS/DSCN MKR DOCD: CPT | Performed by: PHYSICIAN ASSISTANT

## 2024-08-07 NOTE — PROGRESS NOTES
[facility]            Patient ID:  Name: Roldan Edmondson  AGE/Gender: 78 y.o. male  MRN: [id number]  : 1945    Date of Service: 2024      ALLERGIES: No Known Allergies       S/P : Left great toe amputation    History:  The patient is seen today for follow-up wound check.  The patient  underwent left great toe amputation at [facility].  They have no other complaints or concerns: The patient was discharged to home from the hospital.        Physical Exam:       General:  On exam the patient is a pleasant 78 y.o. male in no acute distress, A&O x 3.   Foot: This incision is healing there is swelling consistent with the postoperative time frame.  No drainage.        Assessment and Plan:   Patient is doing well status post left great toe potation the wound is healing.  I remove the 3 tension sutures and running nylon suture today.  I placed Steri-Strips.  And placed a dry dressing over this really just to keep this area clean.  He can resume wound care at home.  The Steri-Strips should be left in place for about a week and then he is can be removed and he can start cleaning this foot daily with Hibiclens soap.  We will see him on an as-needed basis.    Electronically signed by:   RADHA De La Fuente  2024,  9:32 AM

## 2024-08-09 ENCOUNTER — CARE COORDINATION (OUTPATIENT)
Dept: CARE COORDINATION | Facility: CLINIC | Age: 79
End: 2024-08-09

## 2024-08-09 NOTE — CARE COORDINATION
Care Transitions Note    Follow Up Call     Attempted to reach patient for transitions of care follow up.  Unable to reach patient.      Outreach Attempts:   HIPAA compliant voicemail left for patient.       Follow Up Appointment:   Future Appointments         Provider Specialty Dept Phone    11/4/2024 9:15 AM (Arrive by 9:00 AM) David Mandujano MD Internal Medicine [phone]            Plan for follow-up call in 6-10 days based on severity of symptoms and risk factors. Plan for next call: symptom management    Love Chin LPN

## 2024-08-16 ENCOUNTER — CARE COORDINATION (OUTPATIENT)
Dept: CARE COORDINATION | Facility: CLINIC | Age: 79
End: 2024-08-16

## 2024-08-16 DIAGNOSIS — N18.30 TYPE 2 DIABETES MELLITUS WITH STAGE 3 CHRONIC KIDNEY DISEASE, WITHOUT LONG-TERM CURRENT USE OF INSULIN, UNSPECIFIED WHETHER STAGE 3A OR 3B CKD (HCC): ICD-10-CM

## 2024-08-16 DIAGNOSIS — E11.22 TYPE 2 DIABETES MELLITUS WITH STAGE 3 CHRONIC KIDNEY DISEASE, WITHOUT LONG-TERM CURRENT USE OF INSULIN, UNSPECIFIED WHETHER STAGE 3A OR 3B CKD (HCC): ICD-10-CM

## 2024-08-16 NOTE — CARE COORDINATION
Care Transitions Note    Follow Up Call     Attempted to reach patient for transitions of care follow up.  Unable to reach patient.      Outreach Attempts:   Multiple attempts to contact patient at phone numbers on file.     Follow Up Appointment:   Future Appointments         Provider Specialty Dept Phone    11/4/2024 9:15 AM (Arrive by 9:00 AM) David Mandujano MD Internal Medicine [phone]            Plan for follow-up call in 6-10 days based on severity of symptoms and risk factors. Plan for next call: symptom management    Love Chin LPN

## 2024-08-19 RX ORDER — SITAGLIPTIN 100 MG/1
100 TABLET, FILM COATED ORAL DAILY
Qty: 90 TABLET | Refills: 0 | Status: SHIPPED | OUTPATIENT
Start: 2024-08-19

## 2024-08-23 ENCOUNTER — CARE COORDINATION (OUTPATIENT)
Dept: CARE COORDINATION | Facility: CLINIC | Age: 79
End: 2024-08-23

## 2024-08-23 NOTE — CARE COORDINATION
Care Transitions Note    Follow Up Call     Attempted to reach patient for transitions of care follow up.  Unable to reach patient.      Outreach Attempts:   Multiple attempts to contact patient at phone numbers on file.   Program closed due to several unsuccessful attempts to reach patient.   Program due to close over the weekend (8/25).  Follow up with ortho completed on 8/7/24.  Appointment with PCP scheduled on 11/4/24.    Follow Up Appointment:   Future Appointments         Provider Specialty Dept Phone    11/4/2024 9:15 AM (Arrive by 9:00 AM) David Mandujano MD Internal Medicine [phone]            No further follow-up call indicated based on severity of symptoms and risk factors.     Love Chin LPN

## 2024-09-03 ENCOUNTER — TELEPHONE (OUTPATIENT)
Dept: ORTHOPEDIC SURGERY | Age: 79
End: 2024-09-03

## 2024-09-03 NOTE — TELEPHONE ENCOUNTER
Returned call to Pt. He stated nurse said all wounds are healed. Pt advised if normal shoes are comfortable then he can waen to Normal shoe. Pt voiced complete understanding. LH

## 2024-10-15 DIAGNOSIS — I10 ESSENTIAL HYPERTENSION: ICD-10-CM

## 2024-10-15 RX ORDER — ATENOLOL 25 MG/1
25 TABLET ORAL DAILY
Qty: 90 TABLET | Refills: 11 | OUTPATIENT
Start: 2024-10-15

## 2024-10-15 RX ORDER — AMLODIPINE BESYLATE 10 MG/1
10 TABLET ORAL DAILY
Qty: 90 TABLET | Refills: 11 | OUTPATIENT
Start: 2024-10-15

## 2024-10-15 RX ORDER — LOSARTAN POTASSIUM 100 MG/1
100 TABLET ORAL DAILY
Qty: 90 TABLET | Refills: 11 | OUTPATIENT
Start: 2024-10-15

## 2024-10-15 RX ORDER — HYDROCHLOROTHIAZIDE 25 MG/1
TABLET ORAL
Qty: 90 TABLET | Refills: 11 | OUTPATIENT
Start: 2024-10-15

## 2024-11-04 ENCOUNTER — OFFICE VISIT (OUTPATIENT)
Dept: INTERNAL MEDICINE CLINIC | Facility: CLINIC | Age: 79
End: 2024-11-04

## 2024-11-04 VITALS
HEART RATE: 57 BPM | WEIGHT: 157 LBS | DIASTOLIC BLOOD PRESSURE: 66 MMHG | OXYGEN SATURATION: 99 % | HEIGHT: 66 IN | TEMPERATURE: 97.6 F | BODY MASS INDEX: 25.23 KG/M2 | SYSTOLIC BLOOD PRESSURE: 130 MMHG

## 2024-11-04 DIAGNOSIS — I10 ESSENTIAL HYPERTENSION: ICD-10-CM

## 2024-11-04 DIAGNOSIS — E11.22 TYPE 2 DIABETES MELLITUS WITH STAGE 3 CHRONIC KIDNEY DISEASE, WITHOUT LONG-TERM CURRENT USE OF INSULIN, UNSPECIFIED WHETHER STAGE 3A OR 3B CKD (HCC): ICD-10-CM

## 2024-11-04 DIAGNOSIS — Z00.00 MEDICARE ANNUAL WELLNESS VISIT, SUBSEQUENT: ICD-10-CM

## 2024-11-04 DIAGNOSIS — N18.30 TYPE 2 DIABETES MELLITUS WITH STAGE 3 CHRONIC KIDNEY DISEASE, WITHOUT LONG-TERM CURRENT USE OF INSULIN, UNSPECIFIED WHETHER STAGE 3A OR 3B CKD (HCC): ICD-10-CM

## 2024-11-04 DIAGNOSIS — K21.9 GASTROESOPHAGEAL REFLUX DISEASE, UNSPECIFIED WHETHER ESOPHAGITIS PRESENT: ICD-10-CM

## 2024-11-04 DIAGNOSIS — N18.31 STAGE 3A CHRONIC KIDNEY DISEASE (HCC): ICD-10-CM

## 2024-11-04 DIAGNOSIS — J06.9 UPPER RESPIRATORY TRACT INFECTION, UNSPECIFIED TYPE: Primary | ICD-10-CM

## 2024-11-04 RX ORDER — AMLODIPINE BESYLATE 10 MG/1
10 TABLET ORAL DAILY
Qty: 90 TABLET | Refills: 1 | Status: SHIPPED | OUTPATIENT
Start: 2024-11-04 | End: 2024-11-05 | Stop reason: SDUPTHER

## 2024-11-04 RX ORDER — DOXYCYCLINE HYCLATE 100 MG
100 TABLET ORAL 2 TIMES DAILY
Qty: 28 TABLET | Refills: 0 | Status: SHIPPED | OUTPATIENT
Start: 2024-11-04 | End: 2024-11-18

## 2024-11-04 RX ORDER — LOSARTAN POTASSIUM 100 MG/1
100 TABLET ORAL DAILY
Qty: 90 TABLET | Refills: 1 | Status: SHIPPED | OUTPATIENT
Start: 2024-11-04 | End: 2024-11-05 | Stop reason: SDUPTHER

## 2024-11-04 SDOH — ECONOMIC STABILITY: FOOD INSECURITY: WITHIN THE PAST 12 MONTHS, YOU WORRIED THAT YOUR FOOD WOULD RUN OUT BEFORE YOU GOT MONEY TO BUY MORE.: NEVER TRUE

## 2024-11-04 SDOH — ECONOMIC STABILITY: FOOD INSECURITY: WITHIN THE PAST 12 MONTHS, THE FOOD YOU BOUGHT JUST DIDN'T LAST AND YOU DIDN'T HAVE MONEY TO GET MORE.: NEVER TRUE

## 2024-11-04 SDOH — ECONOMIC STABILITY: INCOME INSECURITY: HOW HARD IS IT FOR YOU TO PAY FOR THE VERY BASICS LIKE FOOD, HOUSING, MEDICAL CARE, AND HEATING?: NOT HARD AT ALL

## 2024-11-04 ASSESSMENT — ENCOUNTER SYMPTOMS
RHINORRHEA: 0
COUGH: 1
WHEEZING: 0
HEMOPTYSIS: 0
SORE THROAT: 0
SHORTNESS OF BREATH: 0

## 2024-11-04 ASSESSMENT — ANXIETY QUESTIONNAIRES
6. BECOMING EASILY ANNOYED OR IRRITABLE: NOT AT ALL
2. NOT BEING ABLE TO STOP OR CONTROL WORRYING: NOT AT ALL
7. FEELING AFRAID AS IF SOMETHING AWFUL MIGHT HAPPEN: NOT AT ALL
1. FEELING NERVOUS, ANXIOUS, OR ON EDGE: NOT AT ALL
GAD7 TOTAL SCORE: 0
5. BEING SO RESTLESS THAT IT IS HARD TO SIT STILL: NOT AT ALL
4. TROUBLE RELAXING: NOT AT ALL
IF YOU CHECKED OFF ANY PROBLEMS ON THIS QUESTIONNAIRE, HOW DIFFICULT HAVE THESE PROBLEMS MADE IT FOR YOU TO DO YOUR WORK, TAKE CARE OF THINGS AT HOME, OR GET ALONG WITH OTHER PEOPLE: NOT DIFFICULT AT ALL
3. WORRYING TOO MUCH ABOUT DIFFERENT THINGS: NOT AT ALL

## 2024-11-04 ASSESSMENT — PATIENT HEALTH QUESTIONNAIRE - PHQ9
SUM OF ALL RESPONSES TO PHQ QUESTIONS 1-9: 0
1. LITTLE INTEREST OR PLEASURE IN DOING THINGS: NOT AT ALL
SUM OF ALL RESPONSES TO PHQ9 QUESTIONS 1 & 2: 0
SUM OF ALL RESPONSES TO PHQ QUESTIONS 1-9: 0
2. FEELING DOWN, DEPRESSED OR HOPELESS: NOT AT ALL

## 2024-11-04 ASSESSMENT — LIFESTYLE VARIABLES
HOW MANY STANDARD DRINKS CONTAINING ALCOHOL DO YOU HAVE ON A TYPICAL DAY: 1 OR 2
HOW OFTEN DO YOU HAVE A DRINK CONTAINING ALCOHOL: MONTHLY OR LESS

## 2024-11-04 NOTE — PROGRESS NOTES
[facility]  David Mandujano M.D.  Internal Medicine  [address] [place] [address]  Office : [phone]  Fax : [phone]    CHIEF COMPLAINT  Chief Complaint   Patient presents with    Diabetes     4 mo follow up    Medicare AWV     Sub AWV    Cough     Cough x3 weeks          Medicare Annual Wellness Visit    Roldan Edmondson is here for Diabetes (4 mo follow up), Medicare AWV (Sub AWV), and Cough (Cough x3 weeks )    Assessment & Plan   Medicare annual wellness visit, subsequent  Essential hypertension  The following orders have not been finalized:  -     losartan (COZAAR) 100 MG tablet  -     amLODIPine (NORVASC) 10 MG tablet  Type 2 diabetes mellitus with stage 3 chronic kidney disease, without long-term current use of insulin, unspecified whether stage 3a or 3b CKD (HCC)  The following orders have not been finalized:  -     SITagliptin (JANUVIA) 100 MG tablet    Recommendations for Preventive Services Due: see orders and patient instructions/AVS.  Recommended screening schedule for the next 5-10 years is provided to the patient in written form: see Patient Instructions/AVS.     No follow-ups on file.     Subjective       Patient's complete Health Risk Assessment and screening values have been reviewed and are found in Flowsheets. The following problems were reviewed today and where indicated follow up appointments were made and/or referrals ordered.    Positive Risk Factor Screenings with Interventions:       Cognitive:      Words recalled: 1 Word Recalled     Total Score Interpretation: Abnormal Mini-Cog  Interventions:  Decrease alcohol      Drug Use:   Substance and Sexual Activity   Drug Use Yes    Frequency: 4.0 times per week    Types: Marijuana (Weed)    Comment: 3-4 times per week, more if available     Interventions:  Discussed FAVOR in the past              Vision Screen:  Do you have difficulty driving, watching TV, or doing any of your daily

## 2024-11-04 NOTE — PROGRESS NOTES
[facility]  David Mandujano M.D.  Internal Medicine  [address] [place] [address]  Office : [phone]  Fax : [phone]    Chief Complaint   Patient presents with    Diabetes     4 mo follow up    Medicare AWV     Sub AWV    Cough     Cough x3 weeks        History of Present Illness:  Roldan Edmondson is a 78 y.o. male.  Cough  This is a new problem. The current episode started 1 to 4 weeks ago. The problem has been unchanged. The problem occurs every few hours. The cough is Productive of sputum. Associated symptoms include sweats. Pertinent negatives include no chest pain, chills, ear pain, fever, hemoptysis, postnasal drip, rash, rhinorrhea, sore throat, shortness of breath or wheezing. Nothing aggravates the symptoms. He has tried nothing for the symptoms. The treatment provided no relief.     Diabetes Mellitus  Patient presents  for follow up on diabetes.  Onset of symptoms was several years ago. Patient describes symptoms as none. Course to date has been well controlled.Diet and Lifestyle: follows a diabetic diet regularly  exercises sporadically  nonsmoker  Home glucose monitoring:  Results average n/a. Patient denies polyuria and polydipsia. No wounds in lower limbs.  Most recent HbA1c was   Hemoglobin A1C   Date Value Ref Range Status   07/01/2024 7.2 (H) 0 - 5.6 % Final     Comment:     Reference Range  Normal       <5.7%  Prediabetes  5.7-6.4%  Diabetes     >6.4%         Hypertension  Patient is in for Hypertension which is stable.    Diet and Lifestyle: generally follows a low sodium diet  Home BP Monitoring: is not measured at home.  Patient's recent blood pressures were   BP Readings from Last 3 Encounters:   11/04/24 130/66   07/25/24 132/67   07/06/24 (!) 141/72   .   taking medications as instructed, no medication side effects noted, no TIA's, no chest pain on exertion, no dyspnea on exertion, no swelling of ankles. Cardiac risk factors

## 2024-11-04 NOTE — PATIENT INSTRUCTIONS

## 2024-11-05 DIAGNOSIS — E11.22 TYPE 2 DIABETES MELLITUS WITH STAGE 3 CHRONIC KIDNEY DISEASE, WITHOUT LONG-TERM CURRENT USE OF INSULIN, UNSPECIFIED WHETHER STAGE 3A OR 3B CKD (HCC): ICD-10-CM

## 2024-11-05 DIAGNOSIS — J06.9 UPPER RESPIRATORY TRACT INFECTION, UNSPECIFIED TYPE: ICD-10-CM

## 2024-11-05 DIAGNOSIS — I10 ESSENTIAL HYPERTENSION: ICD-10-CM

## 2024-11-05 DIAGNOSIS — N18.30 TYPE 2 DIABETES MELLITUS WITH STAGE 3 CHRONIC KIDNEY DISEASE, WITHOUT LONG-TERM CURRENT USE OF INSULIN, UNSPECIFIED WHETHER STAGE 3A OR 3B CKD (HCC): ICD-10-CM

## 2024-11-05 RX ORDER — DOXYCYCLINE HYCLATE 100 MG
100 TABLET ORAL 2 TIMES DAILY
Qty: 28 TABLET | Refills: 0 | OUTPATIENT
Start: 2024-11-05 | End: 2024-11-19

## 2024-11-05 RX ORDER — AMLODIPINE BESYLATE 10 MG/1
10 TABLET ORAL DAILY
Qty: 90 TABLET | Refills: 1 | Status: SHIPPED | OUTPATIENT
Start: 2024-11-05

## 2024-11-05 RX ORDER — LOSARTAN POTASSIUM 100 MG/1
100 TABLET ORAL DAILY
Qty: 90 TABLET | Refills: 1 | Status: SHIPPED | OUTPATIENT
Start: 2024-11-05

## 2024-11-05 NOTE — TELEPHONE ENCOUNTER
Patient called, [facility] didn't receive prescriptions, please resend.  Doxycycline  [facility]   Patient seen on 11-4-24

## 2024-11-06 ENCOUNTER — TELEPHONE (OUTPATIENT)
Dept: INTERNAL MEDICINE CLINIC | Facility: CLINIC | Age: 79
End: 2024-11-06

## 2024-11-06 NOTE — TELEPHONE ENCOUNTER
[facility] [address] [facility]24, a lot of prescriptions were not received.  Spoke to pharmacist- Rx per Dr Mandujano  Doxycycline hyclate 100mg                                                                            #28                                                                          1 po bid x 14 days                                                                           O RFOk per Tiffany.

## 2024-11-15 DIAGNOSIS — I10 ESSENTIAL HYPERTENSION: ICD-10-CM

## 2024-11-18 RX ORDER — HYDROCHLOROTHIAZIDE 25 MG/1
TABLET ORAL
Qty: 90 TABLET | Refills: 1 | Status: SHIPPED | OUTPATIENT
Start: 2024-11-18

## 2024-11-18 RX ORDER — ATENOLOL 25 MG/1
25 TABLET ORAL DAILY
Qty: 90 TABLET | Refills: 1 | Status: SHIPPED | OUTPATIENT
Start: 2024-11-18

## 2024-11-29 NOTE — WOUND CARE
Discharge Instructions for  [facility]  [address] [facility], [address] [place] [address]  Phone [phone]   Fax [phone]      NAME:  Casey Crews  YOB: 1945  MEDICAL RECORD NUMBER:  [id number]  DATE:  7/25/2023    Return Appointment:   1 week with Ginger Beauchamp DO    Instructions:   Right plantar foot:  Cleanse intact skin with soap & water, wound with normal saline or wound cleanser. Vashe Wound Solution (hypochlorous acid) soak placed on wound bed for a minimum of 60 seconds prior to dressing application. Apply alginate with silver. Cut product to wound size and apply to wound bed. Cover with Exudry pad & secure with rolled gauze. Total Contact Cast on right foot. If cast becomes too tight, try elevating your legs to assist fluid drainage. Do not get wound wet in shower, pool or tub. May purchase cast cover at local pharmacy to keep dry in shower. Elevate legs when sitting. Avoid prolonged standing or sitting with legs in dependent position. Eliminate salt intake as this promotes fluid retention and swelling  Please increase dietary protein to at least 60 grams per day to support cell rejuvenation. May use supplements such as Ensure Max, Naga, & Glucerna   Be sure to spread intake throughout the day for improved absorption. Infectious Disease : call [phone] to reschedule appointment. Continue antibiotics: Augmentin and Doxycycline as prescribed by ID. Should you experience increased redness, swelling, pain, foul odor, size of wound(s), or have a temperature over 101 degrees please contact the [address] at [phone] or if after hours contact your primary care physician or go to the hospital emergency department. PLEASE NOTE: IF YOU ARE UNABLE TO OBTAIN WOUND SUPPLIES, CONTINUE TO USE THE SUPPLIES YOU HAVE AVAILABLE UNTIL YOU ARE ABLE TO REACH US.  IT IS MOST IMPORTANT TO KEEP THE WOUND COVERED AT ALL
Total Contact Cast    NAME:  Brandt Hicks  YOB: 1945  MEDICAL RECORD NUMBER:  [id number]  DATE:  7/25/2023    Goal:  Patient will maintain integrity of cast, avoid mobility hazards, and report complications that may occur (foul odor, pain, numbness, cracked cast). Removed old contact cast if indicated and wash extremity with soap and water. Applied ordered dressing. Applied foam padding  Applied cast padding included in the kit   Applied per nursing and Miladys Lr DO  Applied to: [] Left Lower Leg [x] Right Lower Leg  Allow cast to dry. Instructed patient to report to health care provider, including wound care center, any back pain, hip pain, or leg pain, numbness of toes, or any odor coming from the cast.   Instructed patient not to stick any foreign objects down into cast.   Instructed patient to utilize assistive devices(crutches, cane or walker) as ordered   Instructed patient to continue offloading as directed.      Applied cast per  Guidelines    Electronically signed by Shruti Flores RN on 7/25/2023 at 9:43 AM
<-- Click to add NO pertinent Family History

## 2025-02-13 RX ORDER — TAMSULOSIN HYDROCHLORIDE 0.4 MG/1
0.4 CAPSULE ORAL DAILY
Qty: 90 CAPSULE | Refills: 1 | OUTPATIENT
Start: 2025-02-13

## 2025-03-04 ENCOUNTER — OFFICE VISIT (OUTPATIENT)
Dept: ORTHOPEDIC SURGERY | Age: 80
End: 2025-03-04
Payer: MEDICARE

## 2025-03-04 ENCOUNTER — OFFICE VISIT (OUTPATIENT)
Dept: INTERNAL MEDICINE CLINIC | Facility: CLINIC | Age: 80
End: 2025-03-04
Payer: MEDICARE

## 2025-03-04 VITALS
TEMPERATURE: 97.9 F | WEIGHT: 162 LBS | DIASTOLIC BLOOD PRESSURE: 57 MMHG | SYSTOLIC BLOOD PRESSURE: 126 MMHG | BODY MASS INDEX: 26.03 KG/M2 | OXYGEN SATURATION: 98 % | HEART RATE: 59 BPM | HEIGHT: 66 IN

## 2025-03-04 DIAGNOSIS — L97.514 DIABETIC ULCER OF TOE OF RIGHT FOOT ASSOCIATED WITH TYPE 2 DIABETES MELLITUS, WITH NECROSIS OF BONE (HCC): ICD-10-CM

## 2025-03-04 DIAGNOSIS — R41.89 COGNITIVE CHANGE: ICD-10-CM

## 2025-03-04 DIAGNOSIS — M79.671 RIGHT FOOT PAIN: ICD-10-CM

## 2025-03-04 DIAGNOSIS — Z00.00 MEDICARE ANNUAL WELLNESS VISIT, SUBSEQUENT: ICD-10-CM

## 2025-03-04 DIAGNOSIS — E11.22 TYPE 2 DIABETES MELLITUS WITH STAGE 3 CHRONIC KIDNEY DISEASE, WITHOUT LONG-TERM CURRENT USE OF INSULIN, UNSPECIFIED WHETHER STAGE 3A OR 3B CKD (HCC): ICD-10-CM

## 2025-03-04 DIAGNOSIS — I10 ESSENTIAL HYPERTENSION: ICD-10-CM

## 2025-03-04 DIAGNOSIS — F10.288 ALCOHOL DEPENDENCE WITH OTHER ALCOHOL-INDUCED DISORDER (HCC): ICD-10-CM

## 2025-03-04 DIAGNOSIS — N18.30 TYPE 2 DIABETES MELLITUS WITH STAGE 3 CHRONIC KIDNEY DISEASE, WITHOUT LONG-TERM CURRENT USE OF INSULIN, UNSPECIFIED WHETHER STAGE 3A OR 3B CKD (HCC): ICD-10-CM

## 2025-03-04 DIAGNOSIS — M86.671 CHRONIC REFRACTORY OSTEOMYELITIS OF RIGHT FOOT (HCC): Primary | Chronic | ICD-10-CM

## 2025-03-04 DIAGNOSIS — E11.621 DIABETIC ULCER OF TOE OF RIGHT FOOT ASSOCIATED WITH TYPE 2 DIABETES MELLITUS, WITH NECROSIS OF BONE (HCC): ICD-10-CM

## 2025-03-04 DIAGNOSIS — E11.22 TYPE 2 DIABETES MELLITUS WITH STAGE 3 CHRONIC KIDNEY DISEASE, WITHOUT LONG-TERM CURRENT USE OF INSULIN, UNSPECIFIED WHETHER STAGE 3A OR 3B CKD (HCC): Primary | ICD-10-CM

## 2025-03-04 DIAGNOSIS — N18.30 TYPE 2 DIABETES MELLITUS WITH STAGE 3 CHRONIC KIDNEY DISEASE, WITHOUT LONG-TERM CURRENT USE OF INSULIN, UNSPECIFIED WHETHER STAGE 3A OR 3B CKD (HCC): Primary | ICD-10-CM

## 2025-03-04 LAB
ALBUMIN SERPL-MCNC: 3.3 G/DL (ref 3.2–4.6)
ALBUMIN/GLOB SERPL: 0.7 (ref 1–1.9)
ALP SERPL-CCNC: 57 U/L (ref 40–129)
ALT SERPL-CCNC: 14 U/L (ref 8–55)
ANION GAP SERPL CALC-SCNC: 12 MMOL/L (ref 7–16)
AST SERPL-CCNC: 21 U/L (ref 15–37)
BASOPHILS # BLD: 0.05 K/UL (ref 0–0.2)
BASOPHILS NFR BLD: 0.6 % (ref 0–2)
BILIRUB DIRECT SERPL-MCNC: <0.2 MG/DL (ref 0–0.4)
BILIRUB SERPL-MCNC: 0.4 MG/DL (ref 0–1.2)
BUN SERPL-MCNC: 44 MG/DL (ref 8–23)
CALCIUM SERPL-MCNC: 10 MG/DL (ref 8.8–10.2)
CHLORIDE SERPL-SCNC: 102 MMOL/L (ref 98–107)
CHOLEST SERPL-MCNC: 178 MG/DL (ref 0–200)
CO2 SERPL-SCNC: 25 MMOL/L (ref 20–29)
CREAT SERPL-MCNC: 2.49 MG/DL (ref 0.8–1.3)
CREAT UR-MCNC: 184 MG/DL (ref 39–259)
DIFFERENTIAL METHOD BLD: ABNORMAL
EOSINOPHIL # BLD: 0.11 K/UL (ref 0–0.8)
EOSINOPHIL NFR BLD: 1.4 % (ref 0.5–7.8)
ERYTHROCYTE [DISTWIDTH] IN BLOOD BY AUTOMATED COUNT: 13.4 % (ref 11.9–14.6)
EST. AVERAGE GLUCOSE BLD GHB EST-MCNC: 185 MG/DL
GLOBULIN SER CALC-MCNC: 4.8 G/DL (ref 2.3–3.5)
GLUCOSE SERPL-MCNC: 133 MG/DL (ref 70–99)
HBA1C MFR BLD: 8.1 % (ref 0–5.6)
HCT VFR BLD AUTO: 40.6 % (ref 41.1–50.3)
HDLC SERPL-MCNC: 45 MG/DL (ref 40–60)
HDLC SERPL: 4 (ref 0–5)
HGB BLD-MCNC: 13.1 G/DL (ref 13.6–17.2)
IMM GRANULOCYTES # BLD AUTO: 0.03 K/UL (ref 0–0.5)
IMM GRANULOCYTES NFR BLD AUTO: 0.4 % (ref 0–5)
LDLC SERPL CALC-MCNC: 105 MG/DL (ref 0–100)
LYMPHOCYTES # BLD: 1.71 K/UL (ref 0.5–4.6)
LYMPHOCYTES NFR BLD: 21.2 % (ref 13–44)
MCH RBC QN AUTO: 28.2 PG (ref 26.1–32.9)
MCHC RBC AUTO-ENTMCNC: 32.3 G/DL (ref 31.4–35)
MCV RBC AUTO: 87.3 FL (ref 82–102)
MICROALBUMIN UR-MCNC: 1.75 MG/DL (ref 0–20)
MICROALBUMIN/CREAT UR-RTO: 10 MG/G (ref 0–30)
MONOCYTES # BLD: 0.66 K/UL (ref 0.1–1.3)
MONOCYTES NFR BLD: 8.2 % (ref 4–12)
NEUTS SEG # BLD: 5.49 K/UL (ref 1.7–8.2)
NEUTS SEG NFR BLD: 68.2 % (ref 43–78)
NRBC # BLD: 0 K/UL (ref 0–0.2)
PLATELET # BLD AUTO: 401 K/UL (ref 150–450)
PMV BLD AUTO: 9.2 FL (ref 9.4–12.3)
POTASSIUM SERPL-SCNC: 4.1 MMOL/L (ref 3.5–5.1)
PROT SERPL-MCNC: 8.2 G/DL (ref 6.3–8.2)
RBC # BLD AUTO: 4.65 M/UL (ref 4.23–5.6)
SODIUM SERPL-SCNC: 139 MMOL/L (ref 136–145)
TRIGL SERPL-MCNC: 141 MG/DL (ref 0–150)
VIT B12 SERPL-MCNC: 423 PG/ML (ref 193–986)
VLDLC SERPL CALC-MCNC: 28 MG/DL (ref 6–23)
WBC # BLD AUTO: 8.1 K/UL (ref 4.3–11.1)

## 2025-03-04 PROCEDURE — 99213 OFFICE O/P EST LOW 20 MIN: CPT | Performed by: ORTHOPAEDIC SURGERY

## 2025-03-04 PROCEDURE — 1160F RVW MEDS BY RX/DR IN RCRD: CPT | Performed by: ORTHOPAEDIC SURGERY

## 2025-03-04 PROCEDURE — 1123F ACP DISCUSS/DSCN MKR DOCD: CPT | Performed by: ORTHOPAEDIC SURGERY

## 2025-03-04 PROCEDURE — 1159F MED LIST DOCD IN RCRD: CPT | Performed by: ORTHOPAEDIC SURGERY

## 2025-03-04 PROCEDURE — 1123F ACP DISCUSS/DSCN MKR DOCD: CPT | Performed by: INTERNAL MEDICINE

## 2025-03-04 PROCEDURE — 99214 OFFICE O/P EST MOD 30 MIN: CPT | Performed by: INTERNAL MEDICINE

## 2025-03-04 PROCEDURE — 3074F SYST BP LT 130 MM HG: CPT | Performed by: INTERNAL MEDICINE

## 2025-03-04 PROCEDURE — 3078F DIAST BP <80 MM HG: CPT | Performed by: INTERNAL MEDICINE

## 2025-03-04 PROCEDURE — G0439 PPPS, SUBSEQ VISIT: HCPCS | Performed by: INTERNAL MEDICINE

## 2025-03-04 PROCEDURE — 1160F RVW MEDS BY RX/DR IN RCRD: CPT | Performed by: INTERNAL MEDICINE

## 2025-03-04 PROCEDURE — G2211 COMPLEX E/M VISIT ADD ON: HCPCS | Performed by: INTERNAL MEDICINE

## 2025-03-04 PROCEDURE — 1159F MED LIST DOCD IN RCRD: CPT | Performed by: INTERNAL MEDICINE

## 2025-03-04 RX ORDER — AMLODIPINE BESYLATE 10 MG/1
10 TABLET ORAL DAILY
Qty: 90 TABLET | Refills: 1 | Status: SHIPPED | OUTPATIENT
Start: 2025-03-04

## 2025-03-04 RX ORDER — LOSARTAN POTASSIUM 100 MG/1
100 TABLET ORAL DAILY
Qty: 90 TABLET | Refills: 1 | Status: SHIPPED | OUTPATIENT
Start: 2025-03-04

## 2025-03-04 RX ORDER — HYDROCHLOROTHIAZIDE 25 MG/1
TABLET ORAL
Qty: 90 TABLET | Refills: 1 | Status: SHIPPED | OUTPATIENT
Start: 2025-03-04

## 2025-03-04 RX ORDER — ATENOLOL 25 MG/1
25 TABLET ORAL DAILY
Qty: 90 TABLET | Refills: 1 | Status: SHIPPED | OUTPATIENT
Start: 2025-03-04

## 2025-03-04 SDOH — ECONOMIC STABILITY: FOOD INSECURITY: WITHIN THE PAST 12 MONTHS, THE FOOD YOU BOUGHT JUST DIDN'T LAST AND YOU DIDN'T HAVE MONEY TO GET MORE.: NEVER TRUE

## 2025-03-04 SDOH — ECONOMIC STABILITY: FOOD INSECURITY: WITHIN THE PAST 12 MONTHS, YOU WORRIED THAT YOUR FOOD WOULD RUN OUT BEFORE YOU GOT MONEY TO BUY MORE.: NEVER TRUE

## 2025-03-04 ASSESSMENT — ANXIETY QUESTIONNAIRES
7. FEELING AFRAID AS IF SOMETHING AWFUL MIGHT HAPPEN: NOT AT ALL
IF YOU CHECKED OFF ANY PROBLEMS ON THIS QUESTIONNAIRE, HOW DIFFICULT HAVE THESE PROBLEMS MADE IT FOR YOU TO DO YOUR WORK, TAKE CARE OF THINGS AT HOME, OR GET ALONG WITH OTHER PEOPLE: NOT DIFFICULT AT ALL
1. FEELING NERVOUS, ANXIOUS, OR ON EDGE: NOT AT ALL
GAD7 TOTAL SCORE: 0
2. NOT BEING ABLE TO STOP OR CONTROL WORRYING: NOT AT ALL
3. WORRYING TOO MUCH ABOUT DIFFERENT THINGS: NOT AT ALL
4. TROUBLE RELAXING: NOT AT ALL
5. BEING SO RESTLESS THAT IT IS HARD TO SIT STILL: NOT AT ALL
6. BECOMING EASILY ANNOYED OR IRRITABLE: NOT AT ALL

## 2025-03-04 ASSESSMENT — ENCOUNTER SYMPTOMS
ABDOMINAL PAIN: 0
SHORTNESS OF BREATH: 0

## 2025-03-04 ASSESSMENT — PATIENT HEALTH QUESTIONNAIRE - PHQ9
SUM OF ALL RESPONSES TO PHQ QUESTIONS 1-9: 0
2. FEELING DOWN, DEPRESSED OR HOPELESS: NOT AT ALL
SUM OF ALL RESPONSES TO PHQ QUESTIONS 1-9: 0
1. LITTLE INTEREST OR PLEASURE IN DOING THINGS: NOT AT ALL

## 2025-03-04 NOTE — PATIENT INSTRUCTIONS
adapted under license by [employer]. If you have questions about a medical condition or this instruction, always ask your healthcare professional. [employer] disclaims any warranty or liability for your use of this information.    Personalized Preventive Plan for Roldan Edmondson - 3/4/2025  Medicare offers a range of preventive health benefits. Some of the tests and screenings are paid in full while other may be subject to a deductible, co-insurance, and/or copay.  Some of these benefits include a comprehensive review of your medical history including lifestyle, illnesses that may run in your family, and various assessments and screenings as appropriate.  After reviewing your medical record and screening and assessments performed today your provider may have ordered immunizations, labs, imaging, and/or referrals for you.  A list of these orders (if applicable) as well as your Preventive Care list are included within your After Visit Summary for your review.

## 2025-03-04 NOTE — PROGRESS NOTES
Progress Note    Patient: Roldan Edmondson MRN: [id number]  SSN: [id number]    YOB: 1945  Age: 79 y.o.  Sex: male        3/4/2025      Subjective:     Patient is here today complaining of worsening of his right foot wound.  He says that this is sort of broken down and been draining and getting red for about a week or so now.  He has not seen anybody else besides me for this.  He has not been on any antibiotics.  So it looks like that Dr. Nichols operated on this 1 of year or so ago and he said after that surgery it seemed to heal a good bit.  On his left foot he did have surgery by myself and Dr. Fragoso and that 1 is healed up pretty well.  So this is sort of again new over the last few days about a week    Objective:     There were no vitals filed for this visit.       Physical Exam:     Skin -he does have this wound over the medial and plantar aspect of his right great toe sort of at the level of the MTP joint.  Does appear that one of the sesamoid bones may be sort of exposed there as well.  There is some cloudy drainage but not a significant mount of purulent drainage or erythema  Motor and sensory function intact in RIGHT LOWER extremity  Pulses palpable in RIGHT LOWER extremity     XRAY FINDINGS:  No new x-rays    Assessment:     Right foot diabetic foot wound    Plan:     I think we will try some simple things first.  I going to get him started on some Augmentin and do some dressing changes.  I like him to do Dakin's dressing changes twice a day for the first 3 days and after that just normal saline dressing changes and I will see him back next week and see if we are making any progress.  Obviously for making some progress we may just do some wound care and hopefully avoid any further debridement.  He seems to be okay with this plan    Signed By: Ignacio Boyd MD     March 4, 2025

## 2025-03-04 NOTE — PROGRESS NOTES
[facility]  David Mandujano M.D.  Internal Medicine  [address] [place] [address]  Office : [phone]  Fax : [phone]    CHIEF COMPLAINT  Chief Complaint   Patient presents with    Diabetes     4 mo follow up    Medicare AWV     Sub AWV         Medicare Annual Wellness Visit    Roldan Edmondson is here for Diabetes (4 mo follow up) and Medicare AWV (Sub AWV)    Assessment & Plan   Medicare annual wellness visit, subsequent  Essential hypertension  The following orders have not been finalized:  -     losartan (COZAAR) 100 MG tablet  -     hydroCHLOROthiazide (HYDRODIURIL) 25 MG tablet  -     atenolol (TENORMIN) 25 MG tablet  -     amLODIPine (NORVASC) 10 MG tablet  Type 2 diabetes mellitus with stage 3 chronic kidney disease, without long-term current use of insulin, unspecified whether stage 3a or 3b CKD (HCC)  The following orders have not been finalized:  -     SITagliptin (JANUVIA) 100 MG tablet       No follow-ups on file.     Subjective       Patient's complete Health Risk Assessment and screening values have been reviewed and are found in Flowsheets. The following problems were reviewed today and where indicated follow up appointments were made and/or referrals ordered.    Positive Risk Factor Screenings with Interventions:     Cognitive:      Words recalled: 3 Words Recalled     Total Score Interpretation: Abnormal Mini-Cog likely related to alcohol and marijuana    Drug Use:   Substance and Sexual Activity   Drug Use Yes    Frequency: 4.0 times per week    Types: Marijuana (Weed)    Comment: 3-4 times per week, more if available     DAST-10 Score: 0     Interventions:  Patient declined any further intervention or treatment        General HRA Questions:  Select all that apply: (!) New or Increased Pain (foot pain)  Interventions - Pain:  Podiatry is treating foot ulcer         Dentist Screen:  Have you seen the dentist within the past year?: (!) 
TABLET BY MOUTH ONCE DAILY  -     losartan (COZAAR) 100 MG tablet; Take 1 tablet by mouth daily  -     CBC with Auto Differential; Future  -     Basic Metabolic Panel; Future    Medicare annual wellness visit, subsequent    Diabetic ulcer of toe of right foot associated with type 2 diabetes mellitus, with necrosis of bone (HCC)    Alcohol dependence with other alcohol-induced disorder (HCC)    Cognitive change  -     Vitamin B12; Future        Return in about 4 months (around 7/4/2025), or if symptoms worsen or fail to improve.  __  David Mandujano M.D.

## 2025-03-05 ENCOUNTER — TELEPHONE (OUTPATIENT)
Dept: ORTHOPEDIC SURGERY | Age: 80
End: 2025-03-05

## 2025-03-05 NOTE — TELEPHONE ENCOUNTER
Risa is calling to report the patient is requesting to hold off on them seeing him until Monday. Please call to let her know if this Is okay

## 2025-03-05 NOTE — TELEPHONE ENCOUNTER
Returned  call to Pt and confirmed he was comfortable doing dressing changes 2x daily until Monday. Pt stated yes he would like HH to start Monday. Risa whiting/ JENNI notified as well. LH

## 2025-03-10 ENCOUNTER — TELEPHONE (OUTPATIENT)
Dept: ORTHOPEDIC SURGERY | Age: 80
End: 2025-03-10

## 2025-03-10 NOTE — TELEPHONE ENCOUNTER
She is putting him down ast three times a week for now. Pt. Is scheduled to be seen in clinic tomorrow and we will updated home health following that appointment.

## 2025-03-10 NOTE — TELEPHONE ENCOUNTER
She is requesting verbal orders for wound care. She needs to know how often she needs to see him. Please give her a call.

## 2025-03-10 NOTE — TELEPHONE ENCOUNTER
Can you help  set up  transportation  for this pt  to  come to  his appt tomorrow on the  12th?    Or  please  call him  bc  he may try  to  walk  to his appt  and needs to  speak to someone

## 2025-03-10 NOTE — TELEPHONE ENCOUNTER
Spoke with pt about some different Transportation options. Pt was given # for [employer], which he thinks that he has used before. Pt also asked if he tool the bus if it was ok that he walked from bus stop to the office. After speaking with Zenon and per last OV, pt has not weight bearing restrictions.

## 2025-03-11 ENCOUNTER — OFFICE VISIT (OUTPATIENT)
Dept: ORTHOPEDIC SURGERY | Age: 80
End: 2025-03-11
Payer: MEDICARE

## 2025-03-11 DIAGNOSIS — L97.519 DIABETIC ULCER OF TOE OF RIGHT FOOT ASSOCIATED WITH DIABETES MELLITUS OF OTHER TYPE, UNSPECIFIED ULCER STAGE (HCC): Primary | ICD-10-CM

## 2025-03-11 DIAGNOSIS — E13.621 DIABETIC ULCER OF TOE OF RIGHT FOOT ASSOCIATED WITH DIABETES MELLITUS OF OTHER TYPE, UNSPECIFIED ULCER STAGE (HCC): Primary | ICD-10-CM

## 2025-03-11 PROCEDURE — 99212 OFFICE O/P EST SF 10 MIN: CPT | Performed by: ORTHOPAEDIC SURGERY

## 2025-03-11 PROCEDURE — 3052F HG A1C>EQUAL 8.0%<EQUAL 9.0%: CPT | Performed by: ORTHOPAEDIC SURGERY

## 2025-03-11 PROCEDURE — 1123F ACP DISCUSS/DSCN MKR DOCD: CPT | Performed by: ORTHOPAEDIC SURGERY

## 2025-03-11 NOTE — PROGRESS NOTES
Progress Note    Patient: Roldan Edmondson MRN: [id number]  SSN: [id number]    YOB: 1945  Age: 79 y.o.  Sex: male        3/11/2025      Subjective:     Patient is here today in follow-up.  He presented with a right great toe wound last week.  I just started some local wound care and some antibiotics he says he does not think is getting any worse test for sure    Objective:     There were no vitals filed for this visit.       Physical Exam:     Skin -his wound appears to show some healing.  He is granulating in very slowly.  There is no redness or purulent drainage today  Motor and sensory function intact in RIGHT LOWER extremity  Pulses palpable in RIGHT LOWER extremity     XRAY FINDINGS:      Assessment:     Right medial foot wound with slight improvement    Plan:     I think we could just continue just doing some wet-to-dry dressing changes daily with saline now and we Argun to try to get him set up with the wound care center.  I think he can stop his antibiotics once they are completed.  I will see him back in about 4 weeks to see how he is doing office he can return earlier if he is having issues    Signed By: Ignacio Boyd MD     March 11, 2025

## 2025-04-08 ENCOUNTER — HOSPITAL ENCOUNTER (OUTPATIENT)
Dept: WOUND CARE | Age: 80
Discharge: HOME | End: 2025-04-08
Payer: MEDICARE

## 2025-04-08 VITALS
HEIGHT: 66 IN | WEIGHT: 161 LBS | TEMPERATURE: 98.1 F | RESPIRATION RATE: 18 BRPM | DIASTOLIC BLOOD PRESSURE: 54 MMHG | HEART RATE: 58 BPM | SYSTOLIC BLOOD PRESSURE: 122 MMHG | BODY MASS INDEX: 25.88 KG/M2

## 2025-04-08 PROCEDURE — 11042 DBRDMT SUBQ TIS 1ST 20SQCM/<: CPT

## 2025-04-08 PROCEDURE — 99213 OFFICE O/P EST LOW 20 MIN: CPT

## 2025-04-08 NOTE — WOUND CARE
Discharge Instructions for  [facility]  [address]  [address]  [place] [address]  Phone [phone]   Fax [phone]      NAME:  Roldan Edmondson  YOB: 1945  MEDICAL RECORD NUMBER:  [id number]  DATE:  4/8/2025    Return Appointment:   1 week with Ramiro Bravo DO      Instructions: right foot  Cleanse with normal saline  Allow Vashe Wound Solution moistened gauze to sit on wound bed for 60 seconds  Apply collagen to wound bed  Apply Hydrofera blue, shiny side out, on top of collagen  Cover with abd pad  Secure with rolled gauze  Change 3 times a week    Silver Nitrated used this visit.  Home health to continue for assessment, monitoring, and wound care.    Keep wound dry. Do not get wet in shower. You may purchase cast cover from local pharmacy.  Increase protein intake to promote wound healing. Naga and Ensure are examples of protein supplements.  Wound healing is greatly slowed when glucose levels are greater than 200. Monitor glucose levels to ensure tight glucose control.      Increase protein to 100g daily for optimal wound healing. Aim for 30g protein per shake, two shakes a day.  Protein:   Egg ~ 6g  Yogurt ~ 15g  Half cup of cottage cheese ~ 15g  Chicken breast ~ 30g  [place] filet ~ 40g       Should you experience increased redness, swelling, pain, foul odor, size of wound(s), or have a temperature over 101 degrees please contact the Wound Healing Center at [phone] or if after hours contact your primary care physician or go to the hospital emergency department.    PLEASE NOTE: IF YOU ARE UNABLE TO OBTAIN WOUND SUPPLIES, CONTINUE TO USE THE SUPPLIES YOU HAVE AVAILABLE UNTIL YOU ARE ABLE TO REACH US. IT IS MOST IMPORTANT TO KEEP THE WOUND COVERED AT ALL TIMES.    Electronically signed JACINTA GILLIS RN on 4/8/2025 at 1:52 PM

## 2025-04-08 NOTE — FLOWSHEET NOTE
04/08/25 1315   Wound 04/08/25 Foot Right;Medial #1   Date First Assessed/Time First Assessed: 04/08/25 1335   Present on Original Admission: Yes  Wound Approximate Age at First Assessment (Weeks): 4 weeks  Primary Wound Type: Diabetic Ulcer  Location: Foot  Wound Location Orientation: Right;Medial  Woun...   Wound Image     Wound Etiology Diabetic Meza 1   Dressing Status Old drainage noted   Wound Cleansed Cleansed with saline   Dressing/Treatment Moist to dry   Offloading for Diabetic Foot Ulcers Offloading ordered;Post op shoe   Wound Length (cm) 1.5 cm   Wound Width (cm) 1.6 cm   Wound Depth (cm) 0.2 cm   Wound Surface Area (cm^2) 2.4 cm^2   Wound Volume (cm^3) 0.48 cm^3   Post-Procedure Length (cm) 1.5 cm   Post-Procedure Width (cm) 1.5 cm   Post-Procedure Depth (cm) 0.1 cm   Post-Procedure Surface Area (cm^2) 2.25 cm^2   Post-Procedure Volume (cm^3) 0.225 cm^3   Wound Assessment Pink/red   Drainage Amount Moderate (25-50%)   Drainage Description Green;Serous   Odor Mild   Charla-wound Assessment Dry/flaky   Margins Attached edges   Wound Thickness Description not for Pressure Injury Full thickness   Pain Assessment   Pain Assessment None - Denies Pain

## 2025-04-09 ENCOUNTER — TELEPHONE (OUTPATIENT)
Dept: ORTHOPEDIC SURGERY | Age: 80
End: 2025-04-09

## 2025-04-09 NOTE — TELEPHONE ENCOUNTER
Spoke to Pt. Pt saw Wound care clinic yesterday for first  visit. Dr. Boyd made aware and advised to push our appt out until 4/24/25 for eval with Dr. Boyd.Pt voiced complete understanding. LH

## 2025-04-15 ENCOUNTER — HOSPITAL ENCOUNTER (OUTPATIENT)
Dept: WOUND CARE | Age: 80
Discharge: HOME | End: 2025-04-15
Payer: MEDICARE

## 2025-04-15 VITALS
BODY MASS INDEX: 26.2 KG/M2 | WEIGHT: 163 LBS | DIASTOLIC BLOOD PRESSURE: 69 MMHG | HEIGHT: 66 IN | TEMPERATURE: 98.8 F | RESPIRATION RATE: 16 BRPM | HEART RATE: 60 BPM | SYSTOLIC BLOOD PRESSURE: 133 MMHG

## 2025-04-15 PROCEDURE — 11042 DBRDMT SUBQ TIS 1ST 20SQCM/<: CPT

## 2025-04-15 NOTE — WOUND CARE
Discharge Instructions for  [facility]  [address]  [address]  [place] [address]  Phone [phone]   Fax [phone]      NAME:  Roldan Edmondson  YOB: 1945  MEDICAL RECORD NUMBER:  [id number]  DATE:  4/15/2025    Return Appointment:   2 weeks with Ramiro Bravo DO      Instructions: right foot  Cleanse with normal saline  Allow Vashe Wound Solution moistened gauze to sit on wound bed for 60 seconds  Apply collagen to wound bed  Apply Hydrofera blue, shiny side out, on top of collagen  Secure with either bordered foam, adhesive bandage OR thin layer of gauze and rolled gauze.  Change 3 times a week    [facility] ([phone]) for dressing change and wound assessment 3 TIMES/WEEK.      Keep wound dry. Do not get wet in shower. You may purchase cast cover from local pharmacy.  Increase protein intake to promote wound healing. Naga and Ensure are examples of protein supplements.  Wound healing is greatly slowed when glucose levels are greater than 200. Monitor glucose levels to ensure tight glucose control.      Increase protein to 100g daily for optimal wound healing. Aim for 30g protein per shake, two shakes a day.  Protein:   Egg ~ 6g  Yogurt ~ 15g  Half cup of cottage cheese ~ 15g  Chicken breast ~ 30g  [place] filet ~ 40g       Should you experience increased redness, swelling, pain, foul odor, size of wound(s), or have a temperature over 101 degrees please contact the Wound Healing Center at [phone] or if after hours contact your primary care physician or go to the hospital emergency department.    PLEASE NOTE: IF YOU ARE UNABLE TO OBTAIN WOUND SUPPLIES, CONTINUE TO USE THE SUPPLIES YOU HAVE AVAILABLE UNTIL YOU ARE ABLE TO REACH US. IT IS MOST IMPORTANT TO KEEP THE WOUND COVERED AT ALL TIMES.    Electronically signed Giselle Woodward RN on 4/15/2025 at 9:36 AM

## 2025-04-15 NOTE — FLOWSHEET NOTE
04/15/25 0925   Wound 04/08/25 Foot Right;Medial #1   Date First Assessed/Time First Assessed: 04/08/25 1335   Present on Original Admission: Yes  Wound Approximate Age at First Assessment (Weeks): 4 weeks  Primary Wound Type: Diabetic Ulcer  Location: Foot  Wound Location Orientation: Right;Medial  Woun...   Wound Image     Wound Etiology Diabetic Meza 1   Dressing Status Intact   Wound Cleansed Vashe   Dressing/Treatment Collagen;Hydrofera blue   Offloading for Diabetic Foot Ulcers Offloading ordered   Wound Length (cm) 0.7 cm   Wound Width (cm) 0.7 cm   Wound Depth (cm) 0.1 cm   Wound Surface Area (cm^2) 0.49 cm^2   Change in Wound Size % (l*w) 79.58   Wound Volume (cm^3) 0.049 cm^3   Wound Healing % 90   Post-Procedure Length (cm) 0.8 cm   Post-Procedure Width (cm) 0.8 cm   Post-Procedure Depth (cm) 0.2 cm   Post-Procedure Surface Area (cm^2) 0.64 cm^2   Post-Procedure Volume (cm^3) 0.128 cm^3   Wound Assessment Pink/red   Drainage Amount Moderate (25-50%)   Drainage Description Serosanguinous   Odor None   Charla-wound Assessment Dry/flaky   Wound Thickness Description not for Pressure Injury Full thickness   Pain Assessment   Pain Assessment None - Denies Pain

## 2025-04-24 ENCOUNTER — OFFICE VISIT (OUTPATIENT)
Dept: ORTHOPEDIC SURGERY | Age: 80
End: 2025-04-24

## 2025-04-24 DIAGNOSIS — L08.9 DIABETIC INFECTION OF RIGHT FOOT (HCC): Primary | ICD-10-CM

## 2025-04-24 DIAGNOSIS — E11.628 DIABETIC INFECTION OF RIGHT FOOT (HCC): Primary | ICD-10-CM

## 2025-04-24 PROCEDURE — 99024 POSTOP FOLLOW-UP VISIT: CPT | Performed by: ORTHOPAEDIC SURGERY

## 2025-04-24 NOTE — PROGRESS NOTES
Progress Note    Patient: Roldan Edmondson MRN: [id number]  SSN: [id number]    YOB: 1945  Age: 79 y.o.  Sex: male        4/24/2025      Subjective:     Patient is here today in follow-up.  He says he feels better.  He really feels like is healing and he is wearing the postop shoe and doing well    Objective:     There were no vitals filed for this visit.       Physical Exam:     Skin -his right medial toe wound now is almost completely healed.  He has 1 sort of pressure sore looking wound at the base of his second toe on the plantar surface of his foot that looks like it is just sort of a stage I pressure ulcer but no skin breakdown yet  Motor and sensory function intact in RIGHT LOWER extremity  Pulses palpable in RIGHT LOWER extremity     XRAY FINDINGS:  No new x-rays    Assessment:     Improving right medial foot wound    Plan:     I think we can redress this with just the Hydrofera Blue.  I think he can continue with going to wound care.  He will probably follow-up with me now on a as needed basis.  We  are going to give him postop shoe just to help him can offload this and use his heel for weightbearing for now and try to stay off his fore        Signed By: Ignacio Boyd MD       April 24, 2025

## 2025-04-29 ENCOUNTER — HOSPITAL ENCOUNTER (OUTPATIENT)
Dept: WOUND CARE | Age: 80
Discharge: HOME | End: 2025-04-29
Payer: MEDICARE

## 2025-04-29 VITALS
SYSTOLIC BLOOD PRESSURE: 125 MMHG | TEMPERATURE: 98.1 F | BODY MASS INDEX: 25.88 KG/M2 | RESPIRATION RATE: 18 BRPM | DIASTOLIC BLOOD PRESSURE: 61 MMHG | HEIGHT: 66 IN | OXYGEN SATURATION: 100 % | WEIGHT: 161 LBS | HEART RATE: 51 BPM

## 2025-04-29 DIAGNOSIS — E11.621 DIABETIC ULCER OF RIGHT MIDFOOT ASSOCIATED WITH TYPE 2 DIABETES MELLITUS, WITH NECROSIS OF BONE (HCC): Primary | ICD-10-CM

## 2025-04-29 DIAGNOSIS — L97.414 DIABETIC ULCER OF RIGHT MIDFOOT ASSOCIATED WITH TYPE 2 DIABETES MELLITUS, WITH NECROSIS OF BONE (HCC): Primary | ICD-10-CM

## 2025-04-29 PROCEDURE — 11042 DBRDMT SUBQ TIS 1ST 20SQCM/<: CPT | Performed by: FAMILY MEDICINE

## 2025-04-29 PROCEDURE — 11042 DBRDMT SUBQ TIS 1ST 20SQCM/<: CPT

## 2025-04-29 RX ORDER — BACITRACIN ZINC AND POLYMYXIN B SULFATE 500; 1000 [USP'U]/G; [USP'U]/G
OINTMENT TOPICAL PRN
OUTPATIENT
Start: 2025-04-29

## 2025-04-29 RX ORDER — LIDOCAINE 40 MG/G
CREAM TOPICAL PRN
OUTPATIENT
Start: 2025-04-29

## 2025-04-29 RX ORDER — SILVER SULFADIAZINE 10 MG/G
CREAM TOPICAL PRN
OUTPATIENT
Start: 2025-04-29

## 2025-04-29 RX ORDER — SODIUM CHLOR/HYPOCHLOROUS ACID 0.033 %
SOLUTION, IRRIGATION IRRIGATION PRN
OUTPATIENT
Start: 2025-04-29

## 2025-04-29 RX ORDER — LIDOCAINE 50 MG/G
OINTMENT TOPICAL PRN
OUTPATIENT
Start: 2025-04-29

## 2025-04-29 RX ORDER — NEOMYCIN/BACITRACIN/POLYMYXINB 3.5-400-5K
OINTMENT (GRAM) TOPICAL PRN
OUTPATIENT
Start: 2025-04-29

## 2025-04-29 RX ORDER — CLOBETASOL PROPIONATE 0.5 MG/G
OINTMENT TOPICAL PRN
OUTPATIENT
Start: 2025-04-29

## 2025-04-29 RX ORDER — LIDOCAINE HYDROCHLORIDE 20 MG/ML
JELLY TOPICAL PRN
OUTPATIENT
Start: 2025-04-29

## 2025-04-29 RX ORDER — TRIAMCINOLONE ACETONIDE 1 MG/G
OINTMENT TOPICAL PRN
OUTPATIENT
Start: 2025-04-29

## 2025-04-29 RX ORDER — LIDOCAINE HYDROCHLORIDE 40 MG/ML
SOLUTION TOPICAL PRN
OUTPATIENT
Start: 2025-04-29

## 2025-04-29 RX ORDER — BETAMETHASONE DIPROPIONATE 0.5 MG/G
CREAM TOPICAL PRN
OUTPATIENT
Start: 2025-04-29

## 2025-04-29 RX ORDER — MUPIROCIN 20 MG/G
OINTMENT TOPICAL PRN
OUTPATIENT
Start: 2025-04-29

## 2025-04-29 RX ORDER — LIDOCAINE HYDROCHLORIDE 20 MG/ML
JELLY TOPICAL PRN
Status: DISCONTINUED | OUTPATIENT
Start: 2025-04-29 | End: 2025-04-30 | Stop reason: HOSPADM

## 2025-04-29 RX ORDER — GENTAMICIN SULFATE 1 MG/G
OINTMENT TOPICAL PRN
OUTPATIENT
Start: 2025-04-29

## 2025-04-29 RX ORDER — GINSENG 100 MG
CAPSULE ORAL PRN
OUTPATIENT
Start: 2025-04-29

## 2025-04-29 RX ADMIN — LIDOCAINE HYDROCHLORIDE: 20 JELLY TOPICAL at 11:43

## 2025-04-29 NOTE — WOUND CARE
Discharge Instructions for  [facility]  [address]  [address]  [place] [address]  Phone [phone]   Fax [phone]      NAME:  Roldan Edmondson  YOB: 1945  MEDICAL RECORD NUMBER:  [id number]  DATE:  4/29/2025    Return Appointment:   2 weeks with Ramiro Bravo DO      Instructions: Right foot  Cleanse with normal saline  Allow Vashe Wound Solution moistened gauze to sit on wound bed for 60 seconds  Apply Collagen to wound bed  Apply Hydrofera blue, shiny side out, on top of collagen  Secure with either bordered foam, adhesive bandage OR thin layer of gauze and rolled gauze.  Change 3 times a week    [facility] ([phone]) for dressing change and wound assessment 3 TIMES/WEEK.      Keep wound dry. Do not get wet in shower. You may purchase cast cover from local pharmacy.  Increase protein intake to promote wound healing. Naga and Ensure are examples of protein supplements.  Wound healing is greatly slowed when glucose levels are greater than 200. Monitor glucose levels to ensure tight glucose control.      Increase protein to 100g daily for optimal wound healing. Aim for 30g protein per shake, two shakes a day.  Protein:   Egg ~ 6g  Yogurt ~ 15g  Half cup of cottage cheese ~ 15g  Chicken breast ~ 30g  [place] filet ~ 40g       Should you experience increased redness, swelling, pain, foul odor, size of wound(s), or have a temperature over 101 degrees please contact the Wound Healing Center at [phone] or if after hours contact your primary care physician or go to the hospital emergency department.    PLEASE NOTE: IF YOU ARE UNABLE TO OBTAIN WOUND SUPPLIES, CONTINUE TO USE THE SUPPLIES YOU HAVE AVAILABLE UNTIL YOU ARE ABLE TO REACH US. IT IS MOST IMPORTANT TO KEEP THE WOUND COVERED AT ALL TIMES.    Electronically signed Uzma Hurd PT, WCC on 4/29/2025 at 11:44 AM

## 2025-04-29 NOTE — FLOWSHEET NOTE
04/29/25 1123   Wound 04/08/25 Foot Right;Medial #1   Date First Assessed/Time First Assessed: 04/08/25 1335   Present on Original Admission: Yes  Wound Approximate Age at First Assessment (Weeks): 4 weeks  Primary Wound Type: Diabetic Ulcer  Location: Foot  Wound Location Orientation: Right;Medial  Woun...   Wound Image     Wound Etiology Diabetic Meza 1   Dressing Status Intact   Wound Cleansed Soap and water   Dressing/Treatment Collagen;Hydrofera blue   Offloading for Diabetic Foot Ulcers Forefoot offloading shoe   Wound Length (cm) 0.2 cm   Wound Width (cm) 0.2 cm   Wound Depth (cm) 0.1 cm   Wound Surface Area (cm^2) 0.04 cm^2   Change in Wound Size % (l*w) 98.33   Wound Volume (cm^3) 0.004 cm^3   Wound Healing % 99   Post-Procedure Length (cm) 0.5 cm   Post-Procedure Width (cm) 0.6 cm   Post-Procedure Depth (cm) 0.1 cm   Post-Procedure Surface Area (cm^2) 0.3 cm^2   Post-Procedure Volume (cm^3) 0.03 cm^3   Wound Assessment Pink/red   Drainage Amount Small (< 25%)   Drainage Description Serosanguinous   Odor None   Charla-wound Assessment Dry/flaky   Wound Thickness Description not for Pressure Injury Full thickness   Pain Assessment   Pain Assessment None - Denies Pain     Pre- and post-debridement. Patient also presents with dried blister at plantar forefoot. Will monitor.

## 2025-04-29 NOTE — DISCHARGE INSTRUCTIONS
Return Appointment:   2 weeks with Ramiro Bravo DO        Instructions: Right foot  Cleanse with normal saline  Allow Vashe Wound Solution moistened gauze to sit on wound bed for 60 seconds  Apply Collagen to wound bed  Apply Hydrofera blue, shiny side out, on top of collagen  Secure with either bordered foam, adhesive bandage OR thin layer of gauze and rolled gauze.  Change 3 times a week     [facility] ([phone]) for dressing change and wound assessment 3 TIMES/WEEK.        Keep wound dry. Do not get wet in shower. You may purchase cast cover from local pharmacy.  Increase protein intake to promote wound healing. Naga and Ensure are examples of protein supplements.  Wound healing is greatly slowed when glucose levels are greater than 200. Monitor glucose levels to ensure tight glucose control.        Increase protein to 100g daily for optimal wound healing. Aim for 30g protein per shake, two shakes a day.  Protein:   Egg ~ 6g  Yogurt ~ 15g  Half cup of cottage cheese ~ 15g  Chicken breast ~ 30g  [place] filet ~ 40g

## 2025-04-29 NOTE — PROGRESS NOTES
Procedure Note  Indications: Based on my examination of this patient's wound(s)/ulcer(s) today, debridement is required to promote healing and evaluate the wound base.       Return Appointment:   2 weeks with Ramiro Bravo DO        Instructions: Right foot  Cleanse with normal saline  Allow Vashe Wound Solution moistened gauze to sit on wound bed for 60 seconds  Apply Collagen to wound bed  Apply Hydrofera blue, shiny side out, on top of collagen  Secure with either bordered foam, adhesive bandage OR thin layer of gauze and rolled gauze.  Change 3 times a week     [facility] ([phone]) for dressing change and wound assessment 3 TIMES/WEEK.        Keep wound dry. Do not get wet in shower. You may purchase cast cover from local pharmacy.  Increase protein intake to promote wound healing. Naga and Ensure are examples of protein supplements.  Wound healing is greatly slowed when glucose levels are greater than 200. Monitor glucose levels to ensure tight glucose control.        Increase protein to 100g daily for optimal wound healing. Aim for 30g protein per shake, two shakes a day.  Protein:   Egg ~ 6g  Yogurt ~ 15g  Half cup of cottage cheese ~ 15g  Chicken breast ~ 30g  [place] filet ~ 40g      Debridement: Excisional Debridement    Using: curette the wound(s)/ulcer(s) was/were debrided down through and including the removal of epidermis, dermis, and subcutaneous tissue.  Performed by: Ramiro Bravo DO  Consent obtained: Yes  Time out taken: Yes  Pain Control:         Devitalized Tissue Debrided: fibrin, biofilm, slough, necrotic/eschar, and callus    Pre Debridement Measurements:  Are located in the Wound/Ulcer Documentation Flow Sheet    Diabetic/Pressure/Non Pressure Ulcers only:  Ulcer: Diabetic ulcer, fat layer exposed     Wound/Ulcer #: 1  Post Debridement Measurements:  Wound/Ulcer Descriptions are Pre Debridement except measurements:  Wound 07/01/24 Foot Left;Dorsal Malodorous

## 2025-05-13 ENCOUNTER — HOSPITAL ENCOUNTER (OUTPATIENT)
Dept: WOUND CARE | Age: 80
Discharge: HOME | End: 2025-05-13
Payer: MEDICARE

## 2025-05-13 VITALS
WEIGHT: 156 LBS | SYSTOLIC BLOOD PRESSURE: 123 MMHG | HEART RATE: 58 BPM | BODY MASS INDEX: 25.07 KG/M2 | DIASTOLIC BLOOD PRESSURE: 80 MMHG | HEIGHT: 66 IN

## 2025-05-13 DIAGNOSIS — E11.621 DIABETIC ULCER OF RIGHT MIDFOOT ASSOCIATED WITH TYPE 2 DIABETES MELLITUS, WITH NECROSIS OF BONE (HCC): Primary | Chronic | ICD-10-CM

## 2025-05-13 DIAGNOSIS — L97.414 DIABETIC ULCER OF RIGHT MIDFOOT ASSOCIATED WITH TYPE 2 DIABETES MELLITUS, WITH NECROSIS OF BONE (HCC): Primary | Chronic | ICD-10-CM

## 2025-05-13 PROCEDURE — 11042 DBRDMT SUBQ TIS 1ST 20SQCM/<: CPT

## 2025-05-13 NOTE — FLOWSHEET NOTE
05/13/25 0918   Wound 04/08/25 Foot Right;Medial #1   Date First Assessed/Time First Assessed: 04/08/25 1335   Present on Original Admission: Yes  Wound Approximate Age at First Assessment (Weeks): 4 weeks  Primary Wound Type: Diabetic Ulcer  Location: Foot  Wound Location Orientation: Right;Medial  Woun...   Wound Image     Wound Etiology Diabetic Meza 1   Dressing Status Intact   Wound Cleansed Soap and water   Dressing/Treatment Collagen;Hydrofera blue   Offloading for Diabetic Foot Ulcers Forefoot offloading shoe   Wound Length (cm) 0.2 cm   Wound Width (cm) 0.2 cm   Wound Depth (cm) 0.1 cm   Wound Surface Area (cm^2) 0.04 cm^2   Change in Wound Size % (l*w) 98.33   Wound Volume (cm^3) 0.004 cm^3   Wound Healing % 99   Post-Procedure Length (cm) 0.2 cm   Post-Procedure Width (cm) 0.2 cm   Post-Procedure Depth (cm) 0.1 cm   Post-Procedure Surface Area (cm^2) 0.04 cm^2   Post-Procedure Volume (cm^3) 0.004 cm^3   Wound Assessment Pink/red   Drainage Amount Small (< 25%)   Drainage Description Serosanguinous   Odor None   Charla-wound Assessment Dry/flaky   Wound Thickness Description not for Pressure Injury Full thickness     Post debridement

## 2025-05-13 NOTE — FLOWSHEET NOTE
05/13/25 0918   Wound 04/08/25 Foot Right;Medial #1   Date First Assessed/Time First Assessed: 04/08/25 1335   Present on Original Admission: Yes  Wound Approximate Age at First Assessment (Weeks): 4 weeks  Primary Wound Type: Diabetic Ulcer  Location: Foot  Wound Location Orientation: Right;Medial  Woun...   Wound Image    Wound Etiology Diabetic Meza 1   Dressing Status Intact   Wound Cleansed Soap and water   Dressing/Treatment Collagen;Hydrofera blue   Offloading for Diabetic Foot Ulcers Forefoot offloading shoe   Wound Length (cm) 0.2 cm   Wound Width (cm) 0.2 cm   Wound Depth (cm) 0.1 cm   Wound Surface Area (cm^2) 0.04 cm^2   Change in Wound Size % (l*w) 98.33   Wound Volume (cm^3) 0.004 cm^3   Wound Healing % 99   Wound Assessment Pink/red   Drainage Amount Small (< 25%)   Drainage Description Serosanguinous   Odor None   Charla-wound Assessment Dry/flaky   Wound Thickness Description not for Pressure Injury Full thickness

## 2025-05-13 NOTE — WOUND CARE
Discharge Instructions for  [facility]  [address] [facility]  [address]  [place] [address]  Phone [phone]   Fax [phone]      NAME:  Roldan Edmondson  YOB: 1945  MEDICAL RECORD NUMBER:  [id number]  DATE:  5/13/2025    Return Appointment:   2 weeks with Ramiro Bravo DO      Instructions: Right foot  Cleanse with normal saline  Allow Vashe Wound Solution moistened gauze to sit on wound bed for 60 seconds  Apply polymem over wound then   Secure with either bordered foam, adhesive bandage OR thin layer of gauze and rolled gauze.  Change 3 times a week    [facility] ([phone]) for dressing change and wound assessment 3 TIMES/WEEK.      Keep wound dry. Do not get wet in shower. You may purchase cast cover from local pharmacy.  Increase protein intake to promote wound healing. Naga and Ensure are examples of protein supplements.  Wound healing is greatly slowed when glucose levels are greater than 200. Monitor glucose levels to ensure tight glucose control.      Increase protein to 100g daily for optimal wound healing. Aim for 30g protein per shake, two shakes a day.  Protein:   Egg ~ 6g  Yogurt ~ 15g  Half cup of cottage cheese ~ 15g  Chicken breast ~ 30g  [place] filet ~ 40g       Should you experience increased redness, swelling, pain, foul odor, size of wound(s), or have a temperature over 101 degrees please contact the W[facility] Healing Center at [phone] or if after hours contact your primary care physician or go to the hospital emergency department.    PLEASE NOTE: IF YOU ARE UNABLE TO OBTAIN WOUND SUPPLIES, CONTINUE TO USE THE SUPPLIES YOU HAVE AVAILABLE UNTIL YOU ARE ABLE TO REACH US. IT IS MOST IMPORTANT TO KEEP THE WOUND COVERED AT ALL TIMES.    Electronically signed Rossy Elizabeth RN, [facility] on 5/13/2025 at 9:22 AM

## 2025-05-14 RX ORDER — TAMSULOSIN HYDROCHLORIDE 0.4 MG/1
0.4 CAPSULE ORAL DAILY
Qty: 90 CAPSULE | Refills: 1 | OUTPATIENT
Start: 2025-05-14

## 2025-05-28 ENCOUNTER — HOSPITAL ENCOUNTER (OUTPATIENT)
Dept: WOUND CARE | Age: 80
Discharge: HOME | End: 2025-05-28
Payer: MEDICARE

## 2025-05-28 VITALS
HEIGHT: 66 IN | DIASTOLIC BLOOD PRESSURE: 64 MMHG | BODY MASS INDEX: 25.55 KG/M2 | SYSTOLIC BLOOD PRESSURE: 128 MMHG | HEART RATE: 53 BPM | WEIGHT: 159 LBS

## 2025-05-28 DIAGNOSIS — E11.621 DIABETIC ULCER OF RIGHT MIDFOOT ASSOCIATED WITH TYPE 2 DIABETES MELLITUS, WITH NECROSIS OF BONE (HCC): Primary | Chronic | ICD-10-CM

## 2025-05-28 DIAGNOSIS — L97.414 DIABETIC ULCER OF RIGHT MIDFOOT ASSOCIATED WITH TYPE 2 DIABETES MELLITUS, WITH NECROSIS OF BONE (HCC): Primary | Chronic | ICD-10-CM

## 2025-05-28 PROCEDURE — 11042 DBRDMT SUBQ TIS 1ST 20SQCM/<: CPT

## 2025-05-28 RX ORDER — LIDOCAINE HYDROCHLORIDE 20 MG/ML
JELLY TOPICAL PRN
Status: DISCONTINUED | OUTPATIENT
Start: 2025-05-28 | End: 2025-05-29 | Stop reason: HOSPADM

## 2025-05-28 RX ORDER — NEOMYCIN/BACITRACIN/POLYMYXINB 3.5-400-5K
OINTMENT (GRAM) TOPICAL PRN
OUTPATIENT
Start: 2025-05-28

## 2025-05-28 RX ORDER — GINSENG 100 MG
CAPSULE ORAL PRN
OUTPATIENT
Start: 2025-05-28

## 2025-05-28 RX ORDER — LIDOCAINE 50 MG/G
OINTMENT TOPICAL PRN
OUTPATIENT
Start: 2025-05-28

## 2025-05-28 RX ORDER — MUPIROCIN 20 MG/G
OINTMENT TOPICAL PRN
OUTPATIENT
Start: 2025-05-28

## 2025-05-28 RX ORDER — BETAMETHASONE DIPROPIONATE 0.5 MG/G
CREAM TOPICAL PRN
OUTPATIENT
Start: 2025-05-28

## 2025-05-28 RX ORDER — BACITRACIN ZINC AND POLYMYXIN B SULFATE 500; 1000 [USP'U]/G; [USP'U]/G
OINTMENT TOPICAL PRN
OUTPATIENT
Start: 2025-05-28

## 2025-05-28 RX ORDER — LIDOCAINE HYDROCHLORIDE 40 MG/ML
SOLUTION TOPICAL PRN
OUTPATIENT
Start: 2025-05-28

## 2025-05-28 RX ORDER — SILVER SULFADIAZINE 10 MG/G
CREAM TOPICAL PRN
OUTPATIENT
Start: 2025-05-28

## 2025-05-28 RX ORDER — LIDOCAINE HYDROCHLORIDE 20 MG/ML
JELLY TOPICAL PRN
OUTPATIENT
Start: 2025-05-28

## 2025-05-28 RX ORDER — LIDOCAINE 40 MG/G
CREAM TOPICAL PRN
OUTPATIENT
Start: 2025-05-28

## 2025-05-28 RX ORDER — TRIAMCINOLONE ACETONIDE 1 MG/G
OINTMENT TOPICAL PRN
OUTPATIENT
Start: 2025-05-28

## 2025-05-28 RX ORDER — SODIUM CHLOR/HYPOCHLOROUS ACID 0.033 %
SOLUTION, IRRIGATION IRRIGATION PRN
OUTPATIENT
Start: 2025-05-28

## 2025-05-28 RX ORDER — GENTAMICIN SULFATE 1 MG/G
OINTMENT TOPICAL PRN
OUTPATIENT
Start: 2025-05-28

## 2025-05-28 RX ORDER — CLOBETASOL PROPIONATE 0.5 MG/G
OINTMENT TOPICAL PRN
OUTPATIENT
Start: 2025-05-28

## 2025-05-28 RX ADMIN — LIDOCAINE HYDROCHLORIDE: 20 JELLY TOPICAL at 09:51

## 2025-05-28 NOTE — FLOWSHEET NOTE
05/28/25 0937   Wound 04/08/25 Foot Right;Medial #1   Date First Assessed/Time First Assessed: 04/08/25 1335   Present on Original Admission: Yes  Wound Approximate Age at First Assessment (Weeks): 4 weeks  Primary Wound Type: Diabetic Ulcer  Location: Foot  Wound Location Orientation: Right;Medial  Woun...   Wound Image     Wound Etiology Diabetic Meza 1   Dressing Status Intact   Wound Cleansed Cleansed with saline   Dressing/Treatment   (polymem)   Offloading for Diabetic Foot Ulcers Forefoot offloading shoe   Wound Length (cm) 0.2 cm   Wound Width (cm) 0.2 cm   Wound Depth (cm) 0.1 cm   Wound Surface Area (cm^2) 0.04 cm^2   Change in Wound Size % (l*w) 98.33   Wound Volume (cm^3) 0.004 cm^3   Wound Healing % 99   Post-Procedure Length (cm) 0.2 cm   Post-Procedure Width (cm) 0.2 cm   Post-Procedure Depth (cm) 0.2 cm   Post-Procedure Surface Area (cm^2) 0.04 cm^2   Post-Procedure Volume (cm^3) 0.008 cm^3   Wound Assessment Pink/red   Drainage Amount Small (< 25%)   Drainage Description Serosanguinous   Odor None   Charla-wound Assessment Hyperkeratosis (callous)   Wound Thickness Description not for Pressure Injury Full thickness     Post debridement

## 2025-05-28 NOTE — WOUND CARE
Discharge Instructions for  [facility]  [address]  [address]  [place] [address]  Phone [phone]   Fax [phone]      NAME:  Roldan Edmondson  YOB: 1945  MEDICAL RECORD NUMBER:  [id number]  DATE:  5/28/2025    Return Appointment:   2 weeks with Ramiro Bravo DO      Instructions: Right foot  Cleanse with normal saline  Allow Vashe Wound Solution moistened gauze to sit on wound bed for 60 seconds  Apply polymem over wound then   Secure with either bordered foam, adhesive bandage OR thin layer of gauze and rolled gauze.  Change 3 times a week    [facility] ([phone]) for dressing change and wound assessment 3 TIMES/WEEK.      Keep wound dry. Do not get wet in shower. You may purchase cast cover from local pharmacy.  Increase protein intake to promote wound healing. Naga and Ensure are examples of protein supplements.  Wound healing is greatly slowed when glucose levels are greater than 200. Monitor glucose levels to ensure tight glucose control.      Increase protein to 100g daily for optimal wound healing. Aim for 30g protein per shake, two shakes a day.  Protein:   Egg ~ 6g  Yogurt ~ 15g  Half cup of cottage cheese ~ 15g  Chicken breast ~ 30g  [place] filet ~ 40g       Should you experience increased redness, swelling, pain, foul odor, size of wound(s), or have a temperature over 101 degrees please contact the Wound Healing Center at [phone] or if after hours contact your primary care physician or go to the hospital emergency department.    PLEASE NOTE: IF YOU ARE UNABLE TO OBTAIN WOUND SUPPLIES, CONTINUE TO USE THE SUPPLIES YOU HAVE AVAILABLE UNTIL YOU ARE ABLE TO REACH US. IT IS MOST IMPORTANT TO KEEP THE WOUND COVERED AT ALL TIMES.    Electronically signed Rossy Elizabeth RN, [facility] on 5/28/2025 at 9:51 AM

## 2025-05-28 NOTE — FLOWSHEET NOTE
05/28/25 0937   Wound 04/08/25 Foot Right;Medial #1   Date First Assessed/Time First Assessed: 04/08/25 1335   Present on Original Admission: Yes  Wound Approximate Age at First Assessment (Weeks): 4 weeks  Primary Wound Type: Diabetic Ulcer  Location: Foot  Wound Location Orientation: Right;Medial  Woun...   Wound Image    Wound Etiology Diabetic Meza 1   Dressing Status Intact   Wound Cleansed Cleansed with saline   Dressing/Treatment   (polymem)   Offloading for Diabetic Foot Ulcers Forefoot offloading shoe   Wound Length (cm) 0.2 cm   Wound Width (cm) 0.2 cm   Wound Depth (cm) 0.1 cm   Wound Surface Area (cm^2) 0.04 cm^2   Change in Wound Size % (l*w) 98.33   Wound Volume (cm^3) 0.004 cm^3   Wound Healing % 99   Wound Assessment Pink/red   Drainage Amount Small (< 25%)   Drainage Description Serosanguinous   Odor None   Charla-wound Assessment Hyperkeratosis (callous)   Wound Thickness Description not for Pressure Injury Full thickness

## 2025-05-31 NOTE — PROGRESS NOTES
Procedure Note  Indications: Based on my examination of this patient's wound(s)/ulcer(s) today, debridement is required to promote healing and evaluate the wound base.    Return Appointment:   2 weeks with Ramiro Bravo DO        Instructions: Right foot  Cleanse with normal saline  Allow Vashe Wound Solution moistened gauze to sit on wound bed for 60 seconds  Apply polymem over wound then   Secure with either bordered foam, adhesive bandage OR thin layer of gauze and rolled gauze.  Change 3 times a week     [facility] ([phone]) for dressing change and wound assessment 3 TIMES/WEEK.        Keep wound dry. Do not get wet in shower. You may purchase cast cover from local pharmacy.  Increase protein intake to promote wound healing. Naga and Ensure are examples of protein supplements.  Wound healing is greatly slowed when glucose levels are greater than 200. Monitor glucose levels to ensure tight glucose control.        Increase protein to 100g daily for optimal wound healing. Aim for 30g protein per shake, two shakes a day.  Protein:   Egg ~ 6g  Yogurt ~ 15g  Half cup of cottage cheese ~ 15g  Chicken breast ~ 30g  [place] filet ~ 40g      Debridement: Excisional Debridement    Using: curette the wound(s)/ulcer(s) was/were debrided down through and including the removal of epidermis, dermis, and subcutaneous tissue.  Performed by: Ramiro Bravo DO  Consent obtained: Yes  Time out taken: Yes  Pain Control:         Devitalized Tissue Debrided: fibrin, biofilm, slough, necrotic/eschar, and callus    Pre Debridement Measurements:  Are located in the Wound/Ulcer Documentation Flow Sheet    Diabetic/Pressure/Non Pressure Ulcers only:  Ulcer: Diabetic ulcer, fat layer exposed     Wound/Ulcer #: 1  Post Debridement Measurements:  Wound/Ulcer Descriptions are Pre Debridement except measurements:  Wound 07/01/24 Foot Left;Dorsal Malodorous wound plantar foot (Active)   Number of days: 333

## 2025-06-11 ENCOUNTER — HOSPITAL ENCOUNTER (OUTPATIENT)
Dept: WOUND CARE | Age: 80
Discharge: HOME | End: 2025-06-11
Payer: MEDICARE

## 2025-06-11 VITALS
OXYGEN SATURATION: 98 % | RESPIRATION RATE: 18 BRPM | TEMPERATURE: 97.9 F | HEART RATE: 53 BPM | DIASTOLIC BLOOD PRESSURE: 57 MMHG | SYSTOLIC BLOOD PRESSURE: 108 MMHG

## 2025-06-11 PROCEDURE — 99213 OFFICE O/P EST LOW 20 MIN: CPT | Performed by: FAMILY MEDICINE

## 2025-06-11 PROCEDURE — 99211 OFF/OP EST MAY X REQ PHY/QHP: CPT

## 2025-06-11 NOTE — FLOWSHEET NOTE
06/11/25 0945   Wound 04/08/25 Foot Right;Medial #1   Date First Assessed/Time First Assessed: 04/08/25 1335   Present on Original Admission: Yes  Wound Approximate Age at First Assessment (Weeks): 4 weeks  Primary Wound Type: Diabetic Ulcer  Location: Foot  Wound Location Orientation: Right;Medial  Woun...   Wound Image    Wound Etiology Diabetic Meza 1   Dressing Status Intact   Wound Cleansed Cleansed with saline   Dressing/Treatment Other (comment)  (polymem)   Offloading for Diabetic Foot Ulcers Forefoot offloading shoe   Wound Length (cm) 0 cm   Wound Width (cm) 0 cm   Wound Depth (cm) 0 cm   Wound Surface Area (cm^2) 0 cm^2   Change in Wound Size % (l*w) 100   Wound Volume (cm^3) 0 cm^3   Wound Healing % 100   Wound Assessment Dry   Drainage Amount None (dry)   Odor None   Charla-wound Assessment Hyperkeratosis (callous)   Wound Thickness Description not for Pressure Injury Full thickness   Pain Assessment   Pain Assessment None - Denies Pain

## 2025-06-11 NOTE — WOUND CARE
Discharge Instructions for  [facility]  [address]  [address]  [place] [address]  Phone [phone]   Fax [phone]      NAME:  Roldan Edmondson  YOB: 1945  MEDICAL RECORD NUMBER:  [id number]  DATE:  6/11/2025    Return Appointment:   2 weeks with Ramiro Bravo DO      Instructions: Right foot  Wound is healed.  Apply Vaseline daily.    Obtain diabetic shoe inserts from [facility] in [place].   Continue to wear Darco shoes until you get the diabetic shoes or diabetic shoe inserts.   Reach out to primary care doctor for referral for diabetic shoe paperwork     [facility]   [address] [place] [address] [phone]       [facility] ([phone]) for dressing change and wound assessment 3 TIMES/WEEK.      Should you experience increased redness, swelling, pain, foul odor, size of wound(s), or have a temperature over 101 degrees please contact the Wound Healing Center at [phone] or if after hours contact your primary care physician or go to the hospital emergency department.    PLEASE NOTE: IF YOU ARE UNABLE TO OBTAIN WOUND SUPPLIES, CONTINUE TO USE THE SUPPLIES YOU HAVE AVAILABLE UNTIL YOU ARE ABLE TO REACH US. IT IS MOST IMPORTANT TO KEEP THE WOUND COVERED AT ALL TIMES.    Electronically signed JACINTA GILLIS RN, [facility] on 6/11/2025 at 10:09 AM

## 2025-06-11 NOTE — PROGRESS NOTES
[facility]   [facility]   Medical Staff Progress Note     Roldan Edmondson  MEDICAL RECORD NUMBER:  [id number]  AGE: 79 y.o.   GENDER: male  : 1945  EPISODE DATE:  2025    Chief complaint and reason for visit:     Chief Complaint   Patient presents with    Wound Check     Wound on right medial foot      Patient presenting for follow up evaluation of wound(s) per chief complaint.  No drainage from wound recently.  Patient using offloading shoe.    Subjective and ROS: Symptoms, wound related issues, or other pertinent wound history since last visit: no new wound care issues. Tolerating current treatment. No systemic complaints above baseline.    History of Wound Context: Per original history and physical on this patient. Changes in history since last evaluation: none    Medical Decision Making:     Problem List Items Addressed This Visit    None      Wounds and Treatment Plan:  Patient's wound appears healed.  At this point we will just utilize Vaseline.  Previous blister was peeled away and completely epithelialized underneath that on the plantar aspect.  At this point patient needs diabetic shoes and inserts custom made.  Encouraged him to contact primary care regarding this and we did give him contact of [facility] that can assist with this process in the future.       Return Appointment:   Discharge with Ramiro Bravo DO        Instructions: Right foot  Wound is healed.  Apply Vaseline daily.     Obtain diabetic shoe inserts from [facility] in [place].   Continue to wear Darco shoes until you get the diabetic shoes or diabetic shoe inserts.   Reach out to primary care doctor for referral for diabetic shoe paperwork      [facility]   [address] [place] [address] [phone]         [facility] (ph:[phone]) for dressing change and wound assessment 3 TIMES/WEEK.     Other associated diagnoses or

## 2025-07-07 ENCOUNTER — OFFICE VISIT (OUTPATIENT)
Dept: INTERNAL MEDICINE CLINIC | Facility: CLINIC | Age: 80
End: 2025-07-07
Payer: MEDICARE

## 2025-07-07 VITALS
BODY MASS INDEX: 26.03 KG/M2 | TEMPERATURE: 97.7 F | OXYGEN SATURATION: 98 % | DIASTOLIC BLOOD PRESSURE: 63 MMHG | HEIGHT: 66 IN | SYSTOLIC BLOOD PRESSURE: 119 MMHG | WEIGHT: 162 LBS | HEART RATE: 60 BPM

## 2025-07-07 DIAGNOSIS — E78.49 OTHER HYPERLIPIDEMIA: ICD-10-CM

## 2025-07-07 DIAGNOSIS — N18.30 TYPE 2 DIABETES MELLITUS WITH STAGE 3 CHRONIC KIDNEY DISEASE, WITHOUT LONG-TERM CURRENT USE OF INSULIN, UNSPECIFIED WHETHER STAGE 3A OR 3B CKD (HCC): Primary | ICD-10-CM

## 2025-07-07 DIAGNOSIS — I10 ESSENTIAL HYPERTENSION: ICD-10-CM

## 2025-07-07 DIAGNOSIS — L97.514 DIABETIC ULCER OF TOE OF RIGHT FOOT ASSOCIATED WITH TYPE 2 DIABETES MELLITUS, WITH NECROSIS OF BONE (HCC): ICD-10-CM

## 2025-07-07 DIAGNOSIS — E11.22 TYPE 2 DIABETES MELLITUS WITH STAGE 3 CHRONIC KIDNEY DISEASE, WITHOUT LONG-TERM CURRENT USE OF INSULIN, UNSPECIFIED WHETHER STAGE 3A OR 3B CKD (HCC): Primary | ICD-10-CM

## 2025-07-07 DIAGNOSIS — E11.621 DIABETIC ULCER OF TOE OF RIGHT FOOT ASSOCIATED WITH TYPE 2 DIABETES MELLITUS, WITH NECROSIS OF BONE (HCC): ICD-10-CM

## 2025-07-07 PROCEDURE — 3074F SYST BP LT 130 MM HG: CPT | Performed by: INTERNAL MEDICINE

## 2025-07-07 PROCEDURE — 99214 OFFICE O/P EST MOD 30 MIN: CPT | Performed by: INTERNAL MEDICINE

## 2025-07-07 PROCEDURE — 3078F DIAST BP <80 MM HG: CPT | Performed by: INTERNAL MEDICINE

## 2025-07-07 PROCEDURE — G2211 COMPLEX E/M VISIT ADD ON: HCPCS | Performed by: INTERNAL MEDICINE

## 2025-07-07 PROCEDURE — 3052F HG A1C>EQUAL 8.0%<EQUAL 9.0%: CPT | Performed by: INTERNAL MEDICINE

## 2025-07-07 PROCEDURE — 1123F ACP DISCUSS/DSCN MKR DOCD: CPT | Performed by: INTERNAL MEDICINE

## 2025-07-07 PROCEDURE — 1159F MED LIST DOCD IN RCRD: CPT | Performed by: INTERNAL MEDICINE

## 2025-07-07 PROCEDURE — 1160F RVW MEDS BY RX/DR IN RCRD: CPT | Performed by: INTERNAL MEDICINE

## 2025-07-07 RX ORDER — HYDROCHLOROTHIAZIDE 25 MG/1
TABLET ORAL
Qty: 90 TABLET | Refills: 1 | Status: SHIPPED | OUTPATIENT
Start: 2025-07-07

## 2025-07-07 RX ORDER — ROSUVASTATIN CALCIUM 10 MG/1
10 TABLET, COATED ORAL DAILY
Qty: 90 TABLET | Refills: 1 | Status: SHIPPED | OUTPATIENT
Start: 2025-07-07

## 2025-07-07 RX ORDER — AMLODIPINE BESYLATE 10 MG/1
10 TABLET ORAL DAILY
Qty: 90 TABLET | Refills: 1 | Status: SHIPPED | OUTPATIENT
Start: 2025-07-07

## 2025-07-07 RX ORDER — ATENOLOL 25 MG/1
25 TABLET ORAL DAILY
Qty: 90 TABLET | Refills: 1 | Status: SHIPPED | OUTPATIENT
Start: 2025-07-07

## 2025-07-07 RX ORDER — LOSARTAN POTASSIUM 100 MG/1
100 TABLET ORAL DAILY
Qty: 90 TABLET | Refills: 1 | Status: SHIPPED | OUTPATIENT
Start: 2025-07-07

## 2025-07-07 SDOH — ECONOMIC STABILITY: FOOD INSECURITY: WITHIN THE PAST 12 MONTHS, YOU WORRIED THAT YOUR FOOD WOULD RUN OUT BEFORE YOU GOT MONEY TO BUY MORE.: NEVER TRUE

## 2025-07-07 SDOH — ECONOMIC STABILITY: FOOD INSECURITY: WITHIN THE PAST 12 MONTHS, THE FOOD YOU BOUGHT JUST DIDN'T LAST AND YOU DIDN'T HAVE MONEY TO GET MORE.: NEVER TRUE

## 2025-07-07 ASSESSMENT — PATIENT HEALTH QUESTIONNAIRE - PHQ9
1. LITTLE INTEREST OR PLEASURE IN DOING THINGS: NOT AT ALL
SUM OF ALL RESPONSES TO PHQ QUESTIONS 1-9: 0
2. FEELING DOWN, DEPRESSED OR HOPELESS: NOT AT ALL
SUM OF ALL RESPONSES TO PHQ QUESTIONS 1-9: 0

## 2025-07-07 ASSESSMENT — ENCOUNTER SYMPTOMS: SHORTNESS OF BREATH: 0

## 2025-07-07 NOTE — PROGRESS NOTES
[facility]  David Mandujano M.D.  Internal Medicine  [address] [place] [address]  Office : [phone]  Fax : [phone]    Chief Complaint   Patient presents with    Diabetes     4 mo follow up       History of Present Illness:  Roldan Edmondson is a 79 y.o. male.  HPI    Diabetes Mellitus  Patient presents  for follow up on diabetes.  Onset of symptoms was several years ago. Patient describes symptoms as foot ulcerations and which have been healed by the wound clinic and it was recommended he get diabetic shoes. Course to date has been waxing and waning.Diet and Lifestyle: follows a diabetic diet regularly  exercises sporadically  Home glucose monitoring:  Results average n/a. Patient denies polyuria and polydipsia. No wounds in lower limbs.  Most recent HbA1c was   Hemoglobin A1C   Date Value Ref Range Status   03/04/2025 8.1 (H) 0 - 5.6 % Final     Comment:     Reference Range  Normal       <5.7%  Prediabetes  5.7-6.4%  Diabetes     >6.4%         Hypertension  Patient is in for Hypertension which is stable.    Diet and Lifestyle: generally follows a low sodium diet  Home BP Monitoring: is not measured at home. Patient's recent blood pressures were   BP Readings from Last 3 Encounters:   07/07/25 119/63   06/11/25 (!) 108/57   05/28/25 128/64   .   taking medications as instructed, no medication side effects noted, no TIA's, no chest pain on exertion, no dyspnea on exertion, no swelling of ankles. Cardiac risk factors consist of advanced age (older than 55 for men, 65 for women), diabetes mellitus, dyslipidemia, hypertension, and male gender.  Lab Results   Component Value Date/Time     03/04/2025 09:55 AM    K 4.1 03/04/2025 09:55 AM     03/04/2025 09:55 AM    CO2 25 03/04/2025 09:55 AM    BUN 44 03/04/2025 09:55 AM    CREATININE 2.49 03/04/2025 09:55 AM    GLUCOSE 133 03/04/2025 09:55 AM    CALCIUM 10.0 03/04/2025 09:55 AM    LABGLOM 26

## 2025-08-27 ENCOUNTER — TELEPHONE (OUTPATIENT)
Dept: INTERNAL MEDICINE CLINIC | Facility: CLINIC | Age: 80
End: 2025-08-27

## (undated) DEVICE — BANDAGE GZ W2XL75IN ST RAYON POLY CNFRM STRTCH LTWT

## (undated) DEVICE — GLOVE SURG SZ 85 L12IN FNGR ORTHO 126MIL CRM LTX FREE

## (undated) DEVICE — STERILE HOOK LOCK LATEX FREE ELASTIC BANDAGE 4INX5YD: Brand: HOOK LOCK™

## (undated) DEVICE — PAD,ABDOMINAL,5"X9",ST,LF,25/BX: Brand: MEDLINE INDUSTRIES, INC.

## (undated) DEVICE — SUTURE PDS II SZ 2-0 L27IN ABSRB VLT L36MM CT-1 1/2 CIR Z339H

## (undated) DEVICE — CANNULA NSL ORAL AD FOR CAPNOFLEX CO2 O2 AIRLFE

## (undated) DEVICE — PRECISION THIN (9.0 X 0.38 X 31.0MM)

## (undated) DEVICE — SUTURE PDS II SZ 2-0 L27IN ABSRB VLT L26MM CT-2 1/2 CIR Z333H

## (undated) DEVICE — FORCEPS BX L240CM JAW DIA2.8MM L CAP W/ NDL MIC MESH TOOTH

## (undated) DEVICE — SPONGE LAP W18XL18IN WHT COT 4 PLY FLD STRUNG RADPQ DISP ST 2 PER PACK

## (undated) DEVICE — CANISTER NEG PRSS 500ML WND THER W/ TBNG NO PRSS RANG W/

## (undated) DEVICE — GLOVE ORANGE PI 7 1/2   MSG9075

## (undated) DEVICE — HANDPIECE SET WITH COAXIAL HIGH FLOW TIP AND SUCTION TUBE: Brand: INTERPULSE

## (undated) DEVICE — GOWN,SIRUS,NONRNF,SETINSLV,XL,20/CS: Brand: MEDLINE

## (undated) DEVICE — DRAPE XR CASS L UNIV FIT ADH CLSR

## (undated) DEVICE — PREMIUM WET SKIN PREP TRAY: Brand: MEDLINE INDUSTRIES, INC.

## (undated) DEVICE — GLOVE SURG SZ 8 L12IN FNGR THK79MIL GRN LTX FREE

## (undated) DEVICE — CONTAINER PREFIL FRMLN 40ML --

## (undated) DEVICE — DRESSING,GAUZE,XEROFORM,CURAD,1"X8",ST: Brand: CURAD

## (undated) DEVICE — SOLUTION IRRIG 3000ML 0.9% SOD CHL USP UROMATIC PLAS CONT

## (undated) DEVICE — FOOT & ANKLE SOFT DR WOMACK: Brand: MEDLINE INDUSTRIES, INC.

## (undated) DEVICE — BANDAGE,GAUZE,BULKEE II,4.5"X4.1YD,STRL: Brand: MEDLINE

## (undated) DEVICE — PRECISION THIN, OFFSET (5.5 X 0.38 X 25.0MM)

## (undated) DEVICE — 3M™ IOBAN™ 2 ANTIMICROBIAL INCISE DRAPE 6650EZ: Brand: IOBAN™ 2

## (undated) DEVICE — DRESSING PETRO W3XL8IN OIL EMUL N ADH GZ KNIT IMPREG CELOS

## (undated) DEVICE — GLOVE ORTHO 8   MSG9480

## (undated) DEVICE — SNARE POLYP SM W13MMXL240CM SHTH DIA2.4MM OVL FLX DISP

## (undated) DEVICE — ZIMMER® STERILE DISPOSABLE TOURNIQUET CUFF WITH PLC, DUAL PORT, SINGLE BLADDER, 18 IN. (46 CM)

## (undated) DEVICE — SPONGE GZ W4XL4IN COT 12 PLY TYP VII WVN C FLD DSGN STERILE

## (undated) DEVICE — SOLUTION IRRIG 1000ML 0.9% SOD CHL USP POUR PLAS BTL

## (undated) DEVICE — CONNECTOR TBNG OD5-7MM O2 END DISP

## (undated) DEVICE — PADDING CAST W4INXL4YD HIGHLY ABSRB THAN COT EZ APPL

## (undated) DEVICE — BASIC SINGLE BASIN-LF: Brand: MEDLINE INDUSTRIES, INC.

## (undated) DEVICE — DRAIN SURG PENROSE 0.25X12 IN CLOSED WND DRAINAGE PREM SIL

## (undated) DEVICE — BANDAGE,GAUZE,CONFORMING,3"X75",STRL,LF: Brand: MEDLINE

## (undated) DEVICE — 7 DAY SILVER-COATED ANTIMICROBIAL BARRIER DRESSING: Brand: ACTICOAT 7  4" X 5"

## (undated) DEVICE — NDL PRT INJ NSAF BLNT 18GX1.5 --

## (undated) DEVICE — ZIMMER® STERILE DISPOSABLE TOURNIQUET CUFF WITH PLC, DUAL PORT, SINGLE BLADDER, 30 IN. (76 CM)

## (undated) DEVICE — LOWER EXTREMITY: Brand: MEDLINE INDUSTRIES, INC.

## (undated) DEVICE — STOCKINETTE ORTH W9XL36IN COT 2 PLY HLLW FOR HANDLING LMB

## (undated) DEVICE — DRAPE,INSTRUMENT,MAGNETIC,10X16: Brand: MEDLINE

## (undated) DEVICE — DRAPE,EXTREMITY,BILATERAL,ORTHOMAX: Brand: MEDLINE

## (undated) DEVICE — BANDAGE,ELASTIC,ESMARK,STERILE,4"X9',LF: Brand: MEDLINE

## (undated) DEVICE — BLOCK BITE AD 60FR W/ VELC STRP ADDRESSES MOST PT AND

## (undated) DEVICE — KENDALL RADIOLUCENT FOAM MONITORING ELECTRODE RECTANGULAR SHAPE: Brand: KENDALL

## (undated) DEVICE — CLOTH PRE OP W9XL10.5IN 2% CHG FRAGRANCE RNS FREE READYPREP

## (undated) DEVICE — PADDING CAST COHESIVE 4 YDX3 IN HND TEARABLE COTTON SPEC 100

## (undated) DEVICE — SHEET, DRAPE, SPLIT, STERILE: Brand: MEDLINE

## (undated) DEVICE — BNDG,ELSTC,MATRIX,STRL,3"X5YD,LF,HOOK&LP: Brand: MEDLINE

## (undated) DEVICE — GOWN,REINFORCED,POLY,AURORA,XXLARGE,STR: Brand: MEDLINE

## (undated) DEVICE — PADDING CAST W6INXL4YD ST COT COHESIVE HND TEARABLE SPEC

## (undated) DEVICE — BANDAGE COMPR L5YDXW2IN FOAM CO FLX

## (undated) DEVICE — STERILE PVP: Brand: MEDLINE INDUSTRIES, INC.

## (undated) DEVICE — BNDG,ELSTC,MATRIX,STRL,6"X5YD,LF,HOOK&LP: Brand: MEDLINE

## (undated) DEVICE — VASCULAR AMP, I&D: Brand: MEDLINE INDUSTRIES, INC.

## (undated) DEVICE — BANDAGE COBAN 4 IN COMPR W4INXL5YD FOAM COHESIVE QUIK STK SELF ADH SFT

## (undated) DEVICE — KIT DRSG SM W12.5XH3.2XL18CM VAC SH DRP PD W/ CONN DISP RUL

## (undated) DEVICE — STERILE LATEX POWDER FREE SURGICAL GLOVES WITH HYDROGEL COATING: Brand: PROTEXIS

## (undated) DEVICE — REM POLYHESIVE ADULT PATIENT RETURN ELECTRODE: Brand: VALLEYLAB

## (undated) DEVICE — BANDAGE COMPR W4INXL12FT E DISP ESMARCH EVEN

## (undated) DEVICE — STRYKER PERFORMANCE SERIES SAGITTAL BLADE: Brand: STRYKER PERFORMANCE SERIES

## (undated) DEVICE — STERILE POLYISOPRENE POWDER-FREE SURGICAL GLOVES: Brand: PROTEXIS